# Patient Record
Sex: MALE | Race: WHITE | NOT HISPANIC OR LATINO | Employment: UNEMPLOYED | URBAN - METROPOLITAN AREA
[De-identification: names, ages, dates, MRNs, and addresses within clinical notes are randomized per-mention and may not be internally consistent; named-entity substitution may affect disease eponyms.]

---
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
## 2021-02-22 ENCOUNTER — TRANSFERRED RECORDS (OUTPATIENT)
Dept: HEALTH INFORMATION MANAGEMENT | Facility: CLINIC | Age: 21
End: 2021-02-22

## 2021-02-22 LAB
ALT SERPL-CCNC: 20 U/L (ref 4–50)
AST SERPL-CCNC: 35 U/L (ref 12–35)
CHOLEST SERPL-MCNC: 196 MG/DL (ref 90–200)
CREAT SERPL-MCNC: 0.9 MG/DL (ref 0.6–1.2)
GLUCOSE SERPL-MCNC: 84 MG/DL (ref 60–115)
HDLC SERPL-MCNC: 62 MG/DL
LDLC SERPL CALC-MCNC: 125 MG/DL
POTASSIUM SERPL-SCNC: 4.3 MMOL/L (ref 3.6–5.1)
TRIGL SERPL-MCNC: 44 MG/DL (ref 40–197)
TSH SERPL-ACNC: 1.84 ULU/ML (ref 0.27–4.2)

## 2021-03-15 ENCOUNTER — TRANSFERRED RECORDS (OUTPATIENT)
Dept: HEALTH INFORMATION MANAGEMENT | Facility: CLINIC | Age: 21
End: 2021-03-15
Payer: COMMERCIAL

## 2021-04-21 ENCOUNTER — TRANSFERRED RECORDS (OUTPATIENT)
Dept: HEALTH INFORMATION MANAGEMENT | Facility: CLINIC | Age: 21
End: 2021-04-21
Payer: COMMERCIAL

## 2021-05-07 ENCOUNTER — TRANSFERRED RECORDS (OUTPATIENT)
Dept: HEALTH INFORMATION MANAGEMENT | Facility: CLINIC | Age: 21
End: 2021-05-07

## 2021-05-07 ENCOUNTER — MEDICAL CORRESPONDENCE (OUTPATIENT)
Dept: HEALTH INFORMATION MANAGEMENT | Facility: CLINIC | Age: 21
End: 2021-05-07

## 2021-05-10 ENCOUNTER — TRANSCRIBE ORDERS (OUTPATIENT)
Dept: OTHER | Age: 21
End: 2021-05-10

## 2021-05-10 DIAGNOSIS — G24.9 DYSTONIA: Primary | ICD-10-CM

## 2021-05-14 NOTE — TELEPHONE ENCOUNTER
RECORDS RECEIVED FROM: External   Date of Appt: 5/18/21   NOTES (FOR ALL VISITS) STATUS DETAILS   OFFICE NOTE from referring provider Received Tatyana MEDEL @ [facility]:  5/7/21  3/15/21  2/22/21   OFFICE NOTE from other specialist Received Dr Lonnie Parr @ [facility]:  6/17/19 2/22/19    Dr Colby Elizabeth @ [facility]:  7/25/18 5/29/18 1/31/18   DISCHARGE SUMMARY from hospital N/A    DISCHARGE REPORT from the ER Received [facility]:  3/26/18  Removal and Replacement of pulse generator    [facility]:  12/12/13  DBS Placement    [facility]:  12/2/13  DBS Placement   OPERATIVE REPORT N/A    MEDICATION LIST Care Everywhere    IMAGING  (FOR ALL VISITS)     EMG N/A    EEG N/A    LUMBAR PUNCTURE N/A    AKILA SCAN N/A    ULTRASOUND (CAROTID BILAT) *VASCULAR* N/A    MRI (HEAD, NECK, SPINE) Received [facility]:  MRI Head 12/2/13  MRI Head 11/27/13  MRI Complete Spine 11/27/13  MRI Head 2/11/13  MRI Head 8/24/10   CT (HEAD, NECK, SPINE) Received [facility]:  CT Head 12/2/13      Action 5/14/21 MV 7.18am   Action Taken Records request faxed to [facility]     Action 5/17/21 MV 1.55pm   Action Taken Records received from [facility] via fax. Sent to scanning    Waiting for images.     Action 5/27/21 MV 10.36am   Action Taken Images received from [facility] and resolved in PACS

## 2021-05-18 ENCOUNTER — OFFICE VISIT (OUTPATIENT)
Dept: NEUROLOGY | Facility: CLINIC | Age: 21
End: 2021-05-18
Attending: PHYSICIAN ASSISTANT
Payer: COMMERCIAL

## 2021-05-18 ENCOUNTER — PRE VISIT (OUTPATIENT)
Dept: NEUROLOGY | Facility: CLINIC | Age: 21
End: 2021-05-18

## 2021-05-18 VITALS
SYSTOLIC BLOOD PRESSURE: 110 MMHG | DIASTOLIC BLOOD PRESSURE: 60 MMHG | WEIGHT: 131 LBS | RESPIRATION RATE: 16 BRPM | HEART RATE: 76 BPM | OXYGEN SATURATION: 97 %

## 2021-05-18 DIAGNOSIS — G24.3 CERVICAL DYSTONIA: ICD-10-CM

## 2021-05-18 DIAGNOSIS — G24.1 DYSTONIA, TORSION, FRAGMENTS OF: ICD-10-CM

## 2021-05-18 DIAGNOSIS — G24.1 GENERALIZED TORSION DYSTONIA: Primary | ICD-10-CM

## 2021-05-18 DIAGNOSIS — R26.9 GAIT DIFFICULTY: ICD-10-CM

## 2021-05-18 PROCEDURE — 99205 OFFICE O/P NEW HI 60 MIN: CPT | Mod: 25 | Performed by: PSYCHIATRY & NEUROLOGY

## 2021-05-18 PROCEDURE — 95970 ALYS NPGT W/O PRGRMG: CPT | Mod: GC | Performed by: PSYCHIATRY & NEUROLOGY

## 2021-05-18 RX ORDER — CARBIDOPA AND LEVODOPA 25; 100 MG/1; MG/1
TABLET ORAL 4 TIMES DAILY
COMMUNITY
Start: 2021-05-07 | End: 2021-06-18

## 2021-05-18 RX ORDER — SERTRALINE HYDROCHLORIDE 25 MG/1
50 TABLET, FILM COATED ORAL EVERY MORNING
Status: ON HOLD | COMMUNITY
Start: 2021-05-09 | End: 2022-04-11

## 2021-05-18 ASSESSMENT — PAIN SCALES - GENERAL: PAINLEVEL: NO PAIN (0)

## 2021-05-18 NOTE — LETTER
2021       RE: Bradley Garcia  [address]  [facility] [address]     Dear Colleague,    Thank you for referring your patient, Bradley Garcia, to the [facility] NEUROLOGY CLINIC [facility]. Please see a copy of my visit note below.    Department of Neurology  Movement Disorders Division   Initial Clinic Evaluation     Patient: Bradley Garcia   MRN: [id number]   : 2000   Date of Visit: 2021     Referring Provider: Tatyana Low PA-C     Dear Tatyana Low , Thank you for your referral of Bradley Garcia for dystonia.    History of Present Illness  Mr. Garcia is a 20 year old male with childhood onset of generalized dystonia with dystonic head tremor , s/p bilateral GPi DBS 2013 at [facility] who is transitioning care to [facility] .    He presents today accompanied by his father who helps with history . Bradley met his childhood milestones on time, pregnancy and delivery was uneventful .    When he start crawling he kept left fist closed , things progressed to the point that he was not using his left arm, and with progression to the right arm.When Bradley was 10-11 years of age he was noted to drag his left leg. In  at 13 years of age he underwent bilateral GPi DBS implantation at [facility]. After surgery he noted almost 80% benefit in improvement of dystonia symptoms but unfortunately lost benefit in 2-3 months after surgery. He states handwriting improved right after surgery. He denies difficulty swallowing .   In 2018 generator was changed to [employer].    He mentioned that he turned his DBS for 1 week at one point and dystonia got only slightly worse.    He received Botulinum toxin injection for dystonic head tremor wtih great benefit , he stopped a couple of years ago for unclear reason. Bradley and his father are interested in further programming options in order to  help with dystonia symptoms. The most bothersome symptom is the neck pulling and pain.    He has 3 programs, his preferred Program is A ( Active Program).    FHx: brother with Di Elkin syndrome, sister - no issues .    Review of Systems:  Other than that mentioned above and at the end of this note, the remainder of 12 systems reviewed were negative.    Medications:  Current Outpatient Medications   Medication Sig Dispense Refill     carbidopa-levodopa (SINEMET)  MG tablet 4 times daily        sertraline (ZOLOFT) 25 MG tablet Take 25 mg by mouth daily          Allergies: has No Known Allergies.    Past Medical History:   Past Medical History:   Diagnosis Date     Depression      PONV (postoperative nausea and vomiting)      S/P deep brain stimulator placement      Torsion dystonia        Past Surgical History:   Past Surgical History:   Procedure Laterality Date     ANESTHESIA OUT OF OR MRI       EYE SURGERY       NOSE SURGERY      nasal fracture repair     STEREOTACTIC INSERTION DEEP BRAIN STIMULATION         Social History:   Social History     Socioeconomic History     Marital status: Single     Spouse name: Not on file     Number of children: Not on file     Years of education: Not on file     Highest education level: Not on file   Occupational History     Not on file   Social Needs     Financial resource strain: Not on file     Food insecurity     Worry: Not on file     Inability: Not on file     Transportation needs     Medical: Not on file     Non-medical: Not on file   Tobacco Use     Smoking status: Not on file   Substance and Sexual Activity     Alcohol use: Not on file     Drug use: Not on file     Sexual activity: Not on file   Lifestyle     Physical activity     Days per week: Not on file     Minutes per session: Not on file     Stress: Not on file   Relationships     Social connections     Talks on phone: Not on file     Gets together: Not on file     Attends Jew service: Not on file      Active member of club or organization: Not on file     Attends meetings of clubs or organizations: Not on file     Relationship status: Not on file     Intimate partner violence     Fear of current or ex partner: Not on file     Emotionally abused: Not on file     Physically abused: Not on file     Forced sexual activity: Not on file   Other Topics Concern     Not on file   Social History Narrative     Not on file       Family History:  Family History   Problem Relation Age of Onset     No Known Problems Mother      No Known Problems Father      No Known Problems Sister      Other - See Comments Brother         di bunny syndrome     Myocardial Infarction Maternal Grandmother      Hyperlipidemia Maternal Grandmother      Hyperlipidemia Maternal Grandfather      Unknown/Adopted Paternal Grandmother      Unknown/Adopted Paternal Grandfather      Deep Vein Thrombosis No family hx of      Anesthesia Reaction No family hx of           Physical Exam:  The patient's  weight is 59.4 kg (131 lb). His blood pressure is 110/60 and his pulse is 76. His respiration is 16 and oxygen saturation is 97%.    Neurological Examination: He is alert and oriented, speech is dysarthric , follows commands.  He has severe retrocollis, left latero wally at 45 degrees and right torticollis  ~30 degrees. He has right shoulder elevation.  He has multidirectional, jerky head movements consistent with dystonic tremor .  Right hand posturing . Left hand - difficulty opening .  Left truncal flexion during ambulation.    Data Reviewed:   Records from 12/13/2013 [facility] : negative THAP1 and TOR 1 gene for genetic dystonia , Duplication of Chromosome 22 , negative genetic testing for myotonic dystrophy and negative for fascioscapulohumeral dystrophy .    Procedure: DBS Programming    Lead(s):    Left Right   DBS Target GPi    GPi      DBS Lead Type Medtronic 3387       Medtronic 3387    Lead Implant Date 12/12/ 2013 12/12/ 2013         IPG(s):   1 2   IPG    Activa RC       IPG Implant Date    03/26/2018       Location Right chest           Battery (V)    25%         Full Impedence/Currents Check: no issues were found in patient's leads    Initial and final Settings:  Left Brain Program A Program B Program C Program D   Contacts  C+1,2 neg  C pos 1,2 neg   Cpos 1,2 neg  Cpos 1,2 neg    Amplitude (V)  6.0   5.0  6.0  5.0   Pulse Width ( s)  120  120   120  120   Frequency (Hz)  60  100    100   60   Impedence/ Currents 602 / 9.8  /   /   /      Right Brain Program A Program B Program C Program D   Contacts  9 neg, 10 pos   Cpos 9, 10 neg   9 neg 10 pos    C pos, 9, 10 neg   Amplitude (V)  7.5  5.0  7.5   5.0   Pulse Width ( s)  120  120  120  120   Frequency (Hz)  60  100  100  60   Impedence/ Currents 1327 / 5.7  /   /   /      Initial and final program selected:    Program A .       Impression:  Bradley Garcia is a 20 year old male with Childhood onset of generalized dystonia with dystonic head tremor , s/p bilateral GPi DBS 12/2013 at [facility] who is transitioning care to [facility].    Recommendations:     1. Will require a 2 hr programming appointment with Dr. Aleman.    2. Started DBS work up for possible lead revision .      Thank you for the opportunity to share in the care of this patient.  Please feel free to contact me if you have any further questions or comments.    Sincerely,    May Polo MD   Movement Disorders Fellow       Neurology Attending Attestation:     I, Viviana Aleman MD, personally saw this patient with our Movement Disorders Fellow and agree with the fellow's findings and plan of care as documented in the movement disorder fellow's note. I personally performed salient aspects of the history and neurological examination.     I personally reviewed the vital signs, medications, and labs. I personally viewed the imaging, and agree with the interpretation documented by the fellow.    20-year-old  right-handed man with childhood onset dystonia and dystonic tremor (left hand/arm onset when crawling, gait involvement left leg dragging onset at 10-11, spread to cervical age 14, after DBS; axial involvement, need to clarify onset) status post bilateral (GPi?) DBS at age 12/13 at [facility]. 80% benefit for 2-3 months, none thereafter. Has even turned DBS off for at least 1 week duration with minimal change.    Exam: generalized dystonia (left leg, axial [prominent bending to the left with gait], cervical dystonia [marked retrocollis, left laterocollis, right torticollis], bilateral distal arm [left worse than right]) with prominent irregular, jerky dystonic tremor involving the trunk and neck.    History and exam consistent with idiopathic generalized dystonia with dystonic tremor, childhood onset. Description of only 2-3 month benefit after initial DBS surgery despite programming suggests that DBS leads are not in the GPi, or not in the somatomotor GPi. Plan: imaging to map out lead location (Stereotactic brain 1.5T MRI at [facility] with transmit-receive only head coil--to define anatomy, and stereotactic head CT-to most accurately delineate lead location after merging with MRI). We will also initiate the remainder of our DBS evaluation (except wait on neurosurgery evaluation until we have lead locations and have tried programming troubleshooting using lead locations), including videotaped rating scales, neuropsychological testing.     If lead locations are not in somatomotor GPi, as suspected, then after discussion with our DBS consensus team it is likely we will recommend lead removal. Interval 7T imaging for targeting once current DBS system is removed, followed by reimplantation of a new system with CAMILLE guidance for accurate placement in the somatomotor GPi. With GPi stimulation, it is anticipated that he will have significant improvement in his dystonia.    75 minutes spent on the date of the encounter  doing chart review, history and exam, documentation and further activities as noted above    Additional time spent for separate DBS programming: DBS interrogated without reprogramming.    Viviana Aleman MD    of Neurology

## 2021-05-18 NOTE — PATIENT INSTRUCTIONS
1. We started the DBS work up for lead revision.     2. We ordered MRI brain, CT head , you will require intravenous sedation .    3. You will be seen by one of our neurosurgeons and neurophysiologist.

## 2021-05-18 NOTE — PROGRESS NOTES
Department of Neurology  Movement Disorders Division   Initial Clinic Evaluation     Patient: Bradley Garcia   MRN: [id number]   : 2000   Date of Visit: 2021     Referring Provider: Tatyana Low PA-C     Dear Tatyana Low , Thank you for your referral of Bradley Garcia for dystonia.    History of Present Illness  Mr. Garcia is a 20 year old male with childhood onset of generalized dystonia with dystonic head tremor , s/p bilateral GPi DBS 2013 at [facility] who is transition[facility] care to [facility]  .    He presents today accompanied by his father who helps with history . Bradley met his childhood milestones on time, pregnancy and delivery was uneventful .    When he start crawling he kept left fist closed , things progressed to the point that he was not using his left arm, and with progression to the right arm.When Bradley was 10-11 years of age he was noted to drag his left leg. In  at 13 years of age he underwent bilateral GPi DBS implantation at [facility]. After surgery he noted almost 80% benefit in improvement of dystonia symptoms but unfortunately lost benefit in 2-3 months after surgery. He states handwriting improved right after surgery. He denies difficulty swallowing .   In 2018 generator was changed to [employer].    He mentioned that he turned his DBS for 1 week at one point and dystonia got only slightly worse.    He received Botulinum toxin injection for dystonic head tremor wtih great benefit , he stopped a couple of years ago for unclear reason. Bradley and his father are interested in further programming options in order to help with dystonia symptoms. The most bothersome symptom is the neck pulling and pain.    He has 3 programs, his preferred Program is A ( Active Program).    FHx: brother with Di Elkin syndrome, sister - no issues .    Review of Systems:  Other than that mentioned above and at the end of this note, the remainder of 12 systems  reviewed were negative.    Medications:  Current Outpatient Medications   Medication Sig Dispense Refill     carbidopa-levodopa (SINEMET)  MG tablet 4 times daily        sertraline (ZOLOFT) 25 MG tablet Take 25 mg by mouth daily          Allergies: has No Known Allergies.    Past Medical History:   Past Medical History:   Diagnosis Date     Depression      PONV (postoperative nausea and vomiting)      S/P deep brain stimulator placement      Torsion dystonia        Past Surgical History:   Past Surgical History:   Procedure Laterality Date     ANESTHESIA OUT OF OR MRI       EYE SURGERY       NOSE SURGERY      nasal fracture repair     STEREOTACTIC INSERTION DEEP BRAIN STIMULATION         Social History:   Social History     Socioeconomic History     Marital status: Single     Spouse name: Not on file     Number of children: Not on file     Years of education: Not on file     Highest education level: Not on file   Occupational History     Not on file   Social Needs     Financial resource strain: Not on file     Food insecurity     Worry: Not on file     Inability: Not on file     Transportation needs     Medical: Not on file     Non-medical: Not on file   Tobacco Use     Smoking status: Not on file   Substance and Sexual Activity     Alcohol use: Not on file     Drug use: Not on file     Sexual activity: Not on file   Lifestyle     Physical activity     Days per week: Not on file     Minutes per session: Not on file     Stress: Not on file   Relationships     Social connections     Talks on phone: Not on file     Gets together: Not on file     Attends Hoahaoism service: Not on file     Active member of club or organization: Not on file     Attends meetings of clubs or organizations: Not on file     Relationship status: Not on file     Intimate partner violence     Fear of current or ex partner: Not on file     Emotionally abused: Not on file     Physically abused: Not on file     Forced sexual activity: Not on  file   Other Topics Concern     Not on file   Social History Narrative     Not on file       Family History:  Family History   Problem Relation Age of Onset     No Known Problems Mother      No Known Problems Father      No Known Problems Sister      Other - See Comments Brother         di bunny syndrome     Myocardial Infarction Maternal Grandmother      Hyperlipidemia Maternal Grandmother      Hyperlipidemia Maternal Grandfather      Unknown/Adopted Paternal Grandmother      Unknown/Adopted Paternal Grandfather      Deep Vein Thrombosis No family hx of      Anesthesia Reaction No family hx of           Physical Exam:  The patient's  weight is 59.4 kg (131 lb). His blood pressure is 110/60 and his pulse is 76. His respiration is 16 and oxygen saturation is 97%.    Neurological Examination: He is alert and oriented, speech is dysarthric , follows commands.  He has severe retrocollis, left latero wally at 45 degrees and right torticollis  ~30 degrees. He has right shoulder elevation.  He has multidirectional, jerky head movements consistent with dystonic tremor .  Right hand posturing . Left hand - difficulty opening .  Left truncal flexion during ambulation.    Data Reviewed:   Records from 12/13/2013 [facility] : negative THAP1 and TOR 1 gene for genetic dystonia , Duplication of Chromosome 22 , negative genetic testing for myotonic dystrophy and negative for fascioscapulohumeral dystrophy .    Procedure: DBS Programming    Lead(s):    Left Right   DBS Target GPi    GPi      DBS Lead Type Medtronic 3387       Medtronic 3387    Lead Implant Date 12/12/ 2013 12/12/ 2013        IPG(s):   1 2   IPG    Activa RC       IPG Implant Date    03/26/2018       Location Right chest           Battery (V)    25%         Full Impedence/Currents Check: no issues were found in patient's leads    Initial and final Settings:  Left Brain Program A Program B Program C Program D   Contacts  C+1,2 neg  C pos 1,2 neg   Cpos  1,2 neg  Cpos 1,2 neg    Amplitude (V)  6.0   5.0  6.0  5.0   Pulse Width ( s)  120  120   120  120   Frequency (Hz)  60  100    100   60   Impedence/ Currents 602 / 9.8  /   /   /      Right Brain Program A Program B Program C Program D   Contacts  9 neg, 10 pos   Cpos 9, 10 neg   9 neg 10 pos    C pos, 9, 10 neg   Amplitude (V)  7.5  5.0  7.5   5.0   Pulse Width ( s)  120  120  120  120   Frequency (Hz)  60  100  100  60   Impedence/ Currents 1327 / 5.7  /   /   /      Initial and final program selected:    Program A .       Impression:  Bradley Garcia is a 20 year old male with Childhood onset of generalized dystonia with dystonic head tremor , s/p bilateral GPi DBS 12/2013 at [facility] who is transitioning care to [facility].    Recommendations:     1. Will require a 2 hr programming appointment with Dr. Aleman.    2. Started DBS work up for possible lead revision .      Thank you for the opportunity to share in the care of this patient.  Please feel free to contact me if you have any further questions or comments.    Sincerely,    May Polo MD   Movement Disorders Fellow       Neurology Attending Attestation:     I, Viviana Aleman MD, personally saw this patient with our Movement Disorders Fellow and agree with the fellow's findings and plan of care as documented in the movement disorder fellow's note. I personally performed salient aspects of the history and neurological examination.     I personally reviewed the vital signs, medications, and labs. I personally viewed the imaging, and agree with the interpretation documented by the fellow.    20-year-old right-handed man with childhood onset dystonia and dystonic tremor (left hand/arm onset when crawling, gait involvement left leg dragging onset at 10-11, spread to cervical age 14, after DBS; axial involvement, need to clarify onset) status post bilateral (GPi?) DBS at age 12/13 at [facility]. 80% benefit for 2-3 months, none  thereafter. Has even turned DBS off for at least 1 week duration with minimal change.    Exam: generalized dystonia (left leg, axial [prominent bending to the left with gait], cervical dystonia [marked retrocollis, left laterocollis, right torticollis], bilateral distal arm [left worse than right]) with prominent irregular, jerky dystonic tremor involving the trunk and neck.    History and exam consistent with idiopathic generalized dystonia with dystonic tremor, childhood onset. Description of only 2-3 month benefit after initial DBS surgery despite programming suggests that DBS leads are not in the GPi, or not in the somatomotor GPi. Plan: imaging to map out lead location (Stereotactic brain 1.5T MRI at [facility] with transmit-receive only head coil--to define anatomy, and stereotactic head CT-to most accurately delineate lead location after merging with MRI). We will also initiate the remainder of our DBS evaluation (except wait on neurosurgery evaluation until we have lead locations and have tried programming troubleshooting using lead locations), including videotaped rating scales, neuropsychological testing.     If lead locations are not in somatomotor GPi, as suspected, then after discussion with our DBS consensus team it is likely we will recommend lead removal. Interval 7T imaging for targeting once current DBS system is removed, followed by reimplantation of a new system with CAMILLE guidance for accurate placement in the somatomotor GPi. With GPi stimulation, it is anticipated that he will have significant improvement in his dystonia.    75 minutes spent on the date of the encounter doing chart review, history and exam, documentation and further activities as noted above    Additional time spent for separate DBS programming: DBS interrogated without reprogramming.    Viviana Aleman MD    of Neurology

## 2021-05-18 NOTE — NURSING NOTE
Authorization to discuss form and MyChart proxy access form completed and signed to discuss information and give MyChart access to Sonny (father) landen Levin (mother).

## 2021-05-18 NOTE — NURSING NOTE
Chief Complaint   Patient presents with     Consult     P NEW MOVEMENT DISORDER      Olvin Cosme

## 2021-05-19 ENCOUNTER — TELEPHONE (OUTPATIENT)
Dept: NEUROLOGY | Facility: CLINIC | Age: 21
End: 2021-05-19

## 2021-05-19 NOTE — TELEPHONE ENCOUNTER
Writer left a message for the patient to call back. Patient needs to schedule MRI again as the last  scheduled the patient incorrectly.

## 2021-05-20 ENCOUNTER — PRE VISIT (OUTPATIENT)
Dept: SURGERY | Facility: CLINIC | Age: 21
End: 2021-05-20

## 2021-05-20 ENCOUNTER — TELEPHONE (OUTPATIENT)
Dept: NEUROLOGY | Facility: CLINIC | Age: 21
End: 2021-05-20

## 2021-05-20 ENCOUNTER — ANESTHESIA EVENT (OUTPATIENT)
Dept: SURGERY | Facility: CLINIC | Age: 21
End: 2021-05-20

## 2021-05-20 ENCOUNTER — PRE VISIT (OUTPATIENT)
Dept: NEUROSURGERY | Facility: CLINIC | Age: 21
End: 2021-05-20

## 2021-05-20 ASSESSMENT — LIFESTYLE VARIABLES: TOBACCO_USE: 0

## 2021-05-20 ASSESSMENT — COPD QUESTIONNAIRES: COPD: 0

## 2021-05-20 NOTE — TELEPHONE ENCOUNTER
FUTURE VISIT INFORMATION      SURGERY INFORMATION:    MRI - Neurology     6/9/21.    [facility] [place]    Consult: ov 5/18    RECORDS REQUESTED FROM:       Primary Care Provider: [facility]

## 2021-05-20 NOTE — TELEPHONE ENCOUNTER
Patient was informed that his MRI needed to be canceled because it was scheduled incorrectly previously.    The patient was scheduled on 6/16/21 at noon.    Writer left a message on the patient's voicemail to call back to set up a Deep Brain Stimulation interrogation time with the Deep Brain Stimulation nurse prior to the MRI.

## 2021-05-20 NOTE — TELEPHONE ENCOUNTER
FUTURE VISIT INFORMATION      FUTURE VISIT INFORMATION:    Date: 6/1/2021    Time: 830am    [place] [facility] [place]  REFERRAL INFORMATION:    Referring provider:  Dr. Aleman    Referring providers clinic:  [facility]     Reason for visit/diagnosis  DBS     RECORDS REQUESTED FROM:       Clinic name Comments Records Status Imaging Status   Internal Dr. Aleman-5/18/2021 Epic N/A

## 2021-05-20 NOTE — TELEPHONE ENCOUNTER
What we need:  -Deep Brain Stimulation Lead model and serial number:   -Deep Brain Stimulation battery model and serial number:  -Monopolar Survey on each side  -Last couple programming visits    -Notes we have from the 7/26/2018 programming visit at [facility] visit stated programming notes were lost in clinic before documented in the EMR  -Saw Dr. Elizabeth    Called Neurology at [facility] and spoke to Tamela, Nurse. Tamela stated there system only goes back to 2018. His notes are otherwise archived and will need to come from medical records. She was not able to verify his Deep Brain Stimulation system model or serial numbers. Medical records transferred me to their release of information department. Voicemail left regarding what we need specifically for this patient for continuity of care. I asked that they call me back.    [facility] Records: [phone]  [facility]: [phone]  [facility] MR: [id number]

## 2021-05-25 DIAGNOSIS — Z11.59 ENCOUNTER FOR SCREENING FOR OTHER VIRAL DISEASES: ICD-10-CM

## 2021-05-26 NOTE — TELEPHONE ENCOUNTER
"Current generator: Activa RC - implanted March 26 th 2018 Serial number [id number], Model: 37553    BGPI: Lead model (on each side): 3387    Called [employer] Provider Support line and spoke to Mendoza. Mendoza confirmed the above system and reported he should not have a pocket adapter with his system. \"His inline extension wire model is [id number] and he was originally implanted with an Activa PC so I see no reason why he would have had a pocket adapter implanted.\"        "

## 2021-05-27 ENCOUNTER — DOCUMENTATION ONLY (OUTPATIENT)
Dept: NEUROLOGY | Facility: CLINIC | Age: 21
End: 2021-05-27

## 2021-05-27 NOTE — PROGRESS NOTES
Received records from [facility]  Records Date of service: 5/20/21  Copy has been sent to scanning and encounter routed to specialty nurse for review.

## 2021-06-01 ENCOUNTER — VIRTUAL VISIT (OUTPATIENT)
Dept: NEUROPSYCHOLOGY | Facility: CLINIC | Age: 21
End: 2021-06-01
Payer: COMMERCIAL

## 2021-06-01 ENCOUNTER — VIRTUAL VISIT (OUTPATIENT)
Dept: NEUROSURGERY | Facility: CLINIC | Age: 21
End: 2021-06-01
Payer: COMMERCIAL

## 2021-06-01 DIAGNOSIS — G24.1 DYSTONIA, TORSION, FRAGMENTS OF: Primary | ICD-10-CM

## 2021-06-01 DIAGNOSIS — F09 MENTAL DISORDER DUE TO GENERAL MEDICAL CONDITION: ICD-10-CM

## 2021-06-01 DIAGNOSIS — G24.9 DYSTONIA: Primary | ICD-10-CM

## 2021-06-01 PROCEDURE — 99207 PR NO CHARGE LOS: CPT

## 2021-06-01 PROCEDURE — 99204 OFFICE O/P NEW MOD 45 MIN: CPT | Mod: GT | Performed by: NEUROLOGICAL SURGERY

## 2021-06-01 NOTE — PROGRESS NOTES
Neurosurgery Attending DBS Consult    No chief complaint on file.      HISTORY OF PRESENT ILLNESS  Bradley Garcia is a 21 y/o right handed young man with idiopathic generalized dystonia s/p bilateral GPi DBS in  at [facility] who comes for neurosurgical consultation regarding possible lead revision. Of note, this was a video visit. His parents noted when he was 3 that he was not meeting his motor milestones, in particular, dragging his left leg and not moving his left side as well in general. 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 He underwent DBS at about age 13 and got marked improvement for about 2-3 months with subsequent return to baseline function. No programming changes have helped since then. He turned the DBS for a week at one point and noted no difference in his symptoms. His IPG  3 years ago and he was unaware as again, he noted no change in his symptoms.       ON/OFF testing:  Fahn/Gomez Scale (higher=worse):  Movement: 58/120  Disability: 10/30    MRI:  I imported his most recent MRI into the [employer] DBS planning platform to get a sense for where his electrodes are currently. He has a high quality stereotactic (1mm cuts) T1 MRI post-operatively from 2013 as well as SWI/MIP images though the artifact from the leads is quite large on the latter images. Although I do not have all the necessary sequences to plan a GPi case, it appears the electrodes are about 1 cm anterior and a few mm medial to where we would typically target in order to hit somatomotor GPi.     Neuropsych testing: Impression pending    DBS goals:  Walk and move better, not as bothered by the tremor      Past Medical History:   Diagnosis Date     Depression      PONV (postoperative nausea and vomiting)      S/P deep brain stimulator placement      Torsion dystonia        Past Surgical  History:   Procedure Laterality Date     ANESTHESIA OUT OF OR MRI       EYE SURGERY       NOSE SURGERY      nasal fracture repair     STEREOTACTIC INSERTION DEEP BRAIN STIMULATION         Family History   Problem Relation Age of Onset     No Known Problems Mother      No Known Problems Father      No Known Problems Sister      Other - See Comments Brother         di bunny syndrome     Myocardial Infarction Maternal Grandmother      Hyperlipidemia Maternal Grandmother      Hyperlipidemia Maternal Grandfather      Unknown/Adopted Paternal Grandmother      Unknown/Adopted Paternal Grandfather      Deep Vein Thrombosis No family hx of      Anesthesia Reaction No family hx of        Social History     Socioeconomic History     Marital status: Single     Spouse name: Not on file     Number of children: 0     Years of education: Not on file     Highest education level: Not on file   Occupational History     Occupation: unemployed   Social Needs     Financial resource strain: Not on file     Food insecurity     Worry: Not on file     Inability: Not on file     Transportation needs     Medical: Not on file     Non-medical: Not on file   Tobacco Use     Smoking status: Never Smoker     Smokeless tobacco: Never Used   Substance and Sexual Activity     Alcohol use: Never     Frequency: Never     Binge frequency: Never     Drug use: Never     Sexual activity: Not on file   Lifestyle     Physical activity     Days per week: Not on file     Minutes per session: Not on file     Stress: Not on file   Relationships     Social connections     Talks on phone: Not on file     Gets together: Not on file     Attends Cheondoism service: Not on file     Active member of club or organization: Not on file     Attends meetings of clubs or organizations: Not on file     Relationship status: Not on file     Intimate partner violence     Fear of current or ex partner: Not on file     Emotionally abused: Not on file     Physically abused: Not on  file     Forced sexual activity: Not on file   Other Topics Concern     Not on file   Social History Narrative     Not on file        No Known Allergies    Current Outpatient Medications   Medication     carbidopa-levodopa (SINEMET)  MG tablet     sertraline (ZOLOFT) 25 MG tablet     No current facility-administered medications for this visit.          PHYSICAL EXAM  There were no vitals taken for this visit.    Neurological  Awake, alert and oriented to date, time, place and person. Speech fluent.           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 Would recommend obtaining a CT in addition to a high quality MRI so we can merge the images to reduce the effect of artifact and reconstruct the lead position. Just grossly examining the images we do have in the planning software, it appears the leads are quite anterior and would explain his lack of benefit. If we did decide to revise these leads, we would likely have to remove the entire system, give him some time to heal and then place the new system in standard fashion. This would consist of both phase I and II of DBS surgery. We discussed that Phase I would place the DBS electrode on the right side under MAC and microelectrode recording. He would then return one week later for phase II with placement of the DBS generator/battery over the right chest wall under general anesthesia. He  would then return three weeks later for phase I with placement of the DBS electrode on the left side under MAC and microelectrode recording.    Briefly, the following topics were discussed regarding device options. Given his dystonia, we are likely limited to the Medtr[employer] device platform.    Medtronic  MRI compatible.  Non-rechargeable and rechargeable generator/battery available.  4 contact electrode.  Communication method: have the   or patient controller on the skin directly over the generator/battery.      The risks, benefits, alternative therapies were discussed with the patient, including but not limited to infection and bleeding. Surgical procedure was discussed in detail. All questions were answered, and he  expressed understanding. His  case will be discussed at the Movement Disorder Consensus Group Meeting to determine whether he  is a good candidate for DBS surgery.     PLAN:  1. We will discuss his  case at the Movement Disorder Consensus Group Meeting, and we will contact him  regarding his DBS candidacy.

## 2021-06-01 NOTE — LETTER
2021       RE: Bradley Garcia  [id number]  [facility] [id number]     Dear Colleague,    Thank you for referring your patient, Bradley Garcia, to the [facility] [place] [facility]. Please see a copy of my visit note below.    Neurosurgery Attending DBS Consult    No chief complaint on file.      HISTORY OF PRESENT ILLNESS  Bradley Garcia is a 19 y/o right handed young man with idiopathic generalized dystonia s/p bilateral GPi DBS in  at [facility] who comes for neurosurgical consultation regarding possible lead revision. Of note, this was a video visit. His parents noted when he was 3 that he was not meeting his motor milestones, in particular, dragging his left leg and not moving his left side as well in general. 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 He underwent DBS at about age 13 and got marked improvement for about 2-3 months with subsequent return to baseline function. No programming changes have helped since then. He turned the DBS for a week at one point and noted no difference in his symptoms. His IPG  3 years ago and he was unaware as again, he noted no change in his symptoms.       ON/OFF testing:  Fahn/Gomez Scale (higher=worse):  Movement: 58/120  Disability: 10/30    MRI:  I imported his most recent MRI into the [employer] DBS planning platform to get a sense for where his electrodes are currently. He has a high quality stereotactic (1mm cuts) T1 MRI post-operatively from 2013 as well as SWI/MIP images though the artifact from the leads is quite large on the latter images. Although I do not have all the necessary sequences to plan a GPi case, it appears the electrodes are about 1 cm anterior and a few mm medial to where we would typically target in order to  hit somatomotor GPi.     Neuropsych testing: Impression pending    DBS goals:  Walk and move better, not as bothered by the tremor      Past Medical History:   Diagnosis Date     Depression      PONV (postoperative nausea and vomiting)      S/P deep brain stimulator placement      Torsion dystonia        Past Surgical History:   Procedure Laterality Date     ANESTHESIA OUT OF OR MRI       EYE SURGERY       NOSE SURGERY      nasal fracture repair     STEREOTACTIC INSERTION DEEP BRAIN STIMULATION         Family History   Problem Relation Age of Onset     No Known Problems Mother      No Known Problems Father      No Known Problems Sister      Other - See Comments Brother         di bunny syndrome     Myocardial Infarction Maternal Grandmother      Hyperlipidemia Maternal Grandmother      Hyperlipidemia Maternal Grandfather      Unknown/Adopted Paternal Grandmother      Unknown/Adopted Paternal Grandfather      Deep Vein Thrombosis No family hx of      Anesthesia Reaction No family hx of        Social History     Socioeconomic History     Marital status: Single     Spouse name: Not on file     Number of children: 0     Years of education: Not on file     Highest education level: Not on file   Occupational History     Occupation: unemployed   Social Needs     Financial resource strain: Not on file     Food insecurity     Worry: Not on file     Inability: Not on file     Transportation needs     Medical: Not on file     Non-medical: Not on file   Tobacco Use     Smoking status: Never Smoker     Smokeless tobacco: Never Used   Substance and Sexual Activity     Alcohol use: Never     Frequency: Never     Binge frequency: Never     Drug use: Never     Sexual activity: Not on file   Lifestyle     Physical activity     Days per week: Not on file     Minutes per session: Not on file     Stress: Not on file   Relationships     Social connections     Talks on phone: Not on file     Gets together: Not on file     Attends  Temple service: Not on file     Active member of club or organization: Not on file     Attends meetings of clubs or organizations: Not on file     Relationship status: Not on file     Intimate partner violence     Fear of current or ex partner: Not on file     Emotionally abused: Not on file     Physically abused: Not on file     Forced sexual activity: Not on file   Other Topics Concern     Not on file   Social History Narrative     Not on file        No Known Allergies    Current Outpatient Medications   Medication     carbidopa-levodopa (SINEMET)  MG tablet     sertraline (ZOLOFT) 25 MG tablet     No current facility-administered medications for this visit.          PHYSICAL EXAM  There were no vitals taken for this visit.    Neurological  Awake, alert and oriented to date, time, place and person. Speech fluent.           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 Would recommend obtaining a CT in addition to a high quality MRI so we can merge the images to reduce the effect of artifact and reconstruct the lead position. Just grossly examining the images we do have in the planning software, it appears the leads are quite anterior and would explain his lack of benefit. If we did decide to revise these leads, we would likely have to remove the entire system, give him some time to heal and then place the new system in standard fashion. This would consist of both phase I and II of DBS surgery. We discussed that Phase I would place the DBS electrode on the right side under MAC and microelectrode recording. He would then return one week later for phase II with placement of the DBS generator/battery over the right chest wall under general anesthesia. He  would then return three weeks later for phase I with placement of the DBS electrode on the left side under MAC and  microelectrode recording.    Briefly, the following topics were discussed regarding device options. Given his dystonia, we are likely limited to the Medtr[employer] device platform.    [employer]tronic  MRI compatible.  Non-rechargeable and rechargeable generator/battery available.  4 contact electrode.  Communication method: have the  or patient controller on the skin directly over the generator/battery.      The risks, benefits, alternative therapies were discussed with the patient, including but not limited to infection and bleeding. Surgical procedure was discussed in detail. All questions were answered, and he  expressed understanding. His  case will be discussed at the Movement Disorder Consensus Group Meeting to determine whether he  is a good candidate for DBS surgery.     PLAN:  1. We will discuss his  case at the Movement Disorder Consensus Group Meeting, and we will contact him  regarding his DBS candidacy.               Again, thank you for allowing me to participate in the care of your patient.      Sincerely,    Ravinder Coleman MD

## 2021-06-02 ENCOUNTER — TELEPHONE (OUTPATIENT)
Dept: NEUROLOGY | Facility: CLINIC | Age: 21
End: 2021-06-02

## 2021-06-02 NOTE — TELEPHONE ENCOUNTER
The patient was called and informed Dr. Aleman would like for him to do a Head CT on the day of his MRI as well. Patient was informed he was scheduled for the Head CT after his MRI is done on 6/16/21.    The patient verbalized his understanding and had no further questions.

## 2021-06-08 ENCOUNTER — OFFICE VISIT (OUTPATIENT)
Dept: NEUROPSYCHOLOGY | Facility: CLINIC | Age: 21
End: 2021-06-08
Payer: COMMERCIAL

## 2021-06-08 ENCOUNTER — MYC MEDICAL ADVICE (OUTPATIENT)
Dept: NEUROLOGY | Facility: CLINIC | Age: 21
End: 2021-06-08

## 2021-06-08 ENCOUNTER — ALLIED HEALTH/NURSE VISIT (OUTPATIENT)
Dept: NEUROLOGY | Facility: CLINIC | Age: 21
End: 2021-06-08
Payer: COMMERCIAL

## 2021-06-08 VITALS
HEART RATE: 60 BPM | BODY MASS INDEX: 21.05 KG/M2 | RESPIRATION RATE: 16 BRPM | OXYGEN SATURATION: 97 % | HEIGHT: 66 IN | WEIGHT: 131 LBS

## 2021-06-08 DIAGNOSIS — F09 MENTAL DISORDER DUE TO GENERAL MEDICAL CONDITION: ICD-10-CM

## 2021-06-08 DIAGNOSIS — G24.9 GENERALIZED DYSTONIA: Primary | ICD-10-CM

## 2021-06-08 DIAGNOSIS — G24.1 DYSTONIA, TORSION, FRAGMENTS OF: Primary | ICD-10-CM

## 2021-06-08 PROCEDURE — 96139 PSYCL/NRPSYC TST TECH EA: CPT

## 2021-06-08 PROCEDURE — 96132 NRPSYC TST EVAL PHYS/QHP 1ST: CPT

## 2021-06-08 PROCEDURE — 96138 PSYCL/NRPSYC TECH 1ST: CPT

## 2021-06-08 PROCEDURE — 90791 PSYCH DIAGNOSTIC EVALUATION: CPT

## 2021-06-08 PROCEDURE — 96133 NRPSYC TST EVAL PHYS/QHP EA: CPT

## 2021-06-08 PROCEDURE — 95970 ALYS NPGT W/O PRGRMG: CPT | Performed by: PSYCHIATRY & NEUROLOGY

## 2021-06-08 ASSESSMENT — PAIN SCALES - GENERAL: PAINLEVEL: NO PAIN (0)

## 2021-06-08 ASSESSMENT — MIFFLIN-ST. JEOR: SCORE: 1551.96

## 2021-06-08 NOTE — PROGRESS NOTES
Bradley comes into clinic today at the request of Dr. Aleman, ordering provider for follow-up visit to further evaluate his Deep Brain Stimulation system prior to his MRI. This service provided today was under the supervising provider of the day Dr. Aleman, who was available if needed.     Reason for visit: DEEP BRAIN STIMULATION Interrogation   Time spent on visit: 10 minutes    Interrogated patient's DBS Activa RC battery. Patient has BGPI. All impedances were checked at 3.0 Volts and were WN. See neuromodulation sheets scanned. Patient informed he is good to go for his MRI.    LGPI Therapy impedance = 572 ohms, 10.3 mA    Contacts used = C+/1-/2-   (Tripolar)  Upper limit = 7.0  Lower limit = 0  Current Setting = 6.0    RGPI Therapy impedance = 1,317 ohms, 5.7 mA    Contacts used = 2+/1-   (Bipolar)  Upper limit = 6.0  Lower limit = 0  Current Setting = 5.0    Model 20522    Battery voltage 75%    Mana Egan RN     Movement Disorder Care Coordinator  [facility]

## 2021-06-08 NOTE — LETTER
2021      RE: Bradley Garcia  [address] [facility] [id number]       Pt was seen for neuropsychological evaluation at the request of Viviana Aleman MD for the purposes of diagnostic clarification and treatment planning. 255 minutes of test administration and scoring were provided by this writer. Please see Dr. Katarina Klein's report for a full interpretation of the findings.      Iesha Bush  Psychometrist      NEUROPSYCHOLOGICAL EVALUATION    RELEVANT HISTORY AND REASON FOR REFERRAL    Bradley Garcia is a 20 year old, right handed, [employer]  with 12 years of formal education. Information was obtained via interview with the patient and review of his medical records. Records indicate a history of generalized dystonia. His parents have reported that they first brought him to the doctor because before the age of [age], he was crawling and not walking, and was not using his left hand the way he used his right hand. He is s/p bilateral Gpi DBS implantation in . He reportedly had good benefit for three months, until it wore off. He has had multiple programmings done at [facility] and [facility], without regaining benefit. He was not aware that his 0battery  in 2018 because he was not receiving benefit. He is being considered for revision, including removing and replacing the entire system. This neuropsychological evaluation was undertaken at the request of Viviana Aleman M.D., as part of the presurgical protocol, to assess cognitive functioning, mood and personality, as they pertain to his ability to make well reasoned medical decisions and follow through with treatment recommendations.    Precautions are in place related to COVID-19. In an effort to reduce the amount of time spent in the clinic, the interview was conducted remotely via video on 2021, and the testing took place in the clinic today.    CLINICAL INTERVIEW FINDINGS    Upon interview, Mr. Garcia stated that he  had his first DBS surgery in .  He believes that he had 2 or 3 months with good movements, but then he feels like he went back to where it was before.  He was not able to turn off the system.  When the battery , he did not notice a difference.  His goals for revision include having at least some improvement, with better hand and arm control.  He has a good understanding of the surgical procedure.  He understands that there is a risk of bleeding and infection, and that perhaps his risk of infection is elevated because they will be taking out medical devices and putting in new ones.  He stated that his family is excited for him to have the surgery, since they know firsthand how bad it has gotten.  If there is a chance that he could improve, they feel that it is worth it.    Mr. Garcia has not noticed changes in cognition.  He notices difficulty with word finding and attention, which he attributes to ADHD.  He does not believe that these have changed over time.  He has no trouble remembering what others have said, remembering names of familiar people, or becoming lost in familiar places.  He denied difficulty with decision-making.    Mr. Garcia lives with his parents.  He manages his own finances and medications.  He has alarms on his phone that help with the medications.  He stated that he drives and cooks without difficulty.  He has an ILS worker who helps with weekly tasks such as shopping.  He handles his personal cares independently.    Mr. Garcia reported a history of depression, which has been treated with medications prescribed by his primary care provider.  He has never been hospitalized for psychiatric care, and stated that he has not been in psychotherapy.  He described his mood as fine and normal, and he feels that his depression is well under control.  He is not having feelings of sadness, hopelessness, or guilt.  He stated that he is not experiencing any more anxiety than normal.  He sometimes  feels anxious when he is talking to people he does not know, or in job interviews.  He stated that his dystonia gets worse when he is stressed, and is found that it helps to take a few moments and take a deep breath. He sleeps 5-1/2 to 6 hours at night and has not been napping during the day.  His appetite has been good and his weight has been steady.  He stated that he is thinking about eating 4 meals a day instead of 3 because he has extra movements with his dystonia, so he needs extra intake.  His energy level varies.  He stated that it is all over the place but generally it is medium.  His interest level seems to be tied with his medications.  If he forgets to take them for a day or two then his interest level wanes.  When asked about visual hallucinations, he stated that when he is driving he is more focused on the cars ahead of him and sometimes thinks that he sees things on the side of the road like a new post that was not there.  He denied auditory hallucinations.  He denied suicidal ideation or any history of attempts to commit suicide.  He did not report alcohol, tobacco, or illicit drug use.    In school, Mr. Garcia reportedly had some academic accommodations, including a homeroom class where he had an hour to do his homework.  He had the accommodations throughout elementary school, middle school, and the first 2 years of high school.  He stated that there were times when he was in higher classes, such as taking eighth grade math when he was in the sixth grade.  Overall, his grades were average to below average, and he stated that he did not always do his homework.  He graduated from high school in 2020.  He expects to take a college course or two in the next year.  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  He recently was hired at [employer] as a , and he thinks that he will like this work.  He works 16 hours a week.    Around the age of 9 or 10, Mr. Garcia tripped while  playing ball and broke his nose on a metal fire pit.  He had a loss of consciousness and his parents were called and brought him to the hospital.  He denied any history of seizure or stroke.  He stated that his balance is surprisingly very good, stating that people would be concerned if they saw him walk.  He denied unilateral weakness or numbness.  He experiences headaches which he attributes to his neck being tight from the dystonia.    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     CURRENT MEDICATIONS:  Include carbidopa-levodopa (Sinemet), sertraline.    FAMILY MEDICAL HISTORY:  Significant for a brother with DiGeorge syndrome, and a maternal grandmother with myocardial infarction.    BEHAVIORAL OBSERVATIONS    During the evaluation, Mr. Garcia was pleasant, cooperative, and seemed to understand the instructions. He was alert and oriented to person, place, and time. A head tremor was noted clinically, and his head tilted backwards while sitting, although he maintained eye contact.  While walking, he tended to lean to the right. His hands were clenched and he gripped his pencil hard; when writing, he put his chin on his left hand and held on to the table, apparently to compensate for the tremor. He asked the technician to danica his responses on questionnaires. Mood was neutral. Speech was fluent and subtly dysarthric, with normal volume and rate. He presented his thoughts in a clear, logical manner. Internal performance validity measures fell within normal limits. The results are believed to accurately reflect his current level of functioning.     MEASURES ADMINISTERED    The following measures were administered by a trained psychometrist, under the direct supervision of a licensed psychologist.    Wechsler Abbreviated Scale of Intelligence - 2 (WASI-2); Subtests of the Wechsler Adult  Intelligence Scale-4; Reading subtest of the Wide Range Achievement Test-4; subtests of the Wechsler Memory Scale-IV; Swanson Verbal Learning Test-Revised, Form 6; Brief Visual Memory Test - Revised, Form 4; [place] Naming Test; D-KEFS Verbal Fluency, Standard Form; Trail Making Test; Stroop; [place] Card Sorting Test - 64; [place] Judgement of Line Orientation Form H; Clock Drawing; [place] Multiphasic Personality Inventory - 3 (MMPI-3); Daivs Depression Inventory-2 (BDI-2), HAM-D, HAM-A, Apathy Scale, RBDSQ; ESS.     RESULTS AND INTERPRETATION    Overall intellectual functioning fell in the average range (WASI Full 4 IQ = 93), with no significant different between verbal and nonverbal intellectual functioning. Premorbid intellectual functioning was also estimated to fall in the average range based on single word reading abilities.    Confrontation naming was average for his age and level of education. Expressive vocabulary was average. Verbal abstract reasoning was average. Letter fluency was exceptionally low, and generative naming to category and switching fluency were below average. Fluency tasks were notable for a paucity of responses rather than errors.    Attention span was average for his age. Divided attention was low average on a timed, visuomotor task, and was average on an untimed, auditory task. Performance on a measure of distractibility was average.     Basic visual perception, including matching lines and angles, was high average. Construction of a clock fell within normal limits, and was notable for a slight tremor. Assembly of visual material was average. Nonverbal deductive reasoning was average.    Novel problem solving, including the ability to generate strategies and solutions, fell within normal limits for his age and level of education.    Immediate recall of verbal narrative material was average, with high average recall following a 30 minute delay. On a multiple trial list learning task,  immediate recall was high average, with exceptionally low (42%) retention of the learned material following a 25 minute delay. Recognition memory on this task was not significantly better than free recall. Immediate recall of visual material was average, with low average recall following a 25 minute delay. Recognition memory on this task fell within normal limits.    On the MMPI-3, a self-report measure of mood and personality, he responded to the items in a consistent manner. He had a tendency to deny some minor faults and shortcomings that most people acknowledge, although the profile is considered to be valid. He reports an above average level of stress, and he reports not enjoying social events and avoiding social situations.     On the BDI-2, he did not report significant depressive symptomatology. He did report apathy on a scale. He did not endorse excessive daytime sleepiness or dream enactment behavior on questionnaires.    IMPRESSIONS AND RECOMMENDATIONS    Bradley Garcia is a 20 year old man with a history of generalized dystonia. He is s/p bilateral GPi DBS implantation in 2013, with only temporary benefit. He is being considered for replacement of the DBS system.    Results of this evaluation indicate slowed information and psychomotor processing speed. Memory is variable; it is impaired on a list learning task, but otherwise falls well within normal limits. Language, visual processing, complex attentional processing, and executive functioning all fall within normal limits, and overall intellectual functioning falls in the average range. Assessment of mood and personality is suggestive of an above average level of stress, as well as apathy, and otherwise falls within normal limits.    This pattern of performance does not suggest focal or lateralized cerebral involvement. He appears to be capable of comprehending medical information and making well reasoned decisions for himself. He has a good  understanding of the surgical procedures and the risks involved. He is not experiencing emotional problems which might interfere with his judgement or ability to follow through with treatment recommendations. He has a good support system in his family. Overall, he appears to be a good candidate for DBS surgery from a neurocognitive perspective.    In terms of daily functioning, Mr. Garcia is not experiencing cognitive difficulties that might interfere with his ability to actively participate in treatment or to manage his instrumental activities of daily living independently.  He is likely to take longer to complete tasks, and may find it helpful to provide himself with ample time when working on a task so that he does not become overwhelmed and work less efficiently.  Given variable memory, he may benefit from the use of written reminders or checklists.    Results may serve as a baseline of Mr. Garcia's neurocognitive functioning.  Repeated neuropsychological evaluation in 1 year may help to determine whether there are changes in cognition over time.      Katarina Klein, Ph.D., ABPP  Licensed Psychologist, LP [id number]  Board Certified in Clinical Neuropsychology    Time spent: One unit of professional time, including interview on 6/1/2021 (CPT 29412). 60 minutes (1 unit) neuropsychological testing evaluation by licensed and board-certified neuropsychologist, including integration of patient data, interpretation of standardized test results and clinical data, clinical decision-making, treatment planning, report, and interactive feedback to the patient, first hour (CPT 69670). 95 minutes (2 units) of neuropsychological testing evaluation by licensed and board-certified neuropsychologist, including integration of patient data, interpretation of standardized test results and clinical data, clinical decision-making, treatment planning, report, and interactive feedback to the patient, subsequent hours (CPT 63629). 30 minutes  of neuropsychological test administration and scoring by technician, first 30 minutes (CPT 43875). 225 additional minutes (7 units) neuropsychological test administration and scoring by technician, subsequent 30 minutes (CPT 52120). ICD-10 Diagnoses: G24.1; F06.8.          Katarina Klein, PhD LP

## 2021-06-08 NOTE — NURSING NOTE
Chief Complaint   Patient presents with     DBS Interrogation     Nurse Visit     P Nurse Visit     Brandon Noyola

## 2021-06-09 NOTE — PROGRESS NOTES
Pt was seen for neuropsychological evaluation at the request of Viviana Aleman MD for the purposes of diagnostic clarification and treatment planning. 255 minutes of test administration and scoring were provided by this writer. Please see Dr. Katarina Klein's report for a full interpretation of the findings.      Iesha Bush  Psychometrist

## 2021-06-12 ENCOUNTER — HEALTH MAINTENANCE LETTER (OUTPATIENT)
Age: 21
End: 2021-06-12

## 2021-06-12 DIAGNOSIS — Z11.59 ENCOUNTER FOR SCREENING FOR OTHER VIRAL DISEASES: ICD-10-CM

## 2021-06-12 LAB
SARS-COV-2 RNA RESP QL NAA+PROBE: NORMAL
SPECIMEN SOURCE: NORMAL

## 2021-06-12 PROCEDURE — U0005 INFEC AGEN DETEC AMPLI PROBE: HCPCS | Performed by: PSYCHIATRY & NEUROLOGY

## 2021-06-12 PROCEDURE — U0003 INFECTIOUS AGENT DETECTION BY NUCLEIC ACID (DNA OR RNA); SEVERE ACUTE RESPIRATORY SYNDROME CORONAVIRUS 2 (SARS-COV-2) (CORONAVIRUS DISEASE [COVID-19]), AMPLIFIED PROBE TECHNIQUE, MAKING USE OF HIGH THROUGHPUT TECHNOLOGIES AS DESCRIBED BY CMS-2020-01-R: HCPCS | Performed by: PSYCHIATRY & NEUROLOGY

## 2021-06-13 LAB
LABORATORY COMMENT REPORT: NORMAL
SARS-COV-2 RNA RESP QL NAA+PROBE: NEGATIVE
SPECIMEN SOURCE: NORMAL

## 2021-06-14 NOTE — CONFIDENTIAL NOTE
Bradley Garcia is a 20 year old who is being evaluated via a billable video visit.      How would you like to obtain your AVS? MyChart  If the video visit is dropped, the invitation should be resent by: Send to e-mail at: [email or web address]  Will anyone else be joining your video visit? No    Video Start Time: 12:24 PM    Mr. Garcia completed the interview portion of his neuropsychological evaluation. Precautions remain in place related to COVID-19. In an effort to reduce the amount of time spent in the clinic, the interview was conducted remotely today, and he will be seen in the clinic next week to complete the testing portion of the evaluation. Full report to follow, pending completion of the evaluation.    Video-Visit Details    Type of service:  Video Visit    Video End Time:12:55 PM  Originating Location (pt. Location): Home    Distant Location (provider location):  [facility] NEUROPSYCHOLOGY [place]     Platform used for Video Visit: [email or web address]

## 2021-06-14 NOTE — PROGRESS NOTES
NEUROPSYCHOLOGICAL EVALUATION    RELEVANT HISTORY AND REASON FOR REFERRAL    Bradley Garcia is a 20 year old, right handed, [employer]  with 12 years of formal education. Information was obtained via interview with the patient and review of his medical records. Records indicate a history of generalized dystonia. His parents have reported that they first brought him to the doctor because before the age of [age], he was crawling and not walking, and was not using his left hand the way he used his right hand. He is s/p bilateral Gpi DBS implantation in . He reportedly had good benefit for three months, until it wore off. He has had multiple programmings done at [place] and [facility], without regaining benefit. He was not aware that his 0battery  in 2018 because he was not receiving benefit. He is being considered for revision, including removing and replacing the entire system. This neuropsychological evaluation was undertaken at the request of Viviana Aleman M.D., as part of the presurgical protocol, to assess cognitive functioning, mood and personality, as they pertain to his ability to make well reasoned medical decisions and follow through with treatment recommendations.    Precautions are in place related to COVID-19. In an effort to reduce the amount of time spent in the clinic, the interview was conducted remotely via video on 2021, and the testing took place in the clinic today.    CLINICAL INTERVIEW FINDINGS    Upon interview, Mr. Garcia stated that he had his first DBS surgery in .  He believes that he had 2 or 3 months with good movements, but then he feels like he went back to where it was before.  He was not able to turn off the system.  When the battery , he did not notice a difference.  His goals for revision include having at least some improvement, with better hand and arm control.  He has a good understanding of the surgical procedure.  He understands that there is a  risk of bleeding and infection, and that perhaps his risk of infection is elevated because they will be taking out medical devices and putting in new ones.  He stated that his family is excited for him to have the surgery, since they know firsthand how bad it has gotten.  If there is a chance that he could improve, they feel that it is worth it.    Mr. Garcia has not noticed changes in cognition.  He notices difficulty with word finding and attention, which he attributes to ADHD.  He does not believe that these have changed over time.  He has no trouble remembering what others have said, remembering names of familiar people, or becoming lost in familiar places.  He denied difficulty with decision-making.    Mr. Garcia lives with his parents.  He manages his own finances and medications.  He has alarms on his phone that help with the medications.  He stated that he drives and cooks without difficulty.  He has an [employer] worker who helps with weekly tasks such as shopping.  He handles his personal cares independently.    Mr. Garcia reported a history of depression, which has been treated with medications prescribed by his primary care provider.  He has never been hospitalized for psychiatric care, and stated that he has not been in psychotherapy.  He described his mood as fine and normal, and he feels that his depression is well under control.  He is not having feelings of sadness, hopelessness, or guilt.  He stated that he is not experiencing any more anxiety than normal.  He sometimes feels anxious when he is talking to people he does not know, or in job interviews.  He stated that his dystonia gets worse when he is stressed, and is found that it helps to take a few moments and take a deep breath. He sleeps 5-1/2 to 6 hours at night and has not been napping during the day.  His appetite has been good and his weight has been steady.  He stated that he is thinking about eating 4 meals a day instead of 3 because he has  extra movements with his dystonia, so he needs extra intake.  His energy level varies.  He stated that it is all over the place but generally it is medium.  His interest level seems to be tied with his medications.  If he forgets to take them for a day or two then his interest level wanes.  When asked about visual hallucinations, he stated that when he is driving he is more focused on the cars ahead of him and sometimes thinks that he sees things on the side of the road like a new post that was not there.  He denied auditory hallucinations.  He denied suicidal ideation or any history of attempts to commit suicide.  He did not report alcohol, tobacco, or illicit drug use.    In school, Mr. Garcia reportedly had some academic accommodations, including a homeroom class where he had an hour to do his homework.  He had the accommodations throughout elementary school, middle school, and the first 2 years of high school.  He stated that there were times when he was in higher classes, such as taking eighth grade math when he was in the sixth grade.  Overall, his grades were average to below average, and he stated that he did not always do his homework.  He graduated from high school in 2020.  He expects to take a college course or two in the next year.  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  He recently was hired at [employer] as a , and he thinks that he will like this work.  He works 16 hours a week.    Around the age of 9 or 10, Mr. Garcia tripped while playing ball and broke his nose on a metal fire pit.  He had a loss of consciousness and his parents were called and brought him to the hospital.  He denied any history of seizure or stroke.  He stated that his balance is surprisingly very good, stating that people would be concerned if they saw him walk.  He denied unilateral weakness or numbness.  He experiences headaches which he attributes to his neck being tight from the  dystonia.    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     CURRENT MEDICATIONS:  Include carbidopa-levodopa (Sinemet), sertraline.    FAMILY MEDICAL HISTORY:  Significant for a brother with DiGeorge syndrome, and a maternal grandmother with myocardial infarction.    BEHAVIORAL OBSERVATIONS    During the evaluation, Mr. Garcia was pleasant, cooperative, and seemed to understand the instructions. He was alert and oriented to person, place, and time. A head tremor was noted clinically, and his head tilted backwards while sitting, although he maintained eye contact.  While walking, he tended to lean to the right. His hands were clenched and he gripped his pencil hard; when writing, he put his chin on his left hand and held on to the table, apparently to compensate for the tremor. He asked the technician to danica his responses on questionnaires. Mood was neutral. Speech was fluent and subtly dysarthric, with normal volume and rate. He presented his thoughts in a clear, logical manner. Internal performance validity measures fell within normal limits. The results are believed to accurately reflect his current level of functioning.     MEASURES ADMINISTERED    The following measures were administered by a trained psychometrist, under the direct supervision of a licensed psychologist.    Wechsler Abbreviated Scale of Intelligence - 2 (WASI-2); Subtests of the Wechsler Adult Intelligence Scale-4; Reading subtest of the Wide Range Achievement Test-4; subtests of the Wechsler Memory Scale-IV; Swanson Verbal Learning Test-Revised, Form 6; Brief Visual Memory Test - Revised, Form 4; [place] Naming Test; D-KEFS Verbal Fluency, Standard Form; Trail Making Test; Stroop; [place] Card Sorting Test - 64; [place] Judgement of Line Orientation Form H; Clock Drawing; [place] Multiphasic Personality Inventory  - 3 (MMPI-3); Davis Depression Inventory-2 (BDI-2), HAM-D, HAM-A, Apathy Scale, RBDSQ; ESS.     RESULTS AND INTERPRETATION    Overall intellectual functioning fell in the average range (WASI Full 4 IQ = 93), with no significant different between verbal and nonverbal intellectual functioning. Premorbid intellectual functioning was also estimated to fall in the average range based on single word reading abilities.    Confrontation naming was average for his age and level of education. Expressive vocabulary was average. Verbal abstract reasoning was average. Letter fluency was exceptionally low, and generative naming to category and switching fluency were below average. Fluency tasks were notable for a paucity of responses rather than errors.    Attention span was average for his age. Divided attention was low average on a timed, visuomotor task, and was average on an untimed, auditory task. Performance on a measure of distractibility was average.     Basic visual perception, including matching lines and angles, was high average. Construction of a clock fell within normal limits, and was notable for a slight tremor. Assembly of visual material was average. Nonverbal deductive reasoning was average.    Novel problem solving, including the ability to generate strategies and solutions, fell within normal limits for his age and level of education.    Immediate recall of verbal narrative material was average, with high average recall following a 30 minute delay. On a multiple trial list learning task, immediate recall was high average, with exceptionally low (42%) retention of the learned material following a 25 minute delay. Recognition memory on this task was not significantly better than free recall. Immediate recall of visual material was average, with low average recall following a 25 minute delay. Recognition memory on this task fell within normal limits.    On the MMPI-3, a self-report measure of mood and personality,  he responded to the items in a consistent manner. He had a tendency to deny some minor faults and shortcomings that most people acknowledge, although the profile is considered to be valid. He reports an above average level of stress, and he reports not enjoying social events and avoiding social situations.     On the BDI-2, he did not report significant depressive symptomatology. He did report apathy on a scale. He did not endorse excessive daytime sleepiness or dream enactment behavior on questionnaires.    IMPRESSIONS AND RECOMMENDATIONS    Bradley Garcia is a 20 year old man with a history of generalized dystonia. He is s/p bilateral GPi DBS implantation in 2013, with only temporary benefit. He is being considered for replacement of the DBS system.    Results of this evaluation indicate slowed information and psychomotor processing speed. Memory is variable; it is impaired on a list learning task, but otherwise falls well within normal limits. Language, visual processing, complex attentional processing, and executive functioning all fall within normal limits, and overall intellectual functioning falls in the average range. Assessment of mood and personality is suggestive of an above average level of stress, as well as apathy, and otherwise falls within normal limits.    This pattern of performance does not suggest focal or lateralized cerebral involvement. He appears to be capable of comprehending medical information and making well reasoned decisions for himself. He has a good understanding of the surgical procedures and the risks involved. He is not experiencing emotional problems which might interfere with his judgement or ability to follow through with treatment recommendations. He has a good support system in his family. Overall, he appears to be a good candidate for DBS surgery from a neurocognitive perspective.    In terms of daily functioning, Mr. Garcia is not experiencing cognitive difficulties that  might interfere with his ability to actively participate in treatment or to manage his instrumental activities of daily living independently.  He is likely to take longer to complete tasks, and may find it helpful to provide himself with ample time when working on a task so that he does not become overwhelmed and work less efficiently.  Given variable memory, he may benefit from the use of written reminders or checklists.    Results may serve as a baseline of Mr. Garcia's neurocognitive functioning.  Repeated neuropsychological evaluation in 1 year may help to determine whether there are changes in cognition over time.      Katarina Klein, Ph.D., ABPP  Licensed Psychologist, LP [id number]  Board Certified in Clinical Neuropsychology    Time spent: One unit of professional time, including interview on 6/1/2021 (CPT 82181). 60 minutes (1 unit) neuropsychological testing evaluation by licensed and board-certified neuropsychologist, including integration of patient data, interpretation of standardized test results and clinical data, clinical decision-making, treatment planning, report, and interactive feedback to the patient, first hour (CPT 01138). 95 minutes (2 units) of neuropsychological testing evaluation by licensed and board-certified neuropsychologist, including integration of patient data, interpretation of standardized test results and clinical data, clinical decision-making, treatment planning, report, and interactive feedback to the patient, subsequent hours (CPT 37611). 30 minutes of neuropsychological test administration and scoring by technician, first 30 minutes (CPT 76176). 225 additional minutes (7 units) neuropsychological test administration and scoring by technician, subsequent 30 minutes (CPT 41914). ICD-10 Diagnoses: G24.1; F06.8.

## 2021-06-15 ENCOUNTER — TELEPHONE (OUTPATIENT)
Dept: NEUROLOGY | Facility: CLINIC | Age: 21
End: 2021-06-15

## 2021-06-15 ENCOUNTER — ANESTHESIA EVENT (OUTPATIENT)
Dept: SURGERY | Facility: CLINIC | Age: 21
End: 2021-06-15
Payer: COMMERCIAL

## 2021-06-15 DIAGNOSIS — Z11.59 ENCOUNTER FOR SCREENING FOR OTHER VIRAL DISEASES: ICD-10-CM

## 2021-06-15 ASSESSMENT — LIFESTYLE VARIABLES: TOBACCO_USE: 0

## 2021-06-15 ASSESSMENT — COPD QUESTIONNAIRES: COPD: 0

## 2021-06-15 NOTE — ANESTHESIA PREPROCEDURE EVALUATION
Anesthesia Pre-Procedure Evaluation    Patient: rBadley Garcia   MRN: [id number] : 2000        Preoperative Diagnosis: * No surgery found *   Procedure :      Past Medical History:   Diagnosis Date     Depression      PONV (postoperative nausea and vomiting)      S/P deep brain stimulator placement      Torsion dystonia       Past Surgical History:   Procedure Laterality Date     ANESTHESIA OUT OF OR MRI       EYE SURGERY       NOSE SURGERY      nasal fracture repair     STEREOTACTIC INSERTION DEEP BRAIN STIMULATION        No Known Allergies   Social History     Tobacco Use     Smoking status: Never Smoker     Smokeless tobacco: Never Used   Substance Use Topics     Alcohol use: Never     Frequency: Never     Binge frequency: Never      Wt Readings from Last 1 Encounters:   21 59.4 kg (131 lb) (13 %, Z= -1.13)*     * Growth percentiles are based on [facility] (Boys, 2-20 Years) data.        Anesthesia Evaluation   Pt has had prior anesthetic. Type: General and MAC.    History of anesthetic complications  - PONV.      ROS/MED HX  ENT/Pulmonary: Comment: Hasn't had covid vaccines.  Has never had covid    (-) tobacco use, asthma, COPD, GURVINDER risk factors and recent URI   Neurologic: Comment: Dystonia - neck pulling, neck pain, head tremors.  DBS right chest.     (-) no seizures and no CVA   Cardiovascular:     (+) -----No previous cardiac testing     METS/Exercise Tolerance: >4 METS    Hematologic:  - neg hematologic  ROS  (-) history of blood clots and history of blood transfusion   Musculoskeletal: Comment: Chronic neck pain secondary to dystonia      GI/Hepatic:  - neg GI/hepatic ROS     Renal/Genitourinary:  - neg Renal ROS     Endo:  - neg endo ROS     Psychiatric/Substance Use:     (+) psychiatric history depression     Infectious Disease: Comment: COVID NEGATIVE 21 - neg infectious disease ROS  (-) Recent Fever   Malignancy:  - neg malignancy ROS     Other:  - neg other ROS    (+) , H/O Chronic  Pain,        Physical Exam    Airway        Mallampati: II   TM distance: > 3 FB   Neck ROM: full   Mouth opening: > 3 cm    Respiratory Devices and Support         Dental  no notable dental history     (+) upper retainer      Cardiovascular   cardiovascular exam normal       Rhythm and rate: regular     Pulmonary   pulmonary exam normal                OUTSIDE LABS:  CBC: No results found for: WBC, HGB, HCT, PLT  BMP:   Lab Results   Component Value Date    POTASSIUM 4.3 02/22/2021    CR 0.9 02/22/2021    GLC 84 02/22/2021     COAGS: No results found for: PTT, INR, FIBR  POC: No results found for: BGM, HCG, HCGS  HEPATIC:   Lab Results   Component Value Date    ALT 20 02/22/2021    AST 35 02/22/2021     OTHER:   Lab Results   Component Value Date    TSH 1.840 02/22/2021       Anesthesia Plan    ASA Status:  2      Anesthesia Type: General.     - Airway: ETT   Induction: Intravenous.   Maintenance: Balanced.        Consents    Anesthesia Plan(s) and associated risks, benefits, and realistic alternatives discussed. Questions answered and patient/representative(s) expressed understanding.     - Discussed with:  Patient, Parent (Mother and/or Father)         Postoperative Care    Pain management: Multi-modal analgesia.   PONV prophylaxis: Ondansetron (or other 5HT-3), Dexamethasone or Solumedrol     Comments:                PAC Discussion and Assessment    ASA Classification: 1  Case is suitable for: [place]  Anesthetic techniques and relevant risks discussed: GA                  PAC Resident/NP Anesthesia Assessment: Bradley Garcia 20 year old male scheduled for ANESTHESIA OUT OF OR Magnetic resonance imaging Brain on 6/16/21 by general anesthesia in evaluation of dystonia and DBS.  PAC referral for risk assessment and optimization for anesthesia with comorbid conditions of: depression.     Pre-operative considerations:  1.  Cardiac:  Functional status- METS >4.  He doesn't exercise purposefully, but reports he  recently walked 10 minutes and can take the full flight of stairs in his home with no complications.  He has no known cardiac conditions.  Low risk procedure with 0.4% risk of major adverse cardiac event.   2.  Pulm:   GURVINDER risk: low.   3.  GI:  Risk of PONV score = 1.  If > 2, anti-emetic intervention recommended.  4. Neuro:  DBS in place.  Instructed to bring his remote for procedure.   Dystonia symptoms include neck pulling, neck pain, tremors and other involuntary movements.     VTE risk: 0.5%    Virtual visit - Please refer to the physical examination documented by the anesthesiologist in the anesthesia record on the day of surgery      Patient is optimized and is acceptable candidate for the proposed procedure.  No further diagnostic evaluation is needed.         Ann Mcconnell DNP, RN, APRN      Reviewed and Signed by PAC Mid-Level Provider/Resident  Mid-Level Provider/Resident: Mena Burrell PA-C  Date: 7/2/21  Time: 6559                               Rodolfo Yung MD

## 2021-06-15 NOTE — OR NURSING
[email or web address] message with preparing for surgery information sent to patient with reminder of pre-surgery instructions given in PAC.

## 2021-06-15 NOTE — TELEPHONE ENCOUNTER
From: Kaitlin Castillo RN   Sent: 6/14/2021  12:03 PM CDT   To: Elvira Nunn RN, *   Subject: PREOP ORDERS                                     PAS Notification: Your patient is scheduled for surgery in 2 days. Critical chart components are missing. If the required components are not completed in EPIC by noon the day prior to surgery your case may be rescheduled. Thank you in advance for completing the record and improving [facility]'s quality of care.     Surgeon's Name: Anesthesia   Date of Surgery: 6/16/2   Procedure: ANESTHESIA OUT OF OR Magnetic resonance imaging Brain ANESTHESIA OUT OF OR CT Scan Head withput Contrast         Missing Components:     Preop Order Set         Pre-Anesthesia Screening Department [facility]   [phone]    [phone] fax       Thank you!     LEN Madrigal   PAC Clinic

## 2021-06-15 NOTE — TELEPHONE ENCOUNTER
The patient no-showed his Pre-op appointment on 5/20/21.     Writer called and left the patient a detailed message to call back as soon as possible to get the pre-op scheduled or his MRI and CT appointments will be canceled.    Writer also left a general message on the home phone for either Poonam or Sonny (patient's parents) to either tell the patient to call or call back to the writer or his testing appointments will be canceled.

## 2021-06-15 NOTE — TELEPHONE ENCOUNTER
Left a message for the patient that the appointments tomorrow's MRI Head and CT Head have been canceled since the patient did not complete the pre-op physical. Left a message the writer will try the patient again to further explain and answer questions the patient may have.

## 2021-06-15 NOTE — TELEPHONE ENCOUNTER
The patient is aware is MRI and CT for tomorrow have been canceled. Patient was informed he was rescheduled to Thursday 7/8 at 9 AM (MRI) and 10 AM (CT) and must check in 1 hour prior.     Sent a message to Mana HARRINGTON to see if she can gain interrogate the patient's current Deep Brain Stimulation system prior to the MRI and CT, on Tuesday 6/29/21.

## 2021-06-16 ENCOUNTER — MYC MEDICAL ADVICE (OUTPATIENT)
Dept: NEUROLOGY | Facility: CLINIC | Age: 21
End: 2021-06-16

## 2021-06-16 ENCOUNTER — ANESTHESIA (OUTPATIENT)
Dept: SURGERY | Facility: CLINIC | Age: 21
End: 2021-06-16
Payer: COMMERCIAL

## 2021-06-18 ENCOUNTER — OFFICE VISIT (OUTPATIENT)
Dept: NEUROLOGY | Facility: CLINIC | Age: 21
End: 2021-06-18
Payer: COMMERCIAL

## 2021-06-18 VITALS
OXYGEN SATURATION: 98 % | HEIGHT: 66 IN | BODY MASS INDEX: 21.21 KG/M2 | RESPIRATION RATE: 16 BRPM | WEIGHT: 132 LBS | HEART RATE: 92 BPM

## 2021-06-18 DIAGNOSIS — G24.1 GENERALIZED TORSION DYSTONIA: Primary | ICD-10-CM

## 2021-06-18 DIAGNOSIS — Z96.89 S/P DEEP BRAIN STIMULATOR PLACEMENT: ICD-10-CM

## 2021-06-18 PROBLEM — Z76.89: Status: ACTIVE | Noted: 2021-06-18

## 2021-06-18 PROBLEM — M26.639 ARTICULAR DISC DISORDER OF TEMPOROMANDIBULAR JOINT: Status: ACTIVE | Noted: 2019-10-24

## 2021-06-18 PROBLEM — F32.A DEPRESSION: Status: ACTIVE | Noted: 2021-06-18

## 2021-06-18 PROBLEM — M54.2 NECK PAIN: Status: ACTIVE | Noted: 2021-06-18

## 2021-06-18 PROBLEM — R25.9 ABNORMAL INVOLUNTARY MOVEMENT: Status: ACTIVE | Noted: 2021-06-18

## 2021-06-18 PROBLEM — G25.9 MOVEMENT DISORDER: Status: ACTIVE | Noted: 2018-12-05

## 2021-06-18 PROBLEM — F90.9 ATTENTION DEFICIT HYPERACTIVITY DISORDER (ADHD): Status: ACTIVE | Noted: 2021-06-18

## 2021-06-18 PROBLEM — Q92.2: Status: ACTIVE | Noted: 2021-06-18

## 2021-06-18 PROBLEM — Z86.59 HISTORY OF MENTAL DISORDER: Status: ACTIVE | Noted: 2021-06-18

## 2021-06-18 PROBLEM — G24.3 SPASMODIC TORTICOLLIS: Status: ACTIVE | Noted: 2021-06-18

## 2021-06-18 PROBLEM — Z98.890 HISTORY OF EYE SURGERY: Status: ACTIVE | Noted: 2021-06-18

## 2021-06-18 PROBLEM — H93.19 TINNITUS: Status: ACTIVE | Noted: 2021-06-18

## 2021-06-18 PROBLEM — F41.9 ANXIETY: Status: ACTIVE | Noted: 2021-06-18

## 2021-06-18 PROCEDURE — 99417 PROLNG OP E/M EACH 15 MIN: CPT | Performed by: NURSE PRACTITIONER

## 2021-06-18 PROCEDURE — 99215 OFFICE O/P EST HI 40 MIN: CPT | Performed by: NURSE PRACTITIONER

## 2021-06-18 ASSESSMENT — PAIN SCALES - GENERAL: PAINLEVEL: NO PAIN (0)

## 2021-06-18 ASSESSMENT — MIFFLIN-ST. JEOR: SCORE: 1556.5

## 2021-06-18 NOTE — PROGRESS NOTES
"PATIENT: Bradley Garcia    : 2000    REGGIE: 2021    REASON FOR VISIT: For evaluation of generalized dystonia using a rating scale and videotaping.     HPI:  Mr. Bradley Garcia is a 20 year old right - handed young adult who came to the [facility] neurology clinic for dystonia rating evaluation as part of his Deep Brain Stimulation (DBS) surgery work-up for management of generalized dystonia.  He came with his father who is in the waiting area.   Patient reports that before the age of 3, his parents noticed that he was crawling and wasn't walking that led them to bringing him to the doctor. He also wasn't using his left hand the way he used his right.     He is s/p bilateral Gpi DBS implantation on 2013 at [facility]. Patient reports that initially he had good benefit for 3 months. During that time, he had good hand control, shakiness, and \"everything got better.\" But the benefit wore off.  Since then, regardless of multiple programming done at [facility], he didn't regain the benefit. In 2019, he was referred to [place] for programming optimization and was seen by Dr. Parr. His primary care physician assistant, Tatyana Low PA-C referred him here for another opinion.    At this point, pt reports no benefit from his DBS as when his battery  in , he wasn't aware that it did as he didn't notice change/worsening in his symptoms.     In , his PA started him on Sinemet 25/100 mg and he was taking 2 tabs at 8, 1 tab at 12 pm, 2 tabs at 4 pm & 1 tab at HS.  He reports the shakiness improved for a few weeks; but the benefit wore off.  Since it's not doing much, he has stopped taking it a few days ago.     His DBS Goal: Primarily, he would like \"to at least walk upright like a normal person.\"  Secondly, he would like his writing to improve and his ability to do various tasks like putting toothpaste on his tooth brush, tying his shoelace, and " "so on. He report the shaking/jerking is the lease bothersome symptom.     He reports feeling embarrassed when meeting new people. He is comfortable with his friends and people who know that he has dystonia. He reports on days like today where he is meeting a new person and perform tasks while being videotaped, his dystonia becomes much worse.     He was started on Sertraline about 6 months ago for depression. He was at 25 mg and since Monday June 14th, the dose was increased to 50 mg. He reports it is \"definitely helping\" his depression.     He has a sister and brother without any physical symptoms.     When asked about his knowledge on potential side effect of DBS, he stated, that he could have \"brain bleeding that might lead to death and infection.\"    Records Reviewed:   [facility] Neurology Notes by Dr. Parr from June 2019 -   August 2019: Genetic test for PRKRA was negative.      Per Dr. Elizabeth's note on 7/25/2018, patient had a negative genetic testing of the following: THAP1, TOR1A, myotonic dystrophy, and facioscapulohumeral dystrophy.   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    MEDICATIONS:     sertraline (ZOLOFT) 25 MG tablet, Take 50 mg by mouth daily.    PHYSICAL EXAM:    Vital Signs:  Pulse 92, resp. rate 16, height 1.676 m (5' 6\"), weight 59.9 kg (132 lb), SpO2 98 %., Body mass index is 21.31 kg/m .      MOVEMENT DISORDERS ASSESSMENT: (ON DBS)    The Fahn Gomez (BFM) Disability Score     Activity Severity Factor Score   A. Speech  0 - Normal  1 - Slightly involved; easily understood  2 - Some difficulty in understanding  3 - Marked difficulty in understanding  4 - Complete or almost complete anarthria    2      B. Handwriting   (tremor or dystonia) 0 - Normal  1 - Slight difficulty, legible  2 - Almost illegible  3 - Illegible  4 - Unable to grasp to maintain hold on pen    3   C. Feeding 0 - " Normal  1 - Uses  tricks , independent  2 - Can feed, but not cut  3 - Finger food only  4 - Completely dependent     1     D. Eating/swallowing 0 - Normal  1 - Occasional choking  2 - Chokes frequently; difficulty swallowing  3 - Unable to swallow firm foods  4 - Marked difficulty swallowing soft foods and liquids     0     E. Hygiene 0 - Normal  1 - Clumsy, independent  2 - Needs help with some activities  3 - Needs help with most activities  4 - Needs help with all activities    1   F. Dressing 0 - Normal  1 - Clumsy, independent  2 - Needs help with some activities  3 - Needs help with most activities  4 - Helpless     1        G. Walking 0 - Normal  1 - Slightly abnormal; hardly noticeable  2 - Moderately abnormal; obvious to naïve observer  3 - Considerably abnormal  4 - Needs assistance to walk  6 - Wheelchair-bound     2       Total (maximum: 30) = 10              The Fahn-Gomez Score        Region Provoking factor  General Severity Factor Weight Score   Eyes 0 0 0.5 0   Mouth 4 (Jaw opening) 2 0.5 4   Speech & Swallowing 2 2 1.0 4   Neck 4 4 0.5 8   Right Arm 3 3 1.0 9   Left Arm 4 3 1.0 12   Right Leg 4 2 1.0 8   Left Leg 2 2 1.0 4   Trunk 3 3 1.0 9   Total Score = 58       Writing & drawing samples sent to be scanned into Epic.      Total movement scale score = 58/120   Total dystonia disability scale score = 10/30    __ We discussed about DBS procedure, risks, & benefits. Pt was informed the possibility of 1 - 3 % serious side effect; the risk of infection, bleeding, stroke, other potential complications, & death. I also informed him the possibility of lead misplacement. We discussed about appropriate DBS candidates and briefly touched on DBS programming & the need to return to clinic for reprogramming. Pt had good knowledge about DBS. All questions were answered.     __ He was informed that we'll contact him after the DBS team meets & discusses his work-up. He understands the plan.    Today I spent  120 minutes caring for the patient. Time was spent with reviewing records, meeting with the patient, answering questions, videotaping and evaluation of dystonia using a rating scale, and documentation.    MAG Dooley, CNP   [facility]

## 2021-06-18 NOTE — NURSING NOTE
Chief Complaint   Patient presents with     RECHECK     [facility] RETURN MOTOR TESTING- RATING SCALES       Hector Sinha, EMT

## 2021-06-18 NOTE — PATIENT INSTRUCTIONS
Dear  Bradley Peter Garcia,    Thank you for coming today.  During your visit, we have discussed the following:     __  You have completed the Motor Assessment and videotaping.     __  Continue with your DBS workup appointments. It looks like you only have head CT/MRI left, which is scheduled on July 8th under anesthesia. Once you're done with your workup, we'll discuss you at the DBS Consensus meeting & will let you know the decision.    __  You may contact us with any question.     __  Here is the information on our DBS program.     [email or web address]      For questions, you may send us a [employer] message or call [phone]    Fax number: [phone]    MAG Dooley, CNP  [facility]

## 2021-06-21 NOTE — TELEPHONE ENCOUNTER
FUTURE VISIT INFORMATION      SURGERY INFORMATION:    Date: 7/8/21    Location: [facility] OR    Surgeon:  GENERIC ANESTHESIA PROVIDER    Anesthesia Type:  general    Procedure: ANESTHESIA OUT OF OR Magnetic resonance imaging Brain @1200 ANESTHESIA OUT OF OR CT Scan Head withput Contrast @1300    Consult: carie  6/18    RECORDS REQUESTED FROM:       Primary Care Provider: Tatyana Low PA-C- [facility]

## 2021-06-24 NOTE — PROGRESS NOTES
Patient Bradley Garcia  THOMAS [birth year]    MRN [id number]   Provider      [birth year]   [employer] [facility]    Sex Male   Occupation     Education 12   Language     Preferred Hand Right   Station OP    Age 20                 WASI-II           Raw T      Vocabulary  35 47      Similarities  26 41      Block Design  39 47      Matrix Reasoning  21 51               VCI 90 FSIQ 4 93      DIANA 89 FSIQ 2                WAIS-IV          Raw SS       Letter Num Seq 20 10       Digit Span 26 9                WIDE RANGE ACHIEVEMENT TEST - 4         Raw SS %ile Grade Eq.     Reading 58 96 39 11.6              WECHSLER MEMORY SCALE - IV     YA    Raw SS/%ile       Info/Orientation 14        LM Immediate 29 11       LM 30 Minute 28 12       LM 30 Minute Recog 29 >75                BOSTON NAMING TEST         Score (Correct+Stim Cue)  50 MAS 0     # Correct Stimulus Cue  0 T 43     # Correct Phonemic Cue  8                D-KEFS VERBAL FLUENCY    TEST FORM Standard     Raw SS       Letter Fluency 14 3       Category Fluency 23 4       Switching Fluency             Total Correct 8 4       Switching Accuracy 7 6                CLOCK DRAWING         Command 3 /3 Rating   Normal    Copy 3 /3 Rating  Normal     TRAIL MAKING TEST          Seconds Errors MAS z     Trails A 53 1 0 -3.1     Trails B 68 0 0 -1.2              STROOP             Raw    +    Edmond    = Edmond Tot.         T  Raw MAS   Word 83 0 83 37 83 0   Color  57 0 57 35 57 0   Color/Word 37 0 37 42 37 0            [place] CARD SORTING TEST - 1 deck        # Categories 4 %ile >16      # Persev Error 6 T 47      Concept. Lev Resp. 48 T 54      FMS 0                 GUALLPA JUDGMENT OF LINE ORIENTATION   TEST FORM H    Raw Score 27 Raw Correction 0     MAS 0 Guallpa %ile  72              HVLT-R    TEST FORM 6     Raw T       Trial 1 9        Trial 2 12        Trial 3 10        Total 1-3 31 57       Learning 3        Delay 5 <20       Percent Retained 42 <20       True Positives 11         Discrimination Index <20 1       False Positives 10                 BRIEF VISUAL MEMORY TEST - REVISED   TEST FORM 4     Raw T       Trial 1 9        Trial 2 8        Trial 3 9        Total 1-3 26 44       Learning 0 28       Delay 9 37       Percent Retained 100 >16 %ile      Recognition Hits 6        Discrimination Index 6 >16 %ile      False Alarms 0                 BDI         Score 2        Interpretation Minimal                 APATHY SCALE         Score 14                 NON-PD QUESTIONNAIRES         ESS         RDBSQ         CYNTHIA         MMPI

## 2021-06-29 NOTE — TELEPHONE ENCOUNTER
Writer informed the patient that Chelo could see him tomorrow at 10 AM. Patient stated it would not work and then asked if Friday, a time closer to his PAC appointment at 3:15, would work. The writer said she will get back to the patient on this after sending a message to Chelo again, to double check.

## 2021-06-29 NOTE — TELEPHONE ENCOUNTER
Called the patient to ask him when he wanted to reschedule his Deep Brain Stimulation interrogation appointment with the nurse. Patient stated he is okay with an appointment at 10 AM or 11 AM; on any day of this week or next week. Patient stated he wanted to have this as soon as possible.    The patient was informed that the writer would have to check with Chelo, the other movement nurse if she has these times available for this week since Mana won't be in the clinic for the rest of the week, after today. The writer informed the patient that when she hears back from Chelo, then she would let the patient know via [employer].

## 2021-07-01 NOTE — TELEPHONE ENCOUNTER
Writer left the patient a message reminding him to be at the [facility], [facility] Floor, at 2:45 PM, to meet with Chelo nurse for his Medtronic DBS interrogation.     Patient has not read his [employer] message yet with his new appointment.

## 2021-07-02 ENCOUNTER — TELEPHONE (OUTPATIENT)
Dept: NEUROLOGY | Facility: CLINIC | Age: 21
End: 2021-07-02

## 2021-07-02 ENCOUNTER — ALLIED HEALTH/NURSE VISIT (OUTPATIENT)
Dept: NEUROLOGY | Facility: CLINIC | Age: 21
End: 2021-07-02
Payer: COMMERCIAL

## 2021-07-02 ENCOUNTER — OFFICE VISIT (OUTPATIENT)
Dept: SURGERY | Facility: CLINIC | Age: 21
End: 2021-07-02
Payer: COMMERCIAL

## 2021-07-02 VITALS
BODY MASS INDEX: 20.68 KG/M2 | HEIGHT: 66 IN | TEMPERATURE: 98 F | RESPIRATION RATE: 20 BRPM | SYSTOLIC BLOOD PRESSURE: 110 MMHG | WEIGHT: 128.7 LBS | HEART RATE: 60 BPM | OXYGEN SATURATION: 97 % | DIASTOLIC BLOOD PRESSURE: 74 MMHG

## 2021-07-02 VITALS
OXYGEN SATURATION: 100 % | RESPIRATION RATE: 16 BRPM | SYSTOLIC BLOOD PRESSURE: 118 MMHG | HEART RATE: 66 BPM | DIASTOLIC BLOOD PRESSURE: 73 MMHG

## 2021-07-02 DIAGNOSIS — R25.9 ABNORMAL INVOLUNTARY MOVEMENT: Primary | ICD-10-CM

## 2021-07-02 DIAGNOSIS — Z01.818 PREOP EXAMINATION: Primary | ICD-10-CM

## 2021-07-02 PROCEDURE — 99203 OFFICE O/P NEW LOW 30 MIN: CPT | Performed by: PHYSICIAN ASSISTANT

## 2021-07-02 PROCEDURE — 95970 ALYS NPGT W/O PRGRMG: CPT

## 2021-07-02 RX ORDER — TIZANIDINE 2 MG/1
TABLET ORAL EVERY EVENING
Status: ON HOLD | COMMUNITY
Start: 2021-06-18 | End: 2021-10-08

## 2021-07-02 ASSESSMENT — ENCOUNTER SYMPTOMS: SEIZURES: 0

## 2021-07-02 ASSESSMENT — PAIN SCALES - GENERAL
PAINLEVEL: NO PAIN (0)
PAINLEVEL: NO PAIN (0)

## 2021-07-02 ASSESSMENT — MIFFLIN-ST. JEOR: SCORE: 1541.53

## 2021-07-02 NOTE — TELEPHONE ENCOUNTER
The patient called and left the writer a voicemail message that he received the writer's voicemail that he will be seeing LEN Whitney at 2:45 PM, today, for his Deep Brain Stimulation interrogation.

## 2021-07-02 NOTE — H&P (VIEW-ONLY)
Pre-Operative H & P     CC:  Preoperative exam to assess for increased cardiopulmonary risk while undergoing surgery and anesthesia.    Date of Encounter: 7/2/2021  Primary Care Physician:  Tatyana Low  Reason for Visit: Torsion dystonia       HPI     Bradley Garcia is a 19 y/o male who presents for pre-operative H&P in preparation for ANESTHESIA OUT OF OR Magnetic resonance imaging Brain & ANESTHESIA OUT OF OR CT Scan Head without Contrast on 7/8/21 at [facility] for treatment of Torsion dystonia.      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  He has a DBS in place.  He had previously been receiving his neurologic care at [facility].  He recently transferred care to [facility] and noted interest in possible DBS changes for better dystonia management due to chronic associated neck pain and neck pulling.  A MRI under anesthesia was recommended for further evaluation at this time and has been scheduled as above.      PMH is also significant for depression.     History was obtained from patient & chart review.     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      Depression      Neck pain      PONV (postoperative nausea and vomiting)      S/P deep brain stimulator placement      Torsion dystonia        Past Surgical History  Past Surgical History:   Procedure Laterality Date     ANESTHESIA OUT OF OR MRI       EYE SURGERY       NOSE SURGERY      nasal fracture repair     STEREOTACTIC INSERTION DEEP BRAIN STIMULATION         Hx of Blood transfusions/reactions: denies     Hx of abnormal bleeding or anti-platelet use: denies    Menstrual history: No LMP for male patient.:      Steroid use in the last year: denies    Personal or FH with difficulty with Anesthesia:  Patient has a history of PONV, but has no family history of anesthesia  complications.    Prior to Admission Medications  Current Outpatient Medications   Medication Sig Dispense Refill     sertraline (ZOLOFT) 25 MG tablet Take 50 mg by mouth every morning        tiZANidine (ZANAFLEX) 2 MG tablet every evening          Allergies  No Known Allergies    Social History  Social History     Socioeconomic History     Marital status: Single     Spouse name: Not on file     Number of children: 0     Years of education: Not on file     Highest education level: Not on file   Occupational History     Occupation: unemployed   Social Needs     Financial resource strain: Not on file     Food insecurity     Worry: Not on file     Inability: Not on file     Transportation needs     Medical: Not on file     Non-medical: Not on file   Tobacco Use     Smoking status: Never Smoker     Smokeless tobacco: Never Used   Substance and Sexual Activity     Alcohol use: Never     Frequency: Never     Binge frequency: Never     Drug use: Never     Sexual activity: Not on file   Lifestyle     Physical activity     Days per week: Not on file     Minutes per session: Not on file     Stress: Not on file   Relationships     Social connections     Talks on phone: Not on file     Gets together: Not on file     Attends Religion service: Not on file     Active member of club or organization: Not on file     Attends meetings of clubs or organizations: Not on file     Relationship status: Not on file     Intimate partner violence     Fear of current or ex partner: Not on file     Emotionally abused: Not on file     Physically abused: Not on file     Forced sexual activity: Not on file   Other Topics Concern     Not on file   Social History Narrative     Not on file       Family History  Family History   Problem Relation Age of Onset     No Known Problems Mother      No Known Problems Father      No Known Problems Sister      Other - See Comments Brother         di bunny syndrome     Myocardial Infarction Maternal  "Grandmother      Hyperlipidemia Maternal Grandmother      Hyperlipidemia Maternal Grandfather      Unknown/Adopted Paternal Grandmother      Unknown/Adopted Paternal Grandfather      Deep Vein Thrombosis No family hx of      Anesthesia Reaction No family hx of           ROS/MED HX  The complete review of systems is negative other than noted in the HPI or here.  Patient denies recent illness, fever and respiratory infection during past month.  Pt denies steroid use during past year.    ENT/Pulmonary: Comment: Hasn't had covid vaccines.  Has never had covid    (-) tobacco use, asthma, COPD, GURVINDER risk factors and recent URI   Neurologic: Comment: Dystonia - neck pulling, neck pain, head tremors.  DBS right chest.     (-) no seizures and no CVA   Cardiovascular:     (+) -----No previous cardiac testing     METS/Exercise Tolerance: >4 METS    Hematologic:  - neg hematologic  ROS  (-) history of blood clots and history of blood transfusion   Musculoskeletal: Comment: Chronic neck pain secondary to dystonia      GI/Hepatic:  - neg GI/hepatic ROS     Renal/Genitourinary:  - neg Renal ROS     Endo:  - neg endo ROS     Psychiatric/Substance Use:     (+) psychiatric history depression     Infectious Disease: Comment: COVID NEGATIVE 6/12/21 - neg infectious disease ROS  (-) Recent Fever   Malignancy:  - neg malignancy ROS     Other:  - neg other ROS    (+) , H/O Chronic Pain,          The complete review of systems is negative other than noted in the HPI or here.   Temp: 98  F (36.7  C) Temp src: Oral BP: 110/74 Pulse: 60   Resp: 20 SpO2: 97 %         128 lbs 11.2 oz  5' 6\"[pt reported[   Body mass index is 20.77 kg/m .       Physical Exam  Constitutional: Awake, alert, cooperative, no apparent distress, and appears stated age. Moving constantly due to dystonia.  Eyes: Pupils equal, round and reactive to light, extra ocular muscles intact, sclera clear, conjunctiva normal.  HENT: Normocephalic, oral pharynx with moist mucus " membranes, good dentition. No goiter appreciated. No removable dental hardware. Permanent upper retainer in place.  Respiratory: Clear to auscultation bilaterally, no crackles or wheezing. No SOB when supine.  Cardiovascular: Regular rate and rhythm, normal S1 and S2, and no murmur noted.  Carotids +2, no bruits. No edema. Palpable pulses to radial, DP and PT arteries.   GI: Normal bowel sounds, soft, non-distended, non-tender, no masses palpated.    Lymph/Hematologic: No cervical lymphadenopathy and no supraclavicular lymphadenopathy.  Genitourinary:  deferred  Skin: Warm and dry.  No rashes.   Musculoskeletal: full ROM of neck. There is no redness, warmth, or swelling of the joints. Gross motor strength is normal.    Neurologic: Awake, alert, oriented to name, place and time. Cranial nerves II-XII are grossly intact. Gait is normal. Ambulates from chair to exam table, seats self, lies supine and sits back up w/o assistance.  Neuropsychiatric: Calm, cooperative. Normal affect. Answers questions appropriately, follows commands w/o difficulty.        PRIOR LABS/DIAGNOSTIC STUDIES:    All labs and imaging personally reviewed      CBC: No results found for: WBC, HGB, HCT, PLT  BMP:   Lab Results   Component Value Date    POTASSIUM 4.3 02/22/2021    CR 0.9 02/22/2021    GLC 84 02/22/2021     COAGS: No results found for: PTT, INR, FIBR  POC: No results found for: BGM, HCG, HCGS  HEPATIC:   Lab Results   Component Value Date    ALT 20 02/22/2021    AST 35 02/22/2021     OTHER:   Lab Results   Component Value Date    TSH 1.840 02/22/2021       Labs today: None    COVID19 testing scheduled on 7/5/21      ASSESSMENT and PLAN  Bradley Garcia is a 20 year old male scheduled to undergo ANESTHESIA OUT OF OR Magnetic resonance imaging Brain & ANESTHESIA OUT OF OR CT Scan Head without Contrast on 7/8/21 at [facility] for treatment of Torsion dystonia.    Patient has a history of  PONV, but has no family history of anesthesia complications.    Pre-operative considerations:  1.  Cardiac:  Functional status- METS >4.  He doesn't exercise purposefully, but reports he recently walked 10 minutes and can take the full flight of stairs in his home with no complications.  He has no known cardiac conditions.  Low risk procedure with 0.4% risk of major adverse cardiac event.   2.  Pulm:   GURVINDER risk: low.   3.  GI:  Risk of PONV score = 1.  If > 2, anti-emetic intervention recommended.  4. Neuro:  DBS in place.  Instructed to bring his remote for procedure.   Dystonia symptoms include neck pulling, neck pain, tremors and other involuntary movements.      VTE risk: 0.5%      Patient is optimized and is acceptable candidate for the proposed procedure. No further diagnostic evaluation is needed.    Final plan per anesthesiologist on day of surgery.     Arrival time, NPO, shower and medication instructions provided by nursing staff today.  Preparing For Your Surgery handout given.    37 minutes spent on the date of the encounter doing chart review, history and exam, documentation and further activities as noted below:    Prep time: 10 minutes  Visit time: 17 minutes  Documentation time: 10 minutes      Mena Burrell PA-C  Preoperative Assessment Center  [facility]  Phone: [phone]  Fax: [phone]

## 2021-07-02 NOTE — NURSING NOTE
Bradley Garcia comes into clinic today at the request of Sofia Van Ordering Provider for    DEEP BRAIN STIMULATION interrogation prior to MRI.    This service provided today was under the supervising provider of the day Sofia Van, who was available if needed.    Chelo Moss RN  ------------------------  Interrogated patient's DBS Activa RC model [id number] battery. Patient has BGpi. All impedances were checked at 0.7 Volts and were WNL. See neuromodulation sheets scanned. Patient informed.    LGPi Therapy impedance = 607 ohms, 9.7 mA    Model 60490    Battery voltage 0 - he needs to charge his battery - he was told this    RGPi Therapy impedance = 1390 ohms, 5.4 mA    --------------------------  Filled out Medtronic MRI eligibility form and faxed to [facility] tech room [phone].

## 2021-07-02 NOTE — TELEPHONE ENCOUNTER
The writer left the patient a message asking for the patient to call her back to confirm the patient is aware he is going to see LEN Whitney at 2:45 PM, today, before his preop physical at 3:30.

## 2021-07-02 NOTE — H&P
Pre-Operative H & P     CC:  Preoperative exam to assess for increased cardiopulmonary risk while undergoing surgery and anesthesia.    Date of Encounter: 7/2/2021  Primary Care Physician:  Tatyana Low  Reason for Visit: Torsion dystonia       HPI     Bradley Garcia is a 21 y/o male who presents for pre-operative H&P in preparation for ANESTHESIA OUT OF OR Magnetic resonance imaging Brain & ANESTHESIA OUT OF OR CT Scan Head without Contrast on 7/8/21 at [facility] for treatment of Torsion dystonia.      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  He has a DBS in place.  He had previously been receiving his neurologic care at [facility].  He recently transferred care to [facility] and noted interest in possible DBS changes for better dystonia management due to chronic associated neck pain and neck pulling.  A MRI under anesthesia was recommended for further evaluation at this time and has been scheduled as above.      PMH is also significant for depression.     History was obtained from patient & chart review.     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      Depression      Neck pain      PONV (postoperative nausea and vomiting)      S/P deep brain stimulator placement      Torsion dystonia        Past Surgical History  Past Surgical History:   Procedure Laterality Date     ANESTHESIA OUT OF OR MRI       EYE SURGERY       NOSE SURGERY      nasal fracture repair     STEREOTACTIC INSERTION DEEP BRAIN STIMULATION         Hx of Blood transfusions/reactions: denies     Hx of abnormal bleeding or anti-platelet use: denies    Menstrual history: No LMP for male patient.:      Steroid use in the last year: denies    Personal or FH with difficulty with Anesthesia:  Patient has a history of PONV, but has no family history of anesthesia  complications.    Prior to Admission Medications  Current Outpatient Medications   Medication Sig Dispense Refill     sertraline (ZOLOFT) 25 MG tablet Take 50 mg by mouth every morning        tiZANidine (ZANAFLEX) 2 MG tablet every evening          Allergies  No Known Allergies    Social History  Social History     Socioeconomic History     Marital status: Single     Spouse name: Not on file     Number of children: 0     Years of education: Not on file     Highest education level: Not on file   Occupational History     Occupation: unemployed   Social Needs     Financial resource strain: Not on file     Food insecurity     Worry: Not on file     Inability: Not on file     Transportation needs     Medical: Not on file     Non-medical: Not on file   Tobacco Use     Smoking status: Never Smoker     Smokeless tobacco: Never Used   Substance and Sexual Activity     Alcohol use: Never     Frequency: Never     Binge frequency: Never     Drug use: Never     Sexual activity: Not on file   Lifestyle     Physical activity     Days per week: Not on file     Minutes per session: Not on file     Stress: Not on file   Relationships     Social connections     Talks on phone: Not on file     Gets together: Not on file     Attends Sikh service: Not on file     Active member of club or organization: Not on file     Attends meetings of clubs or organizations: Not on file     Relationship status: Not on file     Intimate partner violence     Fear of current or ex partner: Not on file     Emotionally abused: Not on file     Physically abused: Not on file     Forced sexual activity: Not on file   Other Topics Concern     Not on file   Social History Narrative     Not on file       Family History  Family History   Problem Relation Age of Onset     No Known Problems Mother      No Known Problems Father      No Known Problems Sister      Other - See Comments Brother         di bunny syndrome     Myocardial Infarction Maternal  "Grandmother      Hyperlipidemia Maternal Grandmother      Hyperlipidemia Maternal Grandfather      Unknown/Adopted Paternal Grandmother      Unknown/Adopted Paternal Grandfather      Deep Vein Thrombosis No family hx of      Anesthesia Reaction No family hx of           ROS/MED HX  The complete review of systems is negative other than noted in the HPI or here.  Patient denies recent illness, fever and respiratory infection during past month.  Pt denies steroid use during past year.    ENT/Pulmonary: Comment: Hasn't had covid vaccines.  Has never had covid    (-) tobacco use, asthma, COPD, GURVINDER risk factors and recent URI   Neurologic: Comment: Dystonia - neck pulling, neck pain, head tremors.  DBS right chest.     (-) no seizures and no CVA   Cardiovascular:     (+) -----No previous cardiac testing     METS/Exercise Tolerance: >4 METS    Hematologic:  - neg hematologic  ROS  (-) history of blood clots and history of blood transfusion   Musculoskeletal: Comment: Chronic neck pain secondary to dystonia      GI/Hepatic:  - neg GI/hepatic ROS     Renal/Genitourinary:  - neg Renal ROS     Endo:  - neg endo ROS     Psychiatric/Substance Use:     (+) psychiatric history depression     Infectious Disease: Comment: COVID NEGATIVE 6/12/21 - neg infectious disease ROS  (-) Recent Fever   Malignancy:  - neg malignancy ROS     Other:  - neg other ROS    (+) , H/O Chronic Pain,          The complete review of systems is negative other than noted in the HPI or here.   Temp: 98  F (36.7  C) Temp src: Oral BP: 110/74 Pulse: 60   Resp: 20 SpO2: 97 %         128 lbs 11.2 oz  5' 6\"[pt reported[   Body mass index is 20.77 kg/m .       Physical Exam  Constitutional: Awake, alert, cooperative, no apparent distress, and appears stated age. Moving constantly due to dystonia.  Eyes: Pupils equal, round and reactive to light, extra ocular muscles intact, sclera clear, conjunctiva normal.  HENT: Normocephalic, oral pharynx with moist mucus " membranes, good dentition. No goiter appreciated. No removable dental hardware. Permanent upper retainer in place.  Respiratory: Clear to auscultation bilaterally, no crackles or wheezing. No SOB when supine.  Cardiovascular: Regular rate and rhythm, normal S1 and S2, and no murmur noted.  Carotids +2, no bruits. No edema. Palpable pulses to radial, DP and PT arteries.   GI: Normal bowel sounds, soft, non-distended, non-tender, no masses palpated.    Lymph/Hematologic: No cervical lymphadenopathy and no supraclavicular lymphadenopathy.  Genitourinary:  deferred  Skin: Warm and dry.  No rashes.   Musculoskeletal: full ROM of neck. There is no redness, warmth, or swelling of the joints. Gross motor strength is normal.    Neurologic: Awake, alert, oriented to name, place and time. Cranial nerves II-XII are grossly intact. Gait is normal. Ambulates from chair to exam table, seats self, lies supine and sits back up w/o assistance.  Neuropsychiatric: Calm, cooperative. Normal affect. Answers questions appropriately, follows commands w/o difficulty.        PRIOR LABS/DIAGNOSTIC STUDIES:    All labs and imaging personally reviewed      CBC: No results found for: WBC, HGB, HCT, PLT  BMP:   Lab Results   Component Value Date    POTASSIUM 4.3 02/22/2021    CR 0.9 02/22/2021    GLC 84 02/22/2021     COAGS: No results found for: PTT, INR, FIBR  POC: No results found for: BGM, HCG, HCGS  HEPATIC:   Lab Results   Component Value Date    ALT 20 02/22/2021    AST 35 02/22/2021     OTHER:   Lab Results   Component Value Date    TSH 1.840 02/22/2021       Labs today: None    COVID19 testing scheduled on 7/5/21      ASSESSMENT and PLAN  Bradley Garcia is a 20 year old male scheduled to undergo ANESTHESIA OUT OF OR Magnetic resonance imaging Brain & ANESTHESIA OUT OF OR CT Scan Head without Contrast on 7/8/21 at [facility] for treatment of Torsion dystonia.    Patient has a history of  PONV, but has no family history of anesthesia complications.    Pre-operative considerations:  1.  Cardiac:  Functional status- METS >4.  He doesn't exercise purposefully, but reports he recently walked 10 minutes and can take the full flight of stairs in his home with no complications.  He has no known cardiac conditions.  Low risk procedure with 0.4% risk of major adverse cardiac event.   2.  Pulm:   GURVINDER risk: low.   3.  GI:  Risk of PONV score = 1.  If > 2, anti-emetic intervention recommended.  4. Neuro:  DBS in place.  Instructed to bring his remote for procedure.   Dystonia symptoms include neck pulling, neck pain, tremors and other involuntary movements.      VTE risk: 0.5%      Patient is optimized and is acceptable candidate for the proposed procedure. No further diagnostic evaluation is needed.    Final plan per anesthesiologist on day of surgery.     Arrival time, NPO, shower and medication instructions provided by nursing staff today.  Preparing For Your Surgery handout given.    37 minutes spent on the date of the encounter doing chart review, history and exam, documentation and further activities as noted below:    Prep time: 10 minutes  Visit time: 17 minutes  Documentation time: 10 minutes      Mena Burrell PA-C  Preoperative Assessment Center  [facility]  Phone: [phone]  Fax: [phone]

## 2021-07-02 NOTE — TELEPHONE ENCOUNTER
The patient left the writer a message on her voicemail asking if he is able to eat or does he have to fast for his labs later at 4:30 PM.     The writer called and left the patient a detailed message that he would need to check with PAC as the writer it not clear of what they require. The patient was informed that for a pre-op physical, it is typically not required for patient to fast but for the patient to check with PAC. The patient was informed to contact the writer with any further questions.

## 2021-07-02 NOTE — PATIENT INSTRUCTIONS
Preparing for Your Surgery      Name:  Bradley Garcia   MRN:  [id number]   :  2000   Today's Date:  2021       Arriving for surgery:  Surgery date:  2021  Arrival time:  7:00 am    Restrictions due to COVID 19:       One visitor is allowed in the Pre Op area. When you go into surgery, one visitor is allowed to wait in the Surgery Waiting Room       (provided there is enough space to social distance).   After surgery- Two visitors are allowed at a time if you have a private room and one visitor is allowed for those in a semi-private room.   Every 4 days the visitor(s) can rotate. During the 4 day period, the visitor(s) must be consistent. No visitors under the age of 18 years old.   Visiting Hours: 8 am - 8:30 pm   No ill visitors.   All visitors must wear face mask.    [employer] parking is available for anyone with mobility limitations or disabilities.  ([place]  24 hours/ 7 days a week; [facility]  7 am- 3:30 pm, Mon- Fri)    Please come to:     [facility] [address]  [address] [place] [address]       -    Please proceed to [address] on the 3rd floor. [phone]?     - ?If you are in need of directions, wheelchair or escort please stop at the Information Desk in the lobby.      What can I eat or drink?  -  You may eat and drink normally for up to 8 hours before your surgery. (Until Midnight)  -  You may have clear liquids until 2 hours before surgery. (Until 7 am arrival time)    Examples of clear liquids:  Water  Clear broth  Juices (apple, white grape, white cranberry  and cider) without pulp  Noncarbonated, powder based beverages  (lemonade and Anders-Aid)  Sodas (Sprite, 7-Up, ginger ale and seltzer)  Coffee or tea (without milk or cream)  Gatorade    -  No Alcohol for at least 24 hours before surgery     Which medicines can I take?    Hold Aspirin for 7 days before surgery.   Hold Multivitamins for 7 days before  surgery.  Hold Supplements for 7 days before surgery.  Hold Ibuprofen (Advil, Motrin) for 1 day before surgery--unless otherwise directed by surgeon.  Hold Naproxen (Aleve) for 4 days before surgery.      -  PLEASE TAKE these medications the day of surgery:  Sertraline       How do I prepare myself?  - Please take 2 showers before surgery using Scrubcare or Hibiclens soap.    Use this soap only from the neck to your toes.     Leave the soap on your skin for one minute--then rinse thoroughly.      You may use your own shampoo and conditioner; no other hair products.   - Please remove all jewelry and body piercings.  - No lotions, deodorants or fragrance.  - No makeup or fingernail polish.   - Bring your ID and insurance card.    - All patients are required to have a Covid-19 test within 4 days of surgery/procedure.      -Patients will be contacted by the [facility] scheduling team within 1 week of surgery to make an appointment.      - Patients may call the Scheduling team at [phone] if they have not been scheduled within 4 days of  surgery.      ALL PATIENTS GOING HOME THE SAME DAY OF SURGERY ARE REQUIRED TO HAVE A RESPONSIBLE ADULT TO DRIVE AND BE IN ATTENDANCE WITH THEM FOR 24 HOURS FOLLOWING SURGERY.      Questions or Concerns:    - For any questions regarding the day of surgery or your hospital stay, please contact the Pre Admission Nursing Office at [phone].       - If you have health changes between today and your surgery please call your surgeon.       For questions after surgery please call your surgeons office.

## 2021-07-05 ENCOUNTER — HOSPITAL ENCOUNTER (OUTPATIENT)
Dept: LAB | Facility: CLINIC | Age: 21
Discharge: HOME | End: 2021-07-05
Attending: STUDENT IN AN ORGANIZED HEALTH CARE EDUCATION/TRAINING PROGRAM | Admitting: STUDENT IN AN ORGANIZED HEALTH CARE EDUCATION/TRAINING PROGRAM
Payer: COMMERCIAL

## 2021-07-05 DIAGNOSIS — Z11.59 ENCOUNTER FOR SCREENING FOR OTHER VIRAL DISEASES: ICD-10-CM

## 2021-07-05 LAB
SARS-COV-2 RNA RESP QL NAA+PROBE: NORMAL
SPECIMEN SOURCE: NORMAL

## 2021-07-05 PROCEDURE — U0005 INFEC AGEN DETEC AMPLI PROBE: HCPCS | Performed by: STUDENT IN AN ORGANIZED HEALTH CARE EDUCATION/TRAINING PROGRAM

## 2021-07-05 PROCEDURE — U0003 INFECTIOUS AGENT DETECTION BY NUCLEIC ACID (DNA OR RNA); SEVERE ACUTE RESPIRATORY SYNDROME CORONAVIRUS 2 (SARS-COV-2) (CORONAVIRUS DISEASE [COVID-19]), AMPLIFIED PROBE TECHNIQUE, MAKING USE OF HIGH THROUGHPUT TECHNOLOGIES AS DESCRIBED BY CMS-2020-01-R: HCPCS | Performed by: STUDENT IN AN ORGANIZED HEALTH CARE EDUCATION/TRAINING PROGRAM

## 2021-07-06 ENCOUNTER — TELEPHONE (OUTPATIENT)
Dept: NEUROLOGY | Facility: CLINIC | Age: 21
End: 2021-07-06

## 2021-07-06 NOTE — TELEPHONE ENCOUNTER
"                                                      Deep Brain Stimulation Surgery Surgical Candidacy Form    Referring Provider: Tatyana Low PA-C    Patient Information: Lives in [place]  Name: Bradley Garcia  YOB: 2000  Age: 20 year old  Height: 5'6\"  Weight: 128 lbs  BMI: 20.77  Blood Pressure: 110/74  Diagnosis: Dystonia  Age of Onset of Symptoms: <2. Year of Onset of Symptoms: prior to 2003.  Handedness: Right.  Side of Onset: Left upper extremity.   Disease Features: Dystonia and Tremor     Surgery Goals: Improve walking.  Improve writing. Be able to put toothpaste on a toothbrush and tie his shoelaces     Medications: As of 7/6/21  Current Outpatient Medications   Medication Sig Dispense Refill     sertraline (ZOLOFT) 25 MG tablet Take 50 mg by mouth every morning        tiZANidine (ZANAFLEX) 2 MG tablet every evening          Motor Assessment:  Total movement scale score = 58/120   Total dystonia disability scale score = 10/30     Neuropsychological Evaluation:    Cognitive Issues: Results indicate slowed information and psychomotor processing speed. Memory is variable, but largely falls within normal limits. Overall intellectual functioning falls in the average range.    Psychiatric Issues: He has a history of depression, which has been treated with medications. He has no history of psychiatric hospitalizations and has not been in psychotherapy. Assessment of mood and personality suggests an above average level of stress, as well as apathy, and otherwise falls within normal limits. He does not report alcohol, tobacco, or illicit drug use. He has a good support system in his family.     Depression: Mild depressive symptoms.    Cognitive Function: No signs or symptoms of cognitive dysfunction.(slowed processing speed)    Psychosis: No hallucinations.     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     Family Hx of Movement Disorders: No    Consult Clara Aleman/[place]   Patient discussed at conference on: 7/15/21     DBS Meeting Notes/Further Work-up Needed: -    Authorization to discuss Protected Health Information:  Poonam (mother) and Santo (Father)  Healthcare Directive:  None on file  Mychart use:  Uses reliably    DBS decision on 7/15/21:    1. Patient is a candidate for revision   2. 1st surgery will be removal of all components (including battery)   3. Wait 4-6 weeks after removal for brain healing and then have 3T MRI, with and without contrast, under sedation (previous 3T did use airway support, so probably general anesthesia) **Request F-GATIR sequence for MRI**   4. Staged bilateral Gpi surgery, left Gpi first for right body   5. Device: After team discussion: 1st choice would be Medtronic directional/Sensight lead with Activa RC so that patient can avoid numerous replacement surgeries/infection risk. 2nd choice would be Medtronic directional/Sensight lead with newer Percept battery (but is not rechargeable, possibility that Medtronic will have rechargeable version at a future replacement surgery). 3rd choice is Promodity Genus rechargeable with dystonic tremor indication. Ask patient/family on which device they prefer.   6. Dr. Aleman to program patient   7. Ask patient about Research: pt not eligible for 7T due to sedation needed for MRIs. Patient could participate in intraop recordings for removal surgery  (asleep) as well as both lead implants surgery. Will also need HDE if we use Medtronic equipment.   8. Authorization to discuss PHI Poonam-mother and Santo-father, Healthcare directive-none on file, [employer]-uses

## 2021-07-08 ENCOUNTER — HOSPITAL ENCOUNTER (OUTPATIENT)
Dept: MRI IMAGING | Facility: CLINIC | Age: 21
End: 2021-07-08
Attending: PSYCHIATRY & NEUROLOGY | Admitting: PSYCHIATRY & NEUROLOGY
Payer: COMMERCIAL

## 2021-07-08 ENCOUNTER — HOSPITAL ENCOUNTER (OUTPATIENT)
Facility: CLINIC | Age: 21
Discharge: HOME | End: 2021-07-08
Attending: PSYCHIATRY & NEUROLOGY | Admitting: PSYCHIATRY & NEUROLOGY
Payer: COMMERCIAL

## 2021-07-08 VITALS
TEMPERATURE: 98.4 F | HEART RATE: 87 BPM | SYSTOLIC BLOOD PRESSURE: 137 MMHG | OXYGEN SATURATION: 98 % | RESPIRATION RATE: 18 BRPM | DIASTOLIC BLOOD PRESSURE: 123 MMHG

## 2021-07-08 DIAGNOSIS — G24.1 DYSTONIA, TORSION, FRAGMENTS OF: ICD-10-CM

## 2021-07-08 LAB — GLUCOSE BLDC GLUCOMTR-MCNC: 103 MG/DL (ref 70–99)

## 2021-07-08 PROCEDURE — 370N000017 HC ANESTHESIA TECHNICAL FEE, PER MIN

## 2021-07-08 PROCEDURE — 70553 MRI BRAIN STEM W/O & W/DYE: CPT | Mod: 26 | Performed by: STUDENT IN AN ORGANIZED HEALTH CARE EDUCATION/TRAINING PROGRAM

## 2021-07-08 PROCEDURE — 250N000009 HC RX 250: Performed by: NURSE ANESTHETIST, CERTIFIED REGISTERED

## 2021-07-08 PROCEDURE — 250N000011 HC RX IP 250 OP 636: Performed by: NURSE ANESTHETIST, CERTIFIED REGISTERED

## 2021-07-08 PROCEDURE — 258N000003 HC RX IP 258 OP 636: Performed by: NURSE ANESTHETIST, CERTIFIED REGISTERED

## 2021-07-08 PROCEDURE — A9585 GADOBUTROL INJECTION: HCPCS | Performed by: PSYCHIATRY & NEUROLOGY

## 2021-07-08 PROCEDURE — 250N000011 HC RX IP 250 OP 636: Performed by: ANESTHESIOLOGY

## 2021-07-08 PROCEDURE — 82962 GLUCOSE BLOOD TEST: CPT

## 2021-07-08 PROCEDURE — 999N000141 HC STATISTIC PRE-PROCEDURE NURSING ASSESSMENT

## 2021-07-08 PROCEDURE — 710N000010 HC RECOVERY PHASE 1, LEVEL 2, PER MIN

## 2021-07-08 PROCEDURE — 710N000012 HC RECOVERY PHASE 2, PER MINUTE

## 2021-07-08 PROCEDURE — 255N000002 HC RX 255 OP 636: Performed by: PSYCHIATRY & NEUROLOGY

## 2021-07-08 PROCEDURE — 70553 MRI BRAIN STEM W/O & W/DYE: CPT

## 2021-07-08 PROCEDURE — 250N000024 HC ISOFLURANE, PER MIN

## 2021-07-08 RX ORDER — PROPOFOL 10 MG/ML
INJECTION, EMULSION INTRAVENOUS PRN
Status: DISCONTINUED | OUTPATIENT
Start: 2021-07-08 | End: 2021-07-08

## 2021-07-08 RX ORDER — DEXAMETHASONE SODIUM PHOSPHATE 4 MG/ML
INJECTION, SOLUTION INTRA-ARTICULAR; INTRALESIONAL; INTRAMUSCULAR; INTRAVENOUS; SOFT TISSUE PRN
Status: DISCONTINUED | OUTPATIENT
Start: 2021-07-08 | End: 2021-07-08

## 2021-07-08 RX ORDER — ONDANSETRON 2 MG/ML
INJECTION INTRAMUSCULAR; INTRAVENOUS PRN
Status: DISCONTINUED | OUTPATIENT
Start: 2021-07-08 | End: 2021-07-08

## 2021-07-08 RX ORDER — NALOXONE HYDROCHLORIDE 0.4 MG/ML
0.2 INJECTION, SOLUTION INTRAMUSCULAR; INTRAVENOUS; SUBCUTANEOUS
Status: DISCONTINUED | OUTPATIENT
Start: 2021-07-08 | End: 2021-07-08 | Stop reason: HOSPADM

## 2021-07-08 RX ORDER — SODIUM CHLORIDE, SODIUM LACTATE, POTASSIUM CHLORIDE, CALCIUM CHLORIDE 600; 310; 30; 20 MG/100ML; MG/100ML; MG/100ML; MG/100ML
INJECTION, SOLUTION INTRAVENOUS CONTINUOUS PRN
Status: DISCONTINUED | OUTPATIENT
Start: 2021-07-08 | End: 2021-07-08

## 2021-07-08 RX ORDER — FENTANYL CITRATE 50 UG/ML
INJECTION, SOLUTION INTRAMUSCULAR; INTRAVENOUS PRN
Status: DISCONTINUED | OUTPATIENT
Start: 2021-07-08 | End: 2021-07-08

## 2021-07-08 RX ORDER — ONDANSETRON 4 MG/1
4 TABLET, ORALLY DISINTEGRATING ORAL EVERY 30 MIN PRN
Status: DISCONTINUED | OUTPATIENT
Start: 2021-07-08 | End: 2021-07-08 | Stop reason: HOSPADM

## 2021-07-08 RX ORDER — LIDOCAINE HYDROCHLORIDE 20 MG/ML
INJECTION, SOLUTION INFILTRATION; PERINEURAL PRN
Status: DISCONTINUED | OUTPATIENT
Start: 2021-07-08 | End: 2021-07-08

## 2021-07-08 RX ORDER — NALOXONE HYDROCHLORIDE 0.4 MG/ML
0.4 INJECTION, SOLUTION INTRAMUSCULAR; INTRAVENOUS; SUBCUTANEOUS
Status: DISCONTINUED | OUTPATIENT
Start: 2021-07-08 | End: 2021-07-08 | Stop reason: HOSPADM

## 2021-07-08 RX ORDER — SODIUM CHLORIDE, SODIUM LACTATE, POTASSIUM CHLORIDE, CALCIUM CHLORIDE 600; 310; 30; 20 MG/100ML; MG/100ML; MG/100ML; MG/100ML
INJECTION, SOLUTION INTRAVENOUS CONTINUOUS
Status: DISCONTINUED | OUTPATIENT
Start: 2021-07-08 | End: 2021-07-08 | Stop reason: HOSPADM

## 2021-07-08 RX ORDER — DEXAMETHASONE SODIUM PHOSPHATE 4 MG/ML
4 INJECTION, SOLUTION INTRA-ARTICULAR; INTRALESIONAL; INTRAMUSCULAR; INTRAVENOUS; SOFT TISSUE ONCE
Status: DISCONTINUED | OUTPATIENT
Start: 2021-07-08 | End: 2021-07-08 | Stop reason: HOSPADM

## 2021-07-08 RX ORDER — ONDANSETRON 2 MG/ML
4 INJECTION INTRAMUSCULAR; INTRAVENOUS EVERY 30 MIN PRN
Status: DISCONTINUED | OUTPATIENT
Start: 2021-07-08 | End: 2021-07-08 | Stop reason: HOSPADM

## 2021-07-08 RX ORDER — GADOBUTROL 604.72 MG/ML
0.1 INJECTION INTRAVENOUS ONCE
Status: COMPLETED | OUTPATIENT
Start: 2021-07-08 | End: 2021-07-08

## 2021-07-08 RX ORDER — PROPOFOL 10 MG/ML
INJECTION, EMULSION INTRAVENOUS CONTINUOUS PRN
Status: DISCONTINUED | OUTPATIENT
Start: 2021-07-08 | End: 2021-07-08

## 2021-07-08 RX ADMIN — ROCURONIUM BROMIDE 35 MG: 10 INJECTION INTRAVENOUS at 09:09

## 2021-07-08 RX ADMIN — ONDANSETRON 4 MG: 2 INJECTION INTRAMUSCULAR; INTRAVENOUS at 10:16

## 2021-07-08 RX ADMIN — FENTANYL CITRATE 50 MCG: 50 INJECTION, SOLUTION INTRAMUSCULAR; INTRAVENOUS at 09:09

## 2021-07-08 RX ADMIN — ONDANSETRON 4 MG: 2 INJECTION INTRAMUSCULAR; INTRAVENOUS at 10:49

## 2021-07-08 RX ADMIN — PROPOFOL 120 MG: 10 INJECTION, EMULSION INTRAVENOUS at 09:09

## 2021-07-08 RX ADMIN — SODIUM CHLORIDE, POTASSIUM CHLORIDE, SODIUM LACTATE AND CALCIUM CHLORIDE: 600; 310; 30; 20 INJECTION, SOLUTION INTRAVENOUS at 08:59

## 2021-07-08 RX ADMIN — SUGAMMADEX 200 MG: 100 INJECTION, SOLUTION INTRAVENOUS at 10:16

## 2021-07-08 RX ADMIN — LIDOCAINE HYDROCHLORIDE 100 MG: 20 INJECTION, SOLUTION INFILTRATION; PERINEURAL at 09:09

## 2021-07-08 RX ADMIN — DEXAMETHASONE SODIUM PHOSPHATE 4 MG: 4 INJECTION, SOLUTION INTRA-ARTICULAR; INTRALESIONAL; INTRAMUSCULAR; INTRAVENOUS; SOFT TISSUE at 09:09

## 2021-07-08 RX ADMIN — FENTANYL CITRATE 50 MCG: 50 INJECTION, SOLUTION INTRAMUSCULAR; INTRAVENOUS at 09:04

## 2021-07-08 RX ADMIN — PROPOFOL 75 MCG/KG/MIN: 10 INJECTION, EMULSION INTRAVENOUS at 09:09

## 2021-07-08 RX ADMIN — GADOBUTROL 6 ML: 604.72 INJECTION INTRAVENOUS at 10:00

## 2021-07-08 RX ADMIN — FENTANYL CITRATE 50 MCG: 50 INJECTION, SOLUTION INTRAMUSCULAR; INTRAVENOUS at 08:59

## 2021-07-08 NOTE — ANESTHESIA POSTPROCEDURE EVALUATION
Patient: Bradley Garcia    Procedure(s):  ANESTHESIA OUT OF OR Magnetic resonance imaging Brain @1200  ANESTHESIA OUT OF OR CT Scan Head withput Contrast @1300    Diagnosis:Torsion dystonia [G24.1]  Diagnosis Additional Information: No value filed.    Anesthesia Type:  General    Note:  Disposition: Outpatient   Postop Pain Control: Uneventful            Sign Out: Well controlled pain   PONV: No   Neuro/Psych: Uneventful            Sign Out: Acceptable/Baseline neuro status   Airway/Respiratory: Uneventful            Sign Out: Acceptable/Baseline resp. status   CV/Hemodynamics: Uneventful            Sign Out: Acceptable CV status; No obvious hypovolemia; No obvious fluid overload   Other NRE:    DID A NON-ROUTINE EVENT OCCUR?            Last vitals:  Vitals:    07/08/21 1140 07/08/21 1149 07/08/21 1150   BP: 108/78 111/71    Pulse: 78 92    Resp: 24 18    Temp:  36.9  C (98.4  F)    SpO2: 96% 97% 96%       Last vitals prior to Anesthesia Care Transfer:  CRNA VITALS  7/8/2021 0951 - 7/8/2021 1051      7/8/2021             Ht Rate:  70    SpO2:  96 %          Electronically Signed By: Gabe Maurer MD  July 8, 2021  11:53 AM

## 2021-07-08 NOTE — ANESTHESIA CARE TRANSFER NOTE
Patient: Bradley Garcia    Procedure(s):  ANESTHESIA OUT OF OR Magnetic resonance imaging Brain @1200  ANESTHESIA OUT OF OR CT Scan Head withput Contrast @1300    Diagnosis: Torsion dystonia [G24.1]  Diagnosis Additional Information: No value filed.    Anesthesia Type:   General     Note:    Oropharynx: oropharynx clear of all foreign objects  Level of Consciousness: awake  Oxygen Supplementation: face mask  Level of Supplemental Oxygen (L/min / FiO2): 6  Independent Airway: airway patency satisfactory and stable  Dentition: dentition unchanged  Vital Signs Stable: post-procedure vital signs reviewed and stable  Report to RN Given: handoff report given  Patient transferred to: PACU    Handoff Report: Identifed the Patient, Identified the Reponsible Provider, Reviewed the pertinent medical history, Discussed the surgical course, Reviewed Intra-OP anesthesia mangement and issues during anesthesia, Set expectations for post-procedure period and Allowed opportunity for questions and acknowledgement of understanding      Vitals: (Last set prior to Anesthesia Care Transfer)  CRNA VITALS  7/8/2021 0951 - 7/8/2021 1035      7/8/2021             Ht Rate:  70    SpO2:  96 %        Electronically Signed By: MAG Lr CRNA  July 8, 2021  10:35 AM

## 2021-07-08 NOTE — ANESTHESIA PROCEDURE NOTES
Airway       Patient location during procedure: OR  Staff -        Performed By: CRNAIndications and Patient Condition       Indications for airway management: jose cruz-procedural       Induction type:intravenous       Mask difficulty assessment: 1 - vent by mask    Final Airway Details       Final airway type: endotracheal airway       Successful airway: ETT - single  Endotracheal Airway Details        ETT size (mm): 7.0       Cuffed: yes       Successful intubation technique: direct laryngoscopy       DL Blade Type: MAC 3       Grade View of Cords: 1       Adjucts: stylet       Position: Right       Secured at (cm): 22    Post intubation assessment        Placement verified by: capnometry, equal breath sounds and chest rise        Number of attempts at approach: 1       Secured with: pink tape       Ease of procedure: easy       Dentition: Intact    Medication(s) Administered   Medication Administration Time: 7/8/2021 9:11 AM

## 2021-07-08 NOTE — DISCHARGE INSTRUCTIONS
[facility]  Same-Day Surgery   Adult Discharge Orders & Instructions     For 24 hours after surgery    1. Get plenty of rest.  A responsible adult must stay with you for at least 24 hours after you leave the hospital.   2. Do not drive or use heavy equipment.  If you have weakness or tingling, don't drive or use heavy equipment until this feeling goes away.  3. Do not drink alcohol.  4. Avoid strenuous or risky activities.  Ask for help when climbing stairs.   5. You may feel lightheaded.  IF so, sit for a few minutes before standing.  Have someone help you get up.   6. If you have nausea (feel sick to your stomach): Drink only clear liquids such as apple juice, ginger ale, broth or 7-Up.  Rest may also help.  Be sure to drink enough fluids.  Move to a regular diet as you feel able.  7. You may have a slight fever. Call the doctor if your fever is over 100 F (37.7 C) (taken under the tongue) or lasts longer than 24 hours.  8. You may have a dry mouth, a sore throat, muscle aches or trouble sleeping.  These should go away after 24 hours.  9. Do not make important or legal decisions.   Call your doctor for any of the followin.  Signs of infection (fever, growing tenderness at the surgery site, a large amount of drainage or bleeding, severe pain, foul-smelling drainage, redness, swelling).    2. It has been over 8 to 10 hours since surgery and you are still not able to urinate (pass water).    3.  Headache for over 24 hours.    To contact an Anesthesiologist call:        [phone] and ask for the resident on call for Anesthesiologia (answered 24 hours a day)      Emergency Department: [facility]: [phone]    (TTY for hearing impaired: [phone])

## 2021-07-12 ENCOUNTER — NURSE TRIAGE (OUTPATIENT)
Dept: NURSING | Facility: CLINIC | Age: 21
End: 2021-07-12

## 2021-07-12 ENCOUNTER — PRE VISIT (OUTPATIENT)
Dept: SURGERY | Facility: CLINIC | Age: 21
End: 2021-07-12

## 2021-07-12 ENCOUNTER — TELEPHONE (OUTPATIENT)
Dept: NEUROLOGY | Facility: CLINIC | Age: 21
End: 2021-07-12

## 2021-07-12 DIAGNOSIS — Z11.59 ENCOUNTER FOR SCREENING FOR OTHER VIRAL DISEASES: Primary | ICD-10-CM

## 2021-07-12 DIAGNOSIS — Z11.59 ENCOUNTER FOR SCREENING FOR OTHER VIRAL DISEASES: ICD-10-CM

## 2021-07-12 LAB — SARS-COV-2 RNA RESP QL NAA+PROBE: NEGATIVE

## 2021-07-12 PROCEDURE — U0005 INFEC AGEN DETEC AMPLI PROBE: HCPCS

## 2021-07-12 PROCEDURE — U0003 INFECTIOUS AGENT DETECTION BY NUCLEIC ACID (DNA OR RNA); SEVERE ACUTE RESPIRATORY SYNDROME CORONAVIRUS 2 (SARS-COV-2) (CORONAVIRUS DISEASE [COVID-19]), AMPLIFIED PROBE TECHNIQUE, MAKING USE OF HIGH THROUGHPUT TECHNOLOGIES AS DESCRIBED BY CMS-2020-01-R: HCPCS

## 2021-07-12 NOTE — TELEPHONE ENCOUNTER
Writer called and spoke to Ashley at the [facility] department to find out why the patient's CT head under IV sedation was not completed after his 1.5T MRI Head was done. Ashley stated the CT tech and the radiology manager, Pj does not know why the patient's CT was not done. Ashley stated that typically if a patient is scheduled with back to back images like the patient was, then it would be coordinated. Ashley stated she was told they do not know what happened but that the patient didn't show up for his CT Head.    The patient was rescheduled for CT head under IV sedation for 7/13/21 at 12 PM.     The writer called and asked the patient if he knew the reason why his CT head was not done. The patient stated he thought it was done. The patient stated he even told the radiology staff if they knew he was getting an MRI and CT done back to back. The patient stated he was reassured the staff knew. The writer apologized to the patient for the incontinence as well as informing him he was rescheduled for his CT head tomorrow at noon. The patient stated he was okay with being rescheduled for the CT head tomorrow. The writer thanked the patient for his time and patience.

## 2021-07-12 NOTE — TELEPHONE ENCOUNTER
The writer left a message for the Pre-Admissions Nursing Office to check if the patient still needed a Covid test for his CT tomorrow.     Katarina left the writer a message that yes, the patient does need a Covid test as his last one has .    The patient was called and informed that the writer will try to get him in today for a Covid test. The writer again apologized to the patient again.    Spoke to LEN Pinto with the [facility] Covid Team and she was informed of the situation of needing to get the patient in as soon as possible. Carolina stated she will reach out to the patient to get him scheduled and will let the writer know with an update.

## 2021-07-12 NOTE — TELEPHONE ENCOUNTER
Caller: Gael - care coordinator, [facility]  Phone #: [phone]    Caller informs that pt was originally scheduled last week for back to back MRI CT which requires IV sedation. There were some scheduling issues, and caused procedure to be moved tomorrow 7/13.    Needs COVID testing prior to procedure.    FNA called pt and transferred him to [facility]. He is all set for a 2 PM pre-procedure COVID test at  Lab.    FNA reached out to supervisor, Phyllis HARRINGTON for orders. Pre-procedure COVID test orders now in Epic.    FNA called Gael back at [facility] and left detailed message.    Britta Wheeler RN/[facility] Nurse Advisor          Additional Information    Information only question and nurse able to answer    Protocols used: INFORMATION ONLY CALL - NO TRIAGE-A-OH

## 2021-07-12 NOTE — TELEPHONE ENCOUNTER
LEN Pinto left the writer a message that the patient was scheduled for his Covid test and that she requested it to be expedited.

## 2021-07-13 ENCOUNTER — HOSPITAL ENCOUNTER (OUTPATIENT)
Facility: CLINIC | Age: 21
Discharge: HOME | End: 2021-07-13
Attending: PSYCHIATRY & NEUROLOGY | Admitting: PSYCHIATRY & NEUROLOGY
Payer: COMMERCIAL

## 2021-07-13 ENCOUNTER — ANESTHESIA (OUTPATIENT)
Dept: SURGERY | Facility: CLINIC | Age: 21
End: 2021-07-13
Payer: COMMERCIAL

## 2021-07-13 ENCOUNTER — HOSPITAL ENCOUNTER (OUTPATIENT)
Dept: CT IMAGING | Facility: CLINIC | Age: 21
End: 2021-07-13
Attending: PSYCHIATRY & NEUROLOGY | Admitting: PSYCHIATRY & NEUROLOGY
Payer: COMMERCIAL

## 2021-07-13 ENCOUNTER — ANESTHESIA EVENT (OUTPATIENT)
Dept: SURGERY | Facility: CLINIC | Age: 21
End: 2021-07-13
Payer: COMMERCIAL

## 2021-07-13 VITALS
OXYGEN SATURATION: 97 % | RESPIRATION RATE: 14 BRPM | DIASTOLIC BLOOD PRESSURE: 92 MMHG | TEMPERATURE: 97.7 F | WEIGHT: 132.06 LBS | HEART RATE: 64 BPM | HEIGHT: 66 IN | BODY MASS INDEX: 21.22 KG/M2 | SYSTOLIC BLOOD PRESSURE: 137 MMHG

## 2021-07-13 DIAGNOSIS — G24.1 DYSTONIA, TORSION, FRAGMENTS OF: ICD-10-CM

## 2021-07-13 PROCEDURE — 258N000003 HC RX IP 258 OP 636: Performed by: ANESTHESIOLOGY

## 2021-07-13 PROCEDURE — 999N000141 HC STATISTIC PRE-PROCEDURE NURSING ASSESSMENT

## 2021-07-13 PROCEDURE — 370N000017 HC ANESTHESIA TECHNICAL FEE, PER MIN

## 2021-07-13 PROCEDURE — 70450 CT HEAD/BRAIN W/O DYE: CPT

## 2021-07-13 PROCEDURE — 70450 CT HEAD/BRAIN W/O DYE: CPT | Mod: 26 | Performed by: RADIOLOGY

## 2021-07-13 PROCEDURE — 710N000012 HC RECOVERY PHASE 2, PER MINUTE

## 2021-07-13 PROCEDURE — 250N000011 HC RX IP 250 OP 636: Performed by: NURSE ANESTHETIST, CERTIFIED REGISTERED

## 2021-07-13 RX ORDER — SODIUM CHLORIDE, SODIUM LACTATE, POTASSIUM CHLORIDE, CALCIUM CHLORIDE 600; 310; 30; 20 MG/100ML; MG/100ML; MG/100ML; MG/100ML
INJECTION, SOLUTION INTRAVENOUS CONTINUOUS
Status: CANCELLED | OUTPATIENT
Start: 2021-07-13

## 2021-07-13 RX ORDER — LIDOCAINE 40 MG/G
CREAM TOPICAL
Status: DISCONTINUED | OUTPATIENT
Start: 2021-07-13 | End: 2021-07-13 | Stop reason: HOSPADM

## 2021-07-13 RX ORDER — SODIUM CHLORIDE, SODIUM LACTATE, POTASSIUM CHLORIDE, CALCIUM CHLORIDE 600; 310; 30; 20 MG/100ML; MG/100ML; MG/100ML; MG/100ML
INJECTION, SOLUTION INTRAVENOUS CONTINUOUS
Status: DISCONTINUED | OUTPATIENT
Start: 2021-07-13 | End: 2021-07-13 | Stop reason: HOSPADM

## 2021-07-13 RX ORDER — MEPERIDINE HYDROCHLORIDE 25 MG/ML
12.5 INJECTION INTRAMUSCULAR; INTRAVENOUS; SUBCUTANEOUS
Status: CANCELLED | OUTPATIENT
Start: 2021-07-13

## 2021-07-13 RX ORDER — ONDANSETRON 2 MG/ML
4 INJECTION INTRAMUSCULAR; INTRAVENOUS EVERY 30 MIN PRN
Status: CANCELLED | OUTPATIENT
Start: 2021-07-13

## 2021-07-13 RX ORDER — FENTANYL CITRATE 50 UG/ML
INJECTION, SOLUTION INTRAMUSCULAR; INTRAVENOUS PRN
Status: DISCONTINUED | OUTPATIENT
Start: 2021-07-13 | End: 2021-07-13

## 2021-07-13 RX ORDER — ONDANSETRON 4 MG/1
4 TABLET, ORALLY DISINTEGRATING ORAL EVERY 30 MIN PRN
Status: CANCELLED | OUTPATIENT
Start: 2021-07-13

## 2021-07-13 RX ORDER — PROPOFOL 10 MG/ML
INJECTION, EMULSION INTRAVENOUS PRN
Status: DISCONTINUED | OUTPATIENT
Start: 2021-07-13 | End: 2021-07-13

## 2021-07-13 RX ADMIN — PROPOFOL 30 MG: 10 INJECTION, EMULSION INTRAVENOUS at 12:03

## 2021-07-13 RX ADMIN — SODIUM CHLORIDE, POTASSIUM CHLORIDE, SODIUM LACTATE AND CALCIUM CHLORIDE: 600; 310; 30; 20 INJECTION, SOLUTION INTRAVENOUS at 11:50

## 2021-07-13 RX ADMIN — FENTANYL CITRATE 100 MCG: 50 INJECTION, SOLUTION INTRAMUSCULAR; INTRAVENOUS at 12:00

## 2021-07-13 RX ADMIN — MIDAZOLAM 2 MG: 1 INJECTION INTRAMUSCULAR; INTRAVENOUS at 12:00

## 2021-07-13 ASSESSMENT — LIFESTYLE VARIABLES: TOBACCO_USE: 0

## 2021-07-13 ASSESSMENT — MIFFLIN-ST. JEOR: SCORE: 1556.75

## 2021-07-13 ASSESSMENT — COPD QUESTIONNAIRES: COPD: 0

## 2021-07-13 ASSESSMENT — ENCOUNTER SYMPTOMS: SEIZURES: 0

## 2021-07-13 NOTE — ANESTHESIA PREPROCEDURE EVALUATION
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      Depression      Neck pain      PONV (postoperative nausea and vomiting)      S/P deep brain stimulator placement      Torsion dystonia       Past Surgical History:   Procedure Laterality Date     ANESTHESIA OUT OF OR MRI       ANESTHESIA OUT OF OR MRI N/A 2021    Procedure: ANESTHESIA OUT OF OR Magnetic resonance imaging Brain @1200;  Surgeon: GENERIC ANESTHESIA PROVIDER;  Location: [facility] OR     EYE SURGERY       NOSE SURGERY      nasal fracture repair     STEREOTACTIC INSERTION DEEP BRAIN STIMULATION        No Known Allergies   Social History     Tobacco Use     Smoking status: Never Smoker     Smokeless tobacco: Never Used   Substance Use Topics     Alcohol use: Never      Wt Readings from Last 1 Encounters:   21 59.9 kg (132 lb 0.9 oz) (14 %, Z= -1.08)*     * Growth percentiles are based on [facility] (Boys, 2-20 Years) data.        Anesthesia Evaluation   Pt has had prior anesthetic. Type: General and MAC.    History of anesthetic complications  - PONV.      ROS/MED HX  ENT/Pulmonary: Comment: Hasn't had covid vaccines.  Has never had covid    (-) tobacco use, asthma, COPD, GURVINDER risk factors and recent URI   Neurologic: Comment: Dystonia - neck pulling, neck pain, head tremors.  DBS right chest.     (-) no seizures and no CVA   Cardiovascular:     (+) -----No previous cardiac testing     METS/Exercise Tolerance: >4 METS    Hematologic:  - neg hematologic  ROS  (-) history of blood clots and history of blood transfusion   Musculoskeletal: Comment: Chronic neck pain secondary to dystonia      GI/Hepatic:  - neg GI/hepatic ROS      Renal/Genitourinary:  - neg Renal ROS     Endo:  - neg endo ROS     Psychiatric/Substance Use:     (+) psychiatric history depression     Infectious Disease: Comment: COVID NEGATIVE 6/12/21 - neg infectious disease ROS  (-) Recent Fever   Malignancy:  - neg malignancy ROS     Other:  - neg other ROS    (+) , H/O Chronic Pain,        Physical Exam    Airway        Mallampati: I   TM distance: > 3 FB   Neck ROM: full   Mouth opening: > 3 cm    Respiratory Devices and Support         Dental  no notable dental history         Cardiovascular          Rhythm and rate: regular and normal     Pulmonary   pulmonary exam normal                OUTSIDE LABS:  CBC: No results found for: WBC, HGB, HCT, PLT  BMP:   Lab Results   Component Value Date    POTASSIUM 4.3 02/22/2021    CR 0.9 02/22/2021    GLC 93 07/13/2021    GLC 84 02/22/2021     COAGS: No results found for: PTT, INR, FIBR  POC:   Lab Results   Component Value Date     (H) 07/08/2021     HEPATIC:   Lab Results   Component Value Date    ALT 20 02/22/2021    AST 35 02/22/2021     OTHER:   Lab Results   Component Value Date    TSH 1.840 02/22/2021       Anesthesia Plan    ASA Status:  2      Anesthesia Type: MAC.              Consents    Anesthesia Plan(s) and associated risks, benefits, and realistic alternatives discussed. Questions answered and patient/representative(s) expressed understanding.     - Discussed with:  Patient      - Extended Intubation/Ventilatory Support Discussed: No.      - Patient is DNR/DNI Status: No    Use of blood products discussed: No .     Postoperative Care       PONV prophylaxis: Ondansetron (or other 5HT-3), Dexamethasone or Solumedrol     Comments:                Dominguez Childers MD

## 2021-07-13 NOTE — ANESTHESIA CARE TRANSFER NOTE
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    Anesthesia Type:   MAC     Note:      Level of Consciousness: awake  Oxygen Supplementation: room air    Independent Airway: airway patency satisfactory and stable  Dentition: dentition unchanged  Vital Signs Stable: post-procedure vital signs reviewed and stable  Report to RN Given: handoff report given  Patient transferred to: PACU    Handoff Report: Identifed the Patient, Identified the Reponsible Provider, Reviewed the pertinent medical history, Discussed the surgical course, Reviewed Intra-OP anesthesia mangement and issues during anesthesia, Set expectations for post-procedure period and Allowed opportunity for questions and acknowledgement of understanding      Vitals: (Last set prior to Anesthesia Care Transfer)  CRNA VITALS  7/13/2021 1150 - 7/13/2021 1225      7/13/2021             NIBP:  (!) 152/96    Ht Rate:  78        Electronically Signed By: MAG Nova CRNA  July 13, 2021  12:25 PM

## 2021-07-13 NOTE — ANESTHESIA POSTPROCEDURE EVALUATION
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    Anesthesia Type:  MAC    Note:  Disposition: Outpatient   Postop Pain Control: Uneventful            Sign Out: Well controlled pain   PONV: No   Neuro/Psych: Uneventful            Sign Out: Acceptable/Baseline neuro status   Airway/Respiratory: Uneventful            Sign Out: Acceptable/Baseline resp. status   CV/Hemodynamics: Uneventful            Sign Out: Acceptable CV status; No obvious hypovolemia; No obvious fluid overload   Other NRE: NONE   DID A NON-ROUTINE EVENT OCCUR? No           Last vitals:  Vitals:    07/13/21 1004 07/13/21 1231   BP: 129/88 (!) 137/92   Pulse: 74 64   Resp: 16 14   Temp: 36.6  C (97.8  F) 36.5  C (97.7  F)   SpO2: 99% 97%       Last vitals prior to Anesthesia Care Transfer:  CRNA VITALS  7/13/2021 1150 - 7/13/2021 1250      7/13/2021             Ht Rate:  72          Electronically Signed By: Dominguez Childers MD  July 13, 2021  1:11 PM

## 2021-07-13 NOTE — DISCHARGE INSTRUCTIONS
[facility]  Same-Day Surgery   Adult Discharge Orders & Instructions     For 24 hours after surgery    1. Get plenty of rest.  A responsible adult must stay with you for at least 24 hours after you leave the hospital.   2. Do not drive or use heavy equipment.  If you have weakness or tingling, don't drive or use heavy equipment until this feeling goes away.  3. Do not drink alcohol.  4. Avoid strenuous or risky activities.  Ask for help when climbing stairs.   5. You may feel lightheaded.  IF so, sit for a few minutes before standing.  Have someone help you get up.   6. If you have nausea (feel sick to your stomach): Drink only clear liquids such as apple juice, ginger ale, broth or 7-Up.  Rest may also help.  Be sure to drink enough fluids.  Move to a regular diet as you feel able.  7. You may have a slight fever. Call the doctor if your fever is over 100 F (37.7 C) (taken under the tongue) or lasts longer than 24 hours.  8. You may have a dry mouth, a sore throat, muscle aches or trouble sleeping.  These should go away after 24 hours.  9. Do not make important or legal decisions.   Call your doctor for any of the followin.  Signs of infection (fever, growing tenderness at the surgery site, a large amount of drainage or bleeding, severe pain, foul-smelling drainage, redness, swelling).    2. It has been over 8 to 10 hours since surgery and you are still not able to urinate (pass water).    3.  Headache for over 24 hours.    4.  Numbness, tingling or weakness the day after surgery (if you had spinal anesthesia).      [phone] and ask for the resident on call for   anesthesia  (answered 24 hours a day)      Emergency Department:    [facility]: [phone]       (TTY for hearing impaired: [phone])    [facility]: [phone]       (TTY for hearing impaired: [phone])

## 2021-07-27 ENCOUNTER — TELEPHONE (OUTPATIENT)
Dept: NEUROLOGY | Facility: CLINIC | Age: 21
End: 2021-07-27

## 2021-07-27 NOTE — TELEPHONE ENCOUNTER
From 7/15/21 Consensus:    1. Patient is a candidate for revision  2. 1st surgery will be removal of all components (including battery)  3. Wait 4-6 weeks after removal for brain healing and then have 3T MRI, with and without contrast, under sedation (previous 3T did use airway support, so probably general anesthesia) **Request F-GATIR sequence for MRI**  4. Staged bilateral Gpi surgery, left Gpi first for right body  5. Device: After team discussion: 1st choice would be Medtronic directional/Sensight lead with Activa RC so that patient can avoid numerous replacement surgeries/infection risk.  2nd choice would be Medtronic directional/Sensight lead with newer Percept battery (but is not rechargeable, possibility that Medtronic will have rechargeable version at a future replacement surgery).  3rd choice is sones Genus rechargeable with dystonic tremor indication. Ask patient/family on which device they prefer.   6. Dr. Aleman to program patient  7. Ask patient about Research: pt not eligible for 7T due to sedation needed for MRIs. Patient could participate in intraop recordings for removal surgery (asleep) as well as both lead implants surgery. Will also need HDE if we use Medtronic equipment.   8. Authorization to discuss PHI Poonam-mother and Santo-father, Healthcare directive-none on file, [employer]-uses  --------------------------------------  Sent detailed MyChart with above information.    Patient want to have the Medtronic directional/Sensight lead with Activa RC and start with right brain implantation.  He is interested in research.

## 2021-07-27 NOTE — LETTER
July 27, 2021      Re: Bradley Garcia [birth year]  [id number]  [facility] [place] [address]    Dear Ms Low,    Thank you for referring Bradley Garcia to the [facility] for Deep Brain Stimulation evaluation. After thorough testing and discussion at our DBS consensus meeting, we feel that Bradley is an appropriate candidate for DBS to treat his dystonia. We are planning a complete revision. The first surgery will be to remove all the previous Deep Brain Stimulation components, including the battery. We will wait 4-6 weeks for the brain to heal and then implant his left Gpi first, the right Gpi to follow four weeks later. We are hoping to use the Medtronic directional/Sensight lead with the Activa rechargeable battery, but we will wait for the family's decision.    There are many research opportunities for our DBS candidates and Bradley will be able to decide if he would like to participate. If you have any further questions or concerns, please feel free to contact me. Thank you very much for allowing us to partner with you in caring for your patient.    Sincerely,  Chelo Moss, CHRISTINAN, RN, PHN, BA, CNRN, HNB-BC  DBS   Clinical Nurse Care Coordinator Movement Disorders   Phone: [phone]  Fax: [phone]    Cc: Tatyana Low PA-C  [facility], [address] [place] [id number]

## 2021-07-29 DIAGNOSIS — G24.9 DYSTONIA: Primary | ICD-10-CM

## 2021-08-02 ENCOUNTER — TELEPHONE (OUTPATIENT)
Dept: NEUROSURGERY | Facility: CLINIC | Age: 21
End: 2021-08-02

## 2021-08-02 DIAGNOSIS — Z11.59 ENCOUNTER FOR SCREENING FOR OTHER VIRAL DISEASES: ICD-10-CM

## 2021-08-02 NOTE — TELEPHONE ENCOUNTER
FUTURE VISIT INFORMATION      SURGERY INFORMATION:    Date: 8/23/21    Location: [facility] OR    Surgeon:  Ravinder Coleman MD    Anesthesia Type:  Combined MAC with Local    Procedure: Removal of entire deep brain stimulation system hardware including intracranial electrodes and battery/generator in chest wall    RECORDS REQUESTED FROM:       Primary Care Provider: Tatyana Low PA-C- [facility] and [facility]

## 2021-08-02 NOTE — TELEPHONE ENCOUNTER
DOS:   08/23/21  Provider:   Dr. Coleman  Location:    [facility] OR  PAC:    08/16/21  COVID test:    08/21/21  Post Op:      09/07/21  Patient Called: Called and confirmed all upcoming appointments with patient.    Surgery packet sent    Sent by [employer]     Nuvasive:    N/A     Extra Imaging:    N/A    Additional comments: The patient is aware they need a PRE-OP/PAC appt at least a couple of weeks before surgery date. We went over the patient needing a  and someone to stay with them for 24 hours after the surgery. The patient was given my direct number for any questions [phone]

## 2021-08-16 ENCOUNTER — OFFICE VISIT (OUTPATIENT)
Dept: SURGERY | Facility: CLINIC | Age: 21
End: 2021-08-16
Payer: COMMERCIAL

## 2021-08-16 ENCOUNTER — LAB (OUTPATIENT)
Dept: LAB | Facility: CLINIC | Age: 21
End: 2021-08-16
Payer: COMMERCIAL

## 2021-08-16 ENCOUNTER — PRE VISIT (OUTPATIENT)
Dept: SURGERY | Facility: CLINIC | Age: 21
End: 2021-08-16

## 2021-08-16 ENCOUNTER — ANESTHESIA EVENT (OUTPATIENT)
Dept: SURGERY | Facility: CLINIC | Age: 21
End: 2021-08-16
Payer: COMMERCIAL

## 2021-08-16 VITALS
OXYGEN SATURATION: 100 % | WEIGHT: 130 LBS | BODY MASS INDEX: 21.66 KG/M2 | HEART RATE: 69 BPM | DIASTOLIC BLOOD PRESSURE: 58 MMHG | HEIGHT: 65 IN | SYSTOLIC BLOOD PRESSURE: 98 MMHG | TEMPERATURE: 98 F

## 2021-08-16 DIAGNOSIS — Z01.818 PREOP EXAMINATION: Primary | ICD-10-CM

## 2021-08-16 DIAGNOSIS — G24.9 DYSTONIA: ICD-10-CM

## 2021-08-16 DIAGNOSIS — Z01.818 PREOP EXAMINATION: ICD-10-CM

## 2021-08-16 LAB
ABO/RH(D): NORMAL
ANION GAP SERPL CALCULATED.3IONS-SCNC: 7 MMOL/L (ref 3–14)
ANTIBODY SCREEN: NEGATIVE
BASOPHILS # BLD AUTO: 0.1 10E3/UL (ref 0–0.2)
BASOPHILS NFR BLD AUTO: 1 %
BUN SERPL-MCNC: 16 MG/DL (ref 7–30)
CALCIUM SERPL-MCNC: 9.6 MG/DL (ref 8.5–10.1)
CHLORIDE BLD-SCNC: 105 MMOL/L (ref 98–110)
CO2 SERPL-SCNC: 29 MMOL/L (ref 20–32)
CREAT SERPL-MCNC: 0.99 MG/DL (ref 0.5–1)
EOSINOPHIL # BLD AUTO: 0.2 10E3/UL (ref 0–0.7)
EOSINOPHIL NFR BLD AUTO: 2 %
ERYTHROCYTE [DISTWIDTH] IN BLOOD BY AUTOMATED COUNT: 11.9 % (ref 10–15)
GFR SERPL CREATININE-BSD FRML MDRD: >90 ML/MIN/1.73M2
GLUCOSE BLD-MCNC: 94 MG/DL (ref 70–99)
HCT VFR BLD AUTO: 48.2 % (ref 40–53)
HGB BLD-MCNC: 16.9 G/DL (ref 13.3–17.7)
IMM GRANULOCYTES # BLD: 0 10E3/UL
IMM GRANULOCYTES NFR BLD: 0 %
LYMPHOCYTES # BLD AUTO: 4.2 10E3/UL (ref 0.8–5.3)
LYMPHOCYTES NFR BLD AUTO: 42 %
MCH RBC QN AUTO: 28.9 PG (ref 26.5–33)
MCHC RBC AUTO-ENTMCNC: 35.1 G/DL (ref 31.5–36.5)
MCV RBC AUTO: 82 FL (ref 78–100)
MONOCYTES # BLD AUTO: 0.6 10E3/UL (ref 0–1.3)
MONOCYTES NFR BLD AUTO: 6 %
NEUTROPHILS # BLD AUTO: 4.9 10E3/UL (ref 1.6–8.3)
NEUTROPHILS NFR BLD AUTO: 49 %
NRBC # BLD AUTO: 0 10E3/UL
NRBC BLD AUTO-RTO: 0 /100
PLATELET # BLD AUTO: 294 10E3/UL (ref 150–450)
POTASSIUM BLD-SCNC: 4 MMOL/L (ref 3.4–5.3)
RBC # BLD AUTO: 5.85 10E6/UL (ref 4.4–5.9)
SODIUM SERPL-SCNC: 141 MMOL/L (ref 133–144)
SPECIMEN EXPIRATION DATE: NORMAL
WBC # BLD AUTO: 9.9 10E3/UL (ref 4–11)

## 2021-08-16 PROCEDURE — 99215 OFFICE O/P EST HI 40 MIN: CPT | Performed by: PHYSICIAN ASSISTANT

## 2021-08-16 PROCEDURE — 86901 BLOOD TYPING SEROLOGIC RH(D): CPT | Mod: 90 | Performed by: PATHOLOGY

## 2021-08-16 PROCEDURE — 86850 RBC ANTIBODY SCREEN: CPT | Mod: 90 | Performed by: PATHOLOGY

## 2021-08-16 PROCEDURE — 85025 COMPLETE CBC W/AUTO DIFF WBC: CPT | Performed by: PATHOLOGY

## 2021-08-16 PROCEDURE — 36415 COLL VENOUS BLD VENIPUNCTURE: CPT | Performed by: PATHOLOGY

## 2021-08-16 PROCEDURE — 86900 BLOOD TYPING SEROLOGIC ABO: CPT | Mod: 90 | Performed by: PATHOLOGY

## 2021-08-16 PROCEDURE — 80048 BASIC METABOLIC PNL TOTAL CA: CPT | Performed by: PATHOLOGY

## 2021-08-16 ASSESSMENT — LIFESTYLE VARIABLES: TOBACCO_USE: 0

## 2021-08-16 ASSESSMENT — ENCOUNTER SYMPTOMS: SEIZURES: 0

## 2021-08-16 ASSESSMENT — PAIN SCALES - GENERAL: PAINLEVEL: NO PAIN (0)

## 2021-08-16 ASSESSMENT — COPD QUESTIONNAIRES: COPD: 0

## 2021-08-16 ASSESSMENT — MIFFLIN-ST. JEOR: SCORE: 1531.56

## 2021-08-16 NOTE — H&P
Pre-Operative H & P     CC:  Preoperative exam to assess for increased cardiopulmonary risk while undergoing surgery and anesthesia.    Date of Encounter: 2021  Primary Care Physician:  Tatyana Low     Reason for visit:   Encounter Diagnoses   Name Primary?     Preop examination      Dystonia Yes       HPI  Bradley Garcia is a 19 y/o male who presents for pre-operative H&P in preparation for Removal of entire deep brain stimulation system hardware including intracranial electrodes and battery/generator in chest wall with Ravinder Coleman MD on 21 at [facility] for treatment of Dystonia. 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    Mr. Garcia has a history of idiopathic progressive dystonia since early childhood for which he underwent implantation of a DBS system in  at [facility]. He experienced marked improvement for about 2-3 months with subsequent return to baseline function. No programming changes have helped since then. He turned the DBS off for a week at one point and noted no difference in his symptoms. His IPG  3 years ago and he was unaware as again, he noted no change in his symptoms. He now presents for the above procedure.    History was obtained from patient & chart review.     Hx of abnormal bleeding or anti-platelet use: denies    Menstrual history: No LMP for male patient.:      Prior to Admission Medications  Current Outpatient Medications   Medication Sig Dispense Refill     sertraline (ZOLOFT) 25 MG tablet Take 50 mg by mouth every morning        tiZANidine (ZANAFLEX) 2 MG tablet every evening          Family History  Family History   Problem Relation Age of Onset     No Known Problems Mother      No Known Problems Father      No Known Problems Sister      Other - See Comments Brother         di bunny syndrome      Myocardial Infarction Maternal Grandmother      Hyperlipidemia Maternal Grandmother      Hyperlipidemia Maternal Grandfather      Unknown/Adopted Paternal Grandmother      Unknown/Adopted Paternal Grandfather      Deep Vein Thrombosis No family hx of      Anesthesia Reaction No family hx of        The complete review of systems is negative other than noted in the HPI or here.         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      Depression      Neck pain      PONV (postoperative nausea and vomiting)      S/P deep brain stimulator placement      Torsion dystonia       Past Surgical History:   Procedure Laterality Date     ANESTHESIA OUT OF OR MRI       ANESTHESIA OUT OF OR MRI N/A 2021    Procedure: ANESTHESIA OUT OF OR Magnetic resonance imaging Brain @1200;  Surgeon: GENERIC ANESTHESIA PROVIDER;  Location: [facility] OR     EYE SURGERY       NOSE SURGERY      nasal fracture repair     STEREOTACTIC INSERTION DEEP BRAIN STIMULATION        No Known Allergies   Social History     Tobacco Use     Smoking status: Never Smoker     Smokeless tobacco: Never Used   Substance Use Topics     Alcohol use: Never      Wt Readings from Last 1 Encounters:   21 59 kg (130 lb) (11 %, Z= -1.21)*     * Growth percentiles are based on CDC (Boys, 2-20 Years) data.             ROS/MED HX  The complete review of systems is negative other than noted in the HPI or here.  Patient denies recent illness, fever and respiratory infection during past month.  Pt denies steroid use during past year.    ENT/Pulmonary:  - neg pulmonary ROS  (-)  "tobacco use, asthma, COPD, GURVINDER risk factors and recent URI   Neurologic: Comment: Dystonia - neck pulling, neck pain, head tremors.  DBS right chest.      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   Hematologic:  - neg hematologic  ROS  (-) history of blood clots and history of blood transfusion   Musculoskeletal: Comment: Chronic neck pain secondary to dystonia      GI/Hepatic:  - neg GI/hepatic ROS     Renal/Genitourinary:  - neg Renal ROS     Endo:  - neg endo ROS     Psychiatric/Substance Use: Comment: ADHD      (+) psychiatric history depression and anxiety     Infectious Disease:  - neg infectious disease ROS  (-) Recent Fever   Malignancy:  - neg malignancy ROS     Other: Comment: 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 BP 98/58 (BP Location: Right arm, Patient Position: Chair, Cuff Size: Adult Regular)   Pulse 69   Temp 98  F (36.7  C) (Oral)   Ht 1.651 m (5' 5\")   Wt 59 kg (130 lb)   SpO2 100%   BMI 21.63 kg/m           Physical Exam  Constitutional: Awake, alert, cooperative, no apparent distress, and appears stated age. Continuous movements due to dystonia.  Eyes: Pupils equal, round and reactive to light, extra ocular muscles intact, sclera clear, conjunctiva normal.  HENT: Normocephalic, oral pharynx with moist mucus membranes, good dentition. No goiter appreciated. No removable dental hardware. Permanent upper retainer in " place.  Respiratory: Clear to auscultation bilaterally, no crackles or wheezing. No SOB when supine.  Cardiovascular: Regular rate and rhythm, normal S1 and S2, and no murmur noted.  Carotids +2, no bruits. No edema. Palpable pulses to radial, DP and PT arteries.   GI: Normal bowel sounds, soft, non-distended, non-tender, no masses palpated.    Lymph/Hematologic: No cervical lymphadenopathy and no supraclavicular lymphadenopathy.  Genitourinary:  deferred  Skin: Warm and dry.  No rashes.   Musculoskeletal: full ROM of neck. There is no redness, warmth, or swelling of the joints. Gross motor strength is normal.    Neurologic: Awake, alert, oriented to name, place and time. Cranial nerves II-XII are grossly intact. Gait is normal. Ambulates from chair to exam table, seats self, lies supine and sits back up w/o assistance.  Neuropsychiatric: Calm, cooperative. Normal affect. Answers questions appropriately, follows commands w/o difficulty.      PRIOR LABS/DIAGNOSTIC STUDIES:    All labs and imaging personally reviewed      CT HEAD W/O CONTRAST  7/13/2021  Impression: Bilateral frontal approach deep brain stimulator with  electrode tips within the expected regions of subthalamic nuclei.  Limited imaging performed primarily for the purposes of stereotactic  localization.      BMP:   Lab Results   Component Value Date    POTASSIUM 4.3 02/22/2021    CR 0.9 02/22/2021    GLC 93 07/13/2021    GLC 84 02/22/2021     COAGS: No results found for: PTT, INR, FIBR  POC:   Lab Results   Component Value Date     (H) 07/08/2021     HEPATIC:   Lab Results   Component Value Date    ALT 20 02/22/2021    AST 35 02/22/2021     OTHER:   Lab Results   Component Value Date    TSH 1.840 02/22/2021           The patient's records and results personally reviewed by this provider.     Outside records reviewed from: Care Everywhere (Provider notes at [place])      Labs today:  BMP, CBC, T&S    WBC Count 4.0 - 11.0 10e3/uL 9.9      RBC Count  4.40 - 5.90 10e6/uL 5.85     Hemoglobin 13.3 - 17.7 g/dL 16.9     Hematocrit 40.0 - 53.0 % 48.2     MCV 78 - 100 fL 82     MCH 26.5 - 33.0 pg 28.9     MCHC 31.5 - 36.5 g/dL 35.1     RDW 10.0 - 15.0 % 11.9     Platelet Count 150 - 450 10e3/uL 294     % Neutrophils % 49     % Lymphocytes % 42     % Monocytes % 6     % Eosinophils % 2     % Basophils % 1     % Immature Granulocytes % 0     NRBCs per 100 WBC <1 /100 0     Absolute Neutrophils 1.6 - 8.3 10e3/uL 4.9     Absolute Lymphocytes 0.8 - 5.3 10e3/uL 4.2     Absolute Monocytes 0.0 - 1.3 10e3/uL 0.6     Absolute Eosinophils 0.0 - 0.7 10e3/uL 0.2     Absolute Basophils 0.0 - 0.2 10e3/uL 0.1     Absolute Immature Granulocytes <=0.0 10e3/uL 0.0     Absolute NRBCs 10e3/uL 0.0        Sodium 133 - 144 mmol/L 141      Potassium 3.4 - 5.3 mmol/L 4.0     Chloride 98 - 110 mmol/L 105     Carbon Dioxide (CO2) 20 - 32 mmol/L 29     Anion Gap 3 - 14 mmol/L 7     Urea Nitrogen 7 - 30 mg/dL 16     Creatinine 0.50 - 1.00 mg/dL 0.99     Calcium 8.5 - 10.1 mg/dL 9.6     Glucose 70 - 99 mg/dL 94     GFR Estimate >60 mL/min/1.73m2 >90        COVID19 testing scheduled on 8/21/21    ASSESSMENT and PLAN  Bradley Garcia is a 19 y/o male who presents for pre-operative H&P in preparation for Removal of entire deep brain stimulation system hardware including intracranial electrodes and battery/generator in chest wall with Ravinder Coleman MD on 8/23/21 at [facility] for treatment of Dystonia.    Pt has had prior anesthetic. Type: General and MAC.    History of anesthetic complications: + PONV.    He has the following specific operative considerations:   # GURVINDER 1/8 = low risk  # VTE risk: 0.5%  # Risk of PONV score = 1.  If > 2, anti-emetic intervention recommended.  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 Can take the full flight of  stairs in his home with no complications.     # RCRI : No serious cardiac risks.  0.4% risk of major adverse cardiac event.       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 S/P DBS implant 2013.      Patient is optimized and is acceptable candidate for the proposed procedure. No further diagnostic evaluation is needed.    Final plan per anesthesiologist on day of surgery.     Arrival time, NPO, shower and medication instructions provided by nursing staff today.  Preparing For Your Surgery handout given.       On the day of service:     Prep time: 14 minutes  Visit time: 22 minutes  Documentation time: 10 minutes  ------------------------------------------  Total time: 46 minutes      Mena Burrell PA-C  Preoperative Assessment Center  [facility]  Clinic and Surgery Center  Phone: [phone]  Fax: [phone]

## 2021-08-16 NOTE — ANESTHESIA PREPROCEDURE EVALUATION
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      Depression      Neck pain      PONV (postoperative nausea and vomiting)      S/P deep brain stimulator placement      Torsion dystonia       Past Surgical History:   Procedure Laterality Date     ANESTHESIA OUT OF OR MRI       ANESTHESIA OUT OF OR MRI N/A 2021    Procedure: ANESTHESIA OUT OF OR Magnetic resonance imaging Brain @1200;  Surgeon: GENERIC ANESTHESIA PROVIDER;  Location: [facility] OR     EYE SURGERY       NOSE SURGERY      nasal fracture repair     STEREOTACTIC INSERTION DEEP BRAIN STIMULATION        No Known Allergies   Social History     Tobacco Use     Smoking status: Never Smoker     Smokeless tobacco: Never Used   Substance Use Topics     Alcohol use: Never      Wt Readings from Last 1 Encounters:   21 59 kg (130 lb) (11 %, Z= -1.21)*     * Growth percentiles are based on CDC (Boys, 2-20 Years) data.        Anesthesia Evaluation   Pt has had prior anesthetic. Type: General and MAC.    History of anesthetic complications  - PONV.      ROS/MED HX  ENT/Pulmonary:  - neg pulmonary ROS  (-) tobacco use, asthma, COPD, GURVINDER risk factors and recent URI   Neurologic: Comment: Dystonia - neck pulling, neck pain, head tremors.  DBS right chest.      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   Hematologic:  - neg hematologic  ROS  (-) history of blood clots and history of blood transfusion    Musculoskeletal: Comment: Chronic neck pain secondary to dystonia      GI/Hepatic:  - neg GI/hepatic ROS     Renal/Genitourinary:  - neg Renal ROS     Endo:  - neg endo ROS     Psychiatric/Substance Use: Comment: ADHD      (+) psychiatric history depression and anxiety     Infectious Disease:  - neg infectious disease ROS  (-) Recent Fever   Malignancy:  - neg malignancy ROS     Other: Comment: 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       (+) , H/O Chronic Pain,        Physical Exam    Airway        Mallampati: II   TM distance: > 3 FB   Neck ROM: full   Mouth opening: > 3 cm    Respiratory Devices and Support         Dental  no notable dental history         Cardiovascular   cardiovascular exam normal          Pulmonary   pulmonary exam normal                OUTSIDE LABS:  CBC: No results found for: WBC, HGB, HCT, PLT  BMP:   Lab Results   Component Value Date    POTASSIUM 4.3 02/22/2021    CR 0.9 02/22/2021    GLC 93 07/13/2021    GLC 84 02/22/2021     COAGS: No results found for: PTT, INR, FIBR  POC:   Lab Results   Component Value Date     (H) 07/08/2021     HEPATIC:   Lab Results   Component Value Date    ALT 20 02/22/2021    AST 35 02/22/2021     OTHER:   Lab Results   Component Value Date    TSH 1.840 02/22/2021       Anesthesia Plan    ASA Status:  3   NPO Status:  NPO Appropriate    Anesthesia Type: General.     - Airway: ETT   Induction: Intravenous, Propofol.   Maintenance: Balanced.        Consents    Anesthesia Plan(s) and associated risks, benefits, and realistic alternatives discussed. Questions answered and patient/representative(s) expressed understanding.     - Discussed with:  Patient, Parent (Mother and/or Father)      - Extended Intubation/Ventilatory Support Discussed: No.      - Patient is DNR/DNI Status: No    Use of blood products discussed: No .     Postoperative Care    Pain management: IV analgesics.   PONV prophylaxis: Ondansetron (or other 5HT-3), Dexamethasone or Solumedrol      Comments:              PAC Discussion and Assessment    ASA Classification: 2  Case is suitable for: [place]  Anesthetic techniques and relevant risks discussed: MAC with GA as backup and Regional  Invasive monitoring and risk discussed: No    Possibility and Risk of blood transfusion discussed: No            PAC Resident/NP Anesthesia Assessment: Bradley Garcia is a 21 y/o male who presents for pre-operative H&P in preparation for Removal of entire deep brain stimulation system hardware including intracranial electrodes and battery/generator in chest wall with Ravinder Coleman MD on 8/23/21 at [facility] for treatment of Dystonia.    Pt has had prior anesthetic. Type: General and MAC.    History of anesthetic complications: + PONV.    He has the following specific operative considerations:   # GURVINDER 1/8 = low risk  # VTE risk: 0.5%  # Risk of PONV score = 1.  If > 2, anti-emetic intervention recommended.  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 Can take the full flight of stairs in his home with no complications.     # RCRI : No serious cardiac risks.  0.4% risk of major adverse cardiac event.       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 S/P DBS implant 2013.      Patient is optimized and is acceptable candidate for the proposed procedure. No further diagnostic evaluation is needed.    Final plan per anesthesiologist on day of surgery.     Reviewed and Signed by PAC Mid-Level Provider/Resident  Mid-Level Provider/Resident: Mena Burrell PA-C  Date: 8/16/21  Time: 1812    Attending Anesthesiologist Anesthesia Assessment:   Airway: GETA for MRI 7/21-  easy,  DL MAC 3, 7 ett Grade 1 view  Reviewed and Signed by PAC Anesthesiologist  Anesthesiologist: demario  Date: 8/16/21                     Nathaniel Parson MD

## 2021-08-16 NOTE — PATIENT INSTRUCTIONS
Preparing for Your Surgery      Name:  Bradley Garcia   MRN:  [id number]   :  2000   Today's Date:  2021       Arriving for surgery:  Surgery date:  21  Arrival time:  11:40AM    Restrictions due to COVID 19:       One visitor is allowed in the Pre Op area. When you go into surgery, one visitor is allowed to wait in the Surgery Waiting Room       (provided there is enough space to social distance).   After surgery- Two visitors are allowed at a time if you have a private room and one visitor is allowed for those in a semi-private room.   Every 4 days the visitor(s) can rotate. During the 4 day period, the visitor(s) must be consistent. No visitors under the age of 18 years old.   Visiting Hours: 8 am - 8:30 pm   No ill visitors.   All visitors must wear face mask.    Xamplified parking is available for anyone with mobility limitations or disabilities.  (Man  24 hours/ 7 days a week; [facility]  7 am- 3:30 pm, Mon- Fri)    Please come to:     [facility] [address]  [address] [place] [address]    When entering the hospital you will be asked COVID screening questions, you will then be directed to Registration.  Registration will direct you to the 3rd floor Surgery waiting room. Please ask if you need an escort or a wheelchair to the Surgery Waiting Room.  [phone]     What can I eat or drink?  -  You may eat and drink normally for up to 8 hours before your surgery. (Until 21, 6AM)  -  You may have clear liquids until 2 1/2 hours before surgery. (Until 21, 11:40AM)    Examples of clear liquids:  Water  Clear broth  Juices (apple, white grape, white cranberry  and cider) without pulp  Noncarbonated, powder based beverages  (lemonade and Anders-Aid)  Sodas (Sprite, 7-Up, ginger ale and seltzer)  Coffee or tea (without milk or cream)  Gatorade    -  No Alcohol for at least 24 hours before surgery     Which  medicines can I take?    Hold Aspirin for 7 days before surgery.   Hold Multivitamins for 7 days before surgery.  Hold Supplements for 7 days before surgery.  Hold Ibuprofen (Advil, Motrin) for 1 day before surgery--unless otherwise directed by surgeon.  Hold Naproxen (Aleve) for 4 days before surgery.      -  PLEASE TAKE these medications the day of surgery:    Sertraline(Zoloft)    How do I prepare myself?  - Please take 2 showers before surgery using Scrubcare or Hibiclens soap.    Use this soap only from the neck to your toes.     Leave the soap on your skin for one minute--then rinse thoroughly.      You may use your own shampoo and conditioner; no other hair products.   - Please remove all jewelry and body piercings.  - No lotions, deodorants or fragrance.  - Bring your ID and insurance card.    -If you have a Deep Brain Stimulator, Spinal Cord Stimulator or any neuro stimulator device---you must bring the remote control to the hospital     - All patients are required to have a Covid-19 test within 4 days of surgery/procedure.      -Patients will be contacted by the [facility] scheduling team within 1 week of surgery to make an appointment.      - Patients may call the Scheduling team at [phone] if they have not been scheduled within 4 days of  surgery.      ALL PATIENTS GOING HOME THE SAME DAY OF SURGERY ARE REQUIRED TO HAVE A RESPONSIBLE ADULT TO DRIVE AND BE IN ATTENDANCE WITH THEM FOR 24 HOURS FOLLOWING SURGERY.      Questions or Concerns:    - For any questions regarding the day of surgery or your hospital stay, please contact the Pre Admission Nursing Office at [phone].       - If you have health changes between today and your surgery please call your surgeon.       For questions after surgery please call your surgeons office.

## 2021-08-23 ENCOUNTER — APPOINTMENT (OUTPATIENT)
Dept: CT IMAGING | Facility: CLINIC | Age: 21
End: 2021-08-23
Attending: STUDENT IN AN ORGANIZED HEALTH CARE EDUCATION/TRAINING PROGRAM
Payer: COMMERCIAL

## 2021-08-23 ENCOUNTER — ANESTHESIA (OUTPATIENT)
Dept: SURGERY | Facility: CLINIC | Age: 21
End: 2021-08-23
Payer: COMMERCIAL

## 2021-08-23 ENCOUNTER — APPOINTMENT (OUTPATIENT)
Dept: GENERAL RADIOLOGY | Facility: CLINIC | Age: 21
End: 2021-08-23
Attending: NEUROLOGICAL SURGERY
Payer: COMMERCIAL

## 2021-08-23 ENCOUNTER — HOSPITAL ENCOUNTER (OUTPATIENT)
Facility: CLINIC | Age: 21
Discharge: HOME | End: 2021-08-24
Attending: NEUROLOGICAL SURGERY | Admitting: NEUROLOGICAL SURGERY
Payer: COMMERCIAL

## 2021-08-23 DIAGNOSIS — G24.9 DYSTONIA: ICD-10-CM

## 2021-08-23 DIAGNOSIS — Z98.890 S/P HARDWARE REMOVAL: Primary | ICD-10-CM

## 2021-08-23 LAB
APTT PPP: 28 SECONDS (ref 22–38)
GLUCOSE BLDC GLUCOMTR-MCNC: 95 MG/DL (ref 70–99)
INR PPP: 1.06 (ref 0.85–1.15)
SARS-COV-2 RNA RESP QL NAA+PROBE: NEGATIVE

## 2021-08-23 PROCEDURE — U0003 INFECTIOUS AGENT DETECTION BY NUCLEIC ACID (DNA OR RNA); SEVERE ACUTE RESPIRATORY SYNDROME CORONAVIRUS 2 (SARS-COV-2) (CORONAVIRUS DISEASE [COVID-19]), AMPLIFIED PROBE TECHNIQUE, MAKING USE OF HIGH THROUGHPUT TECHNOLOGIES AS DESCRIBED BY CMS-2020-01-R: HCPCS | Performed by: ANESTHESIOLOGY

## 2021-08-23 PROCEDURE — 250N000009 HC RX 250: Performed by: NURSE ANESTHETIST, CERTIFIED REGISTERED

## 2021-08-23 PROCEDURE — 360N000078 HC SURGERY LEVEL 5, PER MIN: Performed by: NEUROLOGICAL SURGERY

## 2021-08-23 PROCEDURE — 250N000009 HC RX 250: Performed by: NEUROLOGICAL SURGERY

## 2021-08-23 PROCEDURE — 258N000003 HC RX IP 258 OP 636: Performed by: NURSE ANESTHETIST, CERTIFIED REGISTERED

## 2021-08-23 PROCEDURE — 250N000011 HC RX IP 250 OP 636: Performed by: NEUROLOGICAL SURGERY

## 2021-08-23 PROCEDURE — 250N000011 HC RX IP 250 OP 636: Performed by: NURSE ANESTHETIST, CERTIFIED REGISTERED

## 2021-08-23 PROCEDURE — 70450 CT HEAD/BRAIN W/O DYE: CPT | Mod: 26 | Performed by: RADIOLOGY

## 2021-08-23 PROCEDURE — 710N000010 HC RECOVERY PHASE 1, LEVEL 2, PER MIN: Performed by: NEUROLOGICAL SURGERY

## 2021-08-23 PROCEDURE — 85610 PROTHROMBIN TIME: CPT | Performed by: STUDENT IN AN ORGANIZED HEALTH CARE EDUCATION/TRAINING PROGRAM

## 2021-08-23 PROCEDURE — 258N000003 HC RX IP 258 OP 636: Performed by: ANESTHESIOLOGY

## 2021-08-23 PROCEDURE — 999N000065 XR SKULL PORT 1/3 VIEWS: Mod: TC

## 2021-08-23 PROCEDURE — 370N000017 HC ANESTHESIA TECHNICAL FEE, PER MIN: Performed by: NEUROLOGICAL SURGERY

## 2021-08-23 PROCEDURE — 70450 CT HEAD/BRAIN W/O DYE: CPT

## 2021-08-23 PROCEDURE — 272N000001 HC OR GENERAL SUPPLY STERILE: Performed by: NEUROLOGICAL SURGERY

## 2021-08-23 PROCEDURE — 120N000002 HC R&B MED SURG/OB UMMC

## 2021-08-23 PROCEDURE — 36415 COLL VENOUS BLD VENIPUNCTURE: CPT | Performed by: STUDENT IN AN ORGANIZED HEALTH CARE EDUCATION/TRAINING PROGRAM

## 2021-08-23 PROCEDURE — 250N000011 HC RX IP 250 OP 636: Performed by: STUDENT IN AN ORGANIZED HEALTH CARE EDUCATION/TRAINING PROGRAM

## 2021-08-23 PROCEDURE — 999N000141 HC STATISTIC PRE-PROCEDURE NURSING ASSESSMENT: Performed by: NEUROLOGICAL SURGERY

## 2021-08-23 PROCEDURE — 85730 THROMBOPLASTIN TIME PARTIAL: CPT | Performed by: STUDENT IN AN ORGANIZED HEALTH CARE EDUCATION/TRAINING PROGRAM

## 2021-08-23 PROCEDURE — 250N000024 HC ISOFLURANE, PER MIN: Performed by: NEUROLOGICAL SURGERY

## 2021-08-23 RX ORDER — PROCHLORPERAZINE MALEATE 10 MG
10 TABLET ORAL EVERY 6 HOURS PRN
Status: DISCONTINUED | OUTPATIENT
Start: 2021-08-23 | End: 2021-08-24 | Stop reason: HOSPADM

## 2021-08-23 RX ORDER — TIZANIDINE 2 MG/1
2 TABLET ORAL EVERY EVENING
Status: DISCONTINUED | OUTPATIENT
Start: 2021-08-23 | End: 2021-08-24 | Stop reason: HOSPADM

## 2021-08-23 RX ORDER — ONDANSETRON 4 MG/1
4 TABLET, ORALLY DISINTEGRATING ORAL EVERY 30 MIN PRN
Status: DISCONTINUED | OUTPATIENT
Start: 2021-08-23 | End: 2021-08-23

## 2021-08-23 RX ORDER — SODIUM CHLORIDE, SODIUM LACTATE, POTASSIUM CHLORIDE, CALCIUM CHLORIDE 600; 310; 30; 20 MG/100ML; MG/100ML; MG/100ML; MG/100ML
INJECTION, SOLUTION INTRAVENOUS CONTINUOUS PRN
Status: DISCONTINUED | OUTPATIENT
Start: 2021-08-23 | End: 2021-08-23

## 2021-08-23 RX ORDER — ACETAMINOPHEN 325 MG/1
975 TABLET ORAL EVERY 8 HOURS
Status: DISCONTINUED | OUTPATIENT
Start: 2021-08-23 | End: 2021-08-24 | Stop reason: HOSPADM

## 2021-08-23 RX ORDER — FENTANYL CITRATE 50 UG/ML
25-50 INJECTION, SOLUTION INTRAMUSCULAR; INTRAVENOUS EVERY 5 MIN PRN
Status: DISCONTINUED | OUTPATIENT
Start: 2021-08-23 | End: 2021-08-23

## 2021-08-23 RX ORDER — LIDOCAINE HYDROCHLORIDE 20 MG/ML
INJECTION, SOLUTION INFILTRATION; PERINEURAL PRN
Status: DISCONTINUED | OUTPATIENT
Start: 2021-08-23 | End: 2021-08-23

## 2021-08-23 RX ORDER — SODIUM CHLORIDE, SODIUM LACTATE, POTASSIUM CHLORIDE, CALCIUM CHLORIDE 600; 310; 30; 20 MG/100ML; MG/100ML; MG/100ML; MG/100ML
INJECTION, SOLUTION INTRAVENOUS CONTINUOUS
Status: DISCONTINUED | OUTPATIENT
Start: 2021-08-23 | End: 2021-08-24 | Stop reason: HOSPADM

## 2021-08-23 RX ORDER — OXYCODONE HYDROCHLORIDE 10 MG/1
10 TABLET ORAL EVERY 4 HOURS PRN
Status: DISCONTINUED | OUTPATIENT
Start: 2021-08-23 | End: 2021-08-23

## 2021-08-23 RX ORDER — SODIUM CHLORIDE, SODIUM LACTATE, POTASSIUM CHLORIDE, CALCIUM CHLORIDE 600; 310; 30; 20 MG/100ML; MG/100ML; MG/100ML; MG/100ML
INJECTION, SOLUTION INTRAVENOUS CONTINUOUS
Status: DISCONTINUED | OUTPATIENT
Start: 2021-08-23 | End: 2021-08-23

## 2021-08-23 RX ORDER — FENTANYL CITRATE 50 UG/ML
INJECTION, SOLUTION INTRAMUSCULAR; INTRAVENOUS PRN
Status: DISCONTINUED | OUTPATIENT
Start: 2021-08-23 | End: 2021-08-23

## 2021-08-23 RX ORDER — AMOXICILLIN 250 MG
1 CAPSULE ORAL 2 TIMES DAILY
Status: DISCONTINUED | OUTPATIENT
Start: 2021-08-23 | End: 2021-08-24 | Stop reason: HOSPADM

## 2021-08-23 RX ORDER — ONDANSETRON 2 MG/ML
4 INJECTION INTRAMUSCULAR; INTRAVENOUS EVERY 6 HOURS PRN
Status: DISCONTINUED | OUTPATIENT
Start: 2021-08-23 | End: 2021-08-24 | Stop reason: HOSPADM

## 2021-08-23 RX ORDER — ACETAMINOPHEN 325 MG/1
650 TABLET ORAL EVERY 4 HOURS PRN
Status: DISCONTINUED | OUTPATIENT
Start: 2021-08-26 | End: 2021-08-24 | Stop reason: HOSPADM

## 2021-08-23 RX ORDER — LABETALOL HYDROCHLORIDE 5 MG/ML
10-40 INJECTION, SOLUTION INTRAVENOUS EVERY 10 MIN PRN
Status: DISCONTINUED | OUTPATIENT
Start: 2021-08-23 | End: 2021-08-24 | Stop reason: HOSPADM

## 2021-08-23 RX ORDER — HYDROMORPHONE HYDROCHLORIDE 1 MG/ML
.2-.4 INJECTION, SOLUTION INTRAMUSCULAR; INTRAVENOUS; SUBCUTANEOUS EVERY 5 MIN PRN
Status: DISCONTINUED | OUTPATIENT
Start: 2021-08-23 | End: 2021-08-23

## 2021-08-23 RX ORDER — CEFAZOLIN SODIUM 1 G/3ML
1 INJECTION, POWDER, FOR SOLUTION INTRAMUSCULAR; INTRAVENOUS EVERY 8 HOURS
Status: DISCONTINUED | OUTPATIENT
Start: 2021-08-24 | End: 2021-08-24 | Stop reason: HOSPADM

## 2021-08-23 RX ORDER — ONDANSETRON 2 MG/ML
4 INJECTION INTRAMUSCULAR; INTRAVENOUS EVERY 30 MIN PRN
Status: DISCONTINUED | OUTPATIENT
Start: 2021-08-23 | End: 2021-08-23

## 2021-08-23 RX ORDER — VANCOMYCIN HYDROCHLORIDE 1 G/20ML
INJECTION, POWDER, LYOPHILIZED, FOR SOLUTION INTRAVENOUS PRN
Status: DISCONTINUED | OUTPATIENT
Start: 2021-08-23 | End: 2021-08-23 | Stop reason: HOSPADM

## 2021-08-23 RX ORDER — ONDANSETRON 4 MG/1
4 TABLET, ORALLY DISINTEGRATING ORAL EVERY 6 HOURS PRN
Status: DISCONTINUED | OUTPATIENT
Start: 2021-08-23 | End: 2021-08-24 | Stop reason: HOSPADM

## 2021-08-23 RX ORDER — OXYCODONE HYDROCHLORIDE 5 MG/1
5 TABLET ORAL EVERY 4 HOURS PRN
Status: DISCONTINUED | OUTPATIENT
Start: 2021-08-23 | End: 2021-08-23

## 2021-08-23 RX ORDER — BISACODYL 10 MG
10 SUPPOSITORY, RECTAL RECTAL DAILY PRN
Status: DISCONTINUED | OUTPATIENT
Start: 2021-08-23 | End: 2021-08-24 | Stop reason: HOSPADM

## 2021-08-23 RX ORDER — OXYCODONE HYDROCHLORIDE 5 MG/1
5 TABLET ORAL EVERY 4 HOURS PRN
Status: DISCONTINUED | OUTPATIENT
Start: 2021-08-23 | End: 2021-08-24 | Stop reason: HOSPADM

## 2021-08-23 RX ORDER — CEFAZOLIN SODIUM 2 G/100ML
2 INJECTION, SOLUTION INTRAVENOUS
Status: COMPLETED | OUTPATIENT
Start: 2021-08-23 | End: 2021-08-23

## 2021-08-23 RX ORDER — PROPOFOL 10 MG/ML
INJECTION, EMULSION INTRAVENOUS PRN
Status: DISCONTINUED | OUTPATIENT
Start: 2021-08-23 | End: 2021-08-23

## 2021-08-23 RX ORDER — EPHEDRINE SULFATE 50 MG/ML
INJECTION, SOLUTION INTRAMUSCULAR; INTRAVENOUS; SUBCUTANEOUS PRN
Status: DISCONTINUED | OUTPATIENT
Start: 2021-08-23 | End: 2021-08-23

## 2021-08-23 RX ORDER — CEFAZOLIN SODIUM 2 G/100ML
2 INJECTION, SOLUTION INTRAVENOUS SEE ADMIN INSTRUCTIONS
Status: DISCONTINUED | OUTPATIENT
Start: 2021-08-23 | End: 2021-08-23 | Stop reason: HOSPADM

## 2021-08-23 RX ORDER — LIDOCAINE 40 MG/G
CREAM TOPICAL
Status: DISCONTINUED | OUTPATIENT
Start: 2021-08-23 | End: 2021-08-24 | Stop reason: HOSPADM

## 2021-08-23 RX ORDER — HYDROMORPHONE HYDROCHLORIDE 1 MG/ML
0.4 INJECTION, SOLUTION INTRAMUSCULAR; INTRAVENOUS; SUBCUTANEOUS
Status: DISCONTINUED | OUTPATIENT
Start: 2021-08-23 | End: 2021-08-24

## 2021-08-23 RX ORDER — ONDANSETRON 2 MG/ML
INJECTION INTRAMUSCULAR; INTRAVENOUS PRN
Status: DISCONTINUED | OUTPATIENT
Start: 2021-08-23 | End: 2021-08-23

## 2021-08-23 RX ORDER — MEPERIDINE HYDROCHLORIDE 25 MG/ML
12.5 INJECTION INTRAMUSCULAR; INTRAVENOUS; SUBCUTANEOUS
Status: DISCONTINUED | OUTPATIENT
Start: 2021-08-23 | End: 2021-08-23

## 2021-08-23 RX ORDER — HYDRALAZINE HYDROCHLORIDE 20 MG/ML
10-20 INJECTION INTRAMUSCULAR; INTRAVENOUS EVERY 30 MIN PRN
Status: DISCONTINUED | OUTPATIENT
Start: 2021-08-23 | End: 2021-08-24 | Stop reason: HOSPADM

## 2021-08-23 RX ORDER — POLYETHYLENE GLYCOL 3350 17 G/17G
17 POWDER, FOR SOLUTION ORAL DAILY
Status: DISCONTINUED | OUTPATIENT
Start: 2021-08-24 | End: 2021-08-24 | Stop reason: HOSPADM

## 2021-08-23 RX ORDER — HYDROMORPHONE HYDROCHLORIDE 1 MG/ML
0.2 INJECTION, SOLUTION INTRAMUSCULAR; INTRAVENOUS; SUBCUTANEOUS
Status: DISCONTINUED | OUTPATIENT
Start: 2021-08-23 | End: 2021-08-24

## 2021-08-23 RX ORDER — DEXAMETHASONE SODIUM PHOSPHATE 4 MG/ML
INJECTION, SOLUTION INTRA-ARTICULAR; INTRALESIONAL; INTRAMUSCULAR; INTRAVENOUS; SOFT TISSUE PRN
Status: DISCONTINUED | OUTPATIENT
Start: 2021-08-23 | End: 2021-08-23

## 2021-08-23 RX ADMIN — MIDAZOLAM 2 MG: 1 INJECTION INTRAMUSCULAR; INTRAVENOUS at 15:56

## 2021-08-23 RX ADMIN — PROPOFOL 50 MG: 10 INJECTION, EMULSION INTRAVENOUS at 20:29

## 2021-08-23 RX ADMIN — ROCURONIUM BROMIDE 10 MG: 10 INJECTION INTRAVENOUS at 18:24

## 2021-08-23 RX ADMIN — ROCURONIUM BROMIDE 10 MG: 10 INJECTION INTRAVENOUS at 19:29

## 2021-08-23 RX ADMIN — Medication 2 G: at 20:40

## 2021-08-23 RX ADMIN — Medication 10 MG: at 18:02

## 2021-08-23 RX ADMIN — SODIUM CHLORIDE, POTASSIUM CHLORIDE, SODIUM LACTATE AND CALCIUM CHLORIDE: 600; 310; 30; 20 INJECTION, SOLUTION INTRAVENOUS at 19:22

## 2021-08-23 RX ADMIN — PROPOFOL 60 MG: 10 INJECTION, EMULSION INTRAVENOUS at 20:27

## 2021-08-23 RX ADMIN — ROCURONIUM BROMIDE 50 MG: 10 INJECTION INTRAVENOUS at 16:31

## 2021-08-23 RX ADMIN — LIDOCAINE HYDROCHLORIDE 50 MG: 20 INJECTION, SOLUTION INFILTRATION; PERINEURAL at 16:31

## 2021-08-23 RX ADMIN — ROCURONIUM BROMIDE 10 MG: 10 INJECTION INTRAVENOUS at 19:00

## 2021-08-23 RX ADMIN — DEXAMETHASONE SODIUM PHOSPHATE 6 MG: 4 INJECTION, SOLUTION INTRA-ARTICULAR; INTRALESIONAL; INTRAMUSCULAR; INTRAVENOUS; SOFT TISSUE at 17:14

## 2021-08-23 RX ADMIN — Medication 2 G: at 16:40

## 2021-08-23 RX ADMIN — FENTANYL CITRATE 25 MCG: 50 INJECTION, SOLUTION INTRAMUSCULAR; INTRAVENOUS at 19:07

## 2021-08-23 RX ADMIN — FENTANYL CITRATE 50 MCG: 50 INJECTION, SOLUTION INTRAMUSCULAR; INTRAVENOUS at 17:35

## 2021-08-23 RX ADMIN — ROCURONIUM BROMIDE 10 MG: 10 INJECTION INTRAVENOUS at 17:50

## 2021-08-23 RX ADMIN — HYDROMORPHONE HYDROCHLORIDE 0.5 MG: 1 INJECTION, SOLUTION INTRAMUSCULAR; INTRAVENOUS; SUBCUTANEOUS at 20:31

## 2021-08-23 RX ADMIN — SODIUM CHLORIDE, POTASSIUM CHLORIDE, SODIUM LACTATE AND CALCIUM CHLORIDE: 600; 310; 30; 20 INJECTION, SOLUTION INTRAVENOUS at 15:45

## 2021-08-23 RX ADMIN — HYDROMORPHONE HYDROCHLORIDE 0.2 MG: 1 INJECTION, SOLUTION INTRAMUSCULAR; INTRAVENOUS; SUBCUTANEOUS at 23:31

## 2021-08-23 RX ADMIN — ROCURONIUM BROMIDE 10 MG: 10 INJECTION INTRAVENOUS at 17:14

## 2021-08-23 RX ADMIN — HYDROMORPHONE HYDROCHLORIDE 0.3 MG: 1 INJECTION, SOLUTION INTRAMUSCULAR; INTRAVENOUS; SUBCUTANEOUS at 22:39

## 2021-08-23 RX ADMIN — Medication 10 MG: at 20:39

## 2021-08-23 RX ADMIN — FENTANYL CITRATE 25 MCG: 50 INJECTION, SOLUTION INTRAMUSCULAR; INTRAVENOUS at 19:21

## 2021-08-23 RX ADMIN — FENTANYL CITRATE 100 MCG: 50 INJECTION, SOLUTION INTRAMUSCULAR; INTRAVENOUS at 16:31

## 2021-08-23 RX ADMIN — PROPOFOL 140 MG: 10 INJECTION, EMULSION INTRAVENOUS at 16:31

## 2021-08-23 RX ADMIN — ONDANSETRON 4 MG: 2 INJECTION INTRAMUSCULAR; INTRAVENOUS at 15:56

## 2021-08-23 RX ADMIN — PHENYLEPHRINE HYDROCHLORIDE 100 MCG: 10 INJECTION INTRAVENOUS at 20:39

## 2021-08-23 RX ADMIN — Medication 10 MG: at 20:35

## 2021-08-23 RX ADMIN — SODIUM CHLORIDE, POTASSIUM CHLORIDE, SODIUM LACTATE AND CALCIUM CHLORIDE: 600; 310; 30; 20 INJECTION, SOLUTION INTRAVENOUS at 21:30

## 2021-08-23 RX ADMIN — SUGAMMADEX 200 MG: 100 INJECTION, SOLUTION INTRAVENOUS at 21:16

## 2021-08-23 ASSESSMENT — MIFFLIN-ST. JEOR: SCORE: 1546.75

## 2021-08-23 NOTE — ANESTHESIA PROCEDURE NOTES
Airway       Patient location during procedure: OR       Procedure Start/Stop Times: 8/23/2021 4:33 PM  Staff -        CRNA: Dixon Ann APRN CRNA       Performed By: CRNA  Consent for Airway        Urgency: elective  Indications and Patient Condition       Indications for airway management: jose cruz-procedural       Induction type:intravenous       Mask difficulty assessment: 1 - vent by mask    Final Airway Details       Final airway type: endotracheal airway       Successful airway: ETT - single  Endotracheal Airway Details        ETT size (mm): 7.5       Cuffed: yes       Successful intubation technique: direct laryngoscopy       DL Blade Type: Garcia 2       Grade View of Cords: 1       Position: Right       Measured from: gums/teeth       Secured at (cm): 23       Bite block used: Oral Airway    Post intubation assessment        Placement verified by: capnometry, equal breath sounds and chest rise        Number of attempts at approach: 1       Secured with: cloth tape       Ease of procedure: easy       Dentition: Intact

## 2021-08-23 NOTE — OR NURSING
Paged Eugenio Dao to clarify orders for labs. Pt had labs done 8/16, and I am questioning whether he wants them all repeated.

## 2021-08-23 NOTE — PROGRESS NOTES
"Pt brought back to OR and only MD signature was appearing on E-consent. Pt confirmed he had signed the consents with the pre-op RN Rica. Rica corroborated this story. E-consent was signed with a generic \"X\" and witnessed by myself. CRNA and surgical team aware.   "

## 2021-08-24 VITALS
OXYGEN SATURATION: 98 % | WEIGHT: 129.85 LBS | SYSTOLIC BLOOD PRESSURE: 115 MMHG | BODY MASS INDEX: 20.87 KG/M2 | HEART RATE: 103 BPM | DIASTOLIC BLOOD PRESSURE: 63 MMHG | RESPIRATION RATE: 16 BRPM | HEIGHT: 66 IN | TEMPERATURE: 98 F

## 2021-08-24 PROBLEM — Z98.890 S/P HARDWARE REMOVAL: Status: ACTIVE | Noted: 2021-08-24

## 2021-08-24 PROCEDURE — 250N000011 HC RX IP 250 OP 636: Performed by: STUDENT IN AN ORGANIZED HEALTH CARE EDUCATION/TRAINING PROGRAM

## 2021-08-24 PROCEDURE — 61888 REVISE/REMOVE NEURORECEIVER: CPT | Mod: 51 | Performed by: NEUROLOGICAL SURGERY

## 2021-08-24 PROCEDURE — 250N000013 HC RX MED GY IP 250 OP 250 PS 637: Performed by: STUDENT IN AN ORGANIZED HEALTH CARE EDUCATION/TRAINING PROGRAM

## 2021-08-24 PROCEDURE — 258N000003 HC RX IP 258 OP 636: Performed by: STUDENT IN AN ORGANIZED HEALTH CARE EDUCATION/TRAINING PROGRAM

## 2021-08-24 PROCEDURE — 61880 REVISE/REMOVE NEUROELECTRODE: CPT | Mod: GC | Performed by: NEUROLOGICAL SURGERY

## 2021-08-24 RX ORDER — NALOXONE HYDROCHLORIDE 0.4 MG/ML
0.4 INJECTION, SOLUTION INTRAMUSCULAR; INTRAVENOUS; SUBCUTANEOUS
Status: DISCONTINUED | OUTPATIENT
Start: 2021-08-24 | End: 2021-08-24 | Stop reason: HOSPADM

## 2021-08-24 RX ORDER — HYDROMORPHONE HYDROCHLORIDE 1 MG/ML
0.4 INJECTION, SOLUTION INTRAMUSCULAR; INTRAVENOUS; SUBCUTANEOUS
Status: DISCONTINUED | OUTPATIENT
Start: 2021-08-24 | End: 2021-08-24 | Stop reason: HOSPADM

## 2021-08-24 RX ORDER — SODIUM CHLORIDE 9 MG/ML
INJECTION, SOLUTION INTRAVENOUS CONTINUOUS
Status: DISCONTINUED | OUTPATIENT
Start: 2021-08-24 | End: 2021-08-24 | Stop reason: HOSPADM

## 2021-08-24 RX ORDER — HYDROMORPHONE HYDROCHLORIDE 1 MG/ML
0.2 INJECTION, SOLUTION INTRAMUSCULAR; INTRAVENOUS; SUBCUTANEOUS
Status: DISCONTINUED | OUTPATIENT
Start: 2021-08-24 | End: 2021-08-24 | Stop reason: HOSPADM

## 2021-08-24 RX ORDER — POLYETHYLENE GLYCOL 3350 17 G/17G
17 POWDER, FOR SOLUTION ORAL DAILY
Qty: 14 PACKET | Refills: 0 | Status: SHIPPED | OUTPATIENT
Start: 2021-08-24 | End: 2021-09-07

## 2021-08-24 RX ORDER — NALOXONE HYDROCHLORIDE 0.4 MG/ML
0.2 INJECTION, SOLUTION INTRAMUSCULAR; INTRAVENOUS; SUBCUTANEOUS
Status: DISCONTINUED | OUTPATIENT
Start: 2021-08-24 | End: 2021-08-24 | Stop reason: HOSPADM

## 2021-08-24 RX ORDER — AMOXICILLIN 250 MG
1 CAPSULE ORAL 2 TIMES DAILY
Qty: 28 TABLET | Refills: 0 | Status: SHIPPED | OUTPATIENT
Start: 2021-08-24 | End: 2021-09-07

## 2021-08-24 RX ORDER — OXYCODONE HYDROCHLORIDE 5 MG/1
5 TABLET ORAL EVERY 4 HOURS PRN
Qty: 20 TABLET | Refills: 0 | Status: ON HOLD | OUTPATIENT
Start: 2021-08-24 | End: 2021-10-08

## 2021-08-24 RX ADMIN — HYDROMORPHONE HYDROCHLORIDE 0.2 MG: 1 INJECTION, SOLUTION INTRAMUSCULAR; INTRAVENOUS; SUBCUTANEOUS at 01:13

## 2021-08-24 RX ADMIN — SODIUM CHLORIDE: 9 INJECTION, SOLUTION INTRAVENOUS at 01:12

## 2021-08-24 RX ADMIN — CEFAZOLIN 1 G: 1 INJECTION, POWDER, FOR SOLUTION INTRAMUSCULAR; INTRAVENOUS at 11:09

## 2021-08-24 RX ADMIN — PROCHLORPERAZINE EDISYLATE 10 MG: 5 INJECTION INTRAMUSCULAR; INTRAVENOUS at 01:13

## 2021-08-24 RX ADMIN — ACETAMINOPHEN 975 MG: 325 TABLET, FILM COATED ORAL at 04:24

## 2021-08-24 RX ADMIN — CEFAZOLIN 1 G: 1 INJECTION, POWDER, FOR SOLUTION INTRAMUSCULAR; INTRAVENOUS at 04:24

## 2021-08-24 RX ADMIN — SERTRALINE HYDROCHLORIDE 50 MG: 50 TABLET ORAL at 10:15

## 2021-08-24 ASSESSMENT — ACTIVITIES OF DAILY LIVING (ADL)
ADLS_ACUITY_SCORE: 17

## 2021-08-24 NOTE — ANESTHESIA CARE TRANSFER NOTE
Patient: Bradley Garcia    Procedure(s):  Removal of entire deep brain stimulation system hardware including intracranial electrodes and battery/generator in chest wall    Diagnosis: Dystonia [G24.9]  Diagnosis Additional Information: No value filed.    Anesthesia Type:   General     Note:    Oropharynx: oropharynx clear of all foreign objects and spontaneously breathing  Level of Consciousness: drowsy  Oxygen Supplementation: face mask  Level of Supplemental Oxygen (L/min / FiO2): 10  Independent Airway: airway patency satisfactory and stable  Dentition: dentition unchanged  Vital Signs Stable: post-procedure vital signs reviewed and stable  Report to RN Given: handoff report given  Patient transferred to: PACU    Handoff Report: Identifed the Patient, Identified the Reponsible Provider, Reviewed the pertinent medical history, Discussed the surgical course, Reviewed Intra-OP anesthesia mangement and issues during anesthesia, Set expectations for post-procedure period and Allowed opportunity for questions and acknowledgement of understanding      Vitals:  Vitals Value Taken Time   BP     Temp     Pulse 105 08/23/21 2140   Resp     SpO2 96 % 08/23/21 2140   Vitals shown include unvalidated device data.    Electronically Signed By: Sapphire Mcconnell CRNA, MAG MCHUGH  August 23, 2021  9:41 PM

## 2021-08-24 NOTE — BRIEF OP NOTE
[facility]    Brief Operative Note    Pre-operative diagnosis: Dystonia [G24.9]  Post-operative diagnosis Same as pre-operative diagnosis    Procedure: Procedure(s):  Removal of entire deep brain stimulation system hardware including intracranial electrodes and battery/generator in chest wall  Surgeon: Surgeon(s) and Role:     * Ravinder Coleman MD - Primary     * Eugenio Dao MD - Resident - Assisting  Anesthesia: Combined MAC with Local   Estimated blood loss: 25 mL  Drains: None  Specimens: * No specimens in log *  Findings:   Scarred in bilateral DBS system. Fully extacted, small residual lead overlying right clavicle removed following intraoperative xray. Vanc powder placed in wounds. .  Complications: None.  Implants:   Implant Name Type Inv. Item Serial No.  Lot No. LRB No. Used Action   [employer] DBS NEUROSTIMULATOR Neurology device   [employer]   1 Explanted         Plan:  - 6A  - Pain control  - CT head prior to transfer out of PACU  - 3x scalp incisions closed with nylon  - Right chest wall incision closed with monocryl  - Strait catheterization of bladder at end of procedure  - Ancef x3 post-op

## 2021-08-24 NOTE — PLAN OF CARE
Pt discharge back to home. PIV removed. Dressed pt w/ his personal clothings. Personal belongings, clothes & phone, w/ pt. Mom at bedside providing ride home.  AVS printed and handed to patient and mom. Educated and went through AVS handout, suture cares, and medications w/ pt and mom. Pt and mom had no questions or concerns.     Mom left the unit first to pull car around to the front. Pt left the unit on wheelchair with nursing assistant @ 0515.

## 2021-08-24 NOTE — UTILIZATION REVIEW
"  Admission Status; Secondary Review Determination     Admission Date: 8/23/2021 11:35 AM      Under the authority of the Utilization Management Committee, the utilization review process indicated a secondary review on the above patient.  The review outcome is based on review of the medical records, discussions with staff, and applying clinical experience noted on the date of the review.        ()      Inpatient Status Appropriate - This patient's medical care is consistent with medical management for inpatient care and reasonable inpatient medical practice.      () Observation Status Appropriate - This patient does not meet hospital inpatient criteria and is placed in observation status. If this patient's primary payer is Medicare and was admitted as an inpatient, Condition Code 44 should be used and patient status changed to \"observation\".   (x) Outpatient status appropriate          RATIONALE FOR DETERMINATION   Bradley Garcia is a 21 y/o male with a history of idiopathic progressive dystonia since early childhood for which he underwent implantation of a DBS system in 2013 at [facility].  He underwent Explantation of bilateral deep brain stimulator electrode leads, extension wires, and right anterior chest wall implantable pulse generator on 8/23.  He was kept in the hospital overnight for routine postoperative management.  This procedure is usually done in the outpatient setting.  For his routine postoperative care, he had initially had an inpatient order placed.  However, outpatient status at hospital for routine postoperative care is appropriate hospital status at this time.      The severity of illness, intensity of service provided, expected LOS and risk for adverse outcome make the care appropriate for outpatient level care at the hospital.        The information on this document is developed by the utilization review team in order for the business office to ensure compliance.  This only denotes the " appropriateness of proper admission status and does not reflect the quality of care rendered.         The definitions of Inpatient Status and Observation Status used in making the determination above are those provided in the CMS Coverage Manual, Chapter 1 and Chapter 6, section 70.4.      Sincerely,     Parish Rose D.O.  Utilization Review/ Case Management  [facility].

## 2021-08-24 NOTE — OR NURSING
Writer received report from Francesca Kat Pacu RN. Neuro assessment done with Francesca. Pt's parents at bedside seeing Pt before they go home. Pt is stable and neuro status is stable. Vitals WNL's. Pt met Pacu transfer criteria and brought to CT scan before taken to 6A. Neuro assessment done with Hellen BOWENS RN.

## 2021-08-24 NOTE — DISCHARGE SUMMARY
[facility] Discharge Summary and Instructions    Bradley Garcia MRN# [id number]   Age: 20 year old YOB: 2000     Date of Admission:  8/23/2021  Date of Discharge::  8/24/2021  Admitting Physician:  Ravinder Coleman MD  Discharge Physician:  Ravinder Coleman MD          Admission Diagnoses:   Dystonia [G24.9]          Discharge Diagnosis:     Dystonia [G24.9]     In addition to the assessment of diagnoses detailed above, this 20 year old male  patient admitted from home has the following conditions contributing to the complexity of their medical care:    ADHD  Depression         Procedures:   8/23/21  Removal of entire deep brain stimulation system hardware including intracranial electrodes and battery/generator in chest wall           Brief History of Illness:   Bradley is a 20-year-old right-handed male with a history of idiopathic generalized dystonia, status post bilateral GPi DBS leads implanted in 2013 who presented to Neurosurgery Clinic for evaluation regarding possible lead revision.  Bradley was first noted to have dystonic symptoms when he was approximately 3 years old when he was not meeting motor milestones in particular straighten his left leg and not moving his left side as well in general.  He was diagnosed with dystonia around this time and subsequently developed a dystonic tremor, cervical dystonia, which is currently quite prominent and was noted during the clinic visit. He underwent DBS placement when he was approximately 13 years old and received marked benefit for approximately 2-3 months with subsequent return to baseline functioning.  No programming of the device has changed or helped his symptoms since that time.  His IPG reached end of life approximately 3 years ago and he is unaware as he did not, after redeveloping dystonic symptoms several years prior, he did not receive any apparent benefit from the DBS system.  Based on this, we had recommended removal of the  "entire system with plan to replace the system in the future.  The risks, benefits, alternatives were discussed with Bradley and his parents elected to proceed.              Hospital Course:   Following surgery the patient was monitored overnight where he remained medically stable.  Postoperative head CT did not reveal any acute hemorrhage or intracranial pathology. Following surgery the patient complained of mild incisional pain however was tolerating diet, voiding without a James, and ambulating safely.   Home medications were restarted prior to discharge.  Patient was given 3 doses of IV antibiotic for wound prophylaxis and completed doses prior to discharge.  Patient was able to discharge home with family on postoperative day #1.  Patient will follow-up in 2 weeks for wound evaluation.  Discharge instructions were discussed with the patient who stated understanding.         Discharge Medications:     Current Discharge Medication List      START taking these medications    Details   oxyCODONE (ROXICODONE) 5 MG tablet Take 1 tablet (5 mg) by mouth every 4 hours as needed for moderate to severe pain  Qty: 20 tablet, Refills: 0    Associated Diagnoses: S/P hardware removal      polyethylene glycol (MIRALAX) 17 g packet Take 17 g by mouth daily for 14 days  Qty: 14 packet, Refills: 0    Associated Diagnoses: S/P hardware removal      senna-docusate (SENOKOT-S/PERICOLACE) 8.6-50 MG tablet Take 1 tablet by mouth 2 times daily for 14 days  Qty: 28 tablet, Refills: 0    Associated Diagnoses: S/P hardware removal         CONTINUE these medications which have NOT CHANGED    Details   sertraline (ZOLOFT) 25 MG tablet Take 50 mg by mouth every morning       tiZANidine (ZANAFLEX) 2 MG tablet every evening             Exam:   Physical Exam  /53 (BP Location: Right arm)   Pulse 60   Temp 98.2  F (36.8  C) (Oral)   Resp 12   Ht 1.676 m (5' 6\")   Wt 58.9 kg (129 lb 13.6 oz)   SpO2 94%   BMI 20.96 kg/m    General: " Appears comfortable, NAD  Wound: Incision, clean, dry, intact without strikethrough  Neurologic Exam:  - AOx3.  - Follows commands.  - Speech fluent, spontaneous. No aphasia or dysarthria.  - No gaze preference. No apparent hemineglect.  - PERRL, EOMI.  - Face symmetric with sensation intact to light touch.  - Palate elevates symmetrically, uvula midline, tongue protrudes midline.  - Trapezii and sternocleidomastoid muscles 5/5 bilaterally.  - No pronator drift.  Motor: Normal bulk/tone; no tremor, rigidity, or bradykinesia.   Right:  Deltoid 5/5, tricep 5/5, bicep 5/5, wrist flexor 5/5, wrist extensor 5/5, finger intrinsic 5/5  Left:  Deltoid 5/5, tricep 5/5, bicep 5/5, wrist flexor 5/5, wrist extensor 5/5, finger intrinsic 5/5  Right: Iliopsoas 5/5, quadricep 5/5, hamstring 5/5, tibialis anterior 5/5, gastrocnemius 5/5, EHL 5/5  Left:  Iliopsoas 5/5, quadricep 5/5, hamstring 5/5, tibialis anterior 5/5, gastrocnemius 5/5, EHL 5/5    Sensation intact in bilateral L4-S1 dermatones              Discharge Instructions and Follow-Up:     Discharge diet: Regular   Discharge activity: You may advance activity as tolerated. No strenuous exercise or heay lifting greater than 10 lbs for 4 weeks or until seen and cleared in clinic.   Discharge follow-up:     Follow-up Dr. Ravinder Coleman MD in 2 weeks, all additional follow-up visits to be determined by Dr. Ravinder Coleman MD       Wound care: Ok to shower,however no scrubbing of the wound and no soaking of the wound, meaning no bathtubs or swimming pools. Pat dry only. Leave wound open to air.  Patient to have wound checked 2 weeks following surgery.    Post-op care at follow-up: Keep dry and clean     Please call if you have:  1. increased pain, redness, drainage, swelling at your incision  2. fevers > 101.5 F degrees  3. with any questions or concerns.  You may reach the Neurosurgery clinic at [phone] during regular work hours. ER at [phone].    and  ask for the Neurosurgery Resident on call at [phone], during off hours or weekends.         Discharge Disposition:     Discharged to home        MAG Spence, CNP  Department of Neurosurgery  Pager: [phone]

## 2021-08-24 NOTE — OR NURSING
Xray taken at end of procedure to confirm all Hardware removed. Confirmed by Dr. Coleman and Radiologist Dr. Dixon Ingram RN on 8/23/2021 at 8:46 PM

## 2021-08-24 NOTE — OP NOTE
Procedure Date: 08/23/2021    PREOPERATIVE DIAGNOSES:  1.  Bilateral dystonia.  2.  Status post bilateral DBS placement with targeted GPi connected to a right anterior chest wall generator.  3.  Persistent dystonia with limited to no benefit from prior DBS therapy due to suspected inaccurate targeting the DBS leads.    POSTOPERATIVE DIAGNOSES:    1.  Bilateral dystonia.  2.  Status post bilateral DBS placement with targeted GPi connected to a right anterior chest wall generator.  3.  Persistent dystonia with limited to no benefit from prior DBS therapy due to suspected inaccurate targeting the DBS leads.    PROCEDURES PERFORMED:   Explantation of bilateral deep brain stimulator electrode leads, extension wires, and right anterior chest wall implantable pulse generator.  Intraoperative X-ray.    ATTENDING SURGEON:  Ravinder Coleman MD    RESIDENT SURGEON:  Eugenio Dao MD, PhD.    ANESTHESIA:  General endotracheal anesthesia.    ESTIMATED BLOOD LOSS:  25 mL    FINDINGS:  Significantly scarred in bilateral DBS system with a complete explantation of the system.  No retention of intracranial or extracranial wires from the previously implanted system.    IMPLANTS:  None.    EXPLANTS:    - [employer] DBS system (bilateral leads with extension wires and implantable pulse generator).     INDICATIONS FOR PROCEDURE:   Bradley is a 20-year-old right-handed male with a history of idiopathic generalized dystonia, status post bilateral GPi DBS leads implanted in 2013 who presented to Neurosurgery Clinic for evaluation regarding possible lead revision.  Bradley was first noted to have dystonic symptoms when he was approximately 3 years old when he was not meeting motor milestones in particular straighten his left leg and not moving his left side as well in general.  He was diagnosed with dystonia around this time and subsequently developed a dystonic tremor, cervical dystonia, which is currently quite prominent and was noted  during the clinic visit. He underwent DBS placement when he was approximately 13 years old and received marked benefit for approximately 2-3 months with subsequent return to baseline functioning.  No programming of the device has changed or helped his symptoms since that time.  His IPG reached end of life approximately 3 years ago and he is unaware as he did not, after redeveloping dystonic symptoms several years prior, he did not receive any apparent benefit from the DBS system.  Based on this, we had recommended removal of the entire system with plan to replace the system in the future.  The risks, benefits, alternatives were discussed with Bradley and his parents elected to proceed.    DESCRIPTION OF PROCEDURE:  After informed consent was obtained, the patient was brought to the operating room where he was transferred supine onto the operating room table.  He was positioned supine with his head on a donut.  His arms were appropriately set aside.  All pressure points were appropriately padded to avoid neurovascular injury.  His head was turned to the left.  The hair around his scalp incisions and right anterior chest wall incision was clipped using surgical clippers.  Bilateral frontal scalp as well as right scalp, neck and anterior chest wall were prepped and draped in the standard fashion.    Timeout was performed, verifying site, side of surgery as well as that the available imaging in the room corresponded to the correct patient's. Using a #15 blade, the left frontal curvilinear scalp incision was opened.  Monopolar electrocautery used to dissect down to the subgaleal space.  The periosteal elevator was used to mobilize the scalp and the 2-0 Vicryl suture placed in the scalp and the scalp retracted posteriorly.  The leads system was immediately obvious underneath the scalp and was secured in position using bone cement.  There were several tension relief coils embedded in the bone cement.  The leads appeared to  be tunneled to the right down towards a right sided scalp incision.  Therefore, at this point, the right scalp incision was opened using #15 blade.  Monopolar electrocautery was used to dissect down to expose the subgaleal space and the underlying DBS system.  A 2-0 Vicryl suture was placed in the scalp and the scalp was retracted posteriorly.  Again, the lead system was noted to be secured with bone cement.    After exposing the DBS leads, the right anterior chest wall incision was opened using #15 blade.  Monopolar electrocautery was used to dissect down and expose the underlying DBS lead and implantable pulse generator.  Further monopolar electrocautery was used to expose the IPG and open the pocket.  The IPG was then removed and the leads cut to facilitate removal of the IPG.  Hemostasis was achieved using bipolar electrocautery.    We then turned our attention back to explanting the deep brain stimulator leads.  A 3 mm cutting bur was first used to remove a portion of the bone cement and expose the underlying DBS lead.  We then switched to a B1 without a footplate and further meticulous drilling was performed to fully release the DBS lead from the intracranial entry point within the bone cement.  Once the lead was entirely free on the left side the lead was carefully removed from the brain substance. The CSF egress was observed with no evidence of hemorrhage.  The lead was then further drilled out to free it from the bone cement.  We then turned our attention to the right side.  The right lead was again released from the bone cement using a B1 without footplate.  Once the lead was fully released from the bone cement it was carefully removed from the brain parenchyma again with CSF egress and no evidence of blood within the egressing CSF.  The right lead was then freed from the bone cement fully.  The leads were then followed using blunt dissection with Christen clamps to free them from subgaleal scar tissue.  Once  the leads were free from the subgaleal scar tissue they appeared to be tethered in the right posterior auricular region by their larger segment of the lead at their suspected connection to the extension wires.  Therefore, a 3 cm incision was made just posterior to the right ear.  Monopolar electrocautery was used to dissect down and expose the connection of the DBS leads to the extension wires.  The lead connectors were then carefully mobilized from the scar tissue, thus freeing lead and extension wire in a continuous unit.  The left lead was first removed in its entirety.  Further dissection was performed to identify the right lead, which was then removed in its entirety.     Plain x-ray was then brought on the field.  X-rays were obtained to verify complete removal of the DBS electrode leads.  A small section of wiring was identified over the right clavicle, which we were able to identify in the wound and fully removed.  Therefore, at the end of the procedure, both leads and extension wires as well as the implantable pulse generator material were removed in their entirety from the patient.    We then turned our attention to closing.  All wounds were irrigated with copious amounts of antibiotic containing irrigation including the bilateral scalp incisions in the right infraclavicular incision. We then applied vancomycin powder to all the incisions.  The scalp was reapproximated using 3-0 Vicryl sutures in simple inverted interrupted fashion and the skin closed using a 3-0 nylon simple running fashion.  The right posterior auricular incision was closed using 3-0 nylon simple running fashion.  The right infraclavicular anterior chest wall incision was closed first by collapsing the pocket with 3-0 Vicryl sutures, then closing the pocket, superior aspect of the pocket using 3-0 Vicryl sutures in simple interrupted fashion.  The subdermal layer was reapproximated using 3-0 Vicryl sutures in simple inverted interrupted  fashion.  The skin was closed using 3-0 Monocryl in a running subcuticular fashion.  All the wounds were cleaned with wet and dry sponges followed by ChloraPrep and Primapore dressings were applied to the scalp incisions.  The right anterior chest wall incision was covered with Steri-Strips followed by a Telfa and a Tegaderm.  At the end of the procedure, all counts including needle, instrument, sponge were correct x 2. The patient was then extubated and transferred to the PACU for further recovery.     Dr. Coleman, Neurosurgery staff, was present during the key and critical portions of the case and immediately available during the entire procedure.    Ravinder Coleman M.D.    As Dictated by GABRIELLE ROBISON MD, PhD        D: 2021   T: 2021   MT: [id number]    Name:     KYMBERLY CULP  MRN:      [id number]        Account:        [id number]   :      2000           Procedure Date: 2021     Document: [id number]    Physician Attestation   I was present for the key portions of the procedure and I was immediately available for the entire procedure between opening and closing.    Ravinder Coleman  Date of Service (when I saw the patient): 21

## 2021-08-24 NOTE — PLAN OF CARE
"Assumed cares [id number]     /53 (BP Location: Right arm)   Pulse 60   Temp 98.2  F (36.8  C) (Oral)   Resp 12   Ht 1.676 m (5' 6\")   Wt 58.9 kg (129 lb 13.6 oz)   SpO2 94%   BMI 20.96 kg/m       Pain: Patient had mild pain this morning, denied pain this afternoon.   Neuro:  A&Ox4. H/o Dystonia. LUE tremors, LLU, RUE, RLE tremors with activity. Uncontrollable muscle twitches and jerking.  Respiratory: Lungs clear and equal bilaterally. On RA.   Cardiac/Neurovascular: HR and pulse WDL. No numbness or tingling reported. No edema noted.   GI/: pt unable to urinate since surgery. Bladder scanned for 633 around 1115AM, straight cath'd an hour later for 625ml. Pt wanted time to try and urinate on his own, was not successful. No BM.   Nutrition: Advanced to regular diet, adequate intake.   Activity: up with assist of 1 w/ GB.   Skin: 4 small pin sites to forehead, incision on head covered with primapore. No new drainage noted. Incision on chest covered with primapore, no new drainage noted.    Lines: Right PIV infusing NS @75ml/hr.   Events this shift: Discharge orders were placed for patient, however cannot leave until able to urinate on own. Will continue to encourage intake throughout day. Last straight cath'd around 1215pm.      Plan: Continue with plan of care. Possible discharge later if able to urinate on own.               "

## 2021-08-24 NOTE — PLAN OF CARE
"Time: 0025-0730.  Reason for admission: DVS removal.   VS: /68 (BP Location: Right arm)   Pulse 82   Temp 97.8  F (36.6  C) (Axillary)   Resp 16   Ht 1.676 m (5' 6\")   Wt 58.9 kg (129 lb 13.6 oz)   SpO2 95%   BMI 20.96 kg/m      Activity: A1 with gait belt.   Neuros: A&Ox4. H/o Dystonia. LUE tremors, LLU, RUE, RLE tremors with activity. Uncontrollable muscle twitches and jerking. Ambulates with 1 assist with gait belt.   Cardiac: Apical heart sounds regular. Denies chest pain. No edema noted.   Respiratory: LS clear. Sating mid 90's on RA. Denies cough or SOB.   GI/: Pt was straight cathed in OR at 2130. Pt was unable to void on own, attempted 3x. Straight cathed at 0630. Abd soft non tender. BS active. N/V x 2. Compazine 10 mg administered at 0115.   Diet: Clears advance as tolerated. Pt remained clears r/t N/V.   Skin: 4 small pin sites to forehead. No drainage noted. 3 incisions on head covered by primipore dressing. Light shadowing noted post vomiting. Writer outlined area, dated and timed it. R chest wall incision with primpore dressing. Light shadowing noted. Writer outlined, time and dated it.No other skin issues identified. Georgiana SALGADO, charge and writer performed full skin check when pt arrived form PACU. Writer also performed a full skin check with PACU LEN SPRAGUE RN.    Lines: R PIV NS 75 ml/hr and intermit ABX.   Labs: Reviewed. No lytes replaced.   New changes this shift/Plan: Bladder scanned pt at 0400- 280, 0500-407, 0600-419. Straight cathed pt at 0630 for 300. Compazine and Dilaudid administered at 0115.   Will continue to monitor & follow POC.     "

## 2021-08-24 NOTE — ANESTHESIA POSTPROCEDURE EVALUATION
Patient: Bradley Garcia    Procedure(s):  Removal of entire deep brain stimulation system hardware including intracranial electrodes and battery/generator in chest wall    Diagnosis:Dystonia [G24.9]  Diagnosis Additional Information: No value filed.    Anesthesia Type:  General    Note:  Disposition: Inpatient   Postop Pain Control: Uneventful            Sign Out: Well controlled pain   PONV: No   Neuro/Psych: Uneventful            Sign Out: Acceptable/Baseline neuro status   Airway/Respiratory: Uneventful            Sign Out: Acceptable/Baseline resp. status   CV/Hemodynamics: Uneventful            Sign Out: Acceptable CV status; No obvious hypovolemia; No obvious fluid overload   Other NRE: NONE   DID A NON-ROUTINE EVENT OCCUR? No           Last vitals:  Vitals Value Taken Time   /67 08/23/21 2210   Temp     Pulse 113 08/23/21 2214   Resp 16 08/23/21 2150   SpO2 94 % 08/23/21 2214   Vitals shown include unvalidated device data.    Electronically Signed By: Rodolfo Balbuena MD  August 23, 2021  10:15 PM

## 2021-08-26 ENCOUNTER — PATIENT OUTREACH (OUTPATIENT)
Dept: NEUROSURGERY | Facility: CLINIC | Age: 21
End: 2021-08-26

## 2021-08-26 NOTE — PROGRESS NOTES
Pt s/p explantation of bilateral DBS electrode leads, extension wires and right chest pulse generator. Left VM checking on his postop status and requested that he call me on my direct line at his earliest convenience.

## 2021-08-27 NOTE — PROGRESS NOTES
[facility]: Post-Discharge Note  SITUATION                                                      Admission:    Admission Date: 08/23/21   Reason for Admission: Explantation of bilateral deep brain stimulator electrode leads, extension wires, and right anterior chest wall implantable pulse generator  Discharge:   Discharge Date: 08/23/21  Discharge Diagnosis: Dystonia    BACKGROUND                                                      Neurosurgery Discharge Coordination Note     Attending physician: Dr. Coleman  Discharge to: Home     Current status: Patient states he is doing well since surgery. He had some postop pain initially but says he did not need to take any pain medication. Denies redness, swelling, increased tenderness, drainage, incisions opening or elevated temp. Reports Incisions CDI without signs of infection.  Denies current bowel or bladder issues.    Discharge instructions and medications reviewed with patient.  Follow up appointments/imaging/tests needed: 2 week post op with Dr. Coleman on 9/7 at 2:30.     RN triage/on call number given: [phone]/ [phone]        ASSESSMENT           Discharge Assessment  How are your symptoms? (Red Flag symptoms escalate to triage hotline per guidelines): Improved  Do you feel your condition is stable enough to be safe at home until your provider visit?: Yes  Does the patient have their discharge instructions? : Yes  Does the patient have questions regarding their discharge instructions? : No  Does the patient have all of their medications?: Yes  Do you have questions regarding any of your medications? : No  Discharge follow-up appointment scheduled within 14 calendar days? : Yes  Discharge Follow Up Appointment Date: 09/07/21  Discharge Follow Up Appointment Scheduled with?: Specialty Care Provider    Post-op ([facility] Only)  If the patient had a surgery or procedure, do they have any questions for a nurse?: No    Post-op (Clinicians Only)  Did the  patient have surgery or a procedure: Yes  Incision: closed;healing  Drainage: No  Bleeding: none  Fever: No  Chills: No  Redness: No  Warmth: No  Swelling: No  Incision site pain: No  Closure: suture  Eating & Drinking: eating and drinking without complaints/concerns  PO Intake: regular diet  Bowel Function: normal  Urinary Status: voiding without complaint/concerns        PLAN                                                      Outpatient Plan:  Routine postop follow up    Future Appointments   Date Time Provider Department Center   9/7/2021  2:30 PM Ravinder Coleman MD [facility]         For any urgent concerns, please contact our 24 hour nurse triage line: [phone] ([phone])         CRISTIANO LIM RN

## 2021-09-07 ENCOUNTER — OFFICE VISIT (OUTPATIENT)
Dept: NEUROSURGERY | Facility: CLINIC | Age: 21
End: 2021-09-07
Payer: COMMERCIAL

## 2021-09-07 VITALS
OXYGEN SATURATION: 99 % | BODY MASS INDEX: 20.66 KG/M2 | RESPIRATION RATE: 16 BRPM | SYSTOLIC BLOOD PRESSURE: 117 MMHG | HEART RATE: 74 BPM | DIASTOLIC BLOOD PRESSURE: 82 MMHG | WEIGHT: 128 LBS

## 2021-09-07 DIAGNOSIS — G24.9 DYSTONIA: Primary | ICD-10-CM

## 2021-09-07 PROCEDURE — 99024 POSTOP FOLLOW-UP VISIT: CPT | Performed by: NEUROLOGICAL SURGERY

## 2021-09-07 RX ORDER — METHYLPHENIDATE HYDROCHLORIDE 36 MG/1
36 TABLET, EXTENDED RELEASE ORAL EVERY MORNING
COMMUNITY
Start: 2021-08-30 | End: 2024-05-14

## 2021-09-07 ASSESSMENT — PAIN SCALES - GENERAL: PAINLEVEL: NO PAIN (0)

## 2021-09-07 NOTE — NURSING NOTE
Chief Complaint   Patient presents with     RECHECK     [facility] RETURN -  2 wk post op     Jagjit Salomon

## 2021-09-07 NOTE — LETTER
9/7/2021       RE: Bradley Garcia  [id number]  [place] [address]     Dear Colleague,    Thank you for referring your patient, Bradley Garcia, to the [facility] NEUROSURGERY [facility] [place] at [facility]. Please see a copy of my visit note below.    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 He underwent lead and IPG removal without complication a couple of weeks ago and has been doing well. He hasn't noticed much difference in his dystonia since surgery.     Exam:  Awake, alert, oriented x 3  Attends, follows, dysarthric, fluent  PERRL  EOMI  FS  TML  Left laterocollis, right torticollis, dystonic head tremor, bilateral hand posturing  Wounds healing well, no erythema, swelling or pain. Nylons removed in the clinic.      Programming schedule: n/a    UPDRS/TETRAS/FM outcome: TBD    A/P: 22 y/o with idiopathic generalized dystonia s/p DBS lead and IPG removal two weeks ago, doing well. His surgery was much more complicated than anticipated as the prior surgeon had used bone cement to secure the lead without any hardware in place. We therefore had to drill quite a bit of bone away in order to free up the intracranial leads. Since we want to use the same incisions for his redo surgery, this will slightly complicate the method of securing the leads for his replacement surgery. He needs to obtain an MRI first so we can schedule his case.    - MRI brain DBS protocol  - Schedule staged bilateral GPi DBS with Dr. Aleman as electrophysiologist after I look at and plan with the new MRI      Again, thank you for allowing me to participate in the care of your patient.      Sincerely,    Ravinder Coleman MD

## 2021-09-13 DIAGNOSIS — G24.9 DYSTONIA: Primary | ICD-10-CM

## 2021-09-14 DIAGNOSIS — G24.9 DYSTONIA: Primary | ICD-10-CM

## 2021-09-23 NOTE — TELEPHONE ENCOUNTER
FUTURE VISIT INFORMATION      SURGERY INFORMATION:    Date: 10/8/21    Location: [facility] OR    Surgeon:  Ravinder Coleman MD    Anesthesia Type:  General    Procedure: ANESTHESIA OUT OF OR MAGNETIC RESONANCE IMAGING of BRAIN WITH AND WITHOUT CONTRAST@1400    Consult: Ov 9/7    RECORDS REQUESTED FROM:       Primary Care Provider: Tatyana Low PA-C- [facility]

## 2021-09-29 ENCOUNTER — ANESTHESIA EVENT (OUTPATIENT)
Dept: SURGERY | Facility: CLINIC | Age: 21
End: 2021-09-29
Payer: COMMERCIAL

## 2021-09-29 ENCOUNTER — DOCUMENTATION ONLY (OUTPATIENT)
Dept: OTHER | Facility: CLINIC | Age: 21
End: 2021-09-29

## 2021-09-29 ENCOUNTER — OFFICE VISIT (OUTPATIENT)
Dept: SURGERY | Facility: CLINIC | Age: 21
End: 2021-09-29
Payer: COMMERCIAL

## 2021-09-29 ENCOUNTER — PRE VISIT (OUTPATIENT)
Dept: SURGERY | Facility: CLINIC | Age: 21
End: 2021-09-29

## 2021-09-29 VITALS
OXYGEN SATURATION: 97 % | WEIGHT: 129.4 LBS | RESPIRATION RATE: 16 BRPM | DIASTOLIC BLOOD PRESSURE: 66 MMHG | TEMPERATURE: 98.5 F | HEIGHT: 66 IN | SYSTOLIC BLOOD PRESSURE: 126 MMHG | HEART RATE: 90 BPM | BODY MASS INDEX: 20.79 KG/M2

## 2021-09-29 DIAGNOSIS — G24.9 DYSTONIA: ICD-10-CM

## 2021-09-29 DIAGNOSIS — Z01.818 PRE-OP EXAMINATION: Primary | ICD-10-CM

## 2021-09-29 PROCEDURE — 99214 OFFICE O/P EST MOD 30 MIN: CPT | Mod: 24 | Performed by: PHYSICIAN ASSISTANT

## 2021-09-29 ASSESSMENT — PAIN SCALES - GENERAL: PAINLEVEL: NO PAIN (0)

## 2021-09-29 ASSESSMENT — COPD QUESTIONNAIRES: COPD: 0

## 2021-09-29 ASSESSMENT — LIFESTYLE VARIABLES: TOBACCO_USE: 0

## 2021-09-29 ASSESSMENT — MIFFLIN-ST. JEOR: SCORE: 1539.7

## 2021-09-29 ASSESSMENT — ENCOUNTER SYMPTOMS: SEIZURES: 0

## 2021-09-29 NOTE — ANESTHESIA PREPROCEDURE EVALUATION
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      Depression      Neck pain      PONV (postoperative nausea and vomiting)      S/P deep brain stimulator placement      Torsion dystonia       Past Surgical History:   Procedure Laterality Date     ANESTHESIA OUT OF OR MRI       ANESTHESIA OUT OF OR MRI N/A 2021    Procedure: ANESTHESIA OUT OF OR Magnetic resonance imaging Brain @1200;  Surgeon: GENERIC ANESTHESIA PROVIDER;  Location: [facility] OR     EYE SURGERY       NOSE SURGERY      nasal fracture repair     REMOVE DEPTH ELECTRODES N/A 2021    Procedure: Removal of entire deep brain stimulation system hardware including intracranial electrodes and battery/generator in chest wall;  Surgeon: Ravinder Coleman MD;  Location: [facility] OR     STEREOTACTIC INSERTION DEEP BRAIN STIMULATION        No Known Allergies   Social History     Tobacco Use     Smoking status: Never Smoker     Smokeless tobacco: Never Used   Substance Use Topics     Alcohol use: Never      Wt Readings from Last 1 Encounters:   21 58.1 kg (128 lb) (9 %, Z= -1.33)*     * Growth percentiles are based on CDC (Boys, 2-20 Years) data.        Anesthesia Evaluation   Pt has had prior anesthetic. Type: General and MAC.    History of anesthetic complications  - PONV.  The patient reports x1 vomiting after needing urgent nose surgery and not being NPO. After last surgery he ate crackers and then have vomiting without nausea which was throught to possibily be due to headache from DBS removal .    ROS/MED HX  ENT/Pulmonary:  - neg pulmonary ROS  (-) tobacco use, asthma, COPD, GURVINDER risk factors  and recent URI   Neurologic: Comment: Idiopathy progressive dystonia - neck pulling, neck and back pain, head tremors.      Tinnitus     (-) no seizures, no CVA and migraines   Cardiovascular:     (+) -----No previous cardiac testing  (-) murmur   METS/Exercise Tolerance: >4 METS Comment: He was playing adaptive sports in high school but since graduating he has not been playing sports but continues to walk everywhere and can go up a flight of stairs without symptoms.    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        (+) , H/O Chronic Pain,        Physical Exam    Airway        Mallampati: IV   TM distance: > 3 FB   Neck ROM: full   Mouth opening: > 3 cm    Respiratory Devices and Support         Dental  no notable dental history         Cardiovascular          Rhythm and rate: regular and normal (-) no murmur    Pulmonary   pulmonary exam normal                OUTSIDE LABS:  CBC:   Lab Results   Component Value Date    WBC 9.9 08/16/2021    HGB 16.9 08/16/2021    HCT 48.2 08/16/2021     08/16/2021     BMP:   Lab Results   Component Value Date     08/16/2021    POTASSIUM 4.0 08/16/2021    POTASSIUM 4.3 02/22/2021    CHLORIDE 105 08/16/2021    CO2 29 08/16/2021    BUN 16 08/16/2021    CR 0.99 08/16/2021    CR 0.9 02/22/2021    GLC 95 08/23/2021    GLC 94 08/16/2021     COAGS:    Lab Results   Component Value Date    PTT 28 08/23/2021    INR 1.06 08/23/2021     POC:   Lab Results   Component Value Date     (H) 07/08/2021     HEPATIC:   Lab Results   Component Value Date    ALT 20 02/22/2021    AST 35 02/22/2021     OTHER:   Lab Results   Component Value Date    OSORIO 9.6 08/16/2021    TSH 1.840 02/22/2021       Anesthesia Plan    ASA Status:  2   NPO Status:  NPO Appropriate    Anesthesia Type: General.     - Airway: ETT   Induction: Intravenous.   Maintenance: Balanced.        Consents            Postoperative Care    Pain management: IV analgesics, Oral pain medications.   PONV prophylaxis: Ondansetron (or other 5HT-3), Dexamethasone or Solumedrol     Comments:              PAC Discussion and Assessment    ASA Classification: 2  Case is suitable for: [place]  Anesthetic techniques and relevant risks discussed: GA                  PAC Resident/NP Anesthesia Assessment: Bradley is a 21 year old man who is scheduled for ANESTHESIA OUT OF OR MAGNETIC RESONANCE IMAGING of BRAIN WITH AND WITHOUT CONTRAST@1400 on 10/8/21 by Dr. Coleman in treatment of Dystonia. 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  Cardiac:  Functional status- METS >4.  He doesn't exercise purposefully after graduating high school and stopping adaptive sports, but reports he walks everywhere and can go up a flight of stairs without issues.  He has no known cardiac conditions and denies symptoms.  Low risk procedure with 0.4% risk of major adverse cardiac event. No further testing indicated.   2.  Pulm:   GURVINDER risk: low (male)  3.  GI:  Risk of PONV score = 2.  If > 2, anti-emetic intervention recommended. The patient had one episode of post op vomiting after having an urgent nose surgery and not being NPO. After last surgery he ate crackers and then had  vomiting without nausea. He reports they thought it was due to an headache after having had the DBS removed.   4. Neuro:  Idiopathic progressive dystonia symptoms include neck pulling, neck and back pain, tremors and other involuntary movements.    ~ Chronic neck and back pain - he reports these are situational and usually rated at a 1 so can just rest or stretch and the pain resolves  5. Psych: ADHD, depression, anxiety - Hold concerta DOS, continue zoloft. He is working with his primary care provider to improve his depression control.   6. 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      VTE risk: 0.5%    Patient is optimized and is acceptable candidate for the proposed procedure.  No further diagnostic evaluation is needed.    For further details of assessment, testing, and physical exam please see H and P completed on same date   Adele Licona PA-C       Mid-Level Provider/Resident: Adele Licona PA-C  Date: 9/29/21                                 Adele Licona PA-C

## 2021-09-29 NOTE — H&P
Pre-Operative H & P     CC:  Preoperative exam to assess for increased cardiopulmonary risk while undergoing surgery and anesthesia.    Date of Encounter: 9/29/2021  Primary Care Physician:  Tatyana Low     Reason for visit:   Encounter Diagnoses   Name Primary?     Pre-op examination Yes     Dystonia        HPI  Bradley Garcia is a 21 year old male who presents for pre-operative H & P in preparation for ANESTHESIA OUT OF OR MAGNETIC RESONANCE IMAGING of BRAIN WITH AND WITHOUT CONTRAST@1400 with Dr. Coleman on 10/8/21 at [facility].     Bradley is a 21 year old man who has a past medical history significant for depression, anxiety, ADHD, and  PONV. He has history of idiopathic progressive dystonia since early childhood for which he underwent implantation of a DBS system in 2013 at [facility]. He experienced marked improvement for about 2-3 months with subsequent return to baseline function. No programming changes have helped since then. He turned the DBS off for a week at one point and noted no difference in his symptoms. He underwent removal of entire deep brain stimulation system on 8/23/21 with Dr. Coleman. He followed up on 9/7/21 and has been scheduled for the follow up imaging as above.     History is obtained from the patient and chart review      Hx of abnormal bleeding or anti-platelet use: none    Menstrual history: No LMP for male patient.:     Prior to Admission Medications  Current Outpatient Medications   Medication Sig Dispense Refill     CONCERTA 18 MG CR tablet Take 21 mg by mouth every morning        sertraline (ZOLOFT) 50 MG tablet Take 50 mg by mouth daily       oxyCODONE (ROXICODONE) 5 MG tablet Take 1 tablet (5 mg) by mouth every 4 hours as needed for moderate to severe pain (Patient not taking: Reported on 9/7/2021) 20 tablet 0     sertraline (ZOLOFT) 25 MG tablet Take 50 mg by mouth every morning  (Patient not taking: Reported  on 2021)       tiZANidine (ZANAFLEX) 2 MG tablet every evening  (Patient not taking: Reported on 2021)         Family History  Family History   Problem Relation Age of Onset     No Known Problems Mother      No Known Problems Father      No Known Problems Sister      Other - See Comments Brother         di bunny syndrome     Myocardial Infarction Maternal Grandmother      Hyperlipidemia Maternal Grandmother      Hyperlipidemia Maternal Grandfather      Unknown/Adopted Paternal Grandmother      Unknown/Adopted Paternal Grandfather      Deep Vein Thrombosis No family hx of      Anesthesia Reaction No family hx of        The complete review of systems is negative other than noted in the HPI or here.     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      Depression      Neck pain      PONV (postoperative nausea and vomiting)      S/P deep brain stimulator placement      Torsion dystonia       Past Surgical History:   Procedure Laterality Date     ANESTHESIA OUT OF OR MRI       ANESTHESIA OUT OF OR MRI N/A 2021    Procedure: ANESTHESIA OUT OF OR Magnetic resonance imaging Brain @1200;  Surgeon: GENERIC ANESTHESIA PROVIDER;  Location: [facility] OR     EYE SURGERY       NOSE SURGERY      nasal fracture repair     REMOVE DEPTH ELECTRODES N/A 2021    Procedure: Removal of entire deep brain stimulation system hardware including intracranial electrodes and battery/generator in chest  maricruz;  Surgeon: Ravinder Coleman MD;  Location: [facility] OR     STEREOTACTIC INSERTION DEEP BRAIN STIMULATION        No Known Allergies   Social History     Tobacco Use     Smoking status: Never Smoker     Smokeless tobacco: Never Used   Substance Use Topics     Alcohol use: Never      Wt Readings from Last 1 Encounters:   09/07/21 58.1 kg (128 lb) (9 %, Z= -1.33)*     * Growth percentiles are based on [facility] (Boys, 2-20 Years) data.        Anesthesia Evaluation   Pt has had prior anesthetic. Type: General and MAC.    History of anesthetic complications  - PONV.  The patient reports x1 vomiting after needing urgent nose surgery and not being NPO. After last surgery he ate crackers and then have vomiting without nausea which was throught to possibily be due to headache from DBS removal .    ROS/MED HX  ENT/Pulmonary:  - neg pulmonary ROS  (-) tobacco use, asthma, COPD, GURVINDER risk factors and recent URI   Neurologic: Comment: Idiopathy progressive dystonia - neck pulling, neck and back pain, head tremors.      Tinnitus     (-) no seizures, no CVA and migraines   Cardiovascular:     (+) -----No previous cardiac testing     METS/Exercise Tolerance: >4 METS Comment: He was playing adaptive sports in high school but since graduating he has not been playing sports but continues to walk everywhere and can go up a flight of stairs without symptoms.    Hematologic:  - neg hematologic  ROS  (-) history of blood clots and history of blood transfusion   Musculoskeletal: Comment: Chronic neck pain secondary to dystonia      GI/Hepatic:  - neg GI/hepatic ROS  (-) GERD   Renal/Genitourinary:  - neg Renal ROS     Endo:  - neg endo ROS     Psychiatric/Substance Use:     (+) psychiatric history depression, anxiety and other (comment) (ADHD)     Infectious Disease:  - neg infectious disease ROS  (-) Recent Fever   Malignancy:  - neg malignancy ROS     Other: Comment: Patient's brother has di bunny syndrome and Chromosome 22q11.2 duplication  "syndrome is listed in the patient's chart but he is unsure if he does have this diagnosis.        (+) , H/O Chronic Pain,      OUTSIDE LABS:  CBC:   Lab Results   Component Value Date    WBC 9.9 08/16/2021    HGB 16.9 08/16/2021    HCT 48.2 08/16/2021     08/16/2021     BMP:   Lab Results   Component Value Date     08/16/2021    POTASSIUM 4.0 08/16/2021    POTASSIUM 4.3 02/22/2021    CHLORIDE 105 08/16/2021    CO2 29 08/16/2021    BUN 16 08/16/2021    CR 0.99 08/16/2021    CR 0.9 02/22/2021    GLC 95 08/23/2021    GLC 94 08/16/2021     COAGS:   Lab Results   Component Value Date    PTT 28 08/23/2021    INR 1.06 08/23/2021     POC:   Lab Results   Component Value Date     (H) 07/08/2021     HEPATIC:   Lab Results   Component Value Date    ALT 20 02/22/2021    AST 35 02/22/2021     OTHER:   Lab Results   Component Value Date    OSORIO 9.6 08/16/2021    TSH 1.840 02/22/2021     /66 (BP Location: Right arm, Patient Position: Sitting, Cuff Size: Adult Regular)   Pulse 90   Temp 98.5  F (36.9  C) (Oral)   Resp 16   Ht 1.676 m (5' 6\")   Wt 58.7 kg (129 lb 6.4 oz)   SpO2 97%   BMI 20.89 kg/m           Physical Exam  Constitutional: Awake, alert, cooperative, no apparent distress, and appears stated age.  Eyes: Pupils equal, round and reactive to light, extra ocular muscles intact, sclera clear, conjunctiva normal.  HENT: Normocephalic, oral pharynx with moist mucus membranes, good dentition. No goiter appreciated.   Respiratory: Clear to auscultation bilaterally, no crackles or wheezing.  Cardiovascular: Tachycardia, regular rhythm, normal S1 and S2, and no murmur noted.  Carotids +2, no bruits. No edema. Palpable pulses to radial  DP and PT arteries.   GI: Normal bowel sounds, soft, non-distended, non-tender, no masses palpated, no hepatosplenomegaly.    Lymph/Hematologic: No cervical lymphadenopathy and no supraclavicular lymphadenopathy.  Genitourinary:  defer  Skin: Warm and dry.  No rashes " at anticipated surgical site.   Musculoskeletal: Full ROM of neck. There is no redness, warmth, or swelling of the joints.     Neurologic: Awake, alert, oriented to name, place and time. Cranial nerves II-XII are grossly intact. Ongoing tremor in upper arms and head  Neuropsychiatric: Calm, cooperative. Normal affect.     PRIOR LABS/DIAGNOSTIC STUDIES:   All labs and imaging personally reviewed     EKG - not indicated    The patient's records and results personally reviewed by this provider.     Outside records reviewed from: care everywhere      ASSESSMENT and PLAN  Bradley is a 21 year old man who is scheduled for ANESTHESIA OUT OF OR MAGNETIC RESONANCE IMAGING of BRAIN WITH AND WITHOUT CONTRAST@1400 on 10/8/21 by Dr. Coleman in treatment of Dystonia. 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  Cardiac:  Functional status- METS >4.  He doesn't exercise purposefully after graduating high school and stopping adaptive sports, but reports he walks everywhere and can go up a flight of stairs without issues.  He has no known cardiac conditions and denies symptoms.  Low risk procedure with 0.4% risk of major adverse cardiac event. No further testing indicated.   2.  Pulm:   GURVINDER risk: low (male)  3.  GI:  Risk of PONV score = 2.  If > 2, anti-emetic intervention recommended. The patient had one episode of post op vomiting after having an urgent nose surgery and not being NPO. After last surgery he ate crackers and then had vomiting without nausea. He reports they thought it was due to an headache after having had the DBS removed.   4. Neuro:  Idiopathic progressive dystonia symptoms include neck pulling, neck and back pain, tremors and other involuntary movements.    ~ Chronic neck and back pain - he reports these are situational and usually rated at a 1 so  can just rest or stretch and the pain resolves  5. Psych: ADHD, depression, anxiety - Hold concerta DOS, continue zoloft. He is working with his primary care provider to improve his depression control.   6. 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      VTE risk: 0.5%    The patient is optimized and acceptable candidate for proposed procedure. Arrival time, NPO, shower and medication instructions provided by nursing staff today.      On the day of service:     Prep time: 3 minutes  Visit time: 18 minutes  Documentation time: 12 minutes  ------------------------------------------  Total time: 33 minutes      Adele Licona PA-C  Preoperative Assessment Center  [facility]  Clinic and Surgery Center  Phone: [phone]  Fax: [phone]

## 2021-10-02 ENCOUNTER — HEALTH MAINTENANCE LETTER (OUTPATIENT)
Age: 21
End: 2021-10-02

## 2021-10-06 ENCOUNTER — LAB (OUTPATIENT)
Dept: LAB | Facility: CLINIC | Age: 21
End: 2021-10-06
Payer: COMMERCIAL

## 2021-10-06 DIAGNOSIS — Z11.59 ENCOUNTER FOR SCREENING FOR OTHER VIRAL DISEASES: ICD-10-CM

## 2021-10-06 PROCEDURE — U0005 INFEC AGEN DETEC AMPLI PROBE: HCPCS

## 2021-10-06 PROCEDURE — U0003 INFECTIOUS AGENT DETECTION BY NUCLEIC ACID (DNA OR RNA); SEVERE ACUTE RESPIRATORY SYNDROME CORONAVIRUS 2 (SARS-COV-2) (CORONAVIRUS DISEASE [COVID-19]), AMPLIFIED PROBE TECHNIQUE, MAKING USE OF HIGH THROUGHPUT TECHNOLOGIES AS DESCRIBED BY CMS-2020-01-R: HCPCS

## 2021-10-07 LAB — SARS-COV-2 RNA RESP QL NAA+PROBE: NEGATIVE

## 2021-10-08 ENCOUNTER — ANESTHESIA (OUTPATIENT)
Dept: SURGERY | Facility: CLINIC | Age: 21
End: 2021-10-08
Payer: COMMERCIAL

## 2021-10-08 ENCOUNTER — HOSPITAL ENCOUNTER (OUTPATIENT)
Dept: MRI IMAGING | Facility: CLINIC | Age: 21
End: 2021-10-08
Attending: NEUROLOGICAL SURGERY | Admitting: NEUROLOGICAL SURGERY
Payer: COMMERCIAL

## 2021-10-08 ENCOUNTER — HOSPITAL ENCOUNTER (OUTPATIENT)
Facility: CLINIC | Age: 21
Discharge: HOME | End: 2021-10-08
Attending: NEUROLOGICAL SURGERY | Admitting: NEUROLOGICAL SURGERY
Payer: COMMERCIAL

## 2021-10-08 VITALS
RESPIRATION RATE: 16 BRPM | HEIGHT: 66 IN | HEART RATE: 70 BPM | SYSTOLIC BLOOD PRESSURE: 106 MMHG | BODY MASS INDEX: 20.44 KG/M2 | DIASTOLIC BLOOD PRESSURE: 99 MMHG | OXYGEN SATURATION: 96 % | TEMPERATURE: 98.2 F | WEIGHT: 127.21 LBS

## 2021-10-08 DIAGNOSIS — G24.9 DYSTONIA: ICD-10-CM

## 2021-10-08 LAB — GLUCOSE BLDC GLUCOMTR-MCNC: 95 MG/DL (ref 70–99)

## 2021-10-08 PROCEDURE — 250N000009 HC RX 250: Performed by: NURSE ANESTHETIST, CERTIFIED REGISTERED

## 2021-10-08 PROCEDURE — 82962 GLUCOSE BLOOD TEST: CPT

## 2021-10-08 PROCEDURE — 250N000011 HC RX IP 250 OP 636: Performed by: NURSE ANESTHETIST, CERTIFIED REGISTERED

## 2021-10-08 PROCEDURE — 70553 MRI BRAIN STEM W/O & W/DYE: CPT | Mod: 26 | Performed by: RADIOLOGY

## 2021-10-08 PROCEDURE — 255N000002 HC RX 255 OP 636: Performed by: NEUROLOGICAL SURGERY

## 2021-10-08 PROCEDURE — 250N000025 HC SEVOFLURANE, PER MIN

## 2021-10-08 PROCEDURE — 258N000003 HC RX IP 258 OP 636: Performed by: ANESTHESIOLOGY

## 2021-10-08 PROCEDURE — 710N000009 HC RECOVERY PHASE 1, LEVEL 1, PER MIN

## 2021-10-08 PROCEDURE — 250N000011 HC RX IP 250 OP 636: Performed by: ANESTHESIOLOGY

## 2021-10-08 PROCEDURE — 70553 MRI BRAIN STEM W/O & W/DYE: CPT

## 2021-10-08 PROCEDURE — 999N000141 HC STATISTIC PRE-PROCEDURE NURSING ASSESSMENT

## 2021-10-08 PROCEDURE — A9585 GADOBUTROL INJECTION: HCPCS | Performed by: NEUROLOGICAL SURGERY

## 2021-10-08 PROCEDURE — 710N000012 HC RECOVERY PHASE 2, PER MINUTE

## 2021-10-08 PROCEDURE — 370N000017 HC ANESTHESIA TECHNICAL FEE, PER MIN

## 2021-10-08 RX ORDER — MEPERIDINE HYDROCHLORIDE 25 MG/ML
12.5 INJECTION INTRAMUSCULAR; INTRAVENOUS; SUBCUTANEOUS
Status: DISCONTINUED | OUTPATIENT
Start: 2021-10-08 | End: 2021-10-08 | Stop reason: HOSPADM

## 2021-10-08 RX ORDER — LIDOCAINE HYDROCHLORIDE 20 MG/ML
INJECTION, SOLUTION INFILTRATION; PERINEURAL PRN
Status: DISCONTINUED | OUTPATIENT
Start: 2021-10-08 | End: 2021-10-08

## 2021-10-08 RX ORDER — GADOBUTROL 604.72 MG/ML
7.5 INJECTION INTRAVENOUS ONCE
Status: COMPLETED | OUTPATIENT
Start: 2021-10-08 | End: 2021-10-08

## 2021-10-08 RX ORDER — DEXAMETHASONE SODIUM PHOSPHATE 4 MG/ML
INJECTION, SOLUTION INTRA-ARTICULAR; INTRALESIONAL; INTRAMUSCULAR; INTRAVENOUS; SOFT TISSUE PRN
Status: DISCONTINUED | OUTPATIENT
Start: 2021-10-08 | End: 2021-10-08

## 2021-10-08 RX ORDER — DIAZEPAM 5 MG
5 TABLET ORAL ONCE
Status: DISCONTINUED | OUTPATIENT
Start: 2021-10-08 | End: 2021-10-08 | Stop reason: CLARIF

## 2021-10-08 RX ORDER — SODIUM CHLORIDE, SODIUM LACTATE, POTASSIUM CHLORIDE, CALCIUM CHLORIDE 600; 310; 30; 20 MG/100ML; MG/100ML; MG/100ML; MG/100ML
INJECTION, SOLUTION INTRAVENOUS CONTINUOUS
Status: DISCONTINUED | OUTPATIENT
Start: 2021-10-08 | End: 2021-10-08 | Stop reason: HOSPADM

## 2021-10-08 RX ORDER — LIDOCAINE 40 MG/G
CREAM TOPICAL
Status: DISCONTINUED | OUTPATIENT
Start: 2021-10-08 | End: 2021-10-08 | Stop reason: HOSPADM

## 2021-10-08 RX ORDER — ONDANSETRON 2 MG/ML
INJECTION INTRAMUSCULAR; INTRAVENOUS PRN
Status: DISCONTINUED | OUTPATIENT
Start: 2021-10-08 | End: 2021-10-08

## 2021-10-08 RX ORDER — FENTANYL CITRATE 50 UG/ML
INJECTION, SOLUTION INTRAMUSCULAR; INTRAVENOUS PRN
Status: DISCONTINUED | OUTPATIENT
Start: 2021-10-08 | End: 2021-10-08

## 2021-10-08 RX ORDER — PROPOFOL 10 MG/ML
INJECTION, EMULSION INTRAVENOUS CONTINUOUS PRN
Status: DISCONTINUED | OUTPATIENT
Start: 2021-10-08 | End: 2021-10-08

## 2021-10-08 RX ORDER — FENTANYL CITRATE 50 UG/ML
25 INJECTION, SOLUTION INTRAMUSCULAR; INTRAVENOUS
Status: DISCONTINUED | OUTPATIENT
Start: 2021-10-08 | End: 2021-10-08 | Stop reason: HOSPADM

## 2021-10-08 RX ORDER — ONDANSETRON 4 MG/1
4 TABLET, ORALLY DISINTEGRATING ORAL EVERY 30 MIN PRN
Status: DISCONTINUED | OUTPATIENT
Start: 2021-10-08 | End: 2021-10-08 | Stop reason: HOSPADM

## 2021-10-08 RX ORDER — ONDANSETRON 2 MG/ML
4 INJECTION INTRAMUSCULAR; INTRAVENOUS EVERY 30 MIN PRN
Status: DISCONTINUED | OUTPATIENT
Start: 2021-10-08 | End: 2021-10-08 | Stop reason: HOSPADM

## 2021-10-08 RX ORDER — PROPOFOL 10 MG/ML
INJECTION, EMULSION INTRAVENOUS PRN
Status: DISCONTINUED | OUTPATIENT
Start: 2021-10-08 | End: 2021-10-08

## 2021-10-08 RX ADMIN — ONDANSETRON 4 MG: 2 INJECTION INTRAMUSCULAR; INTRAVENOUS at 16:17

## 2021-10-08 RX ADMIN — LIDOCAINE HYDROCHLORIDE 100 MG: 20 INJECTION, SOLUTION INFILTRATION; PERINEURAL at 14:17

## 2021-10-08 RX ADMIN — MIDAZOLAM 2 MG: 1 INJECTION INTRAMUSCULAR; INTRAVENOUS at 14:17

## 2021-10-08 RX ADMIN — PROPOFOL 75 MCG/KG/MIN: 10 INJECTION, EMULSION INTRAVENOUS at 14:28

## 2021-10-08 RX ADMIN — FENTANYL CITRATE 25 MCG: 50 INJECTION, SOLUTION INTRAMUSCULAR; INTRAVENOUS at 14:19

## 2021-10-08 RX ADMIN — PROPOFOL 200 MG: 10 INJECTION, EMULSION INTRAVENOUS at 14:17

## 2021-10-08 RX ADMIN — ONDANSETRON 4 MG: 2 INJECTION INTRAMUSCULAR; INTRAVENOUS at 15:51

## 2021-10-08 RX ADMIN — GADOBUTROL 6 ML: 604.72 INJECTION INTRAVENOUS at 15:18

## 2021-10-08 RX ADMIN — ROCURONIUM BROMIDE 20 MG: 10 INJECTION INTRAVENOUS at 14:41

## 2021-10-08 RX ADMIN — FENTANYL CITRATE 75 MCG: 50 INJECTION, SOLUTION INTRAMUSCULAR; INTRAVENOUS at 14:17

## 2021-10-08 RX ADMIN — ROCURONIUM BROMIDE 50 MG: 10 INJECTION INTRAVENOUS at 14:17

## 2021-10-08 RX ADMIN — SUGAMMADEX 200 MG: 100 INJECTION, SOLUTION INTRAVENOUS at 15:51

## 2021-10-08 RX ADMIN — PROPOFOL 30 MG: 10 INJECTION, EMULSION INTRAVENOUS at 14:19

## 2021-10-08 RX ADMIN — SODIUM CHLORIDE, POTASSIUM CHLORIDE, SODIUM LACTATE AND CALCIUM CHLORIDE: 600; 310; 30; 20 INJECTION, SOLUTION INTRAVENOUS at 14:17

## 2021-10-08 RX ADMIN — DEXAMETHASONE SODIUM PHOSPHATE 4 MG: 4 INJECTION, SOLUTION INTRA-ARTICULAR; INTRALESIONAL; INTRAMUSCULAR; INTRAVENOUS; SOFT TISSUE at 14:17

## 2021-10-08 ASSESSMENT — MIFFLIN-ST. JEOR: SCORE: 1529.75

## 2021-10-08 NOTE — DISCHARGE INSTRUCTIONS
[facility]  Same-Day Surgery   Adult Discharge Orders & Instructions     For 24 hours after surgery    1. Get plenty of rest.  A responsible adult must stay with you for at least 24 hours after you leave the hospital.   2. Do not drive or use heavy equipment.  If you have weakness or tingling, don't drive or use heavy equipment until this feeling goes away.  3. Do not drink alcohol.  4. Avoid strenuous or risky activities.  Ask for help when climbing stairs.   5. You may feel lightheaded.  IF so, sit for a few minutes before standing.  Have someone help you get up.   6. If you have nausea (feel sick to your stomach): Drink only clear liquids such as apple juice, ginger ale, broth or 7-Up.  Rest may also help.  Be sure to drink enough fluids.  Move to a regular diet as you feel able.  7. You may have a slight fever. Call the doctor if your fever is over 100 F (37.7 C) (taken under the tongue) or lasts longer than 24 hours.  8. You may have a dry mouth, a sore throat, muscle aches or trouble sleeping.  These should go away after 24 hours.  9. Do not make important or legal decisions.   Call your doctor for any of the followin.  Signs of infection (fever, growing tenderness at the surgery site, a large amount of drainage or bleeding, severe pain, foul-smelling drainage, redness, swelling).    2. It has been over 8 to 10 hours since surgery and you are still not able to urinate (pass water).    3.  Headache for over 24 hours.    To contact a doctor, call Radiology at [phone] during business hours or:       [phone] and ask for the resident on call for Radiology or Anesthesia (answered 24 hours a day)      Emergency Department:    [facility]: [phone]       (TTY for hearing impaired: [phone])

## 2021-10-08 NOTE — ANESTHESIA POSTPROCEDURE EVALUATION
Patient: Bradley Garcia    Procedure: Procedure(s):  ANESTHESIA OUT OF OR MAGNETIC RESONANCE IMAGING of BRAIN WITH AND WITHOUT CONTRAST@1400       Diagnosis:Dystonia [G24.9]  Diagnosis Additional Information: No value filed.    Anesthesia Type:  General    Note:  Disposition: Outpatient   Postop Pain Control: Uneventful            Sign Out: Well controlled pain   PONV: No   Neuro/Psych: Uneventful            Sign Out: Acceptable/Baseline neuro status   Airway/Respiratory: Uneventful            Sign Out: Acceptable/Baseline resp. status   CV/Hemodynamics: Uneventful            Sign Out: Acceptable CV status   Other NRE: NONE   DID A NON-ROUTINE EVENT OCCUR? No           Last vitals:  Vitals Value Taken Time   /66 10/08/21 1615   Temp 36.1  C (96.9  F) 10/08/21 1550   Pulse 78 10/08/21 1618   Resp 16 10/08/21 1600   SpO2 98 % 10/08/21 1619   Vitals shown include unvalidated device data.    Electronically Signed By: Viviana Price MD  October 8, 2021  4:26 PM

## 2021-10-08 NOTE — ANESTHESIA CARE TRANSFER NOTE
Patient: Bradley Garcia    Procedure: Procedure(s):  ANESTHESIA OUT OF OR MAGNETIC RESONANCE IMAGING of BRAIN WITH AND WITHOUT CONTRAST@1400       Diagnosis: Dystonia [G24.9]  Diagnosis Additional Information: No value filed.    Anesthesia Type:   General     Note:    Oropharynx: oropharynx clear of all foreign objects  Level of Consciousness: awake      Independent Airway: airway patency satisfactory and stable  Dentition: dentition unchanged  Vital Signs Stable: post-procedure vital signs reviewed and stable  Report to RN Given: handoff report given  Patient transferred to: PACU  Comments: Ambu 6l O2. On monitors. To PACU. Suctioned and extubated upon arrival.   Handoff Report: Identifed the Patient, Identified the Reponsible Provider, Reviewed the pertinent medical history, Discussed the surgical course, Reviewed Intra-OP anesthesia mangement and issues during anesthesia, Set expectations for post-procedure period and Allowed opportunity for questions and acknowledgement of understanding      Vitals:  Vitals Value Taken Time   /77 10/08/21 1600   Temp 36.1  C (96.9  F) 10/08/21 1550   Pulse 75 10/08/21 1601   Resp 50 10/08/21 1600   SpO2 100 % 10/08/21 1601   Vitals shown include unvalidated device data.    Electronically Signed By: MAG Hairston CRNA  October 8, 2021  4:03 PM

## 2021-10-08 NOTE — ANESTHESIA PROCEDURE NOTES
Airway       Patient location during procedure: OR       Procedure Start/Stop Times: 10/8/2021 2:26 PM  Staff -        CRNA: Tessa Alcaraz APRN CRNA       Performed By: CRNAIndications and Patient Condition       Indications for airway management: jose cruz-procedural         Mask difficulty assessment: 1 - vent by mask    Final Airway Details       Final airway type: endotracheal airway       Successful airway: ETT - single  Endotracheal Airway Details        ETT size (mm): 7.0       Cuffed: yes       Successful intubation technique: direct laryngoscopy       DL Blade Type: Garcia 2       Grade View of Cords: 1       Adjucts: stylet       Bite block used: None    Post intubation assessment        Placement verified by: capnometry, equal breath sounds and chest rise        Number of attempts at approach: 1       Number of other approaches attempted: 0       Secured with: pink tape       Ease of procedure: easy       Dentition: Intact and Unchanged

## 2021-10-08 NOTE — OR NURSING
Discharge instructions completed with patient and father at bedside, all questions answered. IV out, belongings returned to patient. Discharge to home with dad.

## 2021-10-19 ENCOUNTER — TELEPHONE (OUTPATIENT)
Dept: NEUROLOGY | Facility: CLINIC | Age: 21
End: 2021-10-19

## 2021-10-19 NOTE — PROGRESS NOTES
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 He underwent lead and IPG removal without complication a couple of weeks ago and has been doing well. He hasn't noticed much difference in his dystonia since surgery.     Exam:  Awake, alert, oriented x 3  Attends, follows, dysarthric, fluent  PERRL  EOMI  FS  TML  Left laterocollis, right torticollis, dystonic head tremor, bilateral hand posturing  Wounds healing well, no erythema, swelling or pain. Nylons removed in the clinic.      Programming schedule: n/a    UPDRS/TETRAS/FM outcome: TBD    A/P: 20 y/o with idiopathic generalized dystonia s/p DBS lead and IPG removal two weeks ago, doing well. His surgery was much more complicated than anticipated as the prior surgeon had used bone cement to secure the lead without any hardware in place. We therefore had to drill quite a bit of bone away in order to free up the intracranial leads. Since we want to use the same incisions for his redo surgery, this will slightly complicate the method of securing the leads for his replacement surgery. He needs to obtain an MRI first so we can schedule his case.    - MRI brain DBS protocol  - Schedule staged bilateral GPi DBS with Dr. Aleman as electrophysiologist after I look at and plan with the new MRI

## 2021-10-19 NOTE — TELEPHONE ENCOUNTER
Writer called pt and discussed Humanitarian Device Exemption form for [employer] DBS for Dystonia as well as optional Neural OR recordings research study. Patient completed e-consent via [employer] for both studies. Patient did consent to optional ECoG strips.     Patient is waiting hear about next step for surgery and I told him that I would send a message to our team to notify him about the timeline.    Christen Dickens RN, MPH  Research Nurse, Movement Disorders

## 2021-10-22 NOTE — TELEPHONE ENCOUNTER
After reviewing eligibility criteria for study, it was found that patient does not meet age inclusion criteria for Neural Recordings in the OR study (lower limit is age 22).  Patient withdrawn from study on 10/21/21 and study team was updated. Called patient to notify them, and left general message to have patient call back on diret line to explain.    Christen Dickens, RN, MPH  Research Nurse, Movement Disorders

## 2021-10-28 NOTE — TELEPHONE ENCOUNTER
Spoke to pt and let him know that due to the age criteria for the Neural OR recordings study, I have withdrawn him from the study. I explained that he will still move forward with the surgery, just will not have the additional research component, and I have let neurosurgery and the research team know. Patient appreciated my call and had no further questions.    Christen Dickens RN, MPH  Research Nurse, Movement Disorders

## 2021-11-09 DIAGNOSIS — G24.9 DYSTONIA: Primary | ICD-10-CM

## 2022-01-18 ENCOUNTER — TELEPHONE (OUTPATIENT)
Dept: NEUROSURGERY | Facility: CLINIC | Age: 22
End: 2022-01-18
Payer: COMMERCIAL

## 2022-01-18 DIAGNOSIS — G24.9 DYSTONIA: Primary | ICD-10-CM

## 2022-01-18 NOTE — TELEPHONE ENCOUNTER
Called patient to schedule surgery with Dr. Elizabeth     Date of Surgery: 4/11,4/18,5/2    Location of surgery: [place] OR    Pre-Op H&P: 4/7 PAC    Imaging Scheduled:  Yes    Scheduled COVID-19 Testing: Yes 4/8,4/15,4/29    Post-Op Appt Date: 5/17    Surgery Packet Mailed:     Additional comments: patient to call with additional questions.        Anna C. Schoenecker on 1/18/2022 at 9:30 AM

## 2022-01-19 NOTE — TELEPHONE ENCOUNTER
FUTURE VISIT INFORMATION      SURGERY INFORMATION:    Date: 4/11/22    Location: uu or    Surgeon:  Ravinder Coleman MD    Anesthesia Type:  Combined MAC with Local    Procedure: stealth assisted Right side deep brain stimulator placement, phase I,  target right globus pallidus internus, with microelectrode recording    RECORDS REQUESTED FROM:       Primary Care Provider: Tatyana Low PA-C- [facility] and [facility]

## 2022-01-24 NOTE — TELEPHONE ENCOUNTER
FUTURE VISIT INFORMATION        SURGERY INFORMATION:    Date: 4/11/22    Location: uu or    Surgeon:  Ravinder Coleman MD    Anesthesia Type:  Combined MAC with Local    Procedure: stealth assisted Right side deep brain stimulator placement, phase I,  target right globus pallidus internus, with microelectrode recording     RECORDS REQUESTED FROM:         Primary Care Provider: Tatyana Low PA-C- [facility] and [facility]

## 2022-02-01 ENCOUNTER — TELEPHONE (OUTPATIENT)
Dept: NEUROLOGY | Facility: CLINIC | Age: 22
End: 2022-02-01
Payer: COMMERCIAL

## 2022-02-01 DIAGNOSIS — G24.3 SPASMODIC TORTICOLLIS: Primary | ICD-10-CM

## 2022-02-01 DIAGNOSIS — G24.1 DYSTONIA, TORSION, FRAGMENTS OF: ICD-10-CM

## 2022-02-01 NOTE — TELEPHONE ENCOUNTER
CT head ordered and scheduled x  Neuropsych order placed for 1 year follow up x  Letter composed x  Datebase updated x  Appointment held x    The patient was informed of their initial programming appointments on 5/11/22 and 6/1/22 and was reminded to bring their patient . There was a question on whether or not the patient needed sedation for his post-op CT. The patient stated he did not know if he needed sedation for the CT. The writer informed the patient she would double check with Dr. Aleman.    The patient was informed of Get Well [employer] but stated he did not have time to ask questions about Get Well [employer]. The writer informed the patient she could send him a message and then he can decide if he wants to sign up for it or not. Patient verbalized understanding and had no further questions.

## 2022-02-02 NOTE — TELEPHONE ENCOUNTER
FUTURE VISIT INFORMATION      SURGERY INFORMATION:    Date: 4/18/22    Location: uu or    Surgeon:  Ravinder Coleman MD    Anesthesia Type:  General    Procedure: Deep brain stimulator placement, phase II, placement of deep brain stimulator generator/battery over the right chest wall    RECORDS REQUESTED FROM:       Primary Care Provider: Tatyana Low PA-C- [facility]

## 2022-02-22 DIAGNOSIS — Z11.59 ENCOUNTER FOR SCREENING FOR OTHER VIRAL DISEASES: Primary | ICD-10-CM

## 2022-03-07 DIAGNOSIS — Z11.59 ENCOUNTER FOR SCREENING FOR OTHER VIRAL DISEASES: Primary | ICD-10-CM

## 2022-03-15 DIAGNOSIS — Z11.59 ENCOUNTER FOR SCREENING FOR OTHER VIRAL DISEASES: Primary | ICD-10-CM

## 2022-03-24 ENCOUNTER — PRE VISIT (OUTPATIENT)
Dept: SURGERY | Facility: CLINIC | Age: 22
End: 2022-03-24
Payer: COMMERCIAL

## 2022-03-24 NOTE — TELEPHONE ENCOUNTER
FUTURE VISIT INFORMATION        SURGERY INFORMATION:    Date: 4/11/22    Location: uu or    Surgeon:  Ravinder Coleman MD    Anesthesia Type:  Combined MAC with Local    Procedure: stealth assisted Right side deep brain stimulator placement, phase I,  target right globus pallidus internus, with microelectrode recording     RECORDS REQUESTED FROM:         Primary Care Provider: Tatyana Low PA-C- [facility] and [facility]

## 2022-04-01 RX ORDER — CEFAZOLIN SODIUM/WATER 2 G/20 ML
2 SYRINGE (ML) INTRAVENOUS SEE ADMIN INSTRUCTIONS
Status: CANCELLED | OUTPATIENT
Start: 2022-04-01

## 2022-04-01 RX ORDER — CEFAZOLIN SODIUM/WATER 2 G/20 ML
2 SYRINGE (ML) INTRAVENOUS
Status: CANCELLED | OUTPATIENT
Start: 2022-04-01

## 2022-04-05 NOTE — PATIENT INSTRUCTIONS
Preparing for Your Surgery      Name:  Bradley Garcia   MRN:  [id number]   :  2000   Today's Date:  2022       Arriving for surgery:  Surgery date:  22  Arrival time:  05:30 am    Restrictions due to COVID 19       Effective 22 [facility] is implementing the following visitor policy:     1 person may accompany the patient through the Pre-Op process.      That same person may wait in the Surgery Waiting room, provided there is enough room to social distance         Inpatients are allowed 2 visitors per day for the duration of their stay.        Visitors must wear a mask.      Visitors must not be ill.      Visiting hours are 8 am to 8 pm.    [employer] parking is available for anyone with mobility limitations or disabilities.  ([facility]  24 hours/ 7 days a week; [facility]  7 am- 3:30 pm, Mon- Fri)    Please come to:   [facility] [address]  [address] [place] [address]  -    Please proceed to [address] on the 3rd floor. [phone]?     - ?If you are in need of directions, wheelchair or escort please stop at the Information Desk in the lobby.  Inform the information person that you are here for surgery; a wheelchair and escort to [address] will be provided.?     What can I eat or drink?  -  You may eat and drink normally for up to 8 hours before your surgery.  -  You may have clear liquids until 2 hours before surgery.     Examples of clear liquids:  Water  Clear broth  Juices (apple, white grape, white cranberry  and cider) without pulp  Noncarbonated, powder based beverages  (lemonade and Anders-Aid)  Sodas (Sprite, 7-Up, ginger ale and seltzer)  Coffee or tea (without milk or cream)  Gatorade    -  No Alcohol for at least 24 hours before surgery     Which medicines can I take?  Hold Aspirin for 7 days before surgery.   Hold Multivitamins for 7 days before surgery.  Hold Supplements for 7 days before surgery.  Hold Ibuprofen  (Advil, Motrin) for 1 day before surgery--unless otherwise directed by surgeon.  Hold Naproxen (Aleve) for 4 days before surgery.  -  DO NOT take these medications the day of surgery:  concerta.  -  PLEASE TAKE these medications the day of surgery:  Tylenol if needed; take other morning medication.    How do I prepare myself?  - Please take 2 showers before surgery using Scrubcare or Hibiclens soap.    Use this soap only from the neck to your toes.     Leave the soap on your skin for one minute--then rinse thoroughly.      You may use your own shampoo and conditioner; no other hair products.   - Please remove all jewelry and body piercings.  - No lotions, deodorants or fragrance.  - No makeup or fingernail polish.   - Bring your ID and insurance card.    -If you have a Deep Brain Stimulator, Spinal Cord Stimulator or any neuro stimulator device---you must bring the remote control to the hospital     - All patients are required to have a Covid-19 test within 4 days of surgery/procedure.      -Patients will be contacted by the [facility] scheduling team within 1 week of surgery to make an appointment.      - Patients may call the Scheduling team at [phone] if they have not been scheduled within 4 days of  surgery.        Questions or Concerns:    - For any questions regarding the day of surgery or your hospital stay, please contact the Pre Admission Nursing Office at [phone].       - If you have health changes between today and your surgery please call your surgeon.       For questions after surgery please call your surgeons office.

## 2022-04-06 ENCOUNTER — PRE VISIT (OUTPATIENT)
Dept: SURGERY | Facility: CLINIC | Age: 22
End: 2022-04-06
Payer: COMMERCIAL

## 2022-04-06 ENCOUNTER — ANESTHESIA EVENT (OUTPATIENT)
Dept: SURGERY | Facility: CLINIC | Age: 22
DRG: 027 | End: 2022-04-06
Payer: COMMERCIAL

## 2022-04-06 ENCOUNTER — VIRTUAL VISIT (OUTPATIENT)
Dept: SURGERY | Facility: CLINIC | Age: 22
End: 2022-04-06
Payer: COMMERCIAL

## 2022-04-06 DIAGNOSIS — Z01.818 PRE-OP EXAMINATION: Primary | ICD-10-CM

## 2022-04-06 DIAGNOSIS — G24.9 DYSTONIA: ICD-10-CM

## 2022-04-06 PROCEDURE — 99213 OFFICE O/P EST LOW 20 MIN: CPT | Mod: GT | Performed by: PHYSICIAN ASSISTANT

## 2022-04-06 RX ORDER — ACETAMINOPHEN 325 MG/1
325-650 TABLET ORAL EVERY 6 HOURS PRN
COMMUNITY

## 2022-04-06 ASSESSMENT — PAIN SCALES - GENERAL: PAINLEVEL: NO PAIN (0)

## 2022-04-06 ASSESSMENT — ENCOUNTER SYMPTOMS: SEIZURES: 0

## 2022-04-06 NOTE — H&P (VIEW-ONLY)
Pre-Operative H & P     CC:  Preoperative exam to assess for increased cardiopulmonary risk while undergoing surgery and anesthesia.    Date of Encounter: 4/6/2022  Primary Care Physician:  Tatyana Low     Reason for visit:   Encounter Diagnoses   Name Primary?     Pre-op examination Yes     Dystonia        HPI  Bradley Garcia is a 21 year old male who presents for pre-operative H & P in preparation for  Procedure Information     Case: [id number] Date/Time: 04/11/22 0730    Procedure: stealth assisted Right side deep brain stimulator placement, phase I,  target right globus pallidus internus, with microelectrode recording (Right Head)    Anesthesia type: Combined MAC with Local    Diagnosis: Dystonia [G24.9]    Pre-op diagnosis: Dystonia [G24.9]    Location:  OR [address] [place] [address] OR    Providers: Ravinder Coleman MD          Bradley is a 21 year old man who has a past medical history significant for depression, anxiety, ADHD, and  PONV. He has history of idiopathic progressive dystonia since early childhood for which he underwent implantation of a DBS system in 2013 at [facility]. He experienced marked improvement for about 2-3 months with subsequent return to baseline function. No programming changes have helped since then. He turned the DBS off for a week at one point and noted no difference in his symptoms. He underwent removal of entire deep brain stimulation system on 8/23/21 with Dr. Coleman. He then had follow up MRI on 10/8/21. The patient was then scheduled for the procedure as above.      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      Depression      Neck pain      PONV (postoperative nausea and vomiting)      S/P deep brain stimulator placement       Torsion dystonia        Past Surgical History  Past Surgical History:   Procedure Laterality Date     ANESTHESIA OUT OF OR MRI       ANESTHESIA OUT OF OR MRI N/A 7/8/2021    Procedure: ANESTHESIA OUT OF OR Magnetic resonance imaging Brain @1200;  Surgeon: GENERIC ANESTHESIA PROVIDER;  Location: [facility] OR     ANESTHESIA OUT OF OR MRI N/A 10/8/2021    Procedure: ANESTHESIA OUT OF OR MAGNETIC RESONANCE IMAGING of BRAIN WITH AND WITHOUT CONTRAST@1400;  Surgeon: GENERIC ANESTHESIA PROVIDER;  Location: [facility] OR     EYE SURGERY       NOSE SURGERY      nasal fracture repair     REMOVE DEPTH ELECTRODES N/A 8/23/2021    Procedure: Removal of entire deep brain stimulation system hardware including intracranial electrodes and battery/generator in chest wall;  Surgeon: Ravinder Coleman MD;  Location: [facility] OR     STEREOTACTIC INSERTION DEEP BRAIN STIMULATION         Prior to Admission Medications  Current Outpatient Medications   Medication Sig Dispense Refill     acetaminophen (TYLENOL) 325 MG tablet Take 325-650 mg by mouth every 6 hours as needed for mild pain       CONCERTA 36 MG CR tablet Take 36 mg by mouth every morning        sertraline (ZOLOFT) 50 MG tablet Take 50 mg by mouth every morning        sertraline (ZOLOFT) 25 MG tablet Take 50 mg by mouth every morning          Allergies  No Known Allergies    Social History  Social History     Socioeconomic History     Marital status: Single     Spouse name: Not on file     Number of children: 0     Years of education: Not on file     Highest education level: Not on file   Occupational History     Occupation: unemployed   Tobacco Use     Smoking status: Never Smoker     Smokeless tobacco: Never Used   Substance and Sexual Activity     Alcohol use: Never     Drug use: Never     Sexual activity: Not on file   Other Topics Concern     Not on file   Social History Narrative     Not on file     Social Determinants of Health     Financial Resource Strain: Not on file   Food Insecurity:  Not on file   Transportation Needs: Not on file   Physical Activity: Not on file   Stress: Not on file   Social Connections: Not on file   Intimate Partner Violence: Not on file   Housing Stability: Not on file       Family History  Family History   Problem Relation Age of Onset     No Known Problems Mother      No Known Problems Father      No Known Problems Sister      Other - See Comments Brother         di bunny syndrome     Myocardial Infarction Maternal Grandmother      Hyperlipidemia Maternal Grandmother      Hyperlipidemia Maternal Grandfather      Unknown/Adopted Paternal Grandmother      Unknown/Adopted Paternal Grandfather      Deep Vein Thrombosis No family hx of      Anesthesia Reaction No family hx of        Review of Systems  The complete review of systems is negative other than noted in the HPI or here.   Anesthesia Evaluation   Pt has had prior anesthetic. Type: General and MAC.    History of anesthetic complications  - PONV.  The patient reports x1 vomiting after needing urgent nose surgery and not being NPO. After last surgery he ate crackers and then have vomiting without nausea which was throught to possibily be due to headache from DBS removal - no recent issues with MRI.    ROS/MED HX  ENT/Pulmonary:  - neg pulmonary ROS     Neurologic: Comment: Idiopathy progressive dystonia     (-) no seizures, no CVA, no TIA and migraines   Cardiovascular:     (+) -----No previous cardiac testing     METS/Exercise Tolerance: 4 - Raking leaves, gardening    Hematologic:  - neg hematologic  ROS     Musculoskeletal:  - neg musculoskeletal ROS     GI/Hepatic:  - neg GI/hepatic ROS     Renal/Genitourinary:  - neg Renal ROS     Endo:  - neg endo ROS     Psychiatric/Substance Use:     (+) psychiatric history anxiety and depression (ADHD)     Infectious Disease:  - neg infectious disease ROS     Malignancy:  - neg malignancy ROS     Other:  - neg other ROS          Virtual visit -  No vitals were  obtained    Physical Exam  Constitutional: Awake, alert, cooperative, no apparent distress, and appears stated age.  Eyes: Glasses  HENT: Normocephalic  Respiratory: non labored breathing   Neurologic: Awake, alert, oriented to name, place and time. Involuntary movements of head.   Neuropsychiatric: Calm, cooperative. Normal affect.      Prior Labs/Diagnostic Studies   All labs and imaging personally reviewed   Results for KYMBERLY CULP (MRN [id number]) as of 4/6/2022 08:37   Ref. Range 8/16/2021 16:42   Sodium Latest Ref Range: 133 - 144 mmol/L 141   Potassium Latest Ref Range: 3.4 - 5.3 mmol/L 4.0   Chloride Latest Ref Range: 98 - 110 mmol/L 105   Carbon Dioxide Latest Ref Range: 20 - 32 mmol/L 29   Urea Nitrogen Latest Ref Range: 7 - 30 mg/dL 16   Creatinine Latest Ref Range: 0.50 - 1.00 mg/dL 0.99   GFR Estimate Latest Ref Range: >60 mL/min/1.73m2 >90   Calcium Latest Ref Range: 8.5 - 10.1 mg/dL 9.6   Anion Gap Latest Ref Range: 3 - 14 mmol/L 7   Glucose Latest Ref Range: 70 - 99 mg/dL 94   WBC Latest Ref Range: 4.0 - 11.0 10e3/uL 9.9   Hemoglobin Latest Ref Range: 13.3 - 17.7 g/dL 16.9   Hematocrit Latest Ref Range: 40.0 - 53.0 % 48.2   Platelet Count Latest Ref Range: 150 - 450 10e3/uL 294   RBC Count Latest Ref Range: 4.40 - 5.90 10e6/uL 5.85   MCV Latest Ref Range: 78 - 100 fL 82   MCH Latest Ref Range: 26.5 - 33.0 pg 28.9   MCHC Latest Ref Range: 31.5 - 36.5 g/dL 35.1   RDW Latest Ref Range: 10.0 - 15.0 % 11.9   % Neutrophils Latest Units: % 49   % Lymphocytes Latest Units: % 42   % Monocytes Latest Units: % 6   % Eosinophils Latest Units: % 2   % Basophils Latest Units: % 1   Absolute Basophils Latest Ref Range: 0.0 - 0.2 10e3/uL 0.1   Absolute Eosinophils Latest Ref Range: 0.0 - 0.7 10e3/uL 0.2   Absolute Immature Granulocytes Latest Ref Range: <=0.0 10e3/uL 0.0   Absolute Lymphocytes Latest Ref Range: 0.8 - 5.3 10e3/uL 4.2   Absolute Monocytes Latest Ref Range: 0.0 - 1.3 10e3/uL 0.6   % Immature  "Granulocytes Latest Units: % 0   Absolute Neutrophils Latest Ref Range: 1.6 - 8.3 10e3/uL 4.9   Absolute NRBCs Latest Units: 10e3/uL 0.0   NRBCs per 100 WBC Latest Ref Range: <1 /100 0       EKG/ stress test - if available please see in ROS above   No results found.  No flowsheet data found.      The patient's records and results personally reviewed by this provider.     Outside records reviewed from: Care Everywhere      Assessment      Bradley Garcia is a 21 year old male seen as a PAC referral for risk assessment and optimization for anesthesia.    Plan/Recommendations  Pt will be optimized for the proposed procedure.  See below for details on the assessment, risk, and preoperative recommendations    NEUROLOGY  - Idiopathic progressive dystonia - procedure as above. The patient reports his symptoms have been worse lately. He continues to have chronic neck and back pain. He normally just rests and streches and it resolves but it has been taking longer to resolve lately. The patient otherwise continues to have neck pulling. Tylenol PRN. The patient has labs \"pending standing\" ordered by Dr. Coleman. Have message surgical team if those are needed prior to surgery.   -Post Op delirium risk factors:  No risk identified    ENT  - No current airway concerns.  Will need to be reassessed day of surgery.  Mallampati: Unable to assess  TM: Unable to assess    CARDIAC  - No history of CAD, Hypertension and Afib  - METS (Metabolic Equivalents)  Patient performs 4 or more METS exercise without symptoms            Total Score: 0      RCRI-Very low risk: Class 1 0.4% complication rate            Total Score: 0    ~ The patient reports he hasn't been as active as he hasn't been working as much. When he does work he continues to walk for long distances and is able to go up a flight of stairs without issues.     PULMONARY  - Obstructive Sleep Apnea  No current risk of obstructive sleep apnea   GURVINDER Low Risk            Total Score: " "0      - Denies asthma or inhaler use  - Tobacco History      History   Smoking Status     Never Smoker   Smokeless Tobacco     Never Used       GI  PONV Medium Risk  Total Score: 2           1 AN PONV: Patient is not a current smoker    1 AN PONV: Patient has history of PONV    ~ Patient has had issues with PONV in the past with eating too soon. No issues with recent MRI under [facility].     /RENAL  - Baseline Creatinine  0.99    ENDOCRINE    - BMI: Estimated body mass index is 20.53 kg/m  as calculated from the following:    Height as of 10/8/21: 1.676 m (5' 6\").    Weight as of 10/8/21: 57.7 kg (127 lb 3.3 oz).  Healthy Weight (BMI 18.5-24.9)  - No history of Diabetes Mellitus    HEME  VTE Low Risk 0.26%            Total Score: 0      - No history of abnormal bleeding or antiplatelet use.    PSYCH  - depression/ anxiety - well controlled on Zoloft.   ~ ADHD - hold concerta DOS    The patient is optimized for their procedure. AVS with information on surgery time/arrival time, meds and NPO status given by nursing staff. No further diagnostic testing indicated.    Please refer to the physical examination documented by the anesthesiologist in the anesthesia record on the day of surgery.    Video-Visit Details    Type of service:  Video Visit    Patient verbally consented to video service today: YES    Video Start Time: 8:08  Video End Time (time video stopped): 8:17    Originating Location (pt. Location): Home    Distant Location (provider location):  [facility] PREOPERATIVE ASSESSMENT CENTER     Mode of Communication:  Video Conference via [employer]  On the day of service:     Prep time: 3 minutes  Visit time: 9 minutes  Documentation time: 6 minutes  ------------------------------------------  Total time: 18 minutes      Adele Licona PA-C  Preoperative Assessment Center  [facility]  Clinic and Surgery Center  Phone: [phone]  Fax: [phone]  "

## 2022-04-06 NOTE — H&P
Pre-Operative H & P     CC:  Preoperative exam to assess for increased cardiopulmonary risk while undergoing surgery and anesthesia.    Date of Encounter: 4/6/2022  Primary Care Physician:  Tatyana Low     Reason for visit:   Encounter Diagnoses   Name Primary?     Pre-op examination Yes     Dystonia        HPI  Bradley Garcia is a 21 year old male who presents for pre-operative H & P in preparation for  Procedure Information     Case: [id number] Date/Time: 04/11/22 0730    Procedure: stealth assisted Right side deep brain stimulator placement, phase I,  target right globus pallidus internus, with microelectrode recording (Right Head)    Anesthesia type: Combined MAC with Local    Diagnosis: Dystonia [G24.9]    Pre-op diagnosis: Dystonia [G24.9]    Location:  OR [address] [place] [address] OR    Providers: Ravinder Coleman MD          Bradley is a 21 year old man who has a past medical history significant for depression, anxiety, ADHD, and  PONV. He has history of idiopathic progressive dystonia since early childhood for which he underwent implantation of a DBS system in 2013 at [facility]. He experienced marked improvement for about 2-3 months with subsequent return to baseline function. No programming changes have helped since then. He turned the DBS off for a week at one point and noted no difference in his symptoms. He underwent removal of entire deep brain stimulation system on 8/23/21 with Dr. Coleman. He then had follow up MRI on 10/8/21. The patient was then scheduled for the procedure as above.      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      Depression      Neck pain      PONV (postoperative nausea and vomiting)      S/P deep brain stimulator placement       Torsion dystonia        Past Surgical History  Past Surgical History:   Procedure Laterality Date     ANESTHESIA OUT OF OR MRI       ANESTHESIA OUT OF OR MRI N/A 7/8/2021    Procedure: ANESTHESIA OUT OF OR Magnetic resonance imaging Brain @1200;  Surgeon: GENERIC ANESTHESIA PROVIDER;  Location: [facility] OR     ANESTHESIA OUT OF OR MRI N/A 10/8/2021    Procedure: ANESTHESIA OUT OF OR MAGNETIC RESONANCE IMAGING of BRAIN WITH AND WITHOUT CONTRAST@1400;  Surgeon: GENERIC ANESTHESIA PROVIDER;  Location: [facility] OR     EYE SURGERY       NOSE SURGERY      nasal fracture repair     REMOVE DEPTH ELECTRODES N/A 8/23/2021    Procedure: Removal of entire deep brain stimulation system hardware including intracranial electrodes and battery/generator in chest wall;  Surgeon: Ravinder Coleman MD;  Location: [facility] OR     STEREOTACTIC INSERTION DEEP BRAIN STIMULATION         Prior to Admission Medications  Current Outpatient Medications   Medication Sig Dispense Refill     acetaminophen (TYLENOL) 325 MG tablet Take 325-650 mg by mouth every 6 hours as needed for mild pain       CONCERTA 36 MG CR tablet Take 36 mg by mouth every morning        sertraline (ZOLOFT) 50 MG tablet Take 50 mg by mouth every morning        sertraline (ZOLOFT) 25 MG tablet Take 50 mg by mouth every morning          Allergies  No Known Allergies    Social History  Social History     Socioeconomic History     Marital status: Single     Spouse name: Not on file     Number of children: 0     Years of education: Not on file     Highest education level: Not on file   Occupational History     Occupation: unemployed   Tobacco Use     Smoking status: Never Smoker     Smokeless tobacco: Never Used   Substance and Sexual Activity     Alcohol use: Never     Drug use: Never     Sexual activity: Not on file   Other Topics Concern     Not on file   Social History Narrative     Not on file     Social Determinants of Health     Financial Resource Strain: Not on file   Food Insecurity:  Not on file   Transportation Needs: Not on file   Physical Activity: Not on file   Stress: Not on file   Social Connections: Not on file   Intimate Partner Violence: Not on file   Housing Stability: Not on file       Family History  Family History   Problem Relation Age of Onset     No Known Problems Mother      No Known Problems Father      No Known Problems Sister      Other - See Comments Brother         di bunny syndrome     Myocardial Infarction Maternal Grandmother      Hyperlipidemia Maternal Grandmother      Hyperlipidemia Maternal Grandfather      Unknown/Adopted Paternal Grandmother      Unknown/Adopted Paternal Grandfather      Deep Vein Thrombosis No family hx of      Anesthesia Reaction No family hx of        Review of Systems  The complete review of systems is negative other than noted in the HPI or here.   Anesthesia Evaluation   Pt has had prior anesthetic. Type: General and MAC.    History of anesthetic complications  - PONV.  The patient reports x1 vomiting after needing urgent nose surgery and not being NPO. After last surgery he ate crackers and then have vomiting without nausea which was throught to possibily be due to headache from DBS removal - no recent issues with MRI.    ROS/MED HX  ENT/Pulmonary:  - neg pulmonary ROS     Neurologic: Comment: Idiopathy progressive dystonia     (-) no seizures, no CVA, no TIA and migraines   Cardiovascular:     (+) -----No previous cardiac testing     METS/Exercise Tolerance: 4 - Raking leaves, gardening    Hematologic:  - neg hematologic  ROS     Musculoskeletal:  - neg musculoskeletal ROS     GI/Hepatic:  - neg GI/hepatic ROS     Renal/Genitourinary:  - neg Renal ROS     Endo:  - neg endo ROS     Psychiatric/Substance Use:     (+) psychiatric history anxiety and depression (ADHD)     Infectious Disease:  - neg infectious disease ROS     Malignancy:  - neg malignancy ROS     Other:  - neg other ROS          Virtual visit -  No vitals were  obtained    Physical Exam  Constitutional: Awake, alert, cooperative, no apparent distress, and appears stated age.  Eyes: Glasses  HENT: Normocephalic  Respiratory: non labored breathing   Neurologic: Awake, alert, oriented to name, place and time. Involuntary movements of head.   Neuropsychiatric: Calm, cooperative. Normal affect.      Prior Labs/Diagnostic Studies   All labs and imaging personally reviewed   Results for KYMBERLY CULP (MRN [id number]) as of 4/6/2022 08:37   Ref. Range 8/16/2021 16:42   Sodium Latest Ref Range: 133 - 144 mmol/L 141   Potassium Latest Ref Range: 3.4 - 5.3 mmol/L 4.0   Chloride Latest Ref Range: 98 - 110 mmol/L 105   Carbon Dioxide Latest Ref Range: 20 - 32 mmol/L 29   Urea Nitrogen Latest Ref Range: 7 - 30 mg/dL 16   Creatinine Latest Ref Range: 0.50 - 1.00 mg/dL 0.99   GFR Estimate Latest Ref Range: >60 mL/min/1.73m2 >90   Calcium Latest Ref Range: 8.5 - 10.1 mg/dL 9.6   Anion Gap Latest Ref Range: 3 - 14 mmol/L 7   Glucose Latest Ref Range: 70 - 99 mg/dL 94   WBC Latest Ref Range: 4.0 - 11.0 10e3/uL 9.9   Hemoglobin Latest Ref Range: 13.3 - 17.7 g/dL 16.9   Hematocrit Latest Ref Range: 40.0 - 53.0 % 48.2   Platelet Count Latest Ref Range: 150 - 450 10e3/uL 294   RBC Count Latest Ref Range: 4.40 - 5.90 10e6/uL 5.85   MCV Latest Ref Range: 78 - 100 fL 82   MCH Latest Ref Range: 26.5 - 33.0 pg 28.9   MCHC Latest Ref Range: 31.5 - 36.5 g/dL 35.1   RDW Latest Ref Range: 10.0 - 15.0 % 11.9   % Neutrophils Latest Units: % 49   % Lymphocytes Latest Units: % 42   % Monocytes Latest Units: % 6   % Eosinophils Latest Units: % 2   % Basophils Latest Units: % 1   Absolute Basophils Latest Ref Range: 0.0 - 0.2 10e3/uL 0.1   Absolute Eosinophils Latest Ref Range: 0.0 - 0.7 10e3/uL 0.2   Absolute Immature Granulocytes Latest Ref Range: <=0.0 10e3/uL 0.0   Absolute Lymphocytes Latest Ref Range: 0.8 - 5.3 10e3/uL 4.2   Absolute Monocytes Latest Ref Range: 0.0 - 1.3 10e3/uL 0.6   % Immature  "Granulocytes Latest Units: % 0   Absolute Neutrophils Latest Ref Range: 1.6 - 8.3 10e3/uL 4.9   Absolute NRBCs Latest Units: 10e3/uL 0.0   NRBCs per 100 WBC Latest Ref Range: <1 /100 0       EKG/ stress test - if available please see in ROS above   No results found.  No flowsheet data found.      The patient's records and results personally reviewed by this provider.     Outside records reviewed from: Care Everywhere      Assessment      Bradley Garcia is a 21 year old male seen as a PAC referral for risk assessment and optimization for anesthesia.    Plan/Recommendations  Pt will be optimized for the proposed procedure.  See below for details on the assessment, risk, and preoperative recommendations    NEUROLOGY  - Idiopathic progressive dystonia - procedure as above. The patient reports his symptoms have been worse lately. He continues to have chronic neck and back pain. He normally just rests and streches and it resolves but it has been taking longer to resolve lately. The patient otherwise continues to have neck pulling. Tylenol PRN. The patient has labs \"pending standing\" ordered by Dr. Coleman. Have message surgical team if those are needed prior to surgery.   -Post Op delirium risk factors:  No risk identified    ENT  - No current airway concerns.  Will need to be reassessed day of surgery.  Mallampati: Unable to assess  TM: Unable to assess    CARDIAC  - No history of CAD, Hypertension and Afib  - METS (Metabolic Equivalents)  Patient performs 4 or more METS exercise without symptoms            Total Score: 0      RCRI-Very low risk: Class 1 0.4% complication rate            Total Score: 0    ~ The patient reports he hasn't been as active as he hasn't been working as much. When he does work he continues to walk for long distances and is able to go up a flight of stairs without issues.     PULMONARY  - Obstructive Sleep Apnea  No current risk of obstructive sleep apnea   GURVINDER Low Risk            Total Score: " "0      - Denies asthma or inhaler use  - Tobacco History      History   Smoking Status     Never Smoker   Smokeless Tobacco     Never Used       GI  PONV Medium Risk  Total Score: 2           1 AN PONV: Patient is not a current smoker    1 AN PONV: Patient has history of PONV    ~ Patient has had issues with PONV in the past with eating too soon. No issues with recent MRI under [facility].     /RENAL  - Baseline Creatinine  0.99    ENDOCRINE    - BMI: Estimated body mass index is 20.53 kg/m  as calculated from the following:    Height as of 10/8/21: 1.676 m (5' 6\").    Weight as of 10/8/21: 57.7 kg (127 lb 3.3 oz).  Healthy Weight (BMI 18.5-24.9)  - No history of Diabetes Mellitus    HEME  VTE Low Risk 0.26%            Total Score: 0      - No history of abnormal bleeding or antiplatelet use.    PSYCH  - depression/ anxiety - well controlled on Zoloft.   ~ ADHD - hold concerta DOS    The patient is optimized for their procedure. AVS with information on surgery time/arrival time, meds and NPO status given by nursing staff. No further diagnostic testing indicated.    Please refer to the physical examination documented by the anesthesiologist in the anesthesia record on the day of surgery.    Video-Visit Details    Type of service:  Video Visit    Patient verbally consented to video service today: YES    Video Start Time: 8:08  Video End Time (time video stopped): 8:17    Originating Location (pt. Location): Home    Distant Location (provider location):  [facility] PREOPERATIVE ASSESSMENT CENTER     Mode of Communication:  Video Conference via [employer]  On the day of service:     Prep time: 3 minutes  Visit time: 9 minutes  Documentation time: 6 minutes  ------------------------------------------  Total time: 18 minutes      Adele Licona PA-C  Preoperative Assessment Center  [facility]  Clinic and Surgery Center  Phone: [phone]  Fax: [phone]  "

## 2022-04-06 NOTE — PROGRESS NOTES
Bradley is a 21 year old who is being evaluated via a billable video visit.      How would you like to obtain your AVS? MyChart  PI         Review of Systems         Objective    Vitals - Patient Reported  Pain Score: No Pain (0)        Physical Exam   OTTONIEL Peres LPN

## 2022-04-07 ENCOUNTER — PRE VISIT (OUTPATIENT)
Dept: SURGERY | Facility: CLINIC | Age: 22
End: 2022-04-07
Payer: COMMERCIAL

## 2022-04-08 ENCOUNTER — LAB (OUTPATIENT)
Dept: LAB | Facility: CLINIC | Age: 22
End: 2022-04-08
Payer: COMMERCIAL

## 2022-04-08 DIAGNOSIS — Z11.59 ENCOUNTER FOR SCREENING FOR OTHER VIRAL DISEASES: ICD-10-CM

## 2022-04-08 PROCEDURE — U0005 INFEC AGEN DETEC AMPLI PROBE: HCPCS

## 2022-04-08 PROCEDURE — U0003 INFECTIOUS AGENT DETECTION BY NUCLEIC ACID (DNA OR RNA); SEVERE ACUTE RESPIRATORY SYNDROME CORONAVIRUS 2 (SARS-COV-2) (CORONAVIRUS DISEASE [COVID-19]), AMPLIFIED PROBE TECHNIQUE, MAKING USE OF HIGH THROUGHPUT TECHNOLOGIES AS DESCRIBED BY CMS-2020-01-R: HCPCS

## 2022-04-09 LAB — SARS-COV-2 RNA RESP QL NAA+PROBE: NEGATIVE

## 2022-04-10 RX ORDER — OXYCODONE HYDROCHLORIDE 5 MG/1
5 TABLET ORAL EVERY 4 HOURS PRN
Status: CANCELLED | OUTPATIENT
Start: 2022-04-10

## 2022-04-10 RX ORDER — MEPERIDINE HYDROCHLORIDE 25 MG/ML
12.5 INJECTION INTRAMUSCULAR; INTRAVENOUS; SUBCUTANEOUS
Status: CANCELLED | OUTPATIENT
Start: 2022-04-10

## 2022-04-10 RX ORDER — ONDANSETRON 4 MG/1
4 TABLET, ORALLY DISINTEGRATING ORAL EVERY 30 MIN PRN
Status: CANCELLED | OUTPATIENT
Start: 2022-04-10

## 2022-04-10 RX ORDER — SODIUM CHLORIDE, SODIUM LACTATE, POTASSIUM CHLORIDE, CALCIUM CHLORIDE 600; 310; 30; 20 MG/100ML; MG/100ML; MG/100ML; MG/100ML
INJECTION, SOLUTION INTRAVENOUS CONTINUOUS
Status: CANCELLED | OUTPATIENT
Start: 2022-04-10

## 2022-04-10 RX ORDER — ONDANSETRON 2 MG/ML
4 INJECTION INTRAMUSCULAR; INTRAVENOUS EVERY 30 MIN PRN
Status: CANCELLED | OUTPATIENT
Start: 2022-04-10

## 2022-04-10 RX ORDER — FENTANYL CITRATE 50 UG/ML
25-50 INJECTION, SOLUTION INTRAMUSCULAR; INTRAVENOUS
Status: CANCELLED | OUTPATIENT
Start: 2022-04-10

## 2022-04-11 ENCOUNTER — APPOINTMENT (OUTPATIENT)
Dept: GENERAL RADIOLOGY | Facility: CLINIC | Age: 22
DRG: 027 | End: 2022-04-11
Attending: NEUROLOGICAL SURGERY
Payer: COMMERCIAL

## 2022-04-11 ENCOUNTER — HOSPITAL ENCOUNTER (INPATIENT)
Facility: CLINIC | Age: 22
LOS: 1 days | Discharge: HOME | DRG: 027 | End: 2022-04-12
Attending: NEUROLOGICAL SURGERY | Admitting: NEUROLOGICAL SURGERY
Payer: COMMERCIAL

## 2022-04-11 ENCOUNTER — ANESTHESIA EVENT (OUTPATIENT)
Dept: SURGERY | Facility: CLINIC | Age: 22
End: 2022-04-11
Payer: COMMERCIAL

## 2022-04-11 ENCOUNTER — ANESTHESIA (OUTPATIENT)
Dept: SURGERY | Facility: CLINIC | Age: 22
DRG: 027 | End: 2022-04-11
Payer: COMMERCIAL

## 2022-04-11 ENCOUNTER — APPOINTMENT (OUTPATIENT)
Dept: GENERAL RADIOLOGY | Facility: CLINIC | Age: 22
DRG: 027 | End: 2022-04-11
Attending: STUDENT IN AN ORGANIZED HEALTH CARE EDUCATION/TRAINING PROGRAM
Payer: COMMERCIAL

## 2022-04-11 ENCOUNTER — HOSPITAL ENCOUNTER (OUTPATIENT)
Dept: CT IMAGING | Facility: CLINIC | Age: 22
Discharge: HOME | DRG: 027 | End: 2022-04-11
Attending: NEUROLOGICAL SURGERY | Admitting: NEUROLOGICAL SURGERY
Payer: COMMERCIAL

## 2022-04-11 ENCOUNTER — ANESTHESIA EVENT (OUTPATIENT)
Dept: SURGERY | Facility: CLINIC | Age: 22
DRG: 027 | End: 2022-04-11
Payer: COMMERCIAL

## 2022-04-11 ENCOUNTER — APPOINTMENT (OUTPATIENT)
Dept: CT IMAGING | Facility: CLINIC | Age: 22
DRG: 027 | End: 2022-04-11
Attending: STUDENT IN AN ORGANIZED HEALTH CARE EDUCATION/TRAINING PROGRAM
Payer: COMMERCIAL

## 2022-04-11 DIAGNOSIS — G24.9 DYSTONIA: ICD-10-CM

## 2022-04-11 DIAGNOSIS — Z96.89 S/P DEEP BRAIN STIMULATOR PLACEMENT: Primary | ICD-10-CM

## 2022-04-11 LAB
GLUCOSE BLDC GLUCOMTR-MCNC: 102 MG/DL (ref 70–99)
MAGNESIUM SERPL-MCNC: 2.2 MG/DL (ref 1.6–2.3)
PHOSPHATE SERPL-MCNC: 4 MG/DL (ref 2.5–4.5)
POTASSIUM BLD-SCNC: 4 MMOL/L (ref 3.4–5.3)

## 2022-04-11 PROCEDURE — 70250 X-RAY EXAM OF SKULL: CPT | Mod: 26 | Performed by: RADIOLOGY

## 2022-04-11 PROCEDURE — 272N000001 HC OR GENERAL SUPPLY STERILE: Performed by: NEUROLOGICAL SURGERY

## 2022-04-11 PROCEDURE — 8E09XBH COMPUTER ASSISTED PROCEDURE OF HEAD AND NECK REGION, WITH MAGNETIC RESONANCE IMAGING: ICD-10-PCS | Performed by: NEUROLOGICAL SURGERY

## 2022-04-11 PROCEDURE — 360N000086 HC SURGERY LEVEL 6 W/ FLUORO, PER MIN: Performed by: NEUROLOGICAL SURGERY

## 2022-04-11 PROCEDURE — 258N000003 HC RX IP 258 OP 636

## 2022-04-11 PROCEDURE — 8E09XBG COMPUTER ASSISTED PROCEDURE OF HEAD AND NECK REGION, WITH COMPUTERIZED TOMOGRAPHY: ICD-10-PCS | Performed by: NEUROLOGICAL SURGERY

## 2022-04-11 PROCEDURE — 70450 CT HEAD/BRAIN W/O DYE: CPT | Mod: 26 | Performed by: RADIOLOGY

## 2022-04-11 PROCEDURE — 95961 ELECTRODE STIMULATION BRAIN: CPT | Mod: 26 | Performed by: PSYCHIATRY & NEUROLOGY

## 2022-04-11 PROCEDURE — 272N000002 HC OR SUPPLY OTHER OPNP: Performed by: NEUROLOGICAL SURGERY

## 2022-04-11 PROCEDURE — 272N000004 HC RX 272: Performed by: NEUROLOGICAL SURGERY

## 2022-04-11 PROCEDURE — 95962 ELECTRODE STIM BRAIN ADD-ON: CPT | Mod: 26 | Performed by: PSYCHIATRY & NEUROLOGY

## 2022-04-11 PROCEDURE — 370N000017 HC ANESTHESIA TECHNICAL FEE, PER MIN: Performed by: NEUROLOGICAL SURGERY

## 2022-04-11 PROCEDURE — 250N000011 HC RX IP 250 OP 636: Performed by: NEUROLOGICAL SURGERY

## 2022-04-11 PROCEDURE — 70450 CT HEAD/BRAIN W/O DYE: CPT

## 2022-04-11 PROCEDURE — 84132 ASSAY OF SERUM POTASSIUM: CPT | Performed by: STUDENT IN AN ORGANIZED HEALTH CARE EDUCATION/TRAINING PROGRAM

## 2022-04-11 PROCEDURE — 36415 COLL VENOUS BLD VENIPUNCTURE: CPT | Performed by: STUDENT IN AN ORGANIZED HEALTH CARE EDUCATION/TRAINING PROGRAM

## 2022-04-11 PROCEDURE — 250N000009 HC RX 250: Performed by: NEUROLOGICAL SURGERY

## 2022-04-11 PROCEDURE — 999N000141 HC STATISTIC PRE-PROCEDURE NURSING ASSESSMENT: Performed by: NEUROLOGICAL SURGERY

## 2022-04-11 PROCEDURE — 83735 ASSAY OF MAGNESIUM: CPT | Performed by: STUDENT IN AN ORGANIZED HEALTH CARE EDUCATION/TRAINING PROGRAM

## 2022-04-11 PROCEDURE — 250N000011 HC RX IP 250 OP 636

## 2022-04-11 PROCEDURE — 999N000065 XR SKULL 1/3 VIEWS

## 2022-04-11 PROCEDURE — 250N000013 HC RX MED GY IP 250 OP 250 PS 637: Performed by: STUDENT IN AN ORGANIZED HEALTH CARE EDUCATION/TRAINING PROGRAM

## 2022-04-11 PROCEDURE — 70450 CT HEAD/BRAIN W/O DYE: CPT | Mod: 77

## 2022-04-11 PROCEDURE — 250N000013 HC RX MED GY IP 250 OP 250 PS 637: Performed by: ANESTHESIOLOGY

## 2022-04-11 PROCEDURE — 61867 IMPLANT NEUROELECTRODE: CPT | Mod: RT | Performed by: NEUROLOGICAL SURGERY

## 2022-04-11 PROCEDURE — C1713 ANCHOR/SCREW BN/BN,TIS/BN: HCPCS | Performed by: NEUROLOGICAL SURGERY

## 2022-04-11 PROCEDURE — 250N000011 HC RX IP 250 OP 636: Performed by: ANESTHESIOLOGY

## 2022-04-11 PROCEDURE — 250N000009 HC RX 250

## 2022-04-11 PROCEDURE — 278N000051 HC OR IMPLANT GENERAL: Performed by: NEUROLOGICAL SURGERY

## 2022-04-11 PROCEDURE — C1778 LEAD, NEUROSTIMULATOR: HCPCS | Performed by: NEUROLOGICAL SURGERY

## 2022-04-11 PROCEDURE — 00H03MZ INSERTION OF NEUROSTIMULATOR LEAD INTO BRAIN, PERCUTANEOUS APPROACH: ICD-10-PCS | Performed by: NEUROLOGICAL SURGERY

## 2022-04-11 PROCEDURE — 120N000002 HC R&B MED SURG/OB UMMC

## 2022-04-11 PROCEDURE — 999N000179 XR SURGERY CARM FLUORO LESS THAN 5 MIN W STILLS: Mod: TC

## 2022-04-11 PROCEDURE — 84100 ASSAY OF PHOSPHORUS: CPT | Performed by: STUDENT IN AN ORGANIZED HEALTH CARE EDUCATION/TRAINING PROGRAM

## 2022-04-11 PROCEDURE — 250N000011 HC RX IP 250 OP 636: Performed by: STUDENT IN AN ORGANIZED HEALTH CARE EDUCATION/TRAINING PROGRAM

## 2022-04-11 PROCEDURE — 710N000010 HC RECOVERY PHASE 1, LEVEL 2, PER MIN: Performed by: NEUROLOGICAL SURGERY

## 2022-04-11 DEVICE — IMP SCR SYN MATRIX LOW PRO 1.5X04MM SELF DRILL 04.503.104.01: Type: IMPLANTABLE DEVICE | Site: CRANIAL | Status: FUNCTIONAL

## 2022-04-11 RX ORDER — ONDANSETRON 2 MG/ML
INJECTION INTRAMUSCULAR; INTRAVENOUS PRN
Status: DISCONTINUED | OUTPATIENT
Start: 2022-04-11 | End: 2022-04-11

## 2022-04-11 RX ORDER — SODIUM CHLORIDE, SODIUM LACTATE, POTASSIUM CHLORIDE, CALCIUM CHLORIDE 600; 310; 30; 20 MG/100ML; MG/100ML; MG/100ML; MG/100ML
INJECTION, SOLUTION INTRAVENOUS CONTINUOUS PRN
Status: DISCONTINUED | OUTPATIENT
Start: 2022-04-11 | End: 2022-04-11

## 2022-04-11 RX ORDER — OXYCODONE HYDROCHLORIDE 5 MG/1
5 TABLET ORAL EVERY 4 HOURS PRN
Status: DISCONTINUED | OUTPATIENT
Start: 2022-04-11 | End: 2022-04-12 | Stop reason: HOSPADM

## 2022-04-11 RX ORDER — ACETAMINOPHEN 325 MG/1
975 TABLET ORAL EVERY 8 HOURS
Status: DISCONTINUED | OUTPATIENT
Start: 2022-04-11 | End: 2022-04-12 | Stop reason: HOSPADM

## 2022-04-11 RX ORDER — ACETAMINOPHEN 325 MG/1
650 TABLET ORAL EVERY 4 HOURS PRN
Status: DISCONTINUED | OUTPATIENT
Start: 2022-04-14 | End: 2022-04-12 | Stop reason: HOSPADM

## 2022-04-11 RX ORDER — PROCHLORPERAZINE MALEATE 10 MG
10 TABLET ORAL EVERY 6 HOURS PRN
Status: DISCONTINUED | OUTPATIENT
Start: 2022-04-11 | End: 2022-04-12 | Stop reason: HOSPADM

## 2022-04-11 RX ORDER — NALOXONE HYDROCHLORIDE 0.4 MG/ML
0.4 INJECTION, SOLUTION INTRAMUSCULAR; INTRAVENOUS; SUBCUTANEOUS
Status: DISCONTINUED | OUTPATIENT
Start: 2022-04-11 | End: 2022-04-12 | Stop reason: HOSPADM

## 2022-04-11 RX ORDER — LIDOCAINE 40 MG/G
CREAM TOPICAL
Status: DISCONTINUED | OUTPATIENT
Start: 2022-04-11 | End: 2022-04-11 | Stop reason: HOSPADM

## 2022-04-11 RX ORDER — NALOXONE HYDROCHLORIDE 0.4 MG/ML
0.2 INJECTION, SOLUTION INTRAMUSCULAR; INTRAVENOUS; SUBCUTANEOUS
Status: DISCONTINUED | OUTPATIENT
Start: 2022-04-11 | End: 2022-04-12 | Stop reason: HOSPADM

## 2022-04-11 RX ORDER — SODIUM CHLORIDE, SODIUM LACTATE, POTASSIUM CHLORIDE, CALCIUM CHLORIDE 600; 310; 30; 20 MG/100ML; MG/100ML; MG/100ML; MG/100ML
INJECTION, SOLUTION INTRAVENOUS CONTINUOUS
Status: DISCONTINUED | OUTPATIENT
Start: 2022-04-11 | End: 2022-04-11 | Stop reason: HOSPADM

## 2022-04-11 RX ORDER — FENTANYL CITRATE 50 UG/ML
25-50 INJECTION, SOLUTION INTRAMUSCULAR; INTRAVENOUS EVERY 5 MIN PRN
Status: DISCONTINUED | OUTPATIENT
Start: 2022-04-11 | End: 2022-04-11 | Stop reason: HOSPADM

## 2022-04-11 RX ORDER — ONDANSETRON 2 MG/ML
4 INJECTION INTRAMUSCULAR; INTRAVENOUS EVERY 6 HOURS PRN
Status: DISCONTINUED | OUTPATIENT
Start: 2022-04-11 | End: 2022-04-12 | Stop reason: HOSPADM

## 2022-04-11 RX ORDER — HYDROMORPHONE HCL IN WATER/PF 6 MG/30 ML
.2-.4 PATIENT CONTROLLED ANALGESIA SYRINGE INTRAVENOUS EVERY 10 MIN PRN
Status: DISCONTINUED | OUTPATIENT
Start: 2022-04-11 | End: 2022-04-11 | Stop reason: HOSPADM

## 2022-04-11 RX ORDER — AMOXICILLIN 250 MG
1 CAPSULE ORAL 2 TIMES DAILY
Status: DISCONTINUED | OUTPATIENT
Start: 2022-04-11 | End: 2022-04-12 | Stop reason: HOSPADM

## 2022-04-11 RX ORDER — EPHEDRINE SULFATE 50 MG/ML
INJECTION, SOLUTION INTRAMUSCULAR; INTRAVENOUS; SUBCUTANEOUS PRN
Status: DISCONTINUED | OUTPATIENT
Start: 2022-04-11 | End: 2022-04-11

## 2022-04-11 RX ORDER — ACETAMINOPHEN 325 MG/1
650 TABLET ORAL ONCE
Status: COMPLETED | OUTPATIENT
Start: 2022-04-11 | End: 2022-04-11

## 2022-04-11 RX ORDER — DEXAMETHASONE SODIUM PHOSPHATE 4 MG/ML
INJECTION, SOLUTION INTRA-ARTICULAR; INTRALESIONAL; INTRAMUSCULAR; INTRAVENOUS; SOFT TISSUE PRN
Status: DISCONTINUED | OUTPATIENT
Start: 2022-04-11 | End: 2022-04-11

## 2022-04-11 RX ORDER — BISACODYL 10 MG
10 SUPPOSITORY, RECTAL RECTAL DAILY PRN
Status: DISCONTINUED | OUTPATIENT
Start: 2022-04-11 | End: 2022-04-12 | Stop reason: HOSPADM

## 2022-04-11 RX ORDER — HYDRALAZINE HYDROCHLORIDE 20 MG/ML
2.5-5 INJECTION INTRAMUSCULAR; INTRAVENOUS EVERY 10 MIN PRN
Status: DISCONTINUED | OUTPATIENT
Start: 2022-04-11 | End: 2022-04-11 | Stop reason: HOSPADM

## 2022-04-11 RX ORDER — CEFAZOLIN SODIUM 1 G/3ML
1 INJECTION, POWDER, FOR SOLUTION INTRAMUSCULAR; INTRAVENOUS EVERY 8 HOURS
Status: COMPLETED | OUTPATIENT
Start: 2022-04-11 | End: 2022-04-12

## 2022-04-11 RX ORDER — LABETALOL HYDROCHLORIDE 5 MG/ML
5-10 INJECTION, SOLUTION INTRAVENOUS
Status: DISCONTINUED | OUTPATIENT
Start: 2022-04-11 | End: 2022-04-11 | Stop reason: HOSPADM

## 2022-04-11 RX ORDER — FENTANYL CITRATE 50 UG/ML
INJECTION, SOLUTION INTRAMUSCULAR; INTRAVENOUS PRN
Status: DISCONTINUED | OUTPATIENT
Start: 2022-04-11 | End: 2022-04-11

## 2022-04-11 RX ORDER — POLYETHYLENE GLYCOL 3350 17 G/17G
17 POWDER, FOR SOLUTION ORAL DAILY
Status: DISCONTINUED | OUTPATIENT
Start: 2022-04-12 | End: 2022-04-11

## 2022-04-11 RX ORDER — LIDOCAINE 40 MG/G
CREAM TOPICAL
Status: DISCONTINUED | OUTPATIENT
Start: 2022-04-11 | End: 2022-04-12 | Stop reason: HOSPADM

## 2022-04-11 RX ORDER — CEFAZOLIN SODIUM 1 G/3ML
INJECTION, POWDER, FOR SOLUTION INTRAMUSCULAR; INTRAVENOUS PRN
Status: DISCONTINUED | OUTPATIENT
Start: 2022-04-11 | End: 2022-04-11

## 2022-04-11 RX ORDER — OXYCODONE HYDROCHLORIDE 10 MG/1
10 TABLET ORAL EVERY 4 HOURS PRN
Status: DISCONTINUED | OUTPATIENT
Start: 2022-04-11 | End: 2022-04-12 | Stop reason: HOSPADM

## 2022-04-11 RX ORDER — CEFAZOLIN SODIUM 1 G/3ML
1 INJECTION, POWDER, FOR SOLUTION INTRAMUSCULAR; INTRAVENOUS EVERY 8 HOURS
Status: DISCONTINUED | OUTPATIENT
Start: 2022-04-11 | End: 2022-04-11

## 2022-04-11 RX ORDER — POLYETHYLENE GLYCOL 3350 17 G/17G
17 POWDER, FOR SOLUTION ORAL 2 TIMES DAILY
Status: DISCONTINUED | OUTPATIENT
Start: 2022-04-11 | End: 2022-04-12 | Stop reason: HOSPADM

## 2022-04-11 RX ORDER — LIDOCAINE HYDROCHLORIDE AND EPINEPHRINE 10; 10 MG/ML; UG/ML
INJECTION, SOLUTION INFILTRATION; PERINEURAL PRN
Status: DISCONTINUED | OUTPATIENT
Start: 2022-04-11 | End: 2022-04-11 | Stop reason: HOSPADM

## 2022-04-11 RX ORDER — PROPOFOL 10 MG/ML
INJECTION, EMULSION INTRAVENOUS CONTINUOUS PRN
Status: DISCONTINUED | OUTPATIENT
Start: 2022-04-11 | End: 2022-04-11

## 2022-04-11 RX ORDER — ONDANSETRON 4 MG/1
4 TABLET, ORALLY DISINTEGRATING ORAL EVERY 6 HOURS PRN
Status: DISCONTINUED | OUTPATIENT
Start: 2022-04-11 | End: 2022-04-12 | Stop reason: HOSPADM

## 2022-04-11 RX ORDER — DEXMEDETOMIDINE HYDROCHLORIDE 4 UG/ML
.2-.7 INJECTION, SOLUTION INTRAVENOUS CONTINUOUS
Status: DISCONTINUED | OUTPATIENT
Start: 2022-04-11 | End: 2022-04-11 | Stop reason: HOSPADM

## 2022-04-11 RX ADMIN — PROPOFOL 30 MG: 10 INJECTION, EMULSION INTRAVENOUS at 12:48

## 2022-04-11 RX ADMIN — PROPOFOL 30 MG: 10 INJECTION, EMULSION INTRAVENOUS at 13:12

## 2022-04-11 RX ADMIN — Medication 5 MG: at 08:52

## 2022-04-11 RX ADMIN — OXYCODONE HYDROCHLORIDE 5 MG: 5 TABLET ORAL at 17:40

## 2022-04-11 RX ADMIN — FENTANYL CITRATE 25 MCG: 50 INJECTION, SOLUTION INTRAMUSCULAR; INTRAVENOUS at 13:45

## 2022-04-11 RX ADMIN — Medication 8 MCG: at 08:13

## 2022-04-11 RX ADMIN — Medication 4 MCG: at 08:15

## 2022-04-11 RX ADMIN — Medication 4 MCG: at 12:50

## 2022-04-11 RX ADMIN — CEFAZOLIN 1 G: 1 INJECTION, POWDER, FOR SOLUTION INTRAMUSCULAR; INTRAVENOUS at 16:44

## 2022-04-11 RX ADMIN — Medication 5 MG: at 10:11

## 2022-04-11 RX ADMIN — DEXMEDETOMIDINE HYDROCHLORIDE 0.5 MCG/KG/HR: 400 INJECTION INTRAVENOUS at 08:10

## 2022-04-11 RX ADMIN — PROPOFOL 50 MCG/KG/MIN: 10 INJECTION, EMULSION INTRAVENOUS at 08:49

## 2022-04-11 RX ADMIN — FENTANYL CITRATE 25 MCG: 50 INJECTION, SOLUTION INTRAMUSCULAR; INTRAVENOUS at 13:24

## 2022-04-11 RX ADMIN — Medication 8 MCG: at 08:18

## 2022-04-11 RX ADMIN — ACETAMINOPHEN 650 MG: 325 TABLET ORAL at 06:14

## 2022-04-11 RX ADMIN — CEFAZOLIN 2 G: 1 INJECTION, POWDER, FOR SOLUTION INTRAMUSCULAR; INTRAVENOUS at 12:03

## 2022-04-11 RX ADMIN — SODIUM CHLORIDE, POTASSIUM CHLORIDE, SODIUM LACTATE AND CALCIUM CHLORIDE: 600; 310; 30; 20 INJECTION, SOLUTION INTRAVENOUS at 08:06

## 2022-04-11 RX ADMIN — ACETAMINOPHEN 975 MG: 325 TABLET ORAL at 15:51

## 2022-04-11 RX ADMIN — DEXAMETHASONE SODIUM PHOSPHATE 6 MG: 4 INJECTION, SOLUTION INTRA-ARTICULAR; INTRALESIONAL; INTRAMUSCULAR; INTRAVENOUS; SOFT TISSUE at 09:23

## 2022-04-11 RX ADMIN — ONDANSETRON 4 MG: 2 INJECTION INTRAMUSCULAR; INTRAVENOUS at 13:46

## 2022-04-11 RX ADMIN — Medication 8 MCG: at 08:10

## 2022-04-11 RX ADMIN — FENTANYL CITRATE 25 MCG: 50 INJECTION, SOLUTION INTRAMUSCULAR; INTRAVENOUS at 14:34

## 2022-04-11 RX ADMIN — CEFAZOLIN 2 G: 1 INJECTION, POWDER, FOR SOLUTION INTRAMUSCULAR; INTRAVENOUS at 08:08

## 2022-04-11 ASSESSMENT — ACTIVITIES OF DAILY LIVING (ADL)
ADLS_ACUITY_SCORE: 4
ADLS_ACUITY_SCORE: 9
ADLS_ACUITY_SCORE: 4
ADLS_ACUITY_SCORE: 9
ADLS_ACUITY_SCORE: 4
ADLS_ACUITY_SCORE: 9
ADLS_ACUITY_SCORE: 4
ADLS_ACUITY_SCORE: 4
ADLS_ACUITY_SCORE: 9
ADLS_ACUITY_SCORE: 9
ADLS_ACUITY_SCORE: 4
ADLS_ACUITY_SCORE: 9
ADLS_ACUITY_SCORE: 4
ADLS_ACUITY_SCORE: 4
ADLS_ACUITY_SCORE: 9

## 2022-04-11 ASSESSMENT — VISUAL ACUITY
OU: BLURRED VISION
OU: BLURRED VISION

## 2022-04-11 NOTE — ANESTHESIA CARE TRANSFER NOTE
Patient: Bradley Garcia    Procedure: Procedure(s):  stealth assisted Right side deep brain stimulator placement, phase I,  target right globus pallidus internus, with microelectrode recording       Diagnosis: Dystonia [G24.9]  Diagnosis Additional Information: No value filed.    Anesthesia Type:   MAC     Note:    Oropharynx: oropharynx clear of all foreign objects and spontaneously breathing  Level of Consciousness: awake  Oxygen Supplementation: nasal cannula  Level of Supplemental Oxygen (L/min / FiO2): 2  Independent Airway: airway patency satisfactory and stable  Dentition: dentition unchanged      Patient transferred to: PACU    Handoff Report: Identifed the Patient, Identified the Reponsible Provider, Reviewed the pertinent medical history, Discussed the surgical course, Reviewed Intra-OP anesthesia mangement and issues during anesthesia, Set expectations for post-procedure period and Allowed opportunity for questions and acknowledgement of understanding      Vitals:  Vitals Value Taken Time   /48 04/11/22 1415   Temp 36.3  C (97.3  F) 04/11/22 1415   Pulse 60 04/11/22 1416   Resp 11 04/11/22 1416   SpO2 100 % 04/11/22 1416   Vitals shown include unvalidated device data.    Electronically Signed By: MAG Reyes CRNA  April 11, 2022  2:18 PM

## 2022-04-11 NOTE — OR NURSING
Late entry:  CT and XRays done post procedure.  Pt remained stable as pre op level.  Pt report to 6A and transfer to  Floor.

## 2022-04-11 NOTE — BRIEF OP NOTE
[facility]    Brief Operative Note    Pre-operative diagnosis: Dystonia [G24.9]  Post-operative diagnosis Same as pre-operative diagnosis    Procedure: Procedure(s):  stealth assisted Right side deep brain stimulator placement, phase I,  target right globus pallidus internus, with microelectrode recording  Surgeon: Surgeon(s) and Role:     * Ravinder Coleman MD - Primary     * Danielle Jimenez MD - Resident - Assisting  Anesthesia: Combined MAC with Local   Estimated Blood Loss: 5 mL from 4/11/2022  8:02 AM to 4/11/2022  2:10 PM      Drains: None  Specimens: * No specimens in log *  Findings:   None.  Complications: None.  Implants:   Implant Name Type Inv. Item Serial No.  Lot No. LRB No. Used Action   SENSIGHT GRISEL HOLE DEVICE KIT    [employer] [id number] Right 1 Wasted   IMP SCR SYN MATRIX LOW PRO [id number] SELF DRILL [id number] - [id number] [employer]/[place] IMP SCR SYN MATRIX LOW PRO 1.5X04MM SELF DRILL [id number]  SYNTHES-STRATE NA Right 2 Implanted   SENSIGHT GRISEL HOLE DEVICE    [employer] [id number] Right 1 Implanted   Directional Lead Kit   [id number] MEDTRON[employer]  Right 1 Implanted

## 2022-04-11 NOTE — LETTER
[facility] [address]  [address] [address]  [phone]          April 12, 2022    RE:  Bradley MASON Jose                                                                                                                                                       [id number]  [facility] [address]            To whom it may concern:    Bradley MARLON Garcia is under my professional care for    S/P deep brain stimulator placement  Dystonia He  may return to work pending follow up appointment at 6 weeks. A return to work clearance will be provided at this follow up appointment.     When the patient returns to work, the following restrictions apply until 6 weeks after:  A) Bend: Occasionally (1-3 hours)  B) Squat: Occasionally (1-3 hours)  C) Walk/Stand: Occasionally (1-3 hours)  D) Reach Above Shoulders: Occasionally (1-3 hours)  E) Lift, carry, push, and pull no more than:  0-10 lbs.Light duty-unable to lift more than 10 pounds.      Sincerely,    Gela Ayala MD

## 2022-04-11 NOTE — DISCHARGE SUMMARY
[facility] Discharge Summary and Instructions    Bradley Garcia MRN# [id number]   Age: 21 year old YOB: 2000     Date of Admission:  4/11/2022  Date of Discharge::  4/12/2022  Admitting Physician:  Ravinder Coleman MD  Discharge Physician:  Ravinder Coleman MD          Admission Diagnoses:   Dystonia [G24.9]          Discharge Diagnosis:     Dystonia [G24.9]          Procedures:   Procedure(s):  stealth assisted Right side deep brain stimulator placement, phase I,  target right globus pallidus internus, with microelectrode recording           Brief History of Illness:   Bradley Garcia is a 21 year old year old male with a past medical history of Dystonia [G24.9]  . Patient was seen in clinic for consideration of DBS with Ravinder Coleman MD. At that visit, risks and benefits were discussed with the patient/authorized representative and he agreed to proceed.            Hospital Course:   Patient underwent the above procedure on 4/11/2022. The operation was uncomplicated, and while in the PACU patient received a skull XR which was satisfactory. The patient also received a postoperative stealth CT scan and was admitted to the 6th floor neurological unit afterwards. The patient also received three doses of ancef. On post operative day 1, they were doing well, including ambulating, voiding without a shay, eating a regular diet, having pain under control--therefore they were discharged.    Exam:  Pupils equal, round and reactive to light.  Extraocular movements full. Visual fields full.   Face moves symmetrically. Tongue midline.  Motor strength 5/5. Strength exam limited by dystonia, worse on the left hemibody. Toes are down-going. Full sensation.   Incision clean/dry/intact              Discharge Medications:     Current Discharge Medication List      START taking these medications    Details   oxyCODONE (ROXICODONE) 5 MG tablet Take 1 tablet (5 mg) by mouth every 4 hours as needed for  moderate pain  Qty: 12 tablet, Refills: 0    Associated Diagnoses: Dystonia      polyethylene glycol (MIRALAX) 17 GM/Dose powder Take 17 g by mouth in the morning for 7 days.  Qty: 510 g, Refills: 0    Comments: Take while you're taking oxycodone  Associated Diagnoses: S/P deep brain stimulator placement      senna-docusate (SENOKOT-S/PERICOLACE) 8.6-50 MG tablet Take 1 tablet by mouth in the morning and 1 tablet in the evening. Do all this for 7 days.  Qty: 14 tablet, Refills: 0    Associated Diagnoses: S/P deep brain stimulator placement         CONTINUE these medications which have NOT CHANGED    Details   acetaminophen (TYLENOL) 325 MG tablet Take 325-650 mg by mouth every 6 hours as needed for mild pain      CONCERTA 36 MG CR tablet Take 36 mg by mouth every morning       sertraline (ZOLOFT) 50 MG tablet Take 50 mg by mouth every morning                            Discharge Instructions and Follow-Up:     Discharge diet: Regular   Discharge activity: You may advance activity as tolerated. No strenuous exercise or heay lifting greater than 10 lbs for 4 weeks or until seen and cleared in clinic. Do not return to work until seen in clinic (Return to work letter sent)   Discharge follow-up: Follow-up with Dr. Ravinder Coleman MD in 2 weeks for wound check.    Wound care: Your incision was closed with non-absorbable sutures.     Wound cares after surgery  - You are ok to shower, but do not soak your incisions. Pat them dry if they get damp.   - Avoid coloring your hair or permanent styling until cleared by your surgeon  - No baths, hot tubs or pools for 4-6 weeks after surgery.   - No strenuous activity.  - No lifting more than 10 pounds until you are seen in clinic (a gallon of milk weighs approximately 8 pounds)       Please call if you have:  1. increased pain, redness, drainage, swelling at your incision  2. fevers > 101.5 F degrees  3. with any questions or concerns.  You may reach the Neurosurgery clinic at  [phone] during regular work hours. ER at [phone].    and ask for the Neurosurgery Resident on call at [phone], during off hours or weekends.         Discharge Disposition:     Discharged to home          Danielle Jimenez MD  Neurosurgery Resident, PGY-2        Gela Ayala MD  Neurosurgery PGY2

## 2022-04-11 NOTE — ANESTHESIA POSTPROCEDURE EVALUATION
Patient: Bradley Garcia    Procedure: Procedure(s):  stealth assisted Right side deep brain stimulator placement, phase I,  target right globus pallidus internus, with microelectrode recording       Anesthesia Type:  MAC    Note:  Disposition: Admission; ICU            ICU Sign Out: Anesthesiologist/ICU physician sign out WAS performed   Postop Pain Control: Uneventful            Sign Out: Well controlled pain   PONV: No   Neuro/Psych: Uneventful            Sign Out: Acceptable/Baseline neuro status   Airway/Respiratory: Uneventful            Sign Out: Acceptable/Baseline resp. status   CV/Hemodynamics: Uneventful            Sign Out: Acceptable CV status; No obvious hypovolemia; No obvious fluid overload   Other NRE: NONE   DID A NON-ROUTINE EVENT OCCUR? No           Last vitals:  Vitals Value Taken Time   /57 04/11/22 1530   Temp 35.9  C (96.6  F) 04/11/22 1530   Pulse 88 04/11/22 1530   Resp 16 04/11/22 1530   SpO2 97 % 04/11/22 1530       Electronically Signed By: Dixon Yadav MD  April 11, 2022  4:58 PM

## 2022-04-11 NOTE — ANESTHESIA PREPROCEDURE EVALUATION
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      Depression      Neck pain      PONV (postoperative nausea and vomiting)      S/P deep brain stimulator placement      Torsion dystonia       Past Surgical History:   Procedure Laterality Date     ANESTHESIA OUT OF OR MRI       ANESTHESIA OUT OF OR MRI N/A 2021    Procedure: ANESTHESIA OUT OF OR Magnetic resonance imaging Brain @1200;  Surgeon: GENERIC ANESTHESIA PROVIDER;  Location: [facility] OR     ANESTHESIA OUT OF OR MRI N/A 10/8/2021    Procedure: ANESTHESIA OUT OF OR MAGNETIC RESONANCE IMAGING of BRAIN WITH AND WITHOUT CONTRAST@1400;  Surgeon: GENERIC ANESTHESIA PROVIDER;  Location: [facility] OR     EYE SURGERY       NOSE SURGERY      nasal fracture repair     REMOVE DEPTH ELECTRODES N/A 2021    Procedure: Removal of entire deep brain stimulation system hardware including intracranial electrodes and battery/generator in chest wall;  Surgeon: Ravinder Coleman MD;  Location: [facility] OR     STEREOTACTIC INSERTION DEEP BRAIN STIMULATION        No Known Allergies   Social History     Tobacco Use     Smoking status: Never Smoker     Smokeless tobacco: Never Used   Substance Use Topics     Alcohol use: Never      Wt Readings from Last 1 Encounters:   22 61.5 kg (135 lb 9.3 oz)        Anesthesia Evaluation   Pt has had prior anesthetic.     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           Physical Exam    Airway        Mallampati: I   TM distance: > 3 FB    Mouth opening: > 3 cm    Respiratory Devices and Support         Dental  no notable dental history         Cardiovascular          Rhythm and rate: regular and normal     Pulmonary           breath sounds clear to auscultation           OUTSIDE LABS:  CBC:   Lab Results   Component Value Date    WBC 9.9 08/16/2021    HGB 16.9 08/16/2021    HCT 48.2 08/16/2021     08/16/2021     BMP:   Lab Results   Component Value Date     08/16/2021    POTASSIUM 4.0 08/16/2021    POTASSIUM 4.3 02/22/2021    CHLORIDE 105 08/16/2021    CO2 29 08/16/2021    BUN 16 08/16/2021    CR 0.99 08/16/2021    CR 0.9 02/22/2021     (H) 04/11/2022    GLC 95 10/08/2021     COAGS:   Lab Results   Component Value Date    PTT 28 08/23/2021    INR 1.06 08/23/2021     POC:   Lab Results   Component Value Date     (H) 07/08/2021     HEPATIC:   Lab Results   Component Value Date    ALT 20 02/22/2021    AST 35 02/22/2021     OTHER:   Lab Results   Component Value Date    OSORIO 9.6 08/16/2021    TSH 1.840 02/22/2021       Anesthesia Plan    ASA Status:  2   NPO Status:  NPO Appropriate    Anesthesia Type: MAC.              Consents    Anesthesia Plan(s) and associated risks, benefits, and realistic alternatives discussed. Questions answered and patient/representative(s) expressed understanding.    - Discussed:     - Discussed with:  Patient      - Extended Intubation/Ventilatory Support Discussed: No.      - Patient is DNR/DNI Status: No    Use of blood products discussed: No .     Postoperative Care            Comments:                Dixon Yadav MD

## 2022-04-12 VITALS
RESPIRATION RATE: 16 BRPM | SYSTOLIC BLOOD PRESSURE: 118 MMHG | TEMPERATURE: 97.6 F | HEART RATE: 86 BPM | HEIGHT: 66 IN | DIASTOLIC BLOOD PRESSURE: 50 MMHG | BODY MASS INDEX: 21.79 KG/M2 | OXYGEN SATURATION: 96 % | WEIGHT: 135.58 LBS

## 2022-04-12 LAB
ANION GAP SERPL CALCULATED.3IONS-SCNC: 7 MMOL/L (ref 3–14)
BASOPHILS # BLD AUTO: 0 10E3/UL (ref 0–0.2)
BASOPHILS NFR BLD AUTO: 0 %
BUN SERPL-MCNC: 16 MG/DL (ref 7–30)
CALCIUM SERPL-MCNC: 9.6 MG/DL (ref 8.5–10.1)
CHLORIDE BLD-SCNC: 105 MMOL/L (ref 94–109)
CO2 SERPL-SCNC: 28 MMOL/L (ref 20–32)
CREAT SERPL-MCNC: 0.91 MG/DL (ref 0.66–1.25)
EOSINOPHIL # BLD AUTO: 0 10E3/UL (ref 0–0.7)
EOSINOPHIL NFR BLD AUTO: 0 %
ERYTHROCYTE [DISTWIDTH] IN BLOOD BY AUTOMATED COUNT: 12.3 % (ref 10–15)
GFR SERPL CREATININE-BSD FRML MDRD: >90 ML/MIN/1.73M2
GLUCOSE BLD-MCNC: 102 MG/DL (ref 70–99)
HCT VFR BLD AUTO: 43.8 % (ref 40–53)
HGB BLD-MCNC: 14.9 G/DL (ref 13.3–17.7)
IMM GRANULOCYTES # BLD: 0.1 10E3/UL
IMM GRANULOCYTES NFR BLD: 0 %
LYMPHOCYTES # BLD AUTO: 3 10E3/UL (ref 0.8–5.3)
LYMPHOCYTES NFR BLD AUTO: 22 %
MCH RBC QN AUTO: 28.8 PG (ref 26.5–33)
MCHC RBC AUTO-ENTMCNC: 34 G/DL (ref 31.5–36.5)
MCV RBC AUTO: 85 FL (ref 78–100)
MONOCYTES # BLD AUTO: 1.6 10E3/UL (ref 0–1.3)
MONOCYTES NFR BLD AUTO: 12 %
NEUTROPHILS # BLD AUTO: 8.7 10E3/UL (ref 1.6–8.3)
NEUTROPHILS NFR BLD AUTO: 66 %
NRBC # BLD AUTO: 0 10E3/UL
NRBC BLD AUTO-RTO: 0 /100
PLATELET # BLD AUTO: 247 10E3/UL (ref 150–450)
POTASSIUM BLD-SCNC: 4.2 MMOL/L (ref 3.4–5.3)
RBC # BLD AUTO: 5.17 10E6/UL (ref 4.4–5.9)
SODIUM SERPL-SCNC: 140 MMOL/L (ref 133–144)
WBC # BLD AUTO: 13.4 10E3/UL (ref 4–11)

## 2022-04-12 PROCEDURE — 250N000011 HC RX IP 250 OP 636: Performed by: STUDENT IN AN ORGANIZED HEALTH CARE EDUCATION/TRAINING PROGRAM

## 2022-04-12 PROCEDURE — 250N000013 HC RX MED GY IP 250 OP 250 PS 637: Performed by: STUDENT IN AN ORGANIZED HEALTH CARE EDUCATION/TRAINING PROGRAM

## 2022-04-12 PROCEDURE — 36415 COLL VENOUS BLD VENIPUNCTURE: CPT | Performed by: STUDENT IN AN ORGANIZED HEALTH CARE EDUCATION/TRAINING PROGRAM

## 2022-04-12 PROCEDURE — 80048 BASIC METABOLIC PNL TOTAL CA: CPT | Performed by: STUDENT IN AN ORGANIZED HEALTH CARE EDUCATION/TRAINING PROGRAM

## 2022-04-12 PROCEDURE — 85014 HEMATOCRIT: CPT | Performed by: STUDENT IN AN ORGANIZED HEALTH CARE EDUCATION/TRAINING PROGRAM

## 2022-04-12 RX ORDER — POLYETHYLENE GLYCOL 3350 17 G/17G
17 POWDER, FOR SOLUTION ORAL DAILY
Qty: 510 G | Refills: 0 | Status: SHIPPED | OUTPATIENT
Start: 2022-04-12 | End: 2022-04-19

## 2022-04-12 RX ORDER — AMOXICILLIN 250 MG
1 CAPSULE ORAL 2 TIMES DAILY
Qty: 14 TABLET | Refills: 0 | Status: SHIPPED | OUTPATIENT
Start: 2022-04-12 | End: 2022-04-19

## 2022-04-12 RX ORDER — OXYCODONE HYDROCHLORIDE 5 MG/1
5 TABLET ORAL EVERY 4 HOURS PRN
Qty: 12 TABLET | Refills: 0 | Status: SHIPPED | OUTPATIENT
Start: 2022-04-12 | End: 2022-07-11

## 2022-04-12 RX ADMIN — ACETAMINOPHEN 975 MG: 325 TABLET ORAL at 01:58

## 2022-04-12 RX ADMIN — CEFAZOLIN 1 G: 1 INJECTION, POWDER, FOR SOLUTION INTRAMUSCULAR; INTRAVENOUS at 07:53

## 2022-04-12 RX ADMIN — CEFAZOLIN 1 G: 1 INJECTION, POWDER, FOR SOLUTION INTRAMUSCULAR; INTRAVENOUS at 01:10

## 2022-04-12 ASSESSMENT — ACTIVITIES OF DAILY LIVING (ADL)
ADLS_ACUITY_SCORE: 9

## 2022-04-12 NOTE — PLAN OF CARE
Vitals: VSS  Neuros: Whole body tremors - denies visual changes   IV: Removed   Resp/trach: RA  Diet: Regular diet - good po   Bowel status: LBM 4/10  : Voiding spontaneously   Skin: Head incision covered with primapore - CDI   Pain: Denies pain   Activity: SBA  Plan: Pt is discharging at this time. Discharge medications and instructions gone over, no questions at that time. Pt declined needing medications filled. Volunteer brought pt to front door.

## 2022-04-12 NOTE — PLAN OF CARE
Status: POD # 0 s/p stealth assisted R sided DBS placement, phase I. Hx progressive dystonia  Vitals: VSS on RA, intermittently tachy (100s)  Neuros: A&Ox4, tremors throughout, L sided extremities worse than R. Numbness to forehead from pin sites, blurry vision at baseline, wears glasses   IV: PIV SL between abx   Diet: Regular, ate 100% of dinner. Emesis x1 this evening, declined intervention  Bowel status: LBM 4/10  : Voiding  Skin: Head incision covered with primapore, 2 pin sites on forehead FUAD - bacitracin applied   Pain: Reporting 2-3/10 headache pain - taking scheduled tylenol, PRN oxy x1  Activity: Up with assist of 1, GB. Shuffled/dystonic gait  Social: Pts mom Poonam here this afternoon, helpful/supportive  Plan: Likely discharge tomorrow AM, phase II next Mon 4/18    Arrived from: PACU   Belongings/meds: Remain with pt   2 RN Skin Assessment Completed by: Darlene Palomares  Non-intact findings documented (yes/no/NA): Yes

## 2022-04-12 NOTE — PLAN OF CARE
Status: POD#1 for R side DBS Stage I placement (stage II planned for 4/18). History of progressive dystonia   Vitals: VSS ex soft BPs (pt sleeping at the time, normal per pt)  Neuros: A/Ox4, baseline tremors throughout (fine at rest, more aggressive when ambulating or doing purposeful movements). Blurry vision baseline (corrective lenses)  IV: R PIV SL w/ intermittent abx  Resp/trach: RA  Diet: Regular diet, takes pills one at a time   Bowel status: LBM 4/10  : Voiding adequately   Skin: Head incision covered with primapore,   Pain: endorsing mild headache, taking scheduled tylenol overnight   Activity: SBA w/ GB  Plan: Plan for discharge today after last IV abx dose

## 2022-04-13 ENCOUNTER — PATIENT OUTREACH (OUTPATIENT)
Dept: NEUROSURGERY | Facility: CLINIC | Age: 22
End: 2022-04-13
Payer: COMMERCIAL

## 2022-04-13 NOTE — PROGRESS NOTES
[facility]: Post-Discharge Note  SITUATION                                                      Admission:    Admission Date: 04/11/22   Reason for Admission: Right side deep brain stimulator placement, phase I,  target right globus pallidus internus, with microelectrode recording  Discharge:   Discharge Date: 04/12/22  Discharge Diagnosis: Dystonia    BACKGROUND                                                      Per hospital discharge summary and inpatient provider notes:  Neurosurgery Discharge Coordination Note     Attending physician: Dr. Coleman  Discharge to: Home     Current status: Patient states he is doing well. Yesterday he slept most of the day, but feels much better today. Only minor pain and incision site; taking Tylenol with adequate relief. Dressing was still on at the time of my call, but pt had plans to remove later today with his father's help. He knows to call with the following symptoms:  redness, swelling, increased tenderness, drainage, incision opening or elevated temp. Denies current bowel or bladder issues.    Discharge instructions and medications reviewed with patient.  Follow up appointments/imaging/tests needed: DBS battery placement on 4/18/22 at 1:45; arrive on 3C at 11:45    RN triage/on call number given: [phone]/ [phone]        ASSESSMENT           Discharge Assessment  How are your symptoms? (Red Flag symptoms escalate to triage hotline per guidelines): Improved  Do you feel your condition is stable enough to be safe at home until your provider visit?: Yes  Does the patient have their discharge instructions? : Yes  Does the patient have questions regarding their discharge instructions? : No  Were you started on any new medications or were there changes to any of your previous medications? : Yes  Does the patient have all of their medications?: Yes  Do you have questions regarding any of your medications? : No  Discharge follow-up appointment scheduled within 14  calendar days? : Yes  Discharge Follow Up Appointment Date:  (next surgery scheduled for 4/18/22)         Post-op (Clinicians Only)  Did the patient have surgery or a procedure: Yes  Incision: closed;healing  Drainage: No  Bleeding: none  Fever: No  Chills: No  Redness:  (dressing not removed yet)  Warmth:  (dressing not removed yet)  Swelling:  (dressing not removed yet)  Incision site pain: Yes  Closure: suture;dissolving  Eating & Drinking: eating and drinking without complaints/concerns  PO Intake: regular diet  Bowel Function: normal  Urinary Status: voiding without complaint/concerns        PLAN                                                      Outpatient Plan:  Routine postop follow-up      Future Appointments   Date Time Provider Department Center   4/15/2022 11:15 AM [facility]   4/29/2022 11:15 AM [facility]   [facility]2022  6[facility]   5/11/2022  1:00 PM  MOVEMENT DISORDER FELLOW [facility]   5/17/2022  9:00 AM Ravinder Coleman MD [facility]   6/1/2022 12:20 PM [id number]   6/1/2022  1:00 PM  MOVEMENT DISORDER FELLOW [facility]         For any urgent concerns, please contact our 24 hour nurse triage line: [phone] ([phone])         CRISTIANO LIM RN

## 2022-04-14 ENCOUNTER — PRE VISIT (OUTPATIENT)
Dept: SURGERY | Facility: CLINIC | Age: 22
End: 2022-04-14
Payer: COMMERCIAL

## 2022-04-15 ENCOUNTER — LAB (OUTPATIENT)
Dept: LAB | Facility: CLINIC | Age: 22
End: 2022-04-15
Payer: COMMERCIAL

## 2022-04-15 DIAGNOSIS — Z11.59 ENCOUNTER FOR SCREENING FOR OTHER VIRAL DISEASES: ICD-10-CM

## 2022-04-15 PROCEDURE — U0005 INFEC AGEN DETEC AMPLI PROBE: HCPCS

## 2022-04-15 PROCEDURE — U0003 INFECTIOUS AGENT DETECTION BY NUCLEIC ACID (DNA OR RNA); SEVERE ACUTE RESPIRATORY SYNDROME CORONAVIRUS 2 (SARS-COV-2) (CORONAVIRUS DISEASE [COVID-19]), AMPLIFIED PROBE TECHNIQUE, MAKING USE OF HIGH THROUGHPUT TECHNOLOGIES AS DESCRIBED BY CMS-2020-01-R: HCPCS

## 2022-04-16 LAB — SARS-COV-2 RNA RESP QL NAA+PROBE: NEGATIVE

## 2022-04-18 ENCOUNTER — HOSPITAL ENCOUNTER (OUTPATIENT)
Facility: CLINIC | Age: 22
Discharge: HOME | End: 2022-04-18
Attending: NEUROLOGICAL SURGERY | Admitting: NEUROLOGICAL SURGERY
Payer: COMMERCIAL

## 2022-04-18 ENCOUNTER — ANESTHESIA (OUTPATIENT)
Dept: SURGERY | Facility: CLINIC | Age: 22
End: 2022-04-18
Payer: COMMERCIAL

## 2022-04-18 VITALS
OXYGEN SATURATION: 97 % | WEIGHT: 133.6 LBS | BODY MASS INDEX: 21.47 KG/M2 | RESPIRATION RATE: 16 BRPM | SYSTOLIC BLOOD PRESSURE: 112 MMHG | TEMPERATURE: 97.8 F | DIASTOLIC BLOOD PRESSURE: 72 MMHG | HEART RATE: 80 BPM | HEIGHT: 66 IN

## 2022-04-18 DIAGNOSIS — G24.9 DYSTONIA: Primary | ICD-10-CM

## 2022-04-18 LAB — GLUCOSE BLDC GLUCOMTR-MCNC: 83 MG/DL (ref 70–99)

## 2022-04-18 PROCEDURE — 258N000003 HC RX IP 258 OP 636: Performed by: ANESTHESIOLOGY

## 2022-04-18 PROCEDURE — 250N000011 HC RX IP 250 OP 636: Performed by: NEUROLOGICAL SURGERY

## 2022-04-18 PROCEDURE — 360N000076 HC SURGERY LEVEL 3, PER MIN: Performed by: NEUROLOGICAL SURGERY

## 2022-04-18 PROCEDURE — 250N000011 HC RX IP 250 OP 636

## 2022-04-18 PROCEDURE — 61886 IMPLANT NEUROSTIM ARRAYS: CPT | Mod: 58 | Performed by: NEUROLOGICAL SURGERY

## 2022-04-18 PROCEDURE — 250N000013 HC RX MED GY IP 250 OP 250 PS 637: Performed by: NURSE PRACTITIONER

## 2022-04-18 PROCEDURE — 710N000010 HC RECOVERY PHASE 1, LEVEL 2, PER MIN: Performed by: NEUROLOGICAL SURGERY

## 2022-04-18 PROCEDURE — 272N000002 HC OR SUPPLY OTHER OPNP: Performed by: NEUROLOGICAL SURGERY

## 2022-04-18 PROCEDURE — 250N000013 HC RX MED GY IP 250 OP 250 PS 637: Performed by: ANESTHESIOLOGY

## 2022-04-18 PROCEDURE — 250N000025 HC SEVOFLURANE, PER MIN: Performed by: NEUROLOGICAL SURGERY

## 2022-04-18 PROCEDURE — 272N000001 HC OR GENERAL SUPPLY STERILE: Performed by: NEUROLOGICAL SURGERY

## 2022-04-18 PROCEDURE — 999N000141 HC STATISTIC PRE-PROCEDURE NURSING ASSESSMENT: Performed by: NEUROLOGICAL SURGERY

## 2022-04-18 PROCEDURE — C1778 LEAD, NEUROSTIMULATOR: HCPCS | Performed by: NEUROLOGICAL SURGERY

## 2022-04-18 PROCEDURE — 250N000009 HC RX 250

## 2022-04-18 PROCEDURE — C1820 GENERATOR NEURO RECHG BAT SY: HCPCS | Performed by: NEUROLOGICAL SURGERY

## 2022-04-18 PROCEDURE — 370N000017 HC ANESTHESIA TECHNICAL FEE, PER MIN: Performed by: NEUROLOGICAL SURGERY

## 2022-04-18 PROCEDURE — 82962 GLUCOSE BLOOD TEST: CPT

## 2022-04-18 PROCEDURE — 250N000011 HC RX IP 250 OP 636: Performed by: ANESTHESIOLOGY

## 2022-04-18 PROCEDURE — 250N000011 HC RX IP 250 OP 636: Performed by: NURSE PRACTITIONER

## 2022-04-18 PROCEDURE — 250N000009 HC RX 250: Performed by: NEUROLOGICAL SURGERY

## 2022-04-18 PROCEDURE — 710N000012 HC RECOVERY PHASE 2, PER MINUTE: Performed by: NEUROLOGICAL SURGERY

## 2022-04-18 PROCEDURE — 250N000009 HC RX 250: Performed by: ANESTHESIOLOGY

## 2022-04-18 DEVICE — NEUROSTIMULATOR MEDT ACTIVA RC CHARGEABLE 37612: Type: IMPLANTABLE DEVICE | Site: CHEST | Status: FUNCTIONAL

## 2022-04-18 RX ORDER — LIDOCAINE HYDROCHLORIDE 20 MG/ML
INJECTION, SOLUTION INFILTRATION; PERINEURAL PRN
Status: DISCONTINUED | OUTPATIENT
Start: 2022-04-18 | End: 2022-04-18

## 2022-04-18 RX ORDER — ONDANSETRON 2 MG/ML
INJECTION INTRAMUSCULAR; INTRAVENOUS PRN
Status: DISCONTINUED | OUTPATIENT
Start: 2022-04-18 | End: 2022-04-18

## 2022-04-18 RX ORDER — DEXAMETHASONE SODIUM PHOSPHATE 4 MG/ML
INJECTION, SOLUTION INTRA-ARTICULAR; INTRALESIONAL; INTRAMUSCULAR; INTRAVENOUS; SOFT TISSUE PRN
Status: DISCONTINUED | OUTPATIENT
Start: 2022-04-18 | End: 2022-04-18

## 2022-04-18 RX ORDER — ONDANSETRON 2 MG/ML
4 INJECTION INTRAMUSCULAR; INTRAVENOUS EVERY 30 MIN PRN
Status: DISCONTINUED | OUTPATIENT
Start: 2022-04-18 | End: 2022-04-18 | Stop reason: HOSPADM

## 2022-04-18 RX ORDER — ONDANSETRON 4 MG/1
4 TABLET, ORALLY DISINTEGRATING ORAL EVERY 30 MIN PRN
Status: DISCONTINUED | OUTPATIENT
Start: 2022-04-18 | End: 2022-04-18 | Stop reason: HOSPADM

## 2022-04-18 RX ORDER — HYDROMORPHONE HCL IN WATER/PF 6 MG/30 ML
0.2 PATIENT CONTROLLED ANALGESIA SYRINGE INTRAVENOUS EVERY 5 MIN PRN
Status: DISCONTINUED | OUTPATIENT
Start: 2022-04-18 | End: 2022-04-18 | Stop reason: HOSPADM

## 2022-04-18 RX ORDER — DIPHENHYDRAMINE HCL 25 MG
25 CAPSULE ORAL EVERY 6 HOURS PRN
Status: DISCONTINUED | OUTPATIENT
Start: 2022-04-18 | End: 2022-04-18 | Stop reason: HOSPADM

## 2022-04-18 RX ORDER — LABETALOL HYDROCHLORIDE 5 MG/ML
10 INJECTION, SOLUTION INTRAVENOUS
Status: DISCONTINUED | OUTPATIENT
Start: 2022-04-18 | End: 2022-04-18 | Stop reason: HOSPADM

## 2022-04-18 RX ORDER — SODIUM CHLORIDE, SODIUM LACTATE, POTASSIUM CHLORIDE, CALCIUM CHLORIDE 600; 310; 30; 20 MG/100ML; MG/100ML; MG/100ML; MG/100ML
INJECTION, SOLUTION INTRAVENOUS CONTINUOUS
Status: DISCONTINUED | OUTPATIENT
Start: 2022-04-18 | End: 2022-04-18 | Stop reason: HOSPADM

## 2022-04-18 RX ORDER — ACETAMINOPHEN 325 MG/1
975 TABLET ORAL ONCE
Status: COMPLETED | OUTPATIENT
Start: 2022-04-18 | End: 2022-04-18

## 2022-04-18 RX ORDER — SODIUM CHLORIDE, SODIUM LACTATE, POTASSIUM CHLORIDE, CALCIUM CHLORIDE 600; 310; 30; 20 MG/100ML; MG/100ML; MG/100ML; MG/100ML
INJECTION, SOLUTION INTRAVENOUS CONTINUOUS PRN
Status: DISCONTINUED | OUTPATIENT
Start: 2022-04-18 | End: 2022-04-18

## 2022-04-18 RX ORDER — FENTANYL CITRATE 50 UG/ML
25 INJECTION, SOLUTION INTRAMUSCULAR; INTRAVENOUS EVERY 5 MIN PRN
Status: DISCONTINUED | OUTPATIENT
Start: 2022-04-18 | End: 2022-04-18 | Stop reason: HOSPADM

## 2022-04-18 RX ORDER — CEFAZOLIN SODIUM/WATER 2 G/20 ML
2 SYRINGE (ML) INTRAVENOUS
Status: COMPLETED | OUTPATIENT
Start: 2022-04-18 | End: 2022-04-18

## 2022-04-18 RX ORDER — LIDOCAINE 40 MG/G
CREAM TOPICAL
Status: DISCONTINUED | OUTPATIENT
Start: 2022-04-18 | End: 2022-04-18 | Stop reason: HOSPADM

## 2022-04-18 RX ORDER — DIAZEPAM 10 MG/2ML
2.5 INJECTION, SOLUTION INTRAMUSCULAR; INTRAVENOUS
Status: DISCONTINUED | OUTPATIENT
Start: 2022-04-18 | End: 2022-04-18 | Stop reason: HOSPADM

## 2022-04-18 RX ORDER — SENNA AND DOCUSATE SODIUM 50; 8.6 MG/1; MG/1
1 TABLET, FILM COATED ORAL AT BEDTIME
Qty: 2 TABLET | Refills: 0 | Status: SHIPPED | OUTPATIENT
Start: 2022-04-18 | End: 2022-04-20

## 2022-04-18 RX ORDER — PROPOFOL 10 MG/ML
INJECTION, EMULSION INTRAVENOUS PRN
Status: DISCONTINUED | OUTPATIENT
Start: 2022-04-18 | End: 2022-04-18

## 2022-04-18 RX ORDER — ONDANSETRON 4 MG/1
4 TABLET, FILM COATED ORAL EVERY 8 HOURS PRN
Qty: 6 TABLET | Refills: 0 | Status: SHIPPED | OUTPATIENT
Start: 2022-04-18 | End: 2022-07-11

## 2022-04-18 RX ORDER — CEPHALEXIN 500 MG/1
500 CAPSULE ORAL 2 TIMES DAILY
Qty: 14 CAPSULE | Refills: 0 | Status: SHIPPED | OUTPATIENT
Start: 2022-04-18 | End: 2022-04-25

## 2022-04-18 RX ORDER — HALOPERIDOL 5 MG/ML
1 INJECTION INTRAMUSCULAR
Status: DISCONTINUED | OUTPATIENT
Start: 2022-04-18 | End: 2022-04-18 | Stop reason: HOSPADM

## 2022-04-18 RX ORDER — OXYCODONE HYDROCHLORIDE 5 MG/1
5 TABLET ORAL EVERY 4 HOURS PRN
Status: DISCONTINUED | OUTPATIENT
Start: 2022-04-18 | End: 2022-04-18 | Stop reason: HOSPADM

## 2022-04-18 RX ORDER — MEPERIDINE HYDROCHLORIDE 25 MG/ML
12.5 INJECTION INTRAMUSCULAR; INTRAVENOUS; SUBCUTANEOUS EVERY 5 MIN PRN
Status: DISCONTINUED | OUTPATIENT
Start: 2022-04-18 | End: 2022-04-18 | Stop reason: HOSPADM

## 2022-04-18 RX ORDER — OXYCODONE HYDROCHLORIDE 5 MG/1
5 TABLET ORAL EVERY 6 HOURS PRN
Qty: 12 TABLET | Refills: 0 | Status: SHIPPED | OUTPATIENT
Start: 2022-04-18 | End: 2022-04-21

## 2022-04-18 RX ORDER — CEFAZOLIN SODIUM/WATER 2 G/20 ML
2 SYRINGE (ML) INTRAVENOUS SEE ADMIN INSTRUCTIONS
Status: DISCONTINUED | OUTPATIENT
Start: 2022-04-18 | End: 2022-04-18 | Stop reason: HOSPADM

## 2022-04-18 RX ORDER — DIPHENHYDRAMINE HYDROCHLORIDE 50 MG/ML
25 INJECTION INTRAMUSCULAR; INTRAVENOUS EVERY 6 HOURS PRN
Status: DISCONTINUED | OUTPATIENT
Start: 2022-04-18 | End: 2022-04-18 | Stop reason: HOSPADM

## 2022-04-18 RX ORDER — HYDRALAZINE HYDROCHLORIDE 20 MG/ML
2.5-5 INJECTION INTRAMUSCULAR; INTRAVENOUS EVERY 10 MIN PRN
Status: DISCONTINUED | OUTPATIENT
Start: 2022-04-18 | End: 2022-04-18 | Stop reason: HOSPADM

## 2022-04-18 RX ORDER — FENTANYL CITRATE 50 UG/ML
INJECTION, SOLUTION INTRAMUSCULAR; INTRAVENOUS PRN
Status: DISCONTINUED | OUTPATIENT
Start: 2022-04-18 | End: 2022-04-18

## 2022-04-18 RX ADMIN — Medication 2 G: at 16:11

## 2022-04-18 RX ADMIN — LIDOCAINE HYDROCHLORIDE 80 MG: 20 INJECTION, SOLUTION INFILTRATION; PERINEURAL at 16:21

## 2022-04-18 RX ADMIN — PHENYLEPHRINE HYDROCHLORIDE 50 MCG: 10 INJECTION INTRAVENOUS at 16:39

## 2022-04-18 RX ADMIN — HYDROMORPHONE HYDROCHLORIDE 0.5 MG: 1 INJECTION, SOLUTION INTRAMUSCULAR; INTRAVENOUS; SUBCUTANEOUS at 17:26

## 2022-04-18 RX ADMIN — ROCURONIUM BROMIDE 20 MG: 50 INJECTION, SOLUTION INTRAVENOUS at 16:44

## 2022-04-18 RX ADMIN — SODIUM CHLORIDE, POTASSIUM CHLORIDE, SODIUM LACTATE AND CALCIUM CHLORIDE: 600; 310; 30; 20 INJECTION, SOLUTION INTRAVENOUS at 16:10

## 2022-04-18 RX ADMIN — FENTANYL CITRATE 50 MCG: 50 INJECTION, SOLUTION INTRAMUSCULAR; INTRAVENOUS at 17:22

## 2022-04-18 RX ADMIN — HYDROMORPHONE HYDROCHLORIDE 0.2 MG: 1 INJECTION, SOLUTION INTRAMUSCULAR; INTRAVENOUS; SUBCUTANEOUS at 18:47

## 2022-04-18 RX ADMIN — FENTANYL CITRATE 75 MCG: 50 INJECTION, SOLUTION INTRAMUSCULAR; INTRAVENOUS at 16:21

## 2022-04-18 RX ADMIN — PHENYLEPHRINE HYDROCHLORIDE 100 MCG: 10 INJECTION INTRAVENOUS at 17:40

## 2022-04-18 RX ADMIN — PROPOFOL 160 MG: 10 INJECTION, EMULSION INTRAVENOUS at 16:21

## 2022-04-18 RX ADMIN — PROCHLORPERAZINE EDISYLATE 5 MG: 5 INJECTION INTRAMUSCULAR; INTRAVENOUS at 20:14

## 2022-04-18 RX ADMIN — PHENYLEPHRINE HYDROCHLORIDE 50 MCG: 10 INJECTION INTRAVENOUS at 16:59

## 2022-04-18 RX ADMIN — ONDANSETRON 4 MG: 2 INJECTION INTRAMUSCULAR; INTRAVENOUS at 18:40

## 2022-04-18 RX ADMIN — FENTANYL CITRATE 25 MCG: 50 INJECTION, SOLUTION INTRAMUSCULAR; INTRAVENOUS at 16:50

## 2022-04-18 RX ADMIN — ROCURONIUM BROMIDE 10 MG: 50 INJECTION, SOLUTION INTRAVENOUS at 17:34

## 2022-04-18 RX ADMIN — ACETAMINOPHEN 975 MG: 325 TABLET ORAL at 20:05

## 2022-04-18 RX ADMIN — HYDROMORPHONE HYDROCHLORIDE 0.3 MG: 1 INJECTION, SOLUTION INTRAMUSCULAR; INTRAVENOUS; SUBCUTANEOUS at 19:02

## 2022-04-18 RX ADMIN — ROCURONIUM BROMIDE 40 MG: 50 INJECTION, SOLUTION INTRAVENOUS at 16:21

## 2022-04-18 RX ADMIN — MIDAZOLAM 1 MG: 1 INJECTION INTRAMUSCULAR; INTRAVENOUS at 16:11

## 2022-04-18 RX ADMIN — DEXAMETHASONE SODIUM PHOSPHATE 4 MG: 4 INJECTION, SOLUTION INTRA-ARTICULAR; INTRALESIONAL; INTRAMUSCULAR; INTRAVENOUS; SOFT TISSUE at 16:21

## 2022-04-18 RX ADMIN — ACETAMINOPHEN 975 MG: 325 TABLET ORAL at 14:39

## 2022-04-18 RX ADMIN — SUGAMMADEX 150 MG: 100 INJECTION, SOLUTION INTRAVENOUS at 18:40

## 2022-04-18 RX ADMIN — ROCURONIUM BROMIDE 10 MG: 50 INJECTION, SOLUTION INTRAVENOUS at 17:22

## 2022-04-18 RX ADMIN — PROPOFOL 10 MG: 10 INJECTION, EMULSION INTRAVENOUS at 19:05

## 2022-04-18 RX ADMIN — PHENYLEPHRINE HYDROCHLORIDE 100 MCG: 10 INJECTION INTRAVENOUS at 18:02

## 2022-04-18 RX ADMIN — ONDANSETRON 4 MG: 2 INJECTION INTRAMUSCULAR; INTRAVENOUS at 19:40

## 2022-04-18 NOTE — ANESTHESIA PROCEDURE NOTES
Airway       Patient location during procedure: OR       Procedure Start/Stop Times: 4/18/2022 4:24 PM  Staff -        CRNA: Jhonny Hurtado APRN CRNA       Performed By: CRNA  Consent for Airway        Urgency: elective  Indications and Patient Condition       Indications for airway management: jose cruz-procedural       Induction type:intravenous       Mask difficulty assessment: 1 - vent by mask    Final Airway Details       Final airway type: endotracheal airway       Successful airway: ETT - single  Endotracheal Airway Details        ETT size (mm): 7.5       Cuffed: yes       Successful intubation technique: direct laryngoscopy       DL Blade Type: Garcia 2       Grade View of Cords: 1 (cords open and clear)       Adjucts: stylet       Position: Left       Measured from: lips       Secured at (cm): 23       Bite block used: None    Post intubation assessment        Placement verified by: capnometry, equal breath sounds and chest rise        Number of attempts at approach: 1       Number of other approaches attempted: 0       Secured with: silk tape       Ease of procedure: easy       Dentition: Intact and Unchanged    Medication(s) Administered   Medication Administration Time: 4/18/2022 4:24 PM

## 2022-04-18 NOTE — OP NOTE
Procedure Date: 04/11/2022    PREOPERATIVE DIAGNOSIS:  Dystonia.    POSTOPERATIVE DIAGNOSIS:  Dystonia.    PROCEDURE PERFORMED:   1.  Placement of CRW stereotactic frame.  2.  Stereotactic neuronavigation planning and CT of the head.  3.  Stereotactic neuronavigation Stealth system for surgical planning, targeting and approach. Target is the right globus pallidus internus.  4.  Right-sided deep brain stimulator placement, phase 1.  5.  Placement of right side deep brain stimulator electrode. Target is the right globus pallidus internus.  Use of intraoperative microelectrode recording and use of intraoperative fluoroscopy.    ATTENDING SURGEON:  Ravinder Coleman M.D.    RESIDENT SURGEON:  Danielle Jimenez MD    ANESTHESIA:  MAC.    ESTIMATED BLOOD LOSS:  5 mL    IMPLANTS:    SENSIGHT GRISEL HOLE DEVICE KIT       [employer] [id number] Right 1 Wasted   IMP SCR SYN MATRIX LOW PRO 1.5X04MM SELF DRILL [phone] - [id number] Metallic Hardware/[place] IMP SCR SYN MATRIX LOW PRO 1.5X04MM SELF DRILL [phone]   SYNTHES-STRATEC NA Right 2 Implanted   SENSIGHT GRISEL HOLE DEVICE       [employer] [id number] Right 1 Implanted   Directional Lead Kit     [id number] [employer]   Right 1 Implanted          EXPLANTS:  None.    DRAINS:  None.    FINDINGS -0.5 mm below target final electrode in the anterior Jagjit-Gun position and within normal limits.    COMPLICATIONS:  None.    INDICATIONS FOR PROCEDURE:  Bradley Garcia is a 21-year-old right-handed male with history of idiopathic generalized dystonia originally with bilateral GPi deep electrodes implanted in 2013 at the age of 13 at an outside hospita.  At that time, he received roughly 2-3 months of benefits.  He underwent DBS explantation on 08/24/2021 as he was not receiving benefit and it was determined that the leads could be replaced with the potential for good benefit.  He presents today for a planned right sided phase 1 to globus pallidus internus.  Risks and benefits were discussed  with the patient and his parents and they elected to proceed.    DESCRIPTION OF PROCEDURE:      CRW head frame placement and stereotactic neuronavigation:    The patient was identified in the preoperative holding area.  Four pin sites were used, 2 in the front and 2 in the back of the head.  The areas were first cleaned with ChloraPrep and injected with local anesthetic 1% lidocaine with epinephrine.  The CRW stereotactic frame was placed on the head with 4 points of fixation to finger tight tightness.  Care was also taken just to ensure that the box would fit over the head of the frame.  The fiducial box was easily placed without interference of the forehead.  The patient tolerated the procedure well despite his dystonic tremors.  After frame placement, he was taken directly to CT scanner and a CT stereotactic head CT was obtained.  Subsequently, the patient was taken directly back to the operating room.  At that time, a James catheter was placed and he was then placed in supine position with the CRW frame affixed to the [place].  The patient was placed in the beach chair reclined position to his comfort.  Assuring all pressure points were well padded, care was taken to make sure the back was in a neutral position.  The head caputo had room for manipulation in case more flexion and extension was needed throughout the case.    At this time, attention was given to the neuronavigation imaging system.  The stereotactic CT was pushed into the neuronavigation Stealth device.  At that time, an MRI stereotactic scan of the head was also uploaded to the Stealth scan.  CT of the head and MRI were merged that showed good registration. Using the merged image, the neuronavigation was used to stereotactically target the right GPi using the AC-PC line to target the GPi.  Using stereotactic coordinates of X, Y and Z as well as the ring and arc angles, coordinates were obtained.  Entry into the skull as well as trajectory were  tracked using the probe's eye view to avoid any offending vessels or entry into any sulci.    Using hair clippers, his hair over the right frontal area was clipped using a surgical clipper.  Sclerae was prepped and draped in routine sterile fashion.  The patient was then prepped and draped in routine sterile fashion.  Timeout was performed confirming the patient's identity, procedure to be performed, side of surgery, and site of surgery, he was identified.  Imaging corresponding with the patient registration and administration of perioperative prophylactic antibiotics occurred.  Right-sided GPi placed and microelectrode recording.    Targeting right GPi:    Using the stereotactic targeting apparatus, target location were then transferred to the frame as well as the phantom base to confirm accuracy.  This was confirmed and found to be in good position.  The CRW was then attached top of the sterile drapes, ensuring where we ensured Ioban had not entered the holes to accept the CRW frame.  These were then locked into position using the 3 points of fixation.  It was confirmed to be adequately positioned.  Entry point on the scalp was subsequently marked on the right side.  Given his prior incision was quite small, we attempted to slightly extend his prior incision by using the outer edge with a skin knife after injecting local anesthetic.  1% lidocaine with epinephrine was used.  The incision was subsequently carried down through the galea but above the pericranium.  Hemostasis was obtained using bipolar cautery.  Skin flap was subsequently slowly peeled back and a Vicryl suture was used to pin the skin flap to reflect it posteriorly.  Using blunt dissection, a small pocket was created posteriorly for later use.    At this time, the stereotactic apparatus was then used to position to show us our entry through the pericranium and for planned site of the bur hole. Monopolar cautery was used to burn the area of the  pericranium to accept an entry.  A small hole was made, a size larger than our intended bur hole.  The pericranial tissue was then removed. Using a #5 cutter, bur hole was created over the entry point to expose the dura.  The area was vigorously irrigated.  Small bone bleeders were seen.  Bone wax was used using a small [place] to achieve adequate hemostasis.  The dura was subsequently coagulated with bipolar cautery for hemostasis and again no active bleeding was noted.    At this time, the electrode fixation cover was placed to the skull using 2 screws.  Care was taken to overlap the pericranium over the edge of the cover to provide a smooth tissue transition.  The microelectrode drive was attached to the targeting apparatus.  The entry again was checked into the dura. A #11 blade was used to make a small cruciate incision through the dura.  Bipolar cautery was used to shrink the dural leaflets back.  Once seen, a small window for entry was visible.  Bipolar cautery was then used to coagulate the evelyn and our intended corticectomy.  An #11 blade was again used to open up in a small cruciate manner to accept our cannulas.  Bipolar cautery was used to expand the corticectomy.  No active bleeding was noted. At this time, the electrode guide tubes were inserted slowly anterior to the implant positions, which were the anterior and lateral tracts.  These were slowly advanced until they were inserted at which point tips were at the level above the target.  No abnormal resistance was present.  DuraSeal was used on entry to provide a seal to minimize CSF leak and air entry.  Microelectrodes were brought and placed on the guide tube for  intraoperative recording.  The patient was subsequently awakened. Neurology department participated in the recording and neurological testing via microelectrode recording and testing. Detailed notes were taken with electrophysiologic data, neurologic exam as well as a stimulation effects.   Using a MicroDrive, the electrodes were slowly advanced towards the target collecting microelectrode recording data for mapping throughout the tract.  Based on CAMILLE data, the site of the anterior tract with a 30 degree clockwise rotation would likely be the best place for the final electrode position.  DBS electrode was brought into the field.  The electrode was placed 0.5 mm below the target.  The electrode placement was adequate with good response and minimal side effects.  X-ray was then called to the room.  The guide tubes were removed after satisfactory placement of the final DBS electrode.  DuraSeal was used to preserve and provided a seal to minimize CSF leak and air entry. The electrode demonstrated good impedance values.      After satisfactory testing of electrode position and stimulation parameters, the electrode was secured at this location.  The area was generously irrigated.  Hemostasis was obtained by bipolar cautery.  Intermittent fluoroscopy was obtained to ensure any manipulation of the field did not move the electrode.  Electrode tip seemed to be slightly below target as expected.  The cap cover was finally placed and secured in place.  Fluoroscopy confirmed that the tip of the electrode did not change in position.  The electrode was covered with a protective covering/cap and was inserted into the subgaleal space to create a pocket.     Galea layer was reapproximated using 3-0 Vicryl sutures.  Skin was reapproximated using 3-0 nylon in a running fashion.  The wound was cleaned, dried, prepped with ChloraPrep.  Sterile Primapore dressing was applied.    First, the stereotactic targeting apparatus was removed.  Sterile drapes were removed.  The patient was taken out of the CRW head frame and four pin sites were clean and dry.  No active bleeding noted.  Bacitracin ointment was applied to all pin sites.  Small Primapore dressing was applied to the pin sites.    The patient was further awakened and  taken to the recovery room in stable condition.  At the end of the case, all counts including needle, sponge, and instrument counts were correct.  There were no complications.  Dr. Coleman was present for the entirety of the procedure.    Ravinder Coleman M.D.    As Dictated by JUANJO CONNELL MD    Physician Attestation   I was present for the entire procedure between opening and closing.    Ravinder Coleman  Date of Service (when I saw the patient): 22        D: 2022   T: 2022   MT: [email or web address]    Name:     KYMBERLY CULPJosué  MRN:      [id number]        Account:        [id number]   :      2000           Procedure Date: 2022     Document: [id number]

## 2022-04-19 NOTE — ANESTHESIA CARE TRANSFER NOTE
Patient: Bradley Garcia    Procedure: Procedure(s):  Deep brain stimulator placement, phase II, placement of deep brain stimulator generator/battery over the right chest wall       Diagnosis: Dystonia [G24.9]  Diagnosis Additional Information: No value filed.    Anesthesia Type:   General     Note:    Oropharynx: oropharynx clear of all foreign objects and spontaneously breathing  Level of Consciousness: awake  Oxygen Supplementation: face mask  Level of Supplemental Oxygen (L/min / FiO2): 6  Independent Airway: airway patency satisfactory and stable  Dentition: dentition unchanged  Vital Signs Stable: post-procedure vital signs reviewed and stable  Report to RN Given: handoff report given  Patient transferred to: PACU    Handoff Report: Identifed the Patient, Identified the Reponsible Provider, Reviewed the pertinent medical history, Discussed the surgical course, Reviewed Intra-OP anesthesia mangement and issues during anesthesia, Set expectations for post-procedure period and Allowed opportunity for questions and acknowledgement of understanding      Vitals:  Vitals Value Taken Time   BP     Temp     Pulse 94 04/18/22 1928   Resp 15 04/18/22 1928   SpO2 99 % 04/18/22 1928   Vitals shown include unvalidated device data.    Electronically Signed By: Belén Beltrán MD  April 18, 2022  7:29 PM

## 2022-04-19 NOTE — ANESTHESIA POSTPROCEDURE EVALUATION
Patient: Bradley Garcia    Procedure: Procedure(s):  Deep brain stimulator placement, phase II, placement of deep brain stimulator generator/battery over the right chest wall       Anesthesia Type:  General    Note:  Disposition: Outpatient   Postop Pain Control: Uneventful            Sign Out: Well controlled pain   PONV: No   Neuro/Psych: Uneventful            Sign Out: Acceptable/Baseline neuro status   Airway/Respiratory: Uneventful            Sign Out: Acceptable/Baseline resp. status   CV/Hemodynamics: Uneventful            Sign Out: Acceptable CV status; No obvious hypovolemia; No obvious fluid overload   Other NRE: NONE   DID A NON-ROUTINE EVENT OCCUR? No           Last vitals:  Vitals Value Taken Time   /75 04/18/22 2015   Temp 36.5  C (97.7  F) 04/18/22 2000   Pulse 79 04/18/22 2015   Resp 14 04/18/22 2015   SpO2 97 % 04/18/22 2021   Vitals shown include unvalidated device data.    Electronically Signed By: VIVI FLEMING MD  April 18, 2022  11:12 PM

## 2022-04-19 NOTE — PROVIDER NOTIFICATION
Dr. Livingston notified, ok for sign-out for patient. Patient ok to have another dose of tylenol, telephone order placed.

## 2022-04-19 NOTE — BRIEF OP NOTE
[facility]    Brief Operative Note    Pre-operative diagnosis: Dystonia [G24.9]  Post-operative diagnosis Same as pre-operative diagnosis    Procedure: Procedure(s):  Deep brain stimulator placement, phase II, placement of deep brain stimulator generator/battery over the right chest wall  Surgeon: Surgeon(s) and Role:     * Ravinder Coleman MD - Primary     * Danielle Jimenez MD - Resident - Assisting  Anesthesia: General   Estimated Blood Loss: 25cc    Drains: None  Specimens: * No specimens in log *  Findings:   None.  Complications: None.  Implants:   Implant Name Type Inv. Item Serial No.  Lot No. LRB No. Used Action   sensight connector plug   [id number]  [id number] Right 1 Implanted   sensight extension kit   [id number]  [id number] Right 1 Implanted   sensight extension kit    [id number]  [id number] Right 1 Implanted   activa RC multiprogram rechargebale neurostimulator for deep brain stimulation   [id number]  [id number] Right 1 Implanted

## 2022-04-19 NOTE — DISCHARGE INSTRUCTIONS
[facility]   Adult Discharge Orders & Instructions     For 24 hours after surgery    Get plenty of rest.  A responsible adult must stay with you for at least 24 hours after you leave the hospital.   Do not drive or use heavy equipment.  If you have weakness or tingling, don't drive or use heavy equipment until this feeling goes away.  Do not drink alcohol.  Avoid strenuous or risky activities.  Ask for help when climbing stairs.   You may feel lightheaded.  IF so, sit for a few minutes before standing.  Have someone help you get up.   If you have nausea (feel sick to your stomach): Drink only clear liquids such as apple juice, ginger ale, broth or 7-Up.  Rest may also help.  Be sure to drink enough fluids.  Move to a regular diet as you feel able.  You may have a slight fever. Call the doctor if your fever is over 100 F (37.7 C) (taken under the tongue) or lasts longer than 24 hours.  You may have a dry mouth, a sore throat, muscle aches or trouble sleeping.  These should go away after 24 hours.  Do not make important or legal decisions.   Call your doctor for any of the followin.  Signs of infection (fever, growing tenderness at the surgery site, a large amount of drainage or bleeding, severe pain, foul-smelling drainage, redness, swelling).    2. It has been over 8 to 10 hours since surgery and you are still not able to urinate (pass water).    3.  Headache for over 24 hours.    4.  Numbness, tingling or weakness the day after surgery (if you had spinal anesthesia).

## 2022-04-20 ENCOUNTER — PATIENT OUTREACH (OUTPATIENT)
Dept: NEUROSURGERY | Facility: CLINIC | Age: 22
End: 2022-04-20
Payer: COMMERCIAL

## 2022-04-20 NOTE — PROGRESS NOTES
Pt s/p placement of DBS generator over the right chest wall by Dr. Coleman on 4/18/22. Left VM checking on pt's postop status and requested that he call me at his earliest convenience.

## 2022-04-20 NOTE — OP NOTE
PATIENT NAME: Bradley Garcia  YOB: 2000  MRN:   [id number]  ACCOUNT:  [id number]  AGE: 21 year old     DATE OF SERVICE:  4/18/2022     PREOPERATIVE DIAGNOSIS:   1. Dystonia [G24.9]     POSTOPERATIVE DIAGNOSIS:    1.  Same as pre-operative diagnosis     PROCEDURES PERFORMED  1.  Deep brain stimulator placement, phase II, placement of new deep brain stimulator generator/battery over the right chest wall.  2.  Placement of one extension wire and connection of the right side deep brain stimulator electrode, one electrode array, to the new generator/battery.  3.  Electrical analysis of the deep brain stimulator system.    Procedure(s):  Deep brain stimulator placement, phase II, placement of deep brain stimulator generator/battery over the right chest wall     ATTENDING SURGEON: Ravinder Coleman MD     RESIDENT SURGEON:  Danielle Jimenez MD     ANESTHESIA:  General anesthesia.     ESTIMATED BLOOD LOSS:  25cc mL.     COMPLICATIONS:  None.     IMPLANT:  sensight connector plug     [id number]   [id number] Right 1 Implanted   sensight extension kit     [id number]   [id number] Right 1 Implanted   sensight extension kit      [id number]   [id number] Right 1 Implanted   activa RC multiprogram rechargebale neurostimulator for deep brain stimulation     [id number]   [id number] Right 1 Implanted              INDICATIONS FOR PROCEDURE: After discussing treatment options, he elected to proceed with above procedure understanding the risks of procedure, infection, damage to vessels or nerves, and risks inherent to anesthesia.     DESCRIPTION OF PROCEDURE:  The patient was taken to the operating theater and positioned supine on the operating room table.  All pressure points were appropriately padded.  With placement of the endotracheal tube, general endotracheal anesthesia was induced. his head was turned to the left side, thus exposing the right parietal area of the head.  The previously implanted connector portion of the stimulating  electrode from the right side could be palpated. A small area of hair was removed over this palpable connector using a surgical hair clipper. Then the right side of the head, neck, and chest area was prepped and draped in normal sterile fashion.  Local anesthetic was injected in the areas of intended incision at the right scalp and right chest wall as well as along the path of the intended tunneling. 1% lidocaine with epinephrine mixed with 0.25% Marcaine plain in a 50:50 combination was used.  A timeout was performed confirming the patient's identity, procedure to be performed, site and side of surgery and administration of preoperative prophylactic antibiotics.     Attention was turned to the head.  A #10 blade scalpel was used to make a 2 cm C-shaped skin incision at the distal end of the connector that was palpated in his scalp.  Hemostasis was achieved with bipolar cautery.  The incision was carried down to the pericranium and through fascial and muscle layer down to the bone. At that point, a 5 mm round cutting fernandez was used to drill out a small trough in the bone to accommodate the extension wire connector.  The distal end of the right brain electrode was found and pulled out gently into the incision using a mosquito.  This was found to be fully intact.  At that point, a pocket was made using a Christen clamp down toward behind the ear into the nuchal line.  This was in preparation for tunneling of the extension wire.       At that point, attention was turned to the right chest wall area.  A #10 blade scalpel was used to make a 6 cm incision on the right chest wall.  A #10 blade scalpel was used to finish dissection down to the proper plane.  A dissecting plane was found superficial to the pectoralis muscle.  A combination of blunt dissection as well as the monopolar cautery was used to establish a subcutaneous pocket about 3 fingerbreadths wide and deep enough to encompass the full length of the fingers.   Hemostasis was achieved with monopolar cautery.  The pocket was copiously irrigated with antibiotic-impregnated saline and sponge was placed in the pocket.       At this point, a tunneler was brought into the field.  We tunneled a passage from the head incision to the chest wall incision.  Subsequently, care was taken to stay superficial and the path of the tunneler was confirmed to be over the clavicle.  The tunneler ware removed, leaving behind a plastic sheath.  Two extension wires were passed from the chest incision to the head incision. Subsequently, the plastic sheath was pulled out from the chest incision, leaving behind the tunneled extension wires.      We then proceeded to make the connection between the right intracranial electrode and both extension wires to ensure that both extension wires were functioning. The protective covering was removed from the connector portion of the stimulating electrode.  The contacts were inspected to be clean and without damage.  Then, the stimulating electrode was inserted into the extension wire connection intended for the eventual left side electrode.  The connection was secured with screwdriver and impedances were checked which confirmed a functioning extension wire. This extension wire was then removed, plugged with a plastic plug and buried towards the left parietal region for his combined phase I/II in a month. Then, the process above was repeated for the opposite extension wire. Again, impedances were checked and the right side lead and extension wire were confirmed to be functioning normally. The extension wires were gently pulled from the chest incision and the excess wire length towards the head was also buried superiorly until the connectors were within the incision.  Then, the connector was buried further superiorly in the incision until it was no longer directly under the incision.     At this time, a new Activa RC generator/battery was brought onto the field.   The extension wires were then cleaned at their contacts and inserted into the generator/battery portal.  These were secured with a screwdriver.  Therefore, two electrode arrays were connected to the new generator/battery.  The previously placed sponge within the pocket was removed and hemostasis was confirmed.  Then, the new generator/battery with the excess wires being placed posterior to the generator/battery was placed within the right chest wall pocket that was created previously.  The entire apparatus fit into the pocket comfortably.      The system was tested electrically.  All the impedance values of the right stimulating electrode were within normal limits.  No problems were found with the system.     With the satisfactory placement of the new system, we began closing the wound.  The right chest incision was closed in the following manner.  The deep pocket was reapproximated using 3-0 Vicryl sutures in an inverted interrupted fashion.  The subcutaneous fat layer was reapproximated using 3-0 Vicryl sutures in an inverted interrupted fashion.  The dermal layer was reapproximated using 3-0 Vicryl sutures in an inverted interrupted fashion.  The skin was reapproximated using 4-0 Monocryl suture in a subcuticular fashion.  The right parietal/posterior auricular incision was irrigated with antibiotic solution and dried.  Meticulous hemostasis was obtained.  It was then closed in the following manner.  The galea was reapproximated over the implanted hardware using 3-0 Vicryl sutures in an inverted interrupted fashion.  This provided a protective layer.  The fascial layer above it was subsequently closed with interrupted 3-0 vicryl sutures as well. The skin was then reapproximated using 4-0 Monocryl suture in a running fashion.  Both wounds were cleaned and dried.  ChoraPrep was used to clean the incisions again.  Steristrips were applied over the incision of the chest wall with Telfa on top.      At the end of the  case, all counts including needle, sponge and instrument counts were correct x 2.  There were no complications.  Patient was extubated and taken to the recovery room in a stable condition.       Dr. Coleman, Neurosurgery Attending, was present and scrubbed for the entire case and performed the key and critical portions of the case.     Danielle Jimenez MD  Neurosurgery Resident PGY-2    Physician Attestation   I was present for the entire procedure between opening and closing.    Ravinder Coleman MD  Date of Service (when I saw the patient): 4/18/22

## 2022-04-29 ENCOUNTER — LAB (OUTPATIENT)
Dept: LAB | Facility: CLINIC | Age: 22
End: 2022-04-29
Payer: COMMERCIAL

## 2022-04-29 DIAGNOSIS — Z11.59 ENCOUNTER FOR SCREENING FOR OTHER VIRAL DISEASES: ICD-10-CM

## 2022-04-29 PROCEDURE — U0005 INFEC AGEN DETEC AMPLI PROBE: HCPCS

## 2022-04-29 PROCEDURE — U0003 INFECTIOUS AGENT DETECTION BY NUCLEIC ACID (DNA OR RNA); SEVERE ACUTE RESPIRATORY SYNDROME CORONAVIRUS 2 (SARS-COV-2) (CORONAVIRUS DISEASE [COVID-19]), AMPLIFIED PROBE TECHNIQUE, MAKING USE OF HIGH THROUGHPUT TECHNOLOGIES AS DESCRIBED BY CMS-2020-01-R: HCPCS

## 2022-04-30 LAB — SARS-COV-2 RNA RESP QL NAA+PROBE: NEGATIVE

## 2022-05-02 ENCOUNTER — APPOINTMENT (OUTPATIENT)
Dept: GENERAL RADIOLOGY | Facility: CLINIC | Age: 22
DRG: 027 | End: 2022-05-02
Attending: NEUROLOGICAL SURGERY
Payer: COMMERCIAL

## 2022-05-02 ENCOUNTER — APPOINTMENT (OUTPATIENT)
Dept: GENERAL RADIOLOGY | Facility: CLINIC | Age: 22
DRG: 027 | End: 2022-05-02
Attending: NURSE PRACTITIONER
Payer: COMMERCIAL

## 2022-05-02 ENCOUNTER — HOSPITAL ENCOUNTER (INPATIENT)
Facility: CLINIC | Age: 22
LOS: 1 days | Discharge: HOME | DRG: 027 | End: 2022-05-03
Attending: NEUROLOGICAL SURGERY | Admitting: NEUROLOGICAL SURGERY
Payer: COMMERCIAL

## 2022-05-02 ENCOUNTER — HOSPITAL ENCOUNTER (OUTPATIENT)
Dept: CT IMAGING | Facility: CLINIC | Age: 22
Discharge: HOME | DRG: 027 | End: 2022-05-02
Attending: NEUROLOGICAL SURGERY | Admitting: NEUROLOGICAL SURGERY
Payer: COMMERCIAL

## 2022-05-02 ENCOUNTER — APPOINTMENT (OUTPATIENT)
Dept: CT IMAGING | Facility: CLINIC | Age: 22
DRG: 027 | End: 2022-05-02
Attending: NURSE PRACTITIONER
Payer: COMMERCIAL

## 2022-05-02 ENCOUNTER — ANESTHESIA (OUTPATIENT)
Dept: SURGERY | Facility: CLINIC | Age: 22
DRG: 027 | End: 2022-05-02
Payer: COMMERCIAL

## 2022-05-02 DIAGNOSIS — G24.9 DYSTONIA: ICD-10-CM

## 2022-05-02 DIAGNOSIS — G24.9 DYSTONIA: Primary | ICD-10-CM

## 2022-05-02 LAB
ANION GAP SERPL CALCULATED.3IONS-SCNC: 6 MMOL/L (ref 3–14)
APTT PPP: 29 SECONDS (ref 22–38)
BUN SERPL-MCNC: 15 MG/DL (ref 7–30)
CALCIUM SERPL-MCNC: 9.4 MG/DL (ref 8.5–10.1)
CHLORIDE BLD-SCNC: 106 MMOL/L (ref 94–109)
CO2 SERPL-SCNC: 27 MMOL/L (ref 20–32)
CREAT SERPL-MCNC: 0.82 MG/DL (ref 0.66–1.25)
ERYTHROCYTE [DISTWIDTH] IN BLOOD BY AUTOMATED COUNT: 11.9 % (ref 10–15)
GFR SERPL CREATININE-BSD FRML MDRD: >90 ML/MIN/1.73M2
GLUCOSE BLD-MCNC: 98 MG/DL (ref 70–99)
GLUCOSE BLDC GLUCOMTR-MCNC: 85 MG/DL (ref 70–99)
HCT VFR BLD AUTO: 46.9 % (ref 40–53)
HGB BLD-MCNC: 16.1 G/DL (ref 13.3–17.7)
INR PPP: 1.03 (ref 0.85–1.15)
MCH RBC QN AUTO: 28.8 PG (ref 26.5–33)
MCHC RBC AUTO-ENTMCNC: 34.3 G/DL (ref 31.5–36.5)
MCV RBC AUTO: 84 FL (ref 78–100)
PLATELET # BLD AUTO: 283 10E3/UL (ref 150–450)
POTASSIUM BLD-SCNC: 3.9 MMOL/L (ref 3.4–5.3)
RBC # BLD AUTO: 5.59 10E6/UL (ref 4.4–5.9)
SODIUM SERPL-SCNC: 139 MMOL/L (ref 133–144)
WBC # BLD AUTO: 6.1 10E3/UL (ref 4–11)

## 2022-05-02 PROCEDURE — 250N000011 HC RX IP 250 OP 636: Performed by: ANESTHESIOLOGY

## 2022-05-02 PROCEDURE — 250N000013 HC RX MED GY IP 250 OP 250 PS 637: Performed by: NURSE PRACTITIONER

## 2022-05-02 PROCEDURE — C1778 LEAD, NEUROSTIMULATOR: HCPCS | Performed by: NEUROLOGICAL SURGERY

## 2022-05-02 PROCEDURE — 00H03MZ INSERTION OF NEUROSTIMULATOR LEAD INTO BRAIN, PERCUTANEOUS APPROACH: ICD-10-PCS | Performed by: NEUROLOGICAL SURGERY

## 2022-05-02 PROCEDURE — 370N000017 HC ANESTHESIA TECHNICAL FEE, PER MIN: Performed by: NEUROLOGICAL SURGERY

## 2022-05-02 PROCEDURE — 710N000010 HC RECOVERY PHASE 1, LEVEL 2, PER MIN: Performed by: NEUROLOGICAL SURGERY

## 2022-05-02 PROCEDURE — 80048 BASIC METABOLIC PNL TOTAL CA: CPT | Performed by: STUDENT IN AN ORGANIZED HEALTH CARE EDUCATION/TRAINING PROGRAM

## 2022-05-02 PROCEDURE — 250N000011 HC RX IP 250 OP 636: Performed by: STUDENT IN AN ORGANIZED HEALTH CARE EDUCATION/TRAINING PROGRAM

## 2022-05-02 PROCEDURE — 85730 THROMBOPLASTIN TIME PARTIAL: CPT | Performed by: STUDENT IN AN ORGANIZED HEALTH CARE EDUCATION/TRAINING PROGRAM

## 2022-05-02 PROCEDURE — 70250 X-RAY EXAM OF SKULL: CPT | Mod: 26 | Performed by: RADIOLOGY

## 2022-05-02 PROCEDURE — C1713 ANCHOR/SCREW BN/BN,TIS/BN: HCPCS | Performed by: NEUROLOGICAL SURGERY

## 2022-05-02 PROCEDURE — 272N000001 HC OR GENERAL SUPPLY STERILE: Performed by: NEUROLOGICAL SURGERY

## 2022-05-02 PROCEDURE — 999N000179 XR SURGERY CARM FLUORO LESS THAN 5 MIN W STILLS: Mod: TC

## 2022-05-02 PROCEDURE — 999N000065 XR SKULL PORT 1/3 VIEWS

## 2022-05-02 PROCEDURE — 70450 CT HEAD/BRAIN W/O DYE: CPT

## 2022-05-02 PROCEDURE — 360N000079 HC SURGERY LEVEL 6, PER MIN: Performed by: NEUROLOGICAL SURGERY

## 2022-05-02 PROCEDURE — 85027 COMPLETE CBC AUTOMATED: CPT | Performed by: STUDENT IN AN ORGANIZED HEALTH CARE EDUCATION/TRAINING PROGRAM

## 2022-05-02 PROCEDURE — 250N000009 HC RX 250: Performed by: ANESTHESIOLOGY

## 2022-05-02 PROCEDURE — 120N000002 HC R&B MED SURG/OB UMMC

## 2022-05-02 PROCEDURE — 272N000004 HC RX 272: Performed by: NEUROLOGICAL SURGERY

## 2022-05-02 PROCEDURE — 250N000009 HC RX 250: Performed by: NEUROLOGICAL SURGERY

## 2022-05-02 PROCEDURE — 95961 ELECTRODE STIMULATION BRAIN: CPT | Mod: 26 | Performed by: PSYCHIATRY & NEUROLOGY

## 2022-05-02 PROCEDURE — 8E09XBH COMPUTER ASSISTED PROCEDURE OF HEAD AND NECK REGION, WITH MAGNETIC RESONANCE IMAGING: ICD-10-PCS | Performed by: NEUROLOGICAL SURGERY

## 2022-05-02 PROCEDURE — 00K73ZZ MAP CEREBRAL HEMISPHERE, PERCUTANEOUS APPROACH: ICD-10-PCS | Performed by: NEUROLOGICAL SURGERY

## 2022-05-02 PROCEDURE — 61867 IMPLANT NEUROELECTRODE: CPT | Mod: 58 | Performed by: NEUROLOGICAL SURGERY

## 2022-05-02 PROCEDURE — 85610 PROTHROMBIN TIME: CPT | Performed by: STUDENT IN AN ORGANIZED HEALTH CARE EDUCATION/TRAINING PROGRAM

## 2022-05-02 PROCEDURE — 272N000002 HC OR SUPPLY OTHER OPNP: Performed by: NEUROLOGICAL SURGERY

## 2022-05-02 PROCEDURE — 70450 CT HEAD/BRAIN W/O DYE: CPT | Mod: 77

## 2022-05-02 PROCEDURE — 999N000141 HC STATISTIC PRE-PROCEDURE NURSING ASSESSMENT: Performed by: NEUROLOGICAL SURGERY

## 2022-05-02 PROCEDURE — 36415 COLL VENOUS BLD VENIPUNCTURE: CPT | Performed by: STUDENT IN AN ORGANIZED HEALTH CARE EDUCATION/TRAINING PROGRAM

## 2022-05-02 PROCEDURE — 8E09XBG COMPUTER ASSISTED PROCEDURE OF HEAD AND NECK REGION, WITH COMPUTERIZED TOMOGRAPHY: ICD-10-PCS | Performed by: NEUROLOGICAL SURGERY

## 2022-05-02 PROCEDURE — 258N000003 HC RX IP 258 OP 636: Performed by: ANESTHESIOLOGY

## 2022-05-02 PROCEDURE — 95962 ELECTRODE STIM BRAIN ADD-ON: CPT | Mod: 26 | Performed by: PSYCHIATRY & NEUROLOGY

## 2022-05-02 PROCEDURE — 70450 CT HEAD/BRAIN W/O DYE: CPT | Mod: 26 | Performed by: RADIOLOGY

## 2022-05-02 PROCEDURE — 250N000011 HC RX IP 250 OP 636: Performed by: NEUROLOGICAL SURGERY

## 2022-05-02 DEVICE — IMP PLATE SYN BOX LOW PROFILE 04H TI 421.511: Type: IMPLANTABLE DEVICE | Site: CRANIAL | Status: FUNCTIONAL

## 2022-05-02 DEVICE — IMP SCR SYN MATRIX LOW PRO 1.5X04MM SELF DRILL 04.503.104.01: Type: IMPLANTABLE DEVICE | Site: CRANIAL | Status: FUNCTIONAL

## 2022-05-02 DEVICE — IMPLANTABLE DEVICE: Type: IMPLANTABLE DEVICE | Site: CRANIAL | Status: FUNCTIONAL

## 2022-05-02 RX ORDER — ONDANSETRON 4 MG/1
4 TABLET, FILM COATED ORAL EVERY 8 HOURS PRN
Status: DISCONTINUED | OUTPATIENT
Start: 2022-05-02 | End: 2022-05-03 | Stop reason: HOSPADM

## 2022-05-02 RX ORDER — OXYCODONE HYDROCHLORIDE 5 MG/1
5 TABLET ORAL EVERY 4 HOURS PRN
Status: DISCONTINUED | OUTPATIENT
Start: 2022-05-02 | End: 2022-05-03 | Stop reason: HOSPADM

## 2022-05-02 RX ORDER — FENTANYL CITRATE 50 UG/ML
25 INJECTION, SOLUTION INTRAMUSCULAR; INTRAVENOUS EVERY 5 MIN PRN
Status: DISCONTINUED | OUTPATIENT
Start: 2022-05-02 | End: 2022-05-02 | Stop reason: HOSPADM

## 2022-05-02 RX ORDER — METHYLPHENIDATE HYDROCHLORIDE 36 MG/1
36 TABLET ORAL EVERY MORNING
Status: DISCONTINUED | OUTPATIENT
Start: 2022-05-03 | End: 2022-05-03 | Stop reason: HOSPADM

## 2022-05-02 RX ORDER — NALOXONE HYDROCHLORIDE 0.4 MG/ML
0.4 INJECTION, SOLUTION INTRAMUSCULAR; INTRAVENOUS; SUBCUTANEOUS
Status: DISCONTINUED | OUTPATIENT
Start: 2022-05-02 | End: 2022-05-03 | Stop reason: HOSPADM

## 2022-05-02 RX ORDER — HYDROMORPHONE HCL IN WATER/PF 6 MG/30 ML
0.2 PATIENT CONTROLLED ANALGESIA SYRINGE INTRAVENOUS EVERY 5 MIN PRN
Status: DISCONTINUED | OUTPATIENT
Start: 2022-05-02 | End: 2022-05-02 | Stop reason: HOSPADM

## 2022-05-02 RX ORDER — SODIUM CHLORIDE, SODIUM LACTATE, POTASSIUM CHLORIDE, CALCIUM CHLORIDE 600; 310; 30; 20 MG/100ML; MG/100ML; MG/100ML; MG/100ML
INJECTION, SOLUTION INTRAVENOUS CONTINUOUS
Status: DISCONTINUED | OUTPATIENT
Start: 2022-05-02 | End: 2022-05-02 | Stop reason: HOSPADM

## 2022-05-02 RX ORDER — SODIUM CHLORIDE, SODIUM LACTATE, POTASSIUM CHLORIDE, CALCIUM CHLORIDE 600; 310; 30; 20 MG/100ML; MG/100ML; MG/100ML; MG/100ML
INJECTION, SOLUTION INTRAVENOUS CONTINUOUS PRN
Status: DISCONTINUED | OUTPATIENT
Start: 2022-05-02 | End: 2022-05-02

## 2022-05-02 RX ORDER — DEXMEDETOMIDINE HYDROCHLORIDE 4 UG/ML
INJECTION, SOLUTION INTRAVENOUS PRN
Status: DISCONTINUED | OUTPATIENT
Start: 2022-05-02 | End: 2022-05-02

## 2022-05-02 RX ORDER — POLYETHYLENE GLYCOL 3350 17 G/17G
17 POWDER, FOR SOLUTION ORAL DAILY
Status: DISCONTINUED | OUTPATIENT
Start: 2022-05-03 | End: 2022-05-03 | Stop reason: HOSPADM

## 2022-05-02 RX ORDER — LIDOCAINE 40 MG/G
CREAM TOPICAL
Status: DISCONTINUED | OUTPATIENT
Start: 2022-05-02 | End: 2022-05-02 | Stop reason: HOSPADM

## 2022-05-02 RX ORDER — NALOXONE HYDROCHLORIDE 0.4 MG/ML
0.2 INJECTION, SOLUTION INTRAMUSCULAR; INTRAVENOUS; SUBCUTANEOUS
Status: DISCONTINUED | OUTPATIENT
Start: 2022-05-02 | End: 2022-05-03 | Stop reason: HOSPADM

## 2022-05-02 RX ORDER — ACETAMINOPHEN 325 MG/1
650 TABLET ORAL EVERY 4 HOURS PRN
Status: DISCONTINUED | OUTPATIENT
Start: 2022-05-05 | End: 2022-05-03 | Stop reason: HOSPADM

## 2022-05-02 RX ORDER — LABETALOL HYDROCHLORIDE 5 MG/ML
10-40 INJECTION, SOLUTION INTRAVENOUS EVERY 10 MIN PRN
Status: DISCONTINUED | OUTPATIENT
Start: 2022-05-02 | End: 2022-05-03 | Stop reason: HOSPADM

## 2022-05-02 RX ORDER — PROPOFOL 10 MG/ML
INJECTION, EMULSION INTRAVENOUS CONTINUOUS PRN
Status: DISCONTINUED | OUTPATIENT
Start: 2022-05-02 | End: 2022-05-02

## 2022-05-02 RX ORDER — DEXMEDETOMIDINE HYDROCHLORIDE 4 UG/ML
.1-1.2 INJECTION, SOLUTION INTRAVENOUS CONTINUOUS
Status: DISCONTINUED | OUTPATIENT
Start: 2022-05-02 | End: 2022-05-02 | Stop reason: HOSPADM

## 2022-05-02 RX ORDER — ONDANSETRON 2 MG/ML
INJECTION INTRAMUSCULAR; INTRAVENOUS PRN
Status: DISCONTINUED | OUTPATIENT
Start: 2022-05-02 | End: 2022-05-02

## 2022-05-02 RX ORDER — LIDOCAINE 40 MG/G
CREAM TOPICAL
Status: DISCONTINUED | OUTPATIENT
Start: 2022-05-02 | End: 2022-05-03 | Stop reason: HOSPADM

## 2022-05-02 RX ORDER — ONDANSETRON 2 MG/ML
4 INJECTION INTRAMUSCULAR; INTRAVENOUS EVERY 30 MIN PRN
Status: DISCONTINUED | OUTPATIENT
Start: 2022-05-02 | End: 2022-05-02 | Stop reason: HOSPADM

## 2022-05-02 RX ORDER — CEFAZOLIN SODIUM 2 G/100ML
2 INJECTION, SOLUTION INTRAVENOUS EVERY 8 HOURS
Status: COMPLETED | OUTPATIENT
Start: 2022-05-02 | End: 2022-05-03

## 2022-05-02 RX ORDER — ONDANSETRON 2 MG/ML
4 INJECTION INTRAMUSCULAR; INTRAVENOUS EVERY 6 HOURS PRN
Status: DISCONTINUED | OUTPATIENT
Start: 2022-05-02 | End: 2022-05-03 | Stop reason: HOSPADM

## 2022-05-02 RX ORDER — PROPOFOL 10 MG/ML
INJECTION, EMULSION INTRAVENOUS PRN
Status: DISCONTINUED | OUTPATIENT
Start: 2022-05-02 | End: 2022-05-02

## 2022-05-02 RX ORDER — ACETAMINOPHEN 325 MG/1
325-650 TABLET ORAL EVERY 6 HOURS PRN
Status: DISCONTINUED | OUTPATIENT
Start: 2022-05-02 | End: 2022-05-03 | Stop reason: HOSPADM

## 2022-05-02 RX ORDER — OXYCODONE HYDROCHLORIDE 5 MG/1
5 TABLET ORAL EVERY 4 HOURS PRN
Status: DISCONTINUED | OUTPATIENT
Start: 2022-05-02 | End: 2022-05-02 | Stop reason: HOSPADM

## 2022-05-02 RX ORDER — CEFAZOLIN SODIUM 2 G/100ML
2 INJECTION, SOLUTION INTRAVENOUS EVERY 8 HOURS
Status: DISCONTINUED | OUTPATIENT
Start: 2022-05-02 | End: 2022-05-02

## 2022-05-02 RX ORDER — AMOXICILLIN 250 MG
1 CAPSULE ORAL 2 TIMES DAILY
Status: DISCONTINUED | OUTPATIENT
Start: 2022-05-02 | End: 2022-05-03 | Stop reason: HOSPADM

## 2022-05-02 RX ORDER — ONDANSETRON 4 MG/1
4 TABLET, ORALLY DISINTEGRATING ORAL EVERY 6 HOURS PRN
Status: DISCONTINUED | OUTPATIENT
Start: 2022-05-02 | End: 2022-05-03 | Stop reason: HOSPADM

## 2022-05-02 RX ORDER — ONDANSETRON 4 MG/1
4 TABLET, ORALLY DISINTEGRATING ORAL EVERY 30 MIN PRN
Status: DISCONTINUED | OUTPATIENT
Start: 2022-05-02 | End: 2022-05-02 | Stop reason: HOSPADM

## 2022-05-02 RX ORDER — ACETAMINOPHEN 325 MG/1
975 TABLET ORAL EVERY 8 HOURS
Status: DISCONTINUED | OUTPATIENT
Start: 2022-05-02 | End: 2022-05-03 | Stop reason: HOSPADM

## 2022-05-02 RX ORDER — SODIUM CHLORIDE 9 MG/ML
INJECTION, SOLUTION INTRAVENOUS CONTINUOUS
Status: ACTIVE | OUTPATIENT
Start: 2022-05-02 | End: 2022-05-02

## 2022-05-02 RX ORDER — LIDOCAINE HYDROCHLORIDE 20 MG/ML
JELLY TOPICAL PRN
Status: DISCONTINUED | OUTPATIENT
Start: 2022-05-02 | End: 2022-05-02 | Stop reason: HOSPADM

## 2022-05-02 RX ORDER — HYDRALAZINE HYDROCHLORIDE 20 MG/ML
10-20 INJECTION INTRAMUSCULAR; INTRAVENOUS EVERY 30 MIN PRN
Status: DISCONTINUED | OUTPATIENT
Start: 2022-05-02 | End: 2022-05-03 | Stop reason: HOSPADM

## 2022-05-02 RX ORDER — PROCHLORPERAZINE MALEATE 10 MG
10 TABLET ORAL EVERY 6 HOURS PRN
Status: DISCONTINUED | OUTPATIENT
Start: 2022-05-02 | End: 2022-05-03 | Stop reason: HOSPADM

## 2022-05-02 RX ORDER — BISACODYL 10 MG
10 SUPPOSITORY, RECTAL RECTAL DAILY PRN
Status: DISCONTINUED | OUTPATIENT
Start: 2022-05-02 | End: 2022-05-03 | Stop reason: HOSPADM

## 2022-05-02 RX ORDER — CEFAZOLIN SODIUM/WATER 2 G/20 ML
2 SYRINGE (ML) INTRAVENOUS SEE ADMIN INSTRUCTIONS
Status: DISCONTINUED | OUTPATIENT
Start: 2022-05-02 | End: 2022-05-02 | Stop reason: HOSPADM

## 2022-05-02 RX ORDER — CEFAZOLIN SODIUM/WATER 2 G/20 ML
2 SYRINGE (ML) INTRAVENOUS
Status: COMPLETED | OUTPATIENT
Start: 2022-05-02 | End: 2022-05-02

## 2022-05-02 RX ORDER — LIDOCAINE HYDROCHLORIDE AND EPINEPHRINE 10; 10 MG/ML; UG/ML
INJECTION, SOLUTION INFILTRATION; PERINEURAL PRN
Status: DISCONTINUED | OUTPATIENT
Start: 2022-05-02 | End: 2022-05-02 | Stop reason: HOSPADM

## 2022-05-02 RX ADMIN — ONDANSETRON 4 MG: 2 INJECTION INTRAMUSCULAR; INTRAVENOUS at 13:12

## 2022-05-02 RX ADMIN — Medication 8 MCG: at 13:47

## 2022-05-02 RX ADMIN — Medication 2 G: at 08:49

## 2022-05-02 RX ADMIN — PROPOFOL 30 MG: 10 INJECTION, EMULSION INTRAVENOUS at 12:21

## 2022-05-02 RX ADMIN — Medication 8 MCG: at 07:47

## 2022-05-02 RX ADMIN — PROPOFOL 20 MG: 10 INJECTION, EMULSION INTRAVENOUS at 14:31

## 2022-05-02 RX ADMIN — ACETAMINOPHEN 650 MG: 325 TABLET ORAL at 15:13

## 2022-05-02 RX ADMIN — PROPOFOL 20 MG: 10 INJECTION, EMULSION INTRAVENOUS at 07:58

## 2022-05-02 RX ADMIN — PROPOFOL 20 MG: 10 INJECTION, EMULSION INTRAVENOUS at 14:25

## 2022-05-02 RX ADMIN — SODIUM CHLORIDE, POTASSIUM CHLORIDE, SODIUM LACTATE AND CALCIUM CHLORIDE: 600; 310; 30; 20 INJECTION, SOLUTION INTRAVENOUS at 07:42

## 2022-05-02 RX ADMIN — SODIUM CHLORIDE, POTASSIUM CHLORIDE, SODIUM LACTATE AND CALCIUM CHLORIDE: 600; 310; 30; 20 INJECTION, SOLUTION INTRAVENOUS at 13:30

## 2022-05-02 RX ADMIN — Medication 2 G: at 12:49

## 2022-05-02 RX ADMIN — PROPOFOL 30 MG: 10 INJECTION, EMULSION INTRAVENOUS at 08:13

## 2022-05-02 RX ADMIN — CEFAZOLIN SODIUM 2 G: 2 INJECTION, SOLUTION INTRAVENOUS at 17:55

## 2022-05-02 RX ADMIN — OXYCODONE HYDROCHLORIDE 5 MG: 5 TABLET ORAL at 22:42

## 2022-05-02 RX ADMIN — PROPOFOL 20 MG: 10 INJECTION, EMULSION INTRAVENOUS at 14:19

## 2022-05-02 RX ADMIN — ACETAMINOPHEN 975 MG: 325 TABLET, FILM COATED ORAL at 19:19

## 2022-05-02 RX ADMIN — PROPOFOL 20 MG: 10 INJECTION, EMULSION INTRAVENOUS at 14:38

## 2022-05-02 RX ADMIN — PROPOFOL 50 MCG/KG/MIN: 10 INJECTION, EMULSION INTRAVENOUS at 07:47

## 2022-05-02 RX ADMIN — ONDANSETRON 4 MG: 2 INJECTION INTRAMUSCULAR; INTRAVENOUS at 08:08

## 2022-05-02 RX ADMIN — Medication 8 MCG: at 14:26

## 2022-05-02 RX ADMIN — DEXMEDETOMIDINE HYDROCHLORIDE 0.5 MCG/KG/HR: 400 INJECTION INTRAVENOUS at 07:48

## 2022-05-02 ASSESSMENT — ACTIVITIES OF DAILY LIVING (ADL)
ADLS_ACUITY_SCORE: 7
ADLS_ACUITY_SCORE: 4
ADLS_ACUITY_SCORE: 7
ADLS_ACUITY_SCORE: 4
ADLS_ACUITY_SCORE: 7
ADLS_ACUITY_SCORE: 4
ADLS_ACUITY_SCORE: 4
ADLS_ACUITY_SCORE: 7
ADLS_ACUITY_SCORE: 4
ADLS_ACUITY_SCORE: 7
ADLS_ACUITY_SCORE: 7
ADLS_ACUITY_SCORE: 5
ADLS_ACUITY_SCORE: 4
ADLS_ACUITY_SCORE: 4
ADLS_ACUITY_SCORE: 7
ADLS_ACUITY_SCORE: 4

## 2022-05-02 NOTE — BRIEF OP NOTE
[facility]    Brief Operative Note    Pre-operative diagnosis: Dystonia [G24.9]  Post-operative diagnosis Same as pre-operative diagnosis    Procedure: Procedure(s):  Stealth Assisted Left side deep brain stimulator placement, phase I & II combined, target left globus pallidus internus, with microelectrode recording and connection to existing generator/battery  Surgeon: Surgeon(s) and Role:     * Ravinder Coleman MD - Primary     * Danielle Jimenez MD - Resident - Assisting  Anesthesia: Combined MAC with Local   Estimated Blood Loss: 10 mL from 5/2/2022  7:42 AM to 5/2/2022  2:48 PM      Drains: None  Specimens: * No specimens in log *  Findings:   None.  Complications: None.  Implants:   Implant Name Type Inv. Item Serial No.  Lot No. LRB No. Used Action   [place] Hole Cover     MEDTRONIC [id number] Left 1 Implanted   Screw for fernandez hole cover    [employer] [id number] Left 1 Implanted   Lead kit   [id number] MEDTRONIC  Left 1 Implanted   IMP PLATE SYN BOX LOW PROFILE 04H .511 - [id number] Metallic Hardware/[place] IMP PLATE SYN BOX LOW PROFILE 04H .511  SYNTHES-STRATEC NA Left 2 Wasted   IMP PLATE SYN STRUT LOW PROFILE 6H .522 - [id number] Metallic Hardware/[place] IMP PLATE SYN STRUT LOW PROFILE 6H .522  SYNTHES-STRATEC NA Left 1 Wasted   IMP PLATE SYN BOX LOW PROFILE 04H .511 - [id number] Metallic Hardware/[place] IMP PLATE SYN BOX LOW PROFILE 04H .511  SYNTHES-STRATEC NA Left 1 Implanted   IMP SCR SYN MATRIX LOW PRO 1.5X04MM SELF DRILL 04.503.104.01 - [id number] Metallic Hardware/[place] IMP SCR SYN MATRIX LOW PRO 1.5X04MM SELF DRILL 04.503.104.01  SYNTHES-STRATEC NA Left 3 Implanted     Plan:  CT stealth  Skull XR  Ancef 3 doses

## 2022-05-02 NOTE — OP NOTE
PATIENT NAME:   Bradley Garcia  PATIENT MRN:   [id number]  PATIENT ACCOUNT:  [id number]  PATIENT YOB: 2000     NEUROSURGERY OPERATIVE REPORT     DATE OF SURGERY:      PREOPERATIVE DIAGNOSIS:  1. Cervical dystonia     POSTOPERATIVE DIAGNOSIS:  1. Same as pre-operative diagnosis     PROCEDURES PERFORMED:  1.  Placement of CRW stereotactic head frame.   2.  Stereotactic neuronavigation planning and CT of the head.   3.  Stereotactic neuronavigation using the [employer] system for surgical planning, targeting and approach.  The target is the left globus pallidus internus.  4.  Left side deep brain stimulator placement, phase I, placement of left side deep brain stimulator electrode, target is the left globus pallidus internus (GPi)  5.  Use of intraoperative microelectrode recording.   6.  Use of intraoperative fluoroscopy.      STAFF SURGEON: Ravinder Coleman MD     RESIDENT SURGEONS:   1. Danielle Jimenez MD     ANESTHESIA: Monitored anesthetic care     ESTIMATED BLOOD LOSS: 10 mL     IMPLANTS:   Implant Name Type Inv. Item Serial No.  Lot No. LRB No. Used Action   [place] Hole Cover        MEDTRONIC [id number] Left 1 Implanted   Screw for fernandez hole cover       MEDTR[employer]IC [id number] Left 1 Implanted   Lead kit     [id number] MEDTRONIC   Left 1 Implanted   IMP PLATE SYN BOX LOW PROFILE 04H .511 - [id number] Metallic Hardware/[place] IMP PLATE SYN BOX LOW PROFILE 04H .511   SYNTHES-STRATEC NA Left 2 Wasted   IMP PLATE SYN STRUT LOW PROFILE 6H .522 - [id number] Metallic Hardware/Mchenry IMP PLATE SYN STRUT LOW PROFILE 6H .522   SYNTHES-STRATEC NA Left 1 Wasted   IMP PLATE SYN BOX LOW PROFILE 04H .511 - [id number] Metallic Hardware/M[place] IMP PLATE SYN BOX LOW PROFILE 04H .511   SYNTHES-STRATEC NA Left 1 Implanted   IMP SCR SYN MATRIX LOW PRO 1.5X04MM SELF DRILL 04.503.104.01 - [id number] Metallic Hardware/Mchenry              EXPLANTS: None     DRAINS: None     FINDINGS:   2.5 mm below target with final electrode in anterior uyen gun position, impedances within normal limits     COMPLICATIONS: None     INDICATIONS: Bradley Garcia is a 21-year-old right-handed male with history of idiopathic generalized dystonia originally with bilateral GPi deep electrodes implanted in 2013 at the age of 13 at an outside hospita.  At that time, he received roughly 2-3 months of benefits.  He underwent DBS explantation on 08/24/2021 as he was not receiving benefit and it was determined that the leads could be replaced with the potential for good benefit.  He underwent a right sided Phase 1 into GPI, with subsequent Phase 2 on 4/11/2022, and 4/18 respectively. He presents today for a planned left sided phase 1 to globus pallidus internus with connection to battery on right chest wall via extension wire.  Risks and benefits were discussed with the patient and his parents and they elected to proceed.      DESCRIPTION OF PROCEDURE:    CRW head frame placement and stereotactic neuronavigation.      The patient was identified in the preoperative holding area.  Four pin sites, 2 in the front and 2 in the back of the head, were cleaned with chloraprep and were injected with local anesthetic, 1% lidocaine with epinephrine, 25 mL were used.  Then, the CRW stereotactic head frame was placed on his head with 4 pins for fixation.  Care was taken not to place the pins over the existing extension wire from previous surgeries.  Care was also taken so that the fiducial box would fit over the head and the frame.  Once the head frame was on, the fiducial box was easily placed without interference from the forehead.  The patient tolerated the procedure well.      After the CRW stereotactic head frame was placed, the patient was taken directly to the CT scanner and a CT head stereotactic protocol was obtained.  Subsequently, the patient was taken back to the operating room.  James catheter was then placed.  He was then  placed supine on the operating room bed with the CRW head frame fixated to the bed using the Cueto.  The patient was placed in the beach chair reclining position.  All pressure points were well padded.  Care was taken to make sure that the neck was in a neutral position.  The Cueto head caputo had room for manipulation in case more flexion, extension was needed throughout the case.    At this time, attention was turned to the neuronavigation imaging that was obtained.  The Stealth neuronavigation device was loaded with the CT head that had just been obtained.  The device also had an MRI of the head stereotactic protocol that was obtained prior to surgery.  CT of the head was merged with the previously obtained MRI of the brain.  The merge demonstrated good coherence/registration.  Then, using this merged image neuronavigation was used to stereotactically target the left globus pallidus internus.  The technique used was the AC-PC line localization technique to target the GPi.  Using stereotactic coordinates and the X, Y, and Z as well as the ring and arc angles for the CRW frame were obtained for the left side.  The entry into the skull as well as the trajectory of the electrode were checked with the probe's eye view to avoid any sulci, ventricles, or vascular structures.    The stereotactic coordinates were then transferred to the [facility] stereotactic targeting apparatus as well as the phantom base.  The coordinates were confirmed and double checked for accuracy.  Accuracy was also confirmed using phantom base.  The coordinates were X= -21.7, Y= +3.8, Z = -21.5, ring angle = 73.6 degrees anterior, and arc angle = 0.5 degrees to the left.      At this time, attention was turned to the patient, using a hair clipper his hair over the left frontal area was clipped using a surgical clipper. An area over the right parietal region where the leads were capped and buried was marked out and shaved as well. The nylon sutures  from his previous right frontal incision were removed with a suture removal kit. Then the surgical area was prepped and draped in routine sterile fashion.       A timeout was then performed confirming the patient's identity, procedure to be performed, side and site of surgery identified, imaging corresponding with the patient and administration perioperative prophylactic antibiotic.     Left sided electrode placement with microelectrode recording: Target left GPi.      The CRW targeting apparatus was attached to the head frame on top of the sterile drapes.  The entry point was marked on the scalp on the left side.  His prior C-shaped incision was visible and was opened on its outer edge with a skin knife, after the area was injected with local anesthetic, 1% Lidocaine with epinephrine and 1/4% Marcaine plain, 50:50 mix. A total of 6 mL of the above mentioned local anesthetic was used. The right parietal area where the leads were buried and the location of the planned incision was also injected with local anesthetic. The incision was carried down to the pericranium.  Hemostasis was obtained using monopolar and bipolar cautery.  Significant scarring from his previous surgery was noted.      At this time, the targeting apparatus was positioned and the entry point to the skull was marked.  The area of the intended fernandez hole was cleaned and pericranial tissue removed.  Then using a 5mm cutting fernandez, a fernandez hole was created over this entry point to expose the dura. The area was vigorously irrigated and bone wax used to control the bone bleeding.  Dura was coagulated with bipolar cautery for hemostasis, and again no active bleeding was noted.      At this time, the electrode fixation cover was fixed to the skull using two screws. Then, the electrode drive was attached to the targeting apparatus.  The entry into the dura was again checked.  It appeared that all positions of the Jagjit Gun electrode caputo were accessible  without any interference from the bone edge.  Then, using a #11 blade, a cruciate incision was made into the dura. A small cortical venous bleeder was encountered. Gelfoam and a cottonoid were used to control bleeding. Bipolar cautery was used to shrink the dural leaflets back. We checked the trajectory of our guide tubes and they all fit through the dural opening. Bipolar cautery was then used to coagulate the evelyn in our intended corticectomy. A #11 blade was used to open the evelyn in a cruciate fashion. Bipolar cautery was used to expand the corticectomy. No active bleeding was noted.    At this time, the electrode guide tubes were inserted into the anterior, posterior, and lateral Jagjit-gun positions and they were advanced slowly until they were fully inserted at which point the tips would be well above the target. No abnormal resistance was present. DuraSeal was used on the entry site to provide a seal and to minimize CSF leak and air entry. Then, recording microelectrodes were brought in and placed within the guide tubes and they were connected for intraoperative recording.  The patient was awakened.    The Neurology Department participated in the recording and neurological testing.  During the microelectrode recording and testing, they took detailed notes of the electrophysiologic data and neurologic exam as well as any stimulation effects.    Using a micro drive, the electrodes were slowly advanced towards the target, collecting microelectrode recording data for mapping the tract.  Throughout the tract, good quality expected recording was observed.  The guide tubes were left in place, and the microelectrodes were removed. It was decided that the anterior tract will likely be the best place for the final electrode. The final DBS electrode was brought into the field.  The DBS electrode was connected to the [employer] wireless transmitter.  It was inserted into the anterior guide tube and was placed 2.5 mm below the  target depth. The neurology team evaluated the placement of the electrode with neuro-physiological testing and found that the placement was adequate with good response, and minimal side effects.    X-ray was then called into the room with the C-arm. The guide tubes were removed after satisfactory verification of placement of the final DBS electrode.  Duraseal was used to seal the entry site to provide a seal and to minimize cerebrospinal fluid leak and air entry.   An effort was made to place the contact 2A facing anteriorly.  The electrode demonstrated good impedance values.      With the satisfactory testing of the electrode position and the stimulation parameters, the electrode was secured at this location.  The patient was again made comfortable. The area was generously irrigated.  Hemostasis was also obtained.  Fluoroscopy was brought into the field to test that the electrode did not move with each manipulation.  The electrode tip was seen to be below the target, as expected.  Then, the electrode stylet was removed.  The electrode was then taken out of the electrode caputo.  The cap cover was finally placed and secured in place.  Fluoroscopy confirmed that the tip of the electrode did not change in position with each manipulation.     We then opened his right parietal marked area to expose the extension wire with a dead-end connector using a 15 blade skin knife. Using a small tunneler with a plastic sheath, we tunneled from the left frontal area to the area of the right parietal. Once through, we pulled the metal stylet out and left the plastic guide tube in place. The electrode was subsequently fed through into the right parietal area. The connector was connected to the extension wire. A small trough was drilled out in the bone to accommodate the connector portion using a 5mm cutting fernandez. A modified synthes plate was used to affix the connector to the bone. The area was then meticulously irrigated and  hemostasis achieved with bipolar cautery. Closure: The scar-formed area approximately at the level of the pericranium was closed with interrupted everted 3-0 vicryls. The galea was subsequently closed with interrupted inverted 3-0 monocryls. The skin was then closed with a 4-0 monocryl running suture. The wound was cleaned, dried, prepped with ChoraPrep and sterile primapore dressing was applied.     Removal of the head frame and end of procedure     First, the stereotactic targeting apparatus was removed.  Then, the sterile drapes were removed.  Subsequently, the patient was taken out of the CRW head frame.  The four pin sites were clean and dry and no active bleeding was noted.  Antibiotic ointment was applied to the pin sites.  Small Primapore dressings were applied to the pin sites.     Patient was further awakened and taken to the recovery room in a stable condition.  At the end of the case, all counts including needle, sponge and instrument counts were correct x 2.  There were no complications.      Dr. Coleman was present for the entirety of the procedure.     Operative note prepared by Danielle Jimenez MD, Neurosurgery, PGY-2    Physician Attestation   I was present for the entire procedure between opening and closing.    Ravinder Coleman MD  Date of Service (when I saw the patient): 5/2/22

## 2022-05-02 NOTE — ANESTHESIA CARE TRANSFER NOTE
Patient: Bradley Garcia    Procedure: Procedure(s):  Stealth Assisted Left side deep brain stimulator placement, phase I & II combined, target left globus pallidus internus, with microelectrode recording and connection to existing generator/battery       Diagnosis: Dystonia [G24.9]  Diagnosis Additional Information: No value filed.    Anesthesia Type:   No value filed.     Note:    Oropharynx: oropharynx clear of all foreign objects and spontaneously breathing  Level of Consciousness: awake  Oxygen Supplementation: room air    Independent Airway: airway patency satisfactory and stable  Dentition: dentition unchanged  Vital Signs Stable: post-procedure vital signs reviewed and stable  Report to RN Given: handoff report given  Patient transferred to: Phase II  Comments: Report given to 6A RN and PACU RN.   Handoff Report: Identifed the Patient, Identified the Reponsible Provider, Reviewed the pertinent medical history, Discussed the surgical course, Reviewed Intra-OP anesthesia mangement and issues during anesthesia, Set expectations for post-procedure period and Allowed opportunity for questions and acknowledgement of understanding      Vitals:  Vitals Value Taken Time   /51 05/02/22 1453   Temp     Pulse 62 05/02/22 1453   Resp     SpO2 96 % 05/02/22 1453   Vitals shown include unvalidated device data.    Electronically Signed By: MAG Jimenez CRNA  May 2, 2022  2:53 PM

## 2022-05-02 NOTE — ANESTHESIA PREPROCEDURE EVALUATION
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      Depression      Neck pain      PONV (postoperative nausea and vomiting)      S/P deep brain stimulator placement      Torsion dystonia       Past Surgical History:   Procedure Laterality Date     ANESTHESIA OUT OF OR MRI       ANESTHESIA OUT OF OR MRI N/A 2021    Procedure: ANESTHESIA OUT OF OR Magnetic resonance imaging Brain @1200;  Surgeon: GENERIC ANESTHESIA PROVIDER;  Location: [facility] OR     ANESTHESIA OUT OF OR MRI N/A 10/8/2021    Procedure: ANESTHESIA OUT OF OR MAGNETIC RESONANCE IMAGING of BRAIN WITH AND WITHOUT CONTRAST@1400;  Surgeon: GENERIC ANESTHESIA PROVIDER;  Location: [facility] OR     EYE SURGERY       IMPLANT DEEP BRAIN STIMULATION GENERATOR / BATTERY Right 2022    Procedure: Deep brain stimulator placement, phase II, placement of deep brain stimulator generator/battery over the right chest wall;  Surgeon: Ravinder Coleman MD;  [facility]     NOSE SURGERY      nasal fracture repair     OPTICAL TRACKING SYSTEM INSERTION DEEP BRAIN STIMULATION Right 2022    Procedure: stealth assisted Right side deep brain stimulator placement, phase I,  target right globus pallidus internus, with microelectrode recording;  Surgeon: Ravinder Coleman MD;  Location:  OR     REMOVE DEPTH ELECTRODES N/A 2021    Procedure: Removal of entire deep brain stimulation system hardware including intracranial electrodes and battery/generator in chest wall;  Surgeon: Ravinder Coleman MD;  Location: [facility] OR     STEREOTACTIC  INSERTION DEEP BRAIN STIMULATION        No Known Allergies   Social History     Tobacco Use     Smoking status: Never Smoker     Smokeless tobacco: Never Used   Substance Use Topics     Alcohol use: Never      Wt Readings from Last 1 Encounters:   05/02/22 61 kg (134 lb 7.7 oz)        Anesthesia Evaluation   Pt has had prior anesthetic. Type: MAC and General.    No history of anesthetic complications       ROS/MED HX  ENT/Pulmonary:       Neurologic:       Cardiovascular:       METS/Exercise Tolerance:     Hematologic:       Musculoskeletal:       GI/Hepatic:       Renal/Genitourinary:       Endo:       Psychiatric/Substance Use:       Infectious Disease:       Malignancy:       Other:            Physical Exam    Airway        Mallampati: I   TM distance: > 3 FB   Neck ROM: full   Mouth opening: > 3 cm    Respiratory Devices and Support         Dental  no notable dental history         Cardiovascular             Pulmonary                   OUTSIDE LABS:  CBC:   Lab Results   Component Value Date    WBC 6.1 05/02/2022    WBC 13.4 (H) 04/12/2022    HGB 16.1 05/02/2022    HGB 14.9 04/12/2022    HCT 46.9 05/02/2022    HCT 43.8 04/12/2022     05/02/2022     04/12/2022     BMP:   Lab Results   Component Value Date     05/02/2022     04/12/2022    POTASSIUM 3.9 05/02/2022    POTASSIUM 4.2 04/12/2022    CHLORIDE 106 05/02/2022    CHLORIDE 105 04/12/2022    CO2 27 05/02/2022    CO2 28 04/12/2022    BUN 15 05/02/2022    BUN 16 04/12/2022    CR 0.82 05/02/2022    CR 0.91 04/12/2022    GLC 98 05/02/2022    GLC 85 05/02/2022     COAGS:   Lab Results   Component Value Date    PTT 29 05/02/2022    INR 1.03 05/02/2022     POC:   Lab Results   Component Value Date     (H) 07/08/2021     HEPATIC:   Lab Results   Component Value Date    ALT 20 02/22/2021    AST 35 02/22/2021     OTHER:   Lab Results   Component Value Date    OSORIO 9.4 05/02/2022    PHOS 4.0 04/11/2022    MAG 2.2 04/11/2022    TSH 1.840  02/22/2021       Anesthesia Plan    ASA Status:  2   NPO Status:  NPO Appropriate    Anesthesia Type: MAC.     - Reason for MAC: straight local not clinically adequate      Maintenance: TIVA.   Techniques and Equipment:       - Drips/Meds: Dexmed. infusion     Consents    Anesthesia Plan(s) and associated risks, benefits, and realistic alternatives discussed. Questions answered and patient/representative(s) expressed understanding.    - Discussed:     - Discussed with:  Patient      - Extended Intubation/Ventilatory Support Discussed: No.      - Patient is DNR/DNI Status: No    Use of blood products discussed: No .     Postoperative Care    Pain management: Multi-modal analgesia.   PONV prophylaxis: Ondansetron (or other 5HT-3), Background Propofol Infusion     Comments:                Marianela Colvin MD

## 2022-05-03 VITALS
OXYGEN SATURATION: 98 % | RESPIRATION RATE: 16 BRPM | HEIGHT: 66 IN | SYSTOLIC BLOOD PRESSURE: 109 MMHG | WEIGHT: 134.48 LBS | DIASTOLIC BLOOD PRESSURE: 64 MMHG | TEMPERATURE: 97.8 F | BODY MASS INDEX: 21.61 KG/M2 | HEART RATE: 92 BPM

## 2022-05-03 LAB
ANION GAP SERPL CALCULATED.3IONS-SCNC: 6 MMOL/L (ref 3–14)
BUN SERPL-MCNC: 15 MG/DL (ref 7–30)
CALCIUM SERPL-MCNC: 9.1 MG/DL (ref 8.5–10.1)
CHLORIDE BLD-SCNC: 105 MMOL/L (ref 94–109)
CO2 SERPL-SCNC: 28 MMOL/L (ref 20–32)
CREAT SERPL-MCNC: 0.93 MG/DL (ref 0.66–1.25)
ERYTHROCYTE [DISTWIDTH] IN BLOOD BY AUTOMATED COUNT: 12.2 % (ref 10–15)
GFR SERPL CREATININE-BSD FRML MDRD: >90 ML/MIN/1.73M2
GLUCOSE BLD-MCNC: 88 MG/DL (ref 70–99)
HCT VFR BLD AUTO: 44.8 % (ref 40–53)
HGB BLD-MCNC: 14.9 G/DL (ref 13.3–17.7)
MCH RBC QN AUTO: 28.4 PG (ref 26.5–33)
MCHC RBC AUTO-ENTMCNC: 33.3 G/DL (ref 31.5–36.5)
MCV RBC AUTO: 86 FL (ref 78–100)
PLATELET # BLD AUTO: 265 10E3/UL (ref 150–450)
POTASSIUM BLD-SCNC: 3.8 MMOL/L (ref 3.4–5.3)
RBC # BLD AUTO: 5.24 10E6/UL (ref 4.4–5.9)
SODIUM SERPL-SCNC: 139 MMOL/L (ref 133–144)
WBC # BLD AUTO: 10.5 10E3/UL (ref 4–11)

## 2022-05-03 PROCEDURE — 250N000011 HC RX IP 250 OP 636: Performed by: STUDENT IN AN ORGANIZED HEALTH CARE EDUCATION/TRAINING PROGRAM

## 2022-05-03 PROCEDURE — 250N000013 HC RX MED GY IP 250 OP 250 PS 637: Performed by: NURSE PRACTITIONER

## 2022-05-03 PROCEDURE — 82310 ASSAY OF CALCIUM: CPT | Performed by: NURSE PRACTITIONER

## 2022-05-03 PROCEDURE — 85027 COMPLETE CBC AUTOMATED: CPT | Performed by: NURSE PRACTITIONER

## 2022-05-03 PROCEDURE — 250N000011 HC RX IP 250 OP 636: Performed by: NURSE PRACTITIONER

## 2022-05-03 PROCEDURE — 36415 COLL VENOUS BLD VENIPUNCTURE: CPT | Performed by: NURSE PRACTITIONER

## 2022-05-03 RX ORDER — OXYCODONE HYDROCHLORIDE 5 MG/1
5 TABLET ORAL EVERY 6 HOURS PRN
Qty: 10 TABLET | Refills: 0 | Status: SHIPPED | OUTPATIENT
Start: 2022-05-03 | End: 2022-07-11

## 2022-05-03 RX ADMIN — ONDANSETRON 4 MG: 4 TABLET, ORALLY DISINTEGRATING ORAL at 08:52

## 2022-05-03 RX ADMIN — CEFAZOLIN SODIUM 2 G: 2 INJECTION, SOLUTION INTRAVENOUS at 01:51

## 2022-05-03 RX ADMIN — OXYCODONE HYDROCHLORIDE 5 MG: 5 TABLET ORAL at 09:59

## 2022-05-03 RX ADMIN — METHYLPHENIDATE HYDROCHLORIDE 36 MG: 36 TABLET ORAL at 07:28

## 2022-05-03 RX ADMIN — ACETAMINOPHEN 975 MG: 325 TABLET, FILM COATED ORAL at 02:10

## 2022-05-03 RX ADMIN — ACETAMINOPHEN 650 MG: 325 TABLET ORAL at 08:53

## 2022-05-03 RX ADMIN — DOCUSATE SODIUM 50 MG AND SENNOSIDES 8.6 MG 1 TABLET: 8.6; 5 TABLET, FILM COATED ORAL at 07:28

## 2022-05-03 RX ADMIN — SERTRALINE HYDROCHLORIDE 50 MG: 50 TABLET ORAL at 07:28

## 2022-05-03 RX ADMIN — CEFAZOLIN SODIUM 2 G: 2 INJECTION, SOLUTION INTRAVENOUS at 09:16

## 2022-05-03 ASSESSMENT — ACTIVITIES OF DAILY LIVING (ADL)
ADLS_ACUITY_SCORE: 7

## 2022-05-03 NOTE — PLAN OF CARE
Status: POD #1 s/p DBS placement (phase I & II combined)  Vitals: VSS on RA. Continuous pulse ox  Neuros: A&Ox4, N/T to right hand (fingers 3, 4, 5), tremors to all extremities, L > R. Gait at a 90 degree angle   IV: PIV SL  Diet: Regular  Bowel status: LBM PTA  : Voiding without difficulty  Skin: 4 pin sites covered with primapore, CDI  Pain: Denies HA   Activity: Stand-by/gait belt  Social: na  Plan: Complete IV abx, discharge tmw AM

## 2022-05-03 NOTE — PLAN OF CARE
Status: POD #0 s/p DBS placement (phase I & II combined)  Vitals: VSS on RA  Neuros: A&Ox4, N/T to right hand (fingers 3, 4, 5), tremors to all extremities, L > R  IV: PIV SL  Diet: Regular, good PO  Bowel status: LBM PTA  : James removed @ 1620, voiding   Skin: 4 pin sites covered with primapore, CDI  Pain: Headache pain controlled with scheduled tylenol, PRN oxy  Activity: Assist of 1, GB  Social: Pts mom here this afternoon, supportive/helpful  Plan: Complete IV abx, discharge tmw AM    Arrived from: PACU  Belongings/meds: Remain with patient - phone  2 RN Skin Assessment Completed by: Laura MASON    Non-intact findings documented (yes/no/NA): Yes

## 2022-05-03 NOTE — ANESTHESIA POSTPROCEDURE EVALUATION
Patient: Bradley Garcia    Procedure: Procedure(s):  Stealth Assisted Left side deep brain stimulator placement, phase I & II combined, target left globus pallidus internus, with microelectrode recording and connection to existing generator/battery       Anesthesia Type:  MAC    Note:  Disposition: Inpatient   Postop Pain Control: Uneventful            Sign Out: Well controlled pain   PONV: No   Neuro/Psych: Uneventful            Sign Out: Acceptable/Baseline neuro status   Airway/Respiratory: Uneventful            Sign Out: Acceptable/Baseline resp. status   CV/Hemodynamics: Uneventful            Sign Out: Acceptable CV status; No obvious hypovolemia; No obvious fluid overload   Other NRE: NONE   DID A NON-ROUTINE EVENT OCCUR? No           Last vitals:  Vitals Value Taken Time   /56 05/02/22 1530   Temp 36.6  C (97.8  F) 05/02/22 1530   Pulse 62 05/02/22 1533   Resp 14 05/02/22 1530   SpO2 93 % 05/02/22 1533   Vitals shown include unvalidated device data.    Electronically Signed By: Marianela Colvin MD  May 3, 2022  7:46 AM

## 2022-05-03 NOTE — PROGRESS NOTES
Discharge time/date: 1030, 5/2/22  Walked or Wheelchair: w/c  PIV removed: yes  Reviewed AVS with patient: yes, and father present  Medication due times added to AVS in EPIC: yes  Verbalized understanding of discharge with teachback: yes  Medications retrieved from pharmacy: none needed  Supplies sent home: none needed  Belongings from security with patient: with patient's father.

## 2022-05-03 NOTE — DISCHARGE SUMMARY
[facility] Discharge Summary and Instructions    Bradley Garcia MRN# [id number]   Age: 21 year old YOB: 2000     Date of Admission:  5/2/2022  Date of Discharge::  5/3/2022  Admitting Physician:  Ravinder Coleman MD  Discharge Physician:  Ravinder Coleman MD          Admission Diagnoses:   Dystonia [G24.9]          Discharge Diagnosis:     Dystonia [G24.9]     In addition to the assessment of diagnoses detailed above, this 21 year old male  patient admitted from home has the following conditions contributing to the complexity of their medical care:    Clinically Significant Risk Factors Present on Admission                         Procedures:   1.  Placement of CRW stereotactic head frame.   2.  Stereotactic neuronavigation planning and CT of the head.   3.  Stereotactic neuronavigation using the [employer] system for surgical planning, targeting and approach.  The target is the left globus pallidus internus.  4.  Left side deep brain stimulator placement, phase I, placement of left side deep brain stimulator electrode, target is the left globus pallidus internus (GPi)  5.  Use of intraoperative microelectrode recording.   6.  Use of intraoperative fluoroscopy           Brief History of Illness:   Bradley Garcia is a 22 yo male with PMH of dystonia [G24.9]. Patient was seen in clinic for consideration of DBS with Ravinder Coleman MD. At that visit, risks and benefits were discussed with patient/authorized representative anbd has elected to undergo above-mentioned procedure.           Hospital Course:   Following surgery the patient was monitored overnight where he remained medically stable.  Postoperative head CT and skull x-rays revealed proper positioning of intracranial leads without evidence of hemorrhage.  Following surgery the patient complained of mild incisional pain however was tolerating diet, voiding without a James, and ambulating safely.   Home medications were restarted prior  "to discharge.  Patient was given 3 doses of IV antibiotic for wound prophylaxis and completed doses prior to discharge.  Patient was able to discharge home with family on postoperative day #1.  Patient will follow-up in 2 weeks for wound evaluation.  Discharge instructions were discussed with the patient who stated understanding.             Discharge Medications:     Current Discharge Medication List      CONTINUE these medications which have NOT CHANGED    Details   acetaminophen (TYLENOL) 325 MG tablet Take 325-650 mg by mouth every 6 hours as needed for mild pain      CONCERTA 36 MG CR tablet Take 36 mg by mouth every morning       ondansetron (ZOFRAN) 4 MG tablet Take 1 tablet (4 mg) by mouth every 8 hours as needed for nausea  Qty: 6 tablet, Refills: 0    Associated Diagnoses: Dystonia      oxyCODONE (ROXICODONE) 5 MG tablet Take 1 tablet (5 mg) by mouth every 4 hours as needed for moderate pain  Qty: 12 tablet, Refills: 0    Associated Diagnoses: Dystonia      sertraline (ZOLOFT) 50 MG tablet Take 50 mg by mouth every morning             Exam:   Physical Exam  /64   Pulse 92   Temp 97.8  F (36.6  C) (Oral)   Resp 16   Ht 1.676 m (5' 6\")   Wt 61 kg (134 lb 7.7 oz)   SpO2 98%   BMI 21.71 kg/m    General: Appears comfortable, NAD  Wound: Incision, clean, dry, intact without strikethrough  Neurologic Exam:  - AOx3.  - Follows commands.  - Speech fluent, spontaneous. No aphasia or dysarthria.  - No gaze preference. No apparent hemineglect.  - PERRL, EOMI.  - Face symmetric with sensation intact to light touch.  - Palate elevates symmetrically, uvula midline, tongue protrudes midline.  - Trapezii and sternocleidomastoid muscles 5/5 bilaterally.  - No pronator drift.  Motor: Normal bulk/tone; no tremor, rigidity, or bradykinesia.   Right:  Deltoid 5/5, tricep 5/5, bicep 5/5, wrist flexor 5/5, wrist extensor 5/5, finger intrinsic 5/5  Left:  Deltoid 5/5, tricep 5/5, bicep 5/5, wrist flexor 5/5, wrist " extensor 5/5, finger intrinsic 5/5  Right: Iliopsoas 5/5, quadricep 5/5, hamstring 5/5, tibialis anterior 5/5, gastrocnemius 5/5, EHL 5/5  Left:  Iliopsoas 5/5, quadricep 5/5, hamstring 5/5, tibialis anterior 5/5, gastrocnemius 5/5, EHL 5/5    Sensation intact in bilateral L4-S1 dermatones                  Discharge Instructions and Follow-Up:     Discharge diet: Regular   Discharge activity: You may advance activity as tolerated. No strenuous exercise or heay lifting greater than 10 lbs for 4 weeks or until seen and cleared in clinic.   Discharge follow-up: Follow-up with Dr. Ravinder Coleman MD on 5/17/22     Wound care: Ok to shower,however no scrubbing of the wound and no soaking of the wound, meaning no bathtubs or swimming pools. Pat dry only. Leave wound open to air.  Sutures are not absorbable and need to be removed in 2 weeks. If patient still at rehab by this time, the sutures may be removed by the rehab physician if he or she considers that the wound has healed completely.     Please call if you have:  1. increased pain, redness, drainage, swelling at your incision  2. fevers > 101.5 F degrees  3. with any questions or concerns.  You may reach the Neurosurgery clinic at [phone] during regular work hours. ER at [phone].    and ask for the Neurosurgery Resident on call at [phone], during off hours or weekends.           Discharge Disposition:     Discharged to home          MAG Spence, CNP  Department of Neurosurgery  Pager: [phone]

## 2022-05-04 ENCOUNTER — PATIENT OUTREACH (OUTPATIENT)
Dept: NEUROSURGERY | Facility: CLINIC | Age: 22
End: 2022-05-04
Payer: COMMERCIAL

## 2022-05-04 ENCOUNTER — TELEPHONE (OUTPATIENT)
Dept: NEUROSURGERY | Facility: CLINIC | Age: 22
End: 2022-05-04
Payer: COMMERCIAL

## 2022-05-04 ENCOUNTER — PATIENT OUTREACH (OUTPATIENT)
Dept: CARE COORDINATION | Facility: CLINIC | Age: 22
End: 2022-05-04
Payer: COMMERCIAL

## 2022-05-04 DIAGNOSIS — Z71.89 OTHER SPECIFIED COUNSELING: ICD-10-CM

## 2022-05-04 NOTE — PROGRESS NOTES
Clinic Care Coordination Contact  [facility]/Voicemail    Clinical Data: Care Coordinator Outreach  Outreach attempted x 1. Left message on patient's voicemail with call back information and requested return call.     Plan:Care Coordinator will try to reach patient again in 1-2 business days.    NIYAH Merritt  [phone]  [facility]

## 2022-05-04 NOTE — PROGRESS NOTES
Pt s/p Left side deep brain stimulator placement, phase I, placement of left side deep brain stimulator electrode, target is the left globus pallidus internus on 5/2/22 by Dr. Coleman. Left VM checking on pt's postop status and requested a return call at pt's convenience.

## 2022-05-04 NOTE — TELEPHONE ENCOUNTER
Writer routed to Neurosurgery Nurse Pool  Attn: Alesia Rocha, RNCC     [employer] requesting information    Adrienne Sinha LPN  Neurosurgery

## 2022-05-04 NOTE — TELEPHONE ENCOUNTER
Returned call to [employer]. Confirmed pt's DBS lead placement and battery placement dates as well as previous explant date. No further information requested by [employer] at this time.

## 2022-05-04 NOTE — TELEPHONE ENCOUNTER
Health Call Center    Phone Message    May a detailed message be left on voicemail: yes     Reason for Call: Other: PARUL from [employer] called requesting information regarding pt procedures on 4/11/22 and 4/18/22. Please call back when available. [phone] ext [phone]    Action Taken: Message routed to:  [facility] ([facility] neurosurgery    Travel Screening: Not Applicable

## 2022-05-05 NOTE — PROGRESS NOTES
"Clinic Care Coordination Contact  [facility]: Post-Discharge Note  SITUATION                                                      Admission:    Admission Date: 05/02/22   Reason for Admission: Dystonia  Discharge:   Discharge Date: 05/03/22  Discharge Diagnosis: Dystonia    BACKGROUND                                                      Per hospital discharge summary and inpatient provider notes:    Bradley Garcia is a 20 yo male with PMH of dystonia [G24.9]. Patient was seen in clinic for consideration of DBS with Ravinder Coleman MD. At that visit, risks and benefits were discussed with patient/authorized representative anbd has elected to undergo above-mentioned procedure.    ASSESSMENT      Enrollment  Primary Care Care Coordination Status: Not a Candidate    Discharge Assessment  How are you doing now that you are home?: \"I'm doing just fine.\"  How are your symptoms? (Red Flag symptoms escalate to triage hotline per guidelines): Improved  Do you feel your condition is stable enough to be safe at home until your provider visit?: Yes  Does the patient have their discharge instructions? : Yes  Does the patient have questions regarding their discharge instructions? : No  Were you started on any new medications or were there changes to any of your previous medications? : No  Does the patient have all of their medications?: Yes  Do you have questions regarding any of your medications? : No  Do you have all of your needed medical supplies or equipment (DME)?  (i.e. oxygen tank, CPAP, cane, etc.): Yes  Discharge follow-up appointment scheduled within 14 calendar days? : Yes  Discharge Follow Up Appointment Date: 05/11/22  Discharge Follow Up Appointment Scheduled with?: Specialty Care Provider (Neurology)    Post-op ([facility] Only)  If the patient had a surgery or procedure, do they have any questions for a nurse?: No    PLAN                                                      Outpatient Plan:      Patient " will follow-up in 2 weeks for wound evaluation.    Adult [facility] Follow-up and recommended labs and tests  Please follow-up with Dr. Coleman on 5/17/22 as scheduled  Appointments on [facility] and/or [facility] (with [facility] or [facility] provider or service). Call [phone] if you haven't heard regarding these appointments within 7 days of discharge.    Future Appointments   Date Time Provider Department Center   5/11/2022  1:00 PM  [facility] DISORDER FELLOW [facility]   5/17/2022  9:00 AM Ravinder Coleman MD [facility]   6/1/2022 12:20 PM [id number]   6/1/2022  1:00 PM  MOVEMENT DISORDER FELLOW [facility]         For any urgent concerns, please contact our 24 hour nurse triage line: [phone] ([phone])         NIYAH Merritt  [phone]  Connected Care Resource [facility]

## 2022-05-09 NOTE — PROGRESS NOTES
Department of Neurology  Movement Disorders Division   DBS Follow-up Note    Patient: Bradley Garcia  MRN: [id number]   : 2000   Date of Visit: 2022    Diagnosis: Dystonia  DBS Target(s): Bilateral GPi  Date(s) of DBS Lead Placement: R 2022, L 2022  Date(s) of IPG Placement: R chest 2022  Device: Medtronic      Chief Complaint:  Bradley Garcia is a 21 year old male who returns to clinic for follow up of dystonia status post bilateral GPi DBS.       Interval History:  He reports that the surgeries went well.  After the first surgery, his right arm worsened.    His is walking the same as before. Thinking and speech are stable as well.      Medications:  Current Outpatient Medications   Medication Sig Dispense Refill     acetaminophen (TYLENOL) 325 MG tablet Take 325-650 mg by mouth every 6 hours as needed for mild pain       CONCERTA 36 MG CR tablet Take 36 mg by mouth every morning        ondansetron (ZOFRAN) 4 MG tablet Take 1 tablet (4 mg) by mouth every 8 hours as needed for nausea 6 tablet 0     oxyCODONE (ROXICODONE) 5 MG tablet Take 1 tablet (5 mg) by mouth every 6 hours as needed 10 tablet 0     oxyCODONE (ROXICODONE) 5 MG tablet Take 1 tablet (5 mg) by mouth every 4 hours as needed for moderate pain 12 tablet 0     sertraline (ZOLOFT) 50 MG tablet Take 50 mg by mouth every morning           Review of Systems:  Other than that noted at the end of this note, the remainder of 12 systems reviewed were negative.    Allergies: has No Known Allergies.    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      Depression      Neck pain      PONV (postoperative nausea and vomiting)      S/P deep brain stimulator placement      Torsion dystonia        Past Surgical History:  Past Surgical History:   Procedure Laterality Date     ANESTHESIA OUT OF OR MRI       ANESTHESIA OUT  OF OR MRI N/A 7/8/2021    Procedure: ANESTHESIA OUT OF OR Magnetic resonance imaging Brain @1200;  Surgeon: GENERIC ANESTHESIA PROVIDER;  Location: [facility]     ANESTHESIA OUT OF OR MRI N/A 10/8/2021    Procedure: ANESTHESIA OUT OF OR MAGNETIC RESONANCE IMAGING of BRAIN WITH AND WITHOUT CONTRAST@1400;  Surgeon: GENERIC ANESTHESIA PROVIDER;  Location: [facility] OR     EYE SURGERY       IMPLANT DEEP BRAIN STIMULATION GENERATOR / BATTERY Right 4/18/2022    Procedure: Deep brain stimulator placement, phase II, placement of deep brain stimulator generator/battery over the right chest wall;  Surgeon: Ravinder Coleman MD;  Location: [facility] OR     NOSE SURGERY      nasal fracture repair     OPTICAL TRACKING SYSTEM INSERTION DEEP BRAIN STIMULATION Right 4/11/2022    Procedure: stealth assisted Right side deep brain stimulator placement, phase I,  target right globus pallidus internus, with microelectrode recording;  Surgeon: Ravinder Coleman MD;  Location: [facility] OR     OPTICAL TRACKING SYSTEM INSERTION DEEP BRAIN STIMULATION Left 5/2/2022    Procedure: Stealth Assisted Left side deep brain stimulator placement, phase I & II combined, target left globus pallidus internus, with microelectrode recording and connection to existing generator/battery;  Surgeon: Ravinder Coleman MD;  Location: [facility] OR     REMOVE DEPTH ELECTRODES N/A 8/23/2021    Procedure: Removal of entire deep brain stimulation system hardware including intracranial electrodes and battery/generator in chest wall;  Surgeon: Ravinder Coleman MD;  Location: [facility] OR     STEREOTACTIC INSERTION DEEP BRAIN STIMULATION         Social History:  Social History     Socioeconomic History     Marital status: Single     Number of children: 0   Occupational History     Occupation: unemployed   Tobacco Use     Smoking status: Never Smoker     Smokeless tobacco: Never Used   Substance and Sexual Activity     Alcohol use: Never     Drug use: Never       Family History:  Family History    Problem Relation Age of Onset     No Known Problems Mother      No Known Problems Father      No Known Problems Sister      Other - See Comments Brother         di bunny syndrome     Myocardial Infarction Maternal Grandmother      Hyperlipidemia Maternal Grandmother      Hyperlipidemia Maternal Grandfather      Unknown/Adopted Paternal Grandmother      Unknown/Adopted Paternal Grandfather      Deep Vein Thrombosis No family hx of      Anesthesia Reaction No family hx of          Physical Exam:  The patient's  weight is 59.9 kg (132 lb). His blood pressure is 120/68 and his pulse is 97. His respiration is 16 and oxygen saturation is 98%.    Incisions: clean, dry, and intact     Neurological Examination:   Dysarthric speech  Severe retrocollis with multidirectional, irregular head/trunk tremor  Left hand unable to open  Increased tone in LUE  Trunk flexed and tilted to right with ambulation      Procedure: DBS Interrogation & Programming  Lead(s):    Left Right   DBS Target GPi GPi   DBS Lead Type Sensight Sensight   Lead Implant Date 5/2/2022 4/11/2022     IPG(s):   1   IPG Activa RC   IPG Implant Date 4/18/2022   Location Right chest   Battery (V) 75%     Impedance Check: No problems found  See scanned report for impedance details.      RVIM:  Contacts Amplitude PW Frequency Effect SE   C+8- 0.5 90 130  NN    1.0    Trans flashes of light    1.5    Same    2.0    Coming and going flashes, eye feels weird   C+9- 1.0 90 130  NN    2.0    Trans eye flash, shoulder sensation    2.5    Same, shoulder sensation    0.0    resolved   C+10- 0.5 90 130  NN    1.0    NN    2.0    NN    3.0    Left eye sensation trans    3.5    Tighter all over and harder to move, more shaking    3.0    Improved    4.0    NN    5.0    Left eye sensation, left tongue sensation    4.8   Gait improved, no dysarthria Resolved, left back not pulling as much, eye flash   C+11- 1.0 90 130  NN    2.0    NN    3.0    NN    4.0    NN    5.0    Neck  looser    6.0    Gait improved    7.0   No dysarthria NC         Group A Left Brain         Right Brain       Initial Final Initial Final    None None None Active   Amplitude (V)      3.0 [0-7.0]   Pulse width (usec)       90   Freq (Hz)       130   Contacts:       C+11-          Assessment/Plan:  Bradley Garcia is a 21 year old male with generalized dystonia s/p bilateral GPi DBS with revision in  who presents for initial programming of RGPi.  On monopolar review, there were phosphenes in the 2 most distal contacts.  He felt improvement in the 2 most proximal contacts with contact 11 producing no adverse effects up to 7.0V.    - Stay at 3.0 for 2 weeks. Then increase by 0.2 weekly as needed  - RTC  for LGPi initial programming           Tahira Saxena MD  Movement Disorders Fellow       17 minutes spent on the date of the encounter doing chart review, history and exam, documentation and further activities as noted above.     Additional time spent for separate DBS programmin min DBS analyzed with reprogramming.

## 2022-05-11 ENCOUNTER — OFFICE VISIT (OUTPATIENT)
Dept: NEUROLOGY | Facility: CLINIC | Age: 22
End: 2022-05-11
Payer: COMMERCIAL

## 2022-05-11 VITALS
RESPIRATION RATE: 16 BRPM | BODY MASS INDEX: 21.31 KG/M2 | WEIGHT: 132 LBS | DIASTOLIC BLOOD PRESSURE: 68 MMHG | SYSTOLIC BLOOD PRESSURE: 120 MMHG | OXYGEN SATURATION: 98 % | HEART RATE: 97 BPM

## 2022-05-11 DIAGNOSIS — G24.1 GENERALIZED TORSION DYSTONIA: Primary | ICD-10-CM

## 2022-05-11 DIAGNOSIS — G24.3 CERVICAL DYSTONIA: ICD-10-CM

## 2022-05-11 DIAGNOSIS — R26.9 GAIT DIFFICULTY: ICD-10-CM

## 2022-05-11 DIAGNOSIS — R47.1 DYSARTHRIA: ICD-10-CM

## 2022-05-11 DIAGNOSIS — Z96.89 S/P DEEP BRAIN STIMULATOR PLACEMENT: ICD-10-CM

## 2022-05-11 PROCEDURE — 95984 ALYS BRN NPGT PRGRMG ADDL 15: CPT | Performed by: STUDENT IN AN ORGANIZED HEALTH CARE EDUCATION/TRAINING PROGRAM

## 2022-05-11 PROCEDURE — 95983 ALYS BRN NPGT PRGRMG 15 MIN: CPT | Performed by: STUDENT IN AN ORGANIZED HEALTH CARE EDUCATION/TRAINING PROGRAM

## 2022-05-11 PROCEDURE — 99212 OFFICE O/P EST SF 10 MIN: CPT | Mod: 25 | Performed by: STUDENT IN AN ORGANIZED HEALTH CARE EDUCATION/TRAINING PROGRAM

## 2022-05-11 ASSESSMENT — PAIN SCALES - GENERAL: PAINLEVEL: NO PAIN (0)

## 2022-05-11 NOTE — PATIENT INSTRUCTIONS
Today we turned on your left body.  There is one group called Group A.  Today it is set at 3.0 but there is range from 0 to 7.0 available.    For 2 weeks stay at 3.0.  If you feel that you need more, increase by 0.2 (2 clicks) and then wait at least 1 week before the next increase.    Let us know if you have any questions, concerns, or worsening symptoms.    Make sure you keep it charged.

## 2022-05-17 ENCOUNTER — OFFICE VISIT (OUTPATIENT)
Dept: NEUROSURGERY | Facility: CLINIC | Age: 22
End: 2022-05-17
Payer: COMMERCIAL

## 2022-05-17 VITALS
WEIGHT: 134 LBS | RESPIRATION RATE: 16 BRPM | OXYGEN SATURATION: 97 % | HEART RATE: 100 BPM | DIASTOLIC BLOOD PRESSURE: 72 MMHG | SYSTOLIC BLOOD PRESSURE: 119 MMHG | BODY MASS INDEX: 21.63 KG/M2

## 2022-05-17 DIAGNOSIS — G24.9 DYSTONIA: Primary | ICD-10-CM

## 2022-05-17 PROCEDURE — 99024 POSTOP FOLLOW-UP VISIT: CPT | Performed by: NEUROLOGICAL SURGERY

## 2022-05-17 ASSESSMENT — PAIN SCALES - GENERAL: PAINLEVEL: NO PAIN (0)

## 2022-05-17 NOTE — PROGRESS NOTES
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 He underwent lead and IPG removal without complication in late August and then underwent a reop bilateral GPi DBS here with us with his second side surgery a couple of weeks ago. He had his left body programmed recently with Dr. Saxena which he said went very well and he has noticed some benefit with hand opening and his neck dystonia thus far. He is excited about his right body programming coming up soon. No other concerns or complaints.      Exam:  Awake, alert, oriented x 3  Attends, follows, dysarthric, fluent  PERRL  EOMI  FS  TML  Left laterocollis, right torticollis, dystonic head tremor, bilateral hand posturing  Wounds healing well, no erythema, swelling or pain. Left scalp incisions nylons removed in the clinic. His right scalp incision is very well healed already, c/d/i. His right posterior auricular incision is healing nicely with a little scabbing and Monocryl still in place. His right chest wall incision is also healing nicely with minimal scabbing, all wounds c/d/i    Programming schedule: Right body programming upcoming    UPDRS/TETRAS outcome: N/A    A/P: 22 y/o gentleman with idiopathic generalized dystonia and cervical dystonic tremor now s/p reop bilateral GPi DBS, with good results so far. Looking forward to second side programming soon. No wound concerns thus far. We discussed activity and physical restrictions today.     - RTC in one month

## 2022-05-17 NOTE — LETTER
5/17/2022       RE: Bradley Garcia  [id number]  [facility] [address]     Dear Colleague,    Thank you for referring your patient, Bradley Garcia, to the [facility]UROS[facility] [place] at [facility]. Please see a copy of my visit note below.    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 He underwent lead and IPG removal without complication in late August and then underwent a reop bilateral GPi DBS here with us with his second side surgery a couple of weeks ago. He had his left body programmed recently with Dr. Saxena which he said went very well and he has noticed some benefit with hand opening and his neck dystonia thus far. He is excited about his right body programming coming up soon. No other concerns or complaints.      Exam:  Awake, alert, oriented x 3  Attends, follows, dysarthric, fluent  PERRL  EOMI  FS  TML  Left laterocollis, right torticollis, dystonic head tremor, bilateral hand posturing  Wounds healing well, no erythema, swelling or pain. Left scalp incisions nylons removed in the clinic. His right scalp incision is very well healed already, c/d/i. His right posterior auricular incision is healing nicely with a little scabbing and Monocryl still in place. His right chest wall incision is also healing nicely with minimal scabbing, all wounds c/d/i    Programming schedule: Right body programming upcoming    UPDRS/TETRAS outcome: N/A    A/P: 20 y/o gentleman with idiopathic generalized dystonia and cervical dystonic tremor now s/p reop bilateral GPi DBS, with good results so far. Looking forward to second side programming soon. No wound concerns thus far. We discussed activity and physical restrictions today.     - RTC in one  month      Sincerely,    Ravidner Coleman MD

## 2022-05-31 NOTE — PROGRESS NOTES
Department of Neurology  Movement Disorders Division   DBS Follow-up Note    Patient: Bradley Garcia  MRN: [id number]   : 2000   Date of Visit: 2022    Diagnosis: Dystonia  DBS Target(s): Bilateral GPi  Date(s) of DBS Lead Placement: R 2022, L 2022  Date(s) of IPG Placement: R chest 2022  Device: [employer] Activa RC      Chief Complaint:  Bradley Garcia is a 21 year old male who returns to clinic for follow up of dystonia status post bilateral GPi DBS.  He was last seen 2022 at which time he underwent initial R GPi programming.       Interval History:  After the last programming, his felt his dystonia worsened on the second day.  His neck was tighter and felt harder to move.  A few days later it was harder to open his left hand.  He then increased to 3.4 and noticed a little improvement.  It still isn't back to how it was immediately after turning him on.        Medications:  Current Outpatient Medications   Medication Sig Dispense Refill     acetaminophen (TYLENOL) 325 MG tablet Take 325-650 mg by mouth every 6 hours as needed for mild pain       CONCERTA 36 MG CR tablet Take 36 mg by mouth every morning        ondansetron (ZOFRAN) 4 MG tablet Take 1 tablet (4 mg) by mouth every 8 hours as needed for nausea 6 tablet 0     oxyCODONE (ROXICODONE) 5 MG tablet Take 1 tablet (5 mg) by mouth every 6 hours as needed 10 tablet 0     oxyCODONE (ROXICODONE) 5 MG tablet Take 1 tablet (5 mg) by mouth every 4 hours as needed for moderate pain 12 tablet 0     sertraline (ZOLOFT) 50 MG tablet Take 50 mg by mouth every morning           Review of Systems:  Other than that noted at the end of this note, the remainder of 12 systems reviewed were negative.    Allergies: has No Known Allergies.    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      Depression      Neck pain       PONV (postoperative nausea and vomiting)      S/P deep brain stimulator placement      Torsion dystonia        Past Surgical History:  Past Surgical History:   Procedure Laterality Date     ANESTHESIA OUT OF OR MRI       ANESTHESIA OUT OF OR MRI N/A 7/8/2021    Procedure: ANESTHESIA OUT OF OR Magnetic resonance imaging Brain @1200;  Surgeon: GENERIC ANESTHESIA PROVIDER;  Location: [facility]     ANESTHESIA OUT OF OR MRI N/A 10/8/2021    Procedure: ANESTHESIA OUT OF OR MAGNETIC RESONANCE IMAGING of BRAIN WITH AND WITHOUT CONTRAST@1400;  Surgeon: GENERIC ANESTHESIA PROVIDER;  Location: [facility] OR     EYE SURGERY       IMPLANT DEEP BRAIN STIMULATION GENERATOR / BATTERY Right 4/18/2022    Procedure: Deep brain stimulator placement, phase II, placement of deep brain stimulator generator/battery over the right chest wall;  Surgeon: Ravinder Coleman MD;  Location: [facility] OR     NOSE SURGERY      nasal fracture repair     OPTICAL TRACKING SYSTEM INSERTION DEEP BRAIN STIMULATION Right 4/11/2022    Procedure: stealth assisted Right side deep brain stimulator placement, phase I,  target right globus pallidus internus, with microelectrode recording;  Surgeon: Ravinder Coleman MD;  Location: [facility]     OPTICAL TRACKING SYSTEM INSERTION DEEP BRAIN STIMULATION Left 5/2/2022    Procedure: Stealth Assisted Left side deep brain stimulator placement, phase I & II combined, target left globus pallidus internus, with microelectrode recording and connection to existing generator/battery;  Surgeon: Ravinder Coleman MD;  Location: [facility]     REMOVE DEPTH ELECTRODES N/A 8/23/2021    Procedure: Removal of entire deep brain stimulation system hardware including intracranial electrodes and battery/generator in chest wall;  Surgeon: Ravinder Coleman MD;  Location: [facility] OR     STEREOTACTIC INSERTION DEEP BRAIN STIMULATION         Social History:  Social History     Socioeconomic History     Marital status: Single     Number of children: 0    Occupational History     Occupation: unemployed   Tobacco Use     Smoking status: Never Smoker     Smokeless tobacco: Never Used   Substance and Sexual Activity     Alcohol use: Never     Drug use: Never       Family History:  Family History   Problem Relation Age of Onset     No Known Problems Mother      No Known Problems Father      No Known Problems Sister      Other - See Comments Brother         di bunny syndrome     Myocardial Infarction Maternal Grandmother      Hyperlipidemia Maternal Grandmother      Hyperlipidemia Maternal Grandfather      Unknown/Adopted Paternal Grandmother      Unknown/Adopted Paternal Grandfather      Deep Vein Thrombosis No family hx of      Anesthesia Reaction No family hx of          Physical Exam:  The patient's  weight is 60.8 kg (134 lb). His blood pressure is 118/68 and his pulse is 91. His respiration is 16 and oxygen saturation is 98%.      Incisions: clean, dry, and intact     Neurological Examination:   Mild dysarthria  Moderate to severe retrocollis with multidirectional, irregular head/trunk tremor  Able to slightly open his left hand, 2nd and 3rd fingers  Symmetric UE tone, 0-1  Trunk flexed and tilted to right with ambulation      Imaging:  CT head w/o contrast 6/1/2022 - personally reviewed: no signs of intracranial hemorrhage      Procedure: DBS Interrogation & Programming  Lead(s):    Left Right   DBS Target GPi GPi   DBS Lead Type Sensight Sensight   Lead Implant Date 5/2/2022 4/11/2022      IPG(s):    1   IPG Activa RC   IPG Implant Date 4/18/2022   Location Right chest   Battery (V) 100%, JAMES 4/15/2036     Impedance Check: No problems found  See scanned report for impedance details.    Group A Left Brain         Right Brain       Initial Final Initial Final     None Inactive Active Inactive   Amplitude (V)   3.0 [0-4.0] 3.4 [0-7.0] 3.4 [0-7.0]   Pulse width (usec)   60 90 90   Freq (Hz)   130 130 130   Contacts:   C+3- C+11- C+11-   Impedance: / / 1262/2.7 /        Group B Left Brain         Right Brain       Initial Final Initial Final     None Active None Active   Amplitude (V)   3.0 [0-4.0]  4.0 [0-5.6]   Pulse width (usec)   60  60   Freq (Hz)   130  130   Contacts:   C+3-  C+11-   Impedance: / / / /         L GPi:  Contacts Amplitude PW Frequency Effect SE   C+0- 1.0 60 130  Mouth and right hemibody feeling    0.0    Resolved   C+1- 1.0 60 130  NN    2.0    Mouth feeling    1.5    Resolved    2.0    Tongue not working    0.0    Resolved   C+2- 1.0 60 130  NN    2.0    Neck maybe looser    3.0    Tongue sensation    4.0   Dysarthria, RUE posturing Arm and hand contraction   C+3- 1.0 60 130  NN    2.0    NN    3.0   Increased RUE tremor RUE more shaky    4.0    ?tongue sensation    5.0   Gait same same    5.5    Tongue sensation    5.0    improving    4.5    improving    4.0    resolved       R GPi:  Contacts Amplitude PW Frequency Effect SE   C+10- 1.0 60 130  NN    2.0    NN    3.0    L hand movement improving    4.0    NN    5.0    ?flashes, slight tongue sensation    0.0    improved   C+11- 1.0 60 130  L hand looser    2.0    NN    3.0    NN    4.0    DIP joint tighter    5.0   Gait same NN    6.0    Harder to speak    0.0    Resolved           Assessment/Plan:  Bradley Garcia is a 21 year old male with generalized dystonia s/p bilateral GPi DBS who returns for initial L GPi programming.  Monopolar review of the L GPi lead showed increasing side effect thresholds at each lead more proximally.  R GPi PW was decreased in a new Group B.    - RTC 4 weeks, 1 hr DBS optimization          Tahira Saxena MD  Movement Disorders Fellow       11 minutes spent on the date of the encounter doing chart review, history and exam, documentation and further activities as noted above.     Additional time spent for separate DBS programmin min DBS analyzed with reprogramming.

## 2022-06-01 ENCOUNTER — ANCILLARY PROCEDURE (OUTPATIENT)
Dept: CT IMAGING | Facility: CLINIC | Age: 22
End: 2022-06-01
Attending: STUDENT IN AN ORGANIZED HEALTH CARE EDUCATION/TRAINING PROGRAM
Payer: COMMERCIAL

## 2022-06-01 ENCOUNTER — OFFICE VISIT (OUTPATIENT)
Dept: NEUROLOGY | Facility: CLINIC | Age: 22
End: 2022-06-01
Payer: COMMERCIAL

## 2022-06-01 VITALS
RESPIRATION RATE: 16 BRPM | BODY MASS INDEX: 21.63 KG/M2 | SYSTOLIC BLOOD PRESSURE: 118 MMHG | DIASTOLIC BLOOD PRESSURE: 68 MMHG | WEIGHT: 134 LBS | OXYGEN SATURATION: 98 % | HEART RATE: 91 BPM

## 2022-06-01 DIAGNOSIS — G24.1 DYSTONIA, TORSION, FRAGMENTS OF: ICD-10-CM

## 2022-06-01 DIAGNOSIS — Z96.89 S/P DEEP BRAIN STIMULATOR PLACEMENT: ICD-10-CM

## 2022-06-01 DIAGNOSIS — G24.1 GENERALIZED TORSION DYSTONIA: Primary | ICD-10-CM

## 2022-06-01 DIAGNOSIS — R26.9 GAIT DIFFICULTY: ICD-10-CM

## 2022-06-01 DIAGNOSIS — R25.9 ABNORMAL INVOLUNTARY MOVEMENT: ICD-10-CM

## 2022-06-01 DIAGNOSIS — R47.1 DYSARTHRIA: ICD-10-CM

## 2022-06-01 PROCEDURE — 95983 ALYS BRN NPGT PRGRMG 15 MIN: CPT | Performed by: STUDENT IN AN ORGANIZED HEALTH CARE EDUCATION/TRAINING PROGRAM

## 2022-06-01 PROCEDURE — 99212 OFFICE O/P EST SF 10 MIN: CPT | Mod: 25 | Performed by: STUDENT IN AN ORGANIZED HEALTH CARE EDUCATION/TRAINING PROGRAM

## 2022-06-01 PROCEDURE — 95984 ALYS BRN NPGT PRGRMG ADDL 15: CPT | Performed by: STUDENT IN AN ORGANIZED HEALTH CARE EDUCATION/TRAINING PROGRAM

## 2022-06-01 PROCEDURE — 70450 CT HEAD/BRAIN W/O DYE: CPT | Mod: GC | Performed by: RADIOLOGY

## 2022-06-01 NOTE — LETTER
June 1, 2022          To whom it may concern:    Bradley Garcia is under my care in the Movement Disorders Clinic for dystonia.  He is able to return to work as of today June 6, 2022 without restrictions.  Please contact my office with any further questions.    Sincerely,          Tahira Saxena MD  Movement Disorders Fellow

## 2022-06-01 NOTE — PATIENT INSTRUCTIONS
Today we turned your right body.  You are on Group B which is also a little different for your left body.  The right body is at 3.0 with a range from 0-4.0 and your left body is at 4.0 with a range from 0-5.6.    Try to stay on these settings for about 2 weeks.  After that, you can try increasing one side at a time by 0.2      Group A is your old left body settings plus the right body.

## 2022-06-20 NOTE — OP NOTE
[facility] Neurosurgery Neurophysiology Summary    Name: Bradley Garcia  : 2000  MRN: [id number]  Surgery Date: 2022  Surgery Performed: R GPi DBS  Neurologist Ryan Coreas MD, PhD  Ordering Physician: Ravinder Coleman MD, PhD    Diagnosis: Dystonia    Target: GPi    Side: Right    Procedure: The surgical field was prepared as per the neurosurgeon's note. A microelectrode was attached to the stereotactic frame and lowered through the brain with a calibrated microdrive. Neuronal activity was recorded along each tract. Single units were isolated and somatosensory responses were determined.     Functional Physiologic Mapping: 3 Hours    Electrophysiological Mapping: The target was physiologically mapped using electrophysiological guidance with 3 simultaneous microelectrode tracks.    Microelectrode Mapping: BenGun with 3 CAM[place] tracks Anterior, Posterior and Lateral  Anterior Track - ~3.8-4.6mm GPi with SSR to Shoulder, Elbow and Wrist. OT identified and IC in tongue with ring stimulation at 5-5.5mA at (+)3.6mm with ventral border between 0 to +0.8.   Posterior Track - ~2.5-3mm GPi with SSR to knee and hip -OT, +IC with ustim 60-70ua at depth of -0.6mm  Lateral Track - 4.66mm GPi with SSR to shoulder, elbow, wrist, hip & fingers    Lead Placement- SenSight 0.5. To place lead Bronson was rotated 30degree clockwise to place lead 1mm lateral and 0.3mm posterior to the Anterior Track    Macrostimulation:   (-)0 (+)2a,b,c possible IC activation with 90us, 130Hz at 6V but very difficult to discern.    Comments:   Relatively short runs of GPi but pallidum appears smaller in this patient. Critical landmarks were identified (IC and OT) with good SSR s and reasonable thresholds to stimulation. Based on mapping the bottom of contact 0 was placed at (-)0.5mm just at the ventral border of GPi and well above the OT.

## 2022-06-20 NOTE — OP NOTE
[facility] Neurosurgery Neurophysiology Summary    Name: Bradley Garcia  : 2000  MRN: [id number]  Surgery Date: 22  Surgery Performed: L GPi DBS  Neurologist Ryan Coreas MD, PhD  Ordering Physician: Ravinder Coleman MD, PhD    Diagnosis: Dystonia    Target: GPi    Side: left    Procedure: The surgical field was prepared as per the neurosurgeon's note. A microelectrode was attached to the stereotactic frame and lowered through the brain with a calibrated microdrive. Neuronal activity was recorded along each tract. Single units were isolated and somatosensory responses were determined.     Functional Physiologic Mappin Hours    Electrophysiological Mapping: The target was physiologically mapped using neurophysiological recordings to define the target. Three tracks were used and recordings were performed simultaneously from Anterior, Posterior and Lateral tracks.    Microelectrode Mapping: Mapping for the second side was not as clear as for the first. Cells were not as well isolated or defined. Following implantation, we were informed that anesthesia had left a low dose of presedex on rather than discontinuing it as we normally do.  Anterior Track - 5.8mm of GPi with SSR to shoulder. OT was (-) to ustim up to 80uA at  -4.5mm in CAMILLE notes but later note in stimulation form described blue phosphenes at 100uA at -4.08 in the anterior track with unclear response at 60 and 80uA  Posterior Track - ~4.1mm of GPi with no SSR s clearly determined, OT not tested but IC (+) at 70uA at -4.58mm and at 1.5mA with ring stimulation  Lateral Track - ~2.3mm of GPi with SSR to hand and wrist, OT not tested    Lead Placement: Medtronic SenSight 0.5 Lead placed in the anterior track at -2.5mm given GPi ventral border was -3.1mm.    Macrostimulation:   (-)0, (+)2 60us and 130Hz  3.5mA IC + upper lip twitch  (-) 2 (+) 0 60us and 130 Hz 6.5mA IC + mild upper lip twitch    Comments:   Recording somewhat more difficult to  define given residual anesthesia effects. SM GPi defined with OT and IC identified. Lead placed for ventral contact would be 0.5mm above the ventral border of GPi based on electrophysiology.The upper segments should be located at the dorsal border of GPi slightly into the laminae between Chago and GPi.

## 2022-07-09 ENCOUNTER — HEALTH MAINTENANCE LETTER (OUTPATIENT)
Age: 22
End: 2022-07-09

## 2022-07-11 ENCOUNTER — OFFICE VISIT (OUTPATIENT)
Dept: NEUROLOGY | Facility: CLINIC | Age: 22
End: 2022-07-11
Payer: COMMERCIAL

## 2022-07-11 VITALS
OXYGEN SATURATION: 94 % | HEART RATE: 88 BPM | DIASTOLIC BLOOD PRESSURE: 77 MMHG | BODY MASS INDEX: 21.95 KG/M2 | SYSTOLIC BLOOD PRESSURE: 127 MMHG | WEIGHT: 136 LBS

## 2022-07-11 DIAGNOSIS — G24.1 GENERALIZED TORSION DYSTONIA: ICD-10-CM

## 2022-07-11 DIAGNOSIS — Z96.89 S/P DEEP BRAIN STIMULATOR PLACEMENT: Primary | ICD-10-CM

## 2022-07-11 PROCEDURE — 95984 ALYS BRN NPGT PRGRMG ADDL 15: CPT | Performed by: NURSE PRACTITIONER

## 2022-07-11 PROCEDURE — 99215 OFFICE O/P EST HI 40 MIN: CPT | Mod: 25 | Performed by: NURSE PRACTITIONER

## 2022-07-11 PROCEDURE — 95983 ALYS BRN NPGT PRGRMG 15 MIN: CPT | Performed by: NURSE PRACTITIONER

## 2022-07-11 ASSESSMENT — PAIN SCALES - GENERAL: PAINLEVEL: NO PAIN (0)

## 2022-07-11 NOTE — LETTER
2022       RE: Bradley Garcia  [id number]  [facility] [address]     Dear Colleague,    Thank you for referring your patient, Bradley Garcia, to the [facility] NEUROLOGY CLINIC [place] at [facility]. Please see a copy of my visit note below.    PATIENT: Bradley Garcia    : 2000    REGGIE: 2022    REASON FOR VISIT: Deep brain stimulation (DBS) programming optimization for the treatment of dystonia.     HPI:  Mr. Bradley Garcia is a 21 year old right - handed young adult who came to the [facility] neurology clinic by himself for DBS programming optimization.      DBS Hx: He is s/p Bilateral Globus Pallidus Internus (Gpi) DBS implantation on 2013 at [facility]. Due to lack of efficacy and possible the leads many not be in optimal place, he underwent removal of entire DBS system hardware (leads & battery) on 2021 by Dr. Coleman.  After about 8 months, he underwent bilateral GPi lead reimplantation: Right GPi on 2022 and Left on 2022, and Right Chest Activa-RC DBS battery placement on 2022 by Dr. Coleman.    His initial programming for the Right GPi was completed on 2022 and Left GPi on 2022 by Dr. Saxena.     During his last programming visit on 2022, his Left GPi was turned on and programmed, and the R GPi PW was reduced from 90 msec to 60 msec using Group B.  Patient left the clinic in Group B.    Since his last visit, Pt didn't notice any difference, with a lower PW in his left body; therefore, he went back to Group A.  He didn't try increasing the current/amplitude.     Side Effects: The day after his last programming session, the Rt arm (Lt GPi) started shaking more compared to before he was turn ON. He waited for a week and no change. He reduced the current by 0.5 mA (from 3.0 mA to 2.5 mA) and the shakiness got better. He didn't make any changes to his Left body (Rt  "GPi).    Improvement: Left neck and left trunk pulling have improved \"less pulling\" and muscle felt looser.\"     Charging: No issues charging. Sometimes he charges his DBS battery daily for 10 minutes, and other times every 2 - 3 days and it takes him 1 hour.     MEDICATIONS:   Outpatient Medications Marked as Taking for the 7/11/22 encounter (Office Visit) with Miriam Van APRN CNP   Medication Sig     acetaminophen (TYLENOL) 325 MG tablet Take 325-650 mg by mouth every 6 hours as needed for mild pain     CONCERTA 36 MG CR tablet Take 36 mg by mouth every morning      sertraline (ZOLOFT) 50 MG tablet Take 50 mg by mouth every morning        PHYSICAL EXAM:    Vital Signs:  /77 (BP Location: Right arm, Patient Position: Sitting, Cuff Size: Adult Regular)   Pulse 88   Wt 61.7 kg (136 lb)   SpO2 94%   BMI 21.95 kg/m       General:  Mr. Garcia is a pleasant  male who is well-groomed sitting comfortably in the exam room without any distress.  Affect is appropriate.    Procedure: DBS Interrogation & Analysis:    Lead(s):    Left Right   DBS Target Left GPi Right GPi   DBS Lead Type SenSight - Directional SenSight - Directional   Lead Placement Date 5/2/2022 4/11/2022     IPG(s):   1   IPG Activa-RC   IPG Implant Date 4/18/2022   Location Right chest   Battery (V) Battery Level: 100%  JAMES Date: April 15, 2026     Left Brain -- Group A ACTIVE    C +, 3 -  Voltage:  2.5 volts  PW:  60  s  Rate:  130 Hz    Therapy impedance:  1320 Ohms  Therapy Current:  1.9 mA    Range: 0.0 - 4.0 volts    Electrode Impedance Check:  Left Lead: No Problems Found.       Right Brain -- Group A ACTIVE    C +, 11 -  Voltage:  3.8 volts  PW:  90  s  Rate:  130 Hz    Range: 0.0 - 7.0 volts    Therapy impedance:  1219 Ohms  Therapy Current:  3.1 mA    Electrode Impedance Check:  Right Lead: No Problems Found.      See scanned report for impedance details.    DBS PROGRAMMING:  __  Ra [employer] Rep called to help " set up double monopolar.  With the Medtronic , double monopolar with simultaneous stimulation of 2 contacts is not an option; however, she recommended interleaving.      Therefore, Group C created to create Left GPi Interleaving.    __  Monopolar review for bilateral GPi reviewed.     Left GPi  Program 1  C +, 3 -  Amplitude:  2.5 mA  PW:  60  s  Rate:  125 Hz    Range 0.5 - 3.1 mA    Program 2  C +, 2abc -  Amplitude:  2.0 mA  PW:  60  s  Rate:  125 Hz    Range 0.0 - 2.6 mA    __  Patient had no side effects.     When adjusting the patient range, the  simultaneously changes the other side.  Unable to give individual range limits for Program 1 and Program B.     __ I went over patient controller with pt. Unable to individually adjust Program 1 and Program 2.  When he made a change for Program 1, from 2.5 volt to 2.6 volts, both Program 1 and 2 would increase by 0.1 volts.    __ I called Christen back and informed her the issue above. After a few trial, we concluded that pt can only adjust one side or if both at the same time. Since during his last visit he had increased shakiness when his voltage was set at 3.0 volts in the Program 1, I programmed his Program 2 with range so that he could only make a change in contact 2.    __  Pt was able to double check his settings.  Since Program 2 is set at 2.0 volts, he was able to see that was the contact that he could change.  See below for his final settings.      __  He was instructed to call if there is any side effects or loss of benefit from today's programming.    Final Settings:  Patient Left in Group C    Left GPi      Program 1  C +, 3 -  Voltage:  2.5 volts  PW:  60  s  Rate:  125 Hz    Range -- No range.     Program 2  C +, 2abc -  Voltage:  2.0 volts  PW:  60  s  Rate:  125 Hz    Range 0.0 - 2.6 volts    Program 1: 1336 Ohms, 1.9 mA  Program 2: 1437 Ohms, 1.4 mA Right GPi      No change.     __ Since Dr. Saxena is out until September, I'll see  him back for DBS programming in 1 month.     Today I spent 100 minutes caring for the patient. 60 minutes was spent in DBS programming.  40 minutes was spent in troubleshooting the programming issues, reviewing records, meeting with the patient, answering questions, examining, and documentation.     MAG Dooley, CNP  [facility]

## 2022-07-11 NOTE — PROGRESS NOTES
"PATIENT: Bradley Garcia    : 2000    REGGIE: 2022    REASON FOR VISIT: Deep brain stimulation (DBS) programming optimization for the treatment of dystonia.     HPI:  Mr. Bradley Garcia is a 21 year old right - handed young adult who came to the [facility] neurology clinic by himself for DBS programming optimization.      DBS Hx: He is s/p Bilateral Globus Pallidus Internus (Gpi) DBS implantation on 2013 at [facility]. Due to lack of efficacy and possible the leads many not be in optimal place, he underwent removal of entire DBS system hardware (leads & battery) on 2021 by Dr. Coleman.  After about 8 months, he underwent bilateral GPi lead reimplantation: Right GPi on 2022 and Left on 2022, and Right Chest Activa-RC DBS battery placement on 2022 by Dr. Coleman.    His initial programming for the Right GPi was completed on 2022 and Left GPi on 2022 by Dr. Saxena.     During his last programming visit on 2022, his Left GPi was turned on and programmed, and the R GPi PW was reduced from 90 msec to 60 msec using Group B.  Patient left the clinic in Group B.    Since his last visit, Pt didn't notice any difference, with a lower PW in his left body; therefore, he went back to Group A.  He didn't try increasing the current/amplitude.     Side Effects: The day after his last programming session, the Rt arm (Lt GPi) started shaking more compared to before he was turn ON. He waited for a week and no change. He reduced the current by 0.5 mA (from 3.0 mA to 2.5 mA) and the shakiness got better. He didn't make any changes to his Left body (Rt GPi).    Improvement: Left neck and left trunk pulling have improved \"less pulling\" and muscle felt looser.\"     Charging: No issues charging. Sometimes he charges his DBS battery daily for 10 minutes, and other times every 2 - 3 days and it takes him 1 hour.     MEDICATIONS:   Outpatient Medications Marked as Taking for the " 7/11/22 encounter (Office Visit) with Miriam Van APRN CNP   Medication Sig     acetaminophen (TYLENOL) 325 MG tablet Take 325-650 mg by mouth every 6 hours as needed for mild pain     CONCERTA 36 MG CR tablet Take 36 mg by mouth every morning      sertraline (ZOLOFT) 50 MG tablet Take 50 mg by mouth every morning        PHYSICAL EXAM:    Vital Signs:  /77 (BP Location: Right arm, Patient Position: Sitting, Cuff Size: Adult Regular)   Pulse 88   Wt 61.7 kg (136 lb)   SpO2 94%   BMI 21.95 kg/m       General:  Mr. Garcia is a pleasant  male who is well-groomed sitting comfortably in the exam room without any distress.  Affect is appropriate.    Procedure: DBS Interrogation & Analysis:    Lead(s):    Left Right   DBS Target Left GPi Right GPi   DBS Lead Type SenSight - Directional SenSight - Directional   Lead Placement Date 5/2/2022 4/11/2022     IPG(s):   1   IPG Activa-RC   IPG Implant Date 4/18/2022   Location Right chest   Battery (V) Battery Level: 100%  JAMES Date: April 15, 2026     Left Brain -- Group A ACTIVE    C +, 3 -  Voltage:  2.5 volts  PW:  60  s  Rate:  130 Hz    Therapy impedance:  1320 Ohms  Therapy Current:  1.9 mA    Range: 0.0 - 4.0 volts    Electrode Impedance Check:  Left Lead: No Problems Found.       Right Brain -- Group A ACTIVE    C +, 11 -  Voltage:  3.8 volts  PW:  90  s  Rate:  130 Hz    Range: 0.0 - 7.0 volts    Therapy impedance:  1219 Ohms  Therapy Current:  3.1 mA    Electrode Impedance Check:  Right Lead: No Problems Found.      See scanned report for impedance details.    DBS PROGRAMMING:    __  Christen Terrell [employer] Rep called to help set up double monopolar.  With the Medtronic , double monopolar with simultaneous stimulation of 2 contacts is not an option; however, she recommended interleaving.      Therefore, Group C created to create Left GPi Interleaving.    __  Monopolar review for bilateral GPi reviewed.     Left GPi  Program 1  C +, 3  -  Amplitude:  2.5 mA  PW:  60  s  Rate:  125 Hz    Range 0.5 - 3.1 mA    Program 2  C +, 2abc -  Amplitude:  2.0 mA  PW:  60  s  Rate:  125 Hz    Range 0.0 - 2.6 mA    __  Patient had no side effects.     When adjusting the patient range, the  simultaneously changes the other side.  Unable to give individual range limits for Program 1 and Program B.     __ I went over patient controller with pt. Unable to individually adjust Program 1 and Program 2.  When he made a change for Program 1, from 2.5 volt to 2.6 volts, both Program 1 and 2 would increase by 0.1 volts.    __ I called Christen back and informed her the issue above. After a few trial, we concluded that pt can only adjust one side or if both at the same time. Since during his last visit he had increased shakiness when his voltage was set at 3.0 volts in the Program 1, I programmed his Program 2 with range so that he could only make a change in contact 2.    __  Pt was able to double check his settings.  Since Program 2 is set at 2.0 volts, he was able to see that was the contact that he could change.  See below for his final settings.      __  He was instructed to call if there is any side effects or loss of benefit from today's programming.    Final Settings:  Patient Left in Group C    Left GPi      Program 1  C +, 3 -  Voltage:  2.5 volts  PW:  60  s  Rate:  125 Hz    Range -- No range.     Program 2  C +, 2abc -  Voltage:  2.0 volts  PW:  60  s  Rate:  125 Hz    Range 0.0 - 2.6 volts    Program 1: 1336 Ohms, 1.9 mA  Program 2: 1437 Ohms, 1.4 mA     Right GPi      No change.     __ Since Dr. Saxena is out until September, I'll see him back for DBS programming in 1 month.     Today I spent 100 minutes caring for the patient. 60 minutes was spent in DBS programming.  40 minutes was spent in troubleshooting the programming issues, reviewing records, meeting with the patient, answering questions, examining, and documentation.     Sofia Van,  APRN, CNP  [facility]

## 2022-07-11 NOTE — PATIENT INSTRUCTIONS
Dear  Bradley MASON Jose,    Thank you for coming today.  During your visit, we have discussed the following:     __ Your DBS electrical system function (impedance) is normal. The battery life is also good.    __ The changes that were made today include:    __  No changes made in Group A & B.    Final Settings:  Patient Left in Group C    Left GPi    Program 1  C +, 3 -  Voltage:  2.5 volts    Range -- No range.     Program 2  C +, 2abc -  Voltage:  2.0 volts    Range 0.0 - 2.6 volts   Right GPi      No change.     __ Please call if your symptoms worsen or you experience side effects. You can also switch to Group A, which is the group you had when you came to the clinic today.     __  Return on Aug 11th at 9:40 am for DBS programming.       To contact our clinic, you may call [phone] or use [employer] message.     MAG Dooley, CNP  [facility]

## 2022-08-11 ENCOUNTER — OFFICE VISIT (OUTPATIENT)
Dept: NEUROLOGY | Facility: CLINIC | Age: 22
End: 2022-08-11
Payer: COMMERCIAL

## 2022-08-11 VITALS
RESPIRATION RATE: 16 BRPM | WEIGHT: 138 LBS | BODY MASS INDEX: 22.27 KG/M2 | SYSTOLIC BLOOD PRESSURE: 129 MMHG | OXYGEN SATURATION: 100 % | HEART RATE: 101 BPM | DIASTOLIC BLOOD PRESSURE: 77 MMHG

## 2022-08-11 DIAGNOSIS — Z96.89 S/P DEEP BRAIN STIMULATOR PLACEMENT: Primary | ICD-10-CM

## 2022-08-11 DIAGNOSIS — G24.1 GENERALIZED TORSION DYSTONIA: ICD-10-CM

## 2022-08-11 PROCEDURE — 99212 OFFICE O/P EST SF 10 MIN: CPT | Mod: 25 | Performed by: PSYCHIATRY & NEUROLOGY

## 2022-08-11 PROCEDURE — 95983 ALYS BRN NPGT PRGRMG 15 MIN: CPT | Performed by: PSYCHIATRY & NEUROLOGY

## 2022-08-11 PROCEDURE — 95984 ALYS BRN NPGT PRGRMG ADDL 15: CPT | Performed by: PSYCHIATRY & NEUROLOGY

## 2022-08-11 ASSESSMENT — PAIN SCALES - GENERAL: PAINLEVEL: NO PAIN (0)

## 2022-08-11 NOTE — PROGRESS NOTES
PATIENT: Bradley Garcia    : 2000    REGGIE: 2022    REASON FOR VISIT: Deep brain stimulation (DBS) programming optimization for the treatment of dystonia.     HPI:  Mr. Bradley Garcia is a 21 year old young male who came to the [facility] neurology clinic by himself for DBS programming optimization.    Brief Dystonia Hx: Before the age of 3, his parents noticed that he was crawling and wasn't walking that led them to bringing him to the doctor. He also wasn't using his left hand the way he used his right.     [facility] Neurology Notes by Dr. Parr from 2019 -   2019: Genetic test for PRKRA was negative.       Per Dr. Elizabeth's note on 2018, patient had a negative genetic testing of the following: THAP1, TOR1A, myotonic dystrophy, and facioscapulohumeral dystrophy.   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      DBS Hx: He is s/p Bilateral Globus Pallidus Internus (Gpi) DBS implantation on 2013 at [facility]. Due to lack of efficacy and the leads many not be in optimal place, he underwent removal of entire DBS system hardware (leads & battery) on 2021 by Dr. Coleman.  After about 8 months, he underwent bilateral GPi lead reimplantation: Right GPi on 2022 and Left on 2022, and Right Chest Activa-RC DBS battery placement on 2022 by Dr. Coleman. His initial programming for the Right GPi was completed on 2022 and Left GPi on 2022 by Dr. Saxena.     He was last seen in the clinic on 2022 for DBS programming optimization, and his Left GPi was programmed using interleaving (can't do double monopolar with the Medtronic Activa RC).  Today, pt reported that initially, he didn't notice any change. However, after increasing the voltage from 2.0 to 2.5 volts, he noticed his right leg less shaky.    MEDICATIONS:   Outpatient Medications Marked as  Taking for the 8/11/22 encounter (Office Visit) with Ryan Coeras MD   Medication Sig     acetaminophen (TYLENOL) 325 MG tablet Take 325-650 mg by mouth every 6 hours as needed for mild pain     CONCERTA 36 MG CR tablet Take 36 mg by mouth every morning      sertraline (ZOLOFT) 50 MG tablet Take 50 mg by mouth every morning      PHYSICAL EXAM:  Vital Signs:  /77   Pulse 101   Resp 16   Wt 62.6 kg (138 lb)   SpO2 100%   BMI 22.27 kg/m   Body mass index is 22.27 kg/m .    General:  Mr. Garcia is a pleasant  male who is well-groomed and well-developed sitting comfortably in the exam room without any distress.  Affect is appropriate.     Procedure: DBS Interrogation & Analysis:     Lead(s):     Left Right   DBS Target Left GPi Right GPi   DBS Lead Type SenSight - Directional SenSight - Directional   Lead Placement Date 5/2/2022 4/11/2022      IPG(s):    1   IPG Activa-RC   IPG Implant Date 4/18/2022   Location Right chest   Battery (V) Battery Level: 100%  JAMSE Date: April 15, 2036        Group C ACTIVE     Left GPi    Program 1  C +, 3 -  Voltage:  2.5 volts  PW:  60  s  Rate:  125 Hz     Range - No Range     Program 2  C +, 2abc -  Voltage:  2.5 volts  PW:  60  s  Rate:  125 Hz     Range 0.0 - 2.6 volts    Therapy impedance:   Program 1: 1105 ohms/2.3 mA  Program 2: 1369 ohms/1.8 mA     Electrode Impedance Check:  Left Lead: No Problems Found.       Right Brain      C +, 11 -  Voltage:  3.8 volts  PW:  90  s  Rate:  125 Hz     Range: 0.0 -  volts     Therapy impedance: 1350 Ohms  Therapy Current: 2.8 mA     Electrode Impedance Check:  Right Lead: No Problems Found.       See scanned report for impedance details.    DBS PROGRAMMING:    Right GPi  __  Reviewed the OR mapping and monopolar review with Dr. Coreas and Dr. Peoples.   __  Decided to do double monopolar (interleaving) using contact 10 & 11. Will program 10 with a lower voltage as the threshold is lower.     Program 1  C +, 11 - // 60   s // 125 Hz  Voltage slowly increased and set at 3.0 volts. No side effects.  Ran.0 - 5 volts    Program 2  C +, 10abc - //  60  s // 125 Hz  Voltage slowly increased and was set at 2.5 volts  No range given.    Left GPi  The settings stayed the same.  However, the patient range in Program 2 was increased     Final Settings: Group C ACTIVE    Left GPi -- Interleaving     Program 1:  C +, 3 -   Voltage:  2.5 volts  PW:  60  s  Rate:  125 Hz    Patient : Program 1  No range   Right GPi -- Interleaving    Program 1  C +, 11 -  Voltage:  3.0 volts  PW:  60  s  Rate:  125 Hz    Patient : Program 1  Upper Limit: 5.0 volts  Lower Limit: 0.0 volts     Program 2  C +, 2abc -   Voltage:  2.5 volts  PW:  60  s  Rate:  125 Hz    Patient : Program 2  Upper Limit: 3.5 volts  Lower Limit: 0.0 volts   Program 2  C +, 10abc -  Voltage:  2.5 volts  PW:  90  s  Rate:  125 Hz    Patient : Program 1  No range       __  In the future, may consider bipolar setting,   __  He was instructed to call if there is any side effects or loss of benefit from today's programming.  __  Patient will follow up with Dr. Sxaena for programming. Gael Zacarias CMA notified to put patient on the list to schedule when Dr. Saxena's template in Epic.     Patient was seen, examined, & discussed with Dr. Coreas who agrees with assessment and plan. Dr. Peoples (fellow) was present. Today we spent 60 minutes caring for the patient. 45 minutes was spent in DBS programming.  15 minutes was spent with reviewing records, meeting with the patient, answering questions, examining, and documentation.     Sofia Van, APRN, CNP  [facility]     I,, Ryan Coreas MD, PhD saw the patient with Ms Van and Dr. Peoples. At this point he has experienced only mild benefit to his right leg. Some of the IC thresholds were a little lower than I would have liked so suggested we work with double monopolar as noted above to start  and if we do not get the success we are looking for we should consider bipolar settings to allow for increasing current amplitude while focusing the current field within the GPi. By limiting currrent spread we maay be able to increased the volume of tissue affected and keep it within the GP rather than spreading into the internal capsule.    Ryan Coreas MD, PhD

## 2022-08-11 NOTE — LETTER
2022       RE: Bradley Garcia  [address]  [facility] [address]     Dear Colleague,    Thank you for referring your patient, Bradley Garcia, to the [facility] NEUROLOGY CLINIC [facility]. Please see a copy of my visit note below.    PATIENT: Bradley Garcia    : 2000    REGGIE: 2022    REASON FOR VISIT: Deep brain stimulation (DBS) programming optimization for the treatment of dystonia.     HPI:  Mr. Bradley Garcia is a 21 year old young male who came to the [facility] neurology clinic by himself for DBS programming optimization.    Brief Dystonia Hx: Before the age of 3, his parents noticed that he was crawling and wasn't walking that led them to bringing him to the doctor. He also wasn't using his left hand the way he used his right.     [facility] Neurology Notes by Dr. Parr from 2019 -   2019: Genetic test for PRKRA was negative.       Per Dr. Elizabeth's note on 2018, patient had a negative genetic testing of the following: THAP1, TOR1A, myotonic dystrophy, and facioscapulohumeral dystrophy.   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      DBS Hx: He is s/p Bilateral Globus Pallidus Internus (Gpi) DBS implantation on 2013 at [facility]. Due to lack of efficacy and the leads many not be in optimal place, he underwent removal of entire DBS system hardware (leads & battery) on 2021 by Dr. Coleman.  After about 8 months, he underwent bilateral GPi lead reimplantation: Right GPi on 2022 and Left on 2022, and Right Chest Activa-RC DBS battery placement on 2022 by Dr. Coleman. His initial programming for the Right GPi was completed on 2022 and Left GPi on 2022 by Dr. Saxena.     He was last seen in the clinic on 2022 for DBS programming optimization, and  his Left GPi was programmed using interleaving (can't do double monopolar with the [employer] Activa RC).  Today, pt reported that initially, he didn't notice any change. However, after increasing the voltage from 2.0 to 2.5 volts, he noticed his right leg less shaky.    MEDICATIONS:   Outpatient Medications Marked as Taking for the 8/11/22 encounter (Office Visit) with Ryan Coreas MD   Medication Sig     acetaminophen (TYLENOL) 325 MG tablet Take 325-650 mg by mouth every 6 hours as needed for mild pain     CONCERTA 36 MG CR tablet Take 36 mg by mouth every morning      sertraline (ZOLOFT) 50 MG tablet Take 50 mg by mouth every morning      PHYSICAL EXAM:  Vital Signs:  /77   Pulse 101   Resp 16   Wt 62.6 kg (138 lb)   SpO2 100%   BMI 22.27 kg/m   Body mass index is 22.27 kg/m .    General:  Mr. Garcia is a pleasant  male who is well-groomed and well-developed sitting comfortably in the exam room without any distress.  Affect is appropriate.     Procedure: DBS Interrogation & Analysis:     Lead(s):     Left Right   DBS Target Left GPi Right GPi   DBS Lead Type SenSight - Directional SenSight - Directional   Lead Placement Date 5/2/2022 4/11/2022      IPG(s):    1   IPG Activa-RC   IPG Implant Date 4/18/2022   Location Right chest   Battery (V) Battery Level: 100%  JAMES Date: April 15, 2036        Group C ACTIVE     Left GPi    Program 1  C +, 3 -  Voltage:  2.5 volts  PW:  60  s  Rate:  125 Hz     Range - No Range     Program 2  C +, 2abc -  Voltage:  2.5 volts  PW:  60  s  Rate:  125 Hz     Range 0.0 - 2.6 volts    Therapy impedance:   Program 1: 1105 ohms/2.3 mA  Program 2: 1369 ohms/1.8 mA     Electrode Impedance Check:  Left Lead: No Problems Found.       Right Brain      C +, 11 -  Voltage:  3.8 volts  PW:  90  s  Rate:  125 Hz     Range: 0.0 -  volts     Therapy impedance: 1350 Ohms  Therapy Current: 2.8 mA     Electrode Impedance Check:  Right Lead: No Problems Found.       See  scanned report for impedance details.    DBS PROGRAMMING:    Right GPi  __  Reviewed the OR mapping and monopolar review with Dr. Coreas and Dr. Peoples.   __  Decided to do double monopolar (interleaving) using contact 10 & 11. Will program 10 with a lower voltage as the threshold is lower.     Program 1  C +, 11 - // 60  s // 125 Hz  Voltage slowly increased and set at 3.0 volts. No side effects.  Ran.0 - 5 volts    Program 2  C +, 10abc - //  60  s // 125 Hz  Voltage slowly increased and was set at 2.5 volts  No range given.    Left GPi  The settings stayed the same.  However, the patient range in Program 2 was increased     Final Settings: Group C ACTIVE    Left GPi -- Interleaving     Program 1:  C +, 3 -   Voltage:  2.5 volts  PW:  60  s  Rate:  125 Hz    Patient : Program 1  No range   Right GPi -- Interleaving    Program 1  C +, 11 -  Voltage:  3.0 volts  PW:  60  s  Rate:  125 Hz    Patient : Program 1  Upper Limit: 5.0 volts  Lower Limit: 0.0 volts     Program 2  C +, 2abc -   Voltage:  2.5 volts  PW:  60  s  Rate:  125 Hz    Patient : Program 2  Upper Limit: 3.5 volts  Lower Limit: 0.0 volts   Program 2  C +, 10abc -  Voltage:  2.5 volts  PW:  90  s  Rate:  125 Hz    Patient : Program 1  No range       __  In the future, may consider bipolar setting,   __  He was instructed to call if there is any side effects or loss of benefit from today's programming.  __  Patient will follow up with Dr. Saxnea for programming. Gael Zacarias CMA notified to put patient on the list to schedule when Dr. Saxena's template in Epic.     Patient was seen, examined, & discussed with Dr. Coreas who agrees with assessment and plan. Dr. Peoples (fellow) was present. Today we spent 60 minutes caring for the patient. 45 minutes was spent in DBS programming.  15 minutes was spent with reviewing records, meeting with the patient, answering questions, examining, and documentation.     Sofia SALEH  MAG Van, CNP  [facility]     I,, Ryan Coreas MD, PhD saw the patient with Ms Carlota and Dr. Peoples. At this point he has experienced only mild benefit to his right leg. Some of the IC thresholds were a little lower than I would have liked so suggested we work with double monopolar as noted above to start and if we do not get the success we are looking for we should consider bipolar settings to allow for increasing current amplitude while focusing the current field within the GPi. By limiting currrent spread we maay be able to increased the volume of tissue affected and keep it within the GP rather than spreading into the internal capsule.      Ryan Coreas MD, PhD

## 2022-09-03 ENCOUNTER — HEALTH MAINTENANCE LETTER (OUTPATIENT)
Age: 22
End: 2022-09-03

## 2022-09-26 ENCOUNTER — MYC MEDICAL ADVICE (OUTPATIENT)
Dept: NEUROLOGY | Facility: CLINIC | Age: 22
End: 2022-09-26

## 2022-09-26 NOTE — CONFIDENTIAL NOTE
8/11/2022 office visit: Patient will follow up with Dr. Saxena for programming. Gael Zacarias CMA notified to put patient on the list to schedule when Dr. Saxena's template in Epic.

## 2022-09-26 NOTE — PROGRESS NOTES
Department of Neurology  Movement Disorders Division   DBS Follow-up Note    Patient: Bradley Garcia  MRN: [id number]   : 2000   Date of Visit: 2022    Diagnosis: Dystonia  DBS Target(s): Bilateral GPi  Date(s) of DBS Lead Placement: R 2022, L 2022  Date(s) of IPG Placement: R chest 2022  Device: [employer] Activa RC      Chief Complaint:  Bradley Garcia is a 22 year old male who returns to clinic for follow up of generalized dystonia status post bilateral GPi DBS.   He was last seen 2022 at which time R GPi was switched to double monopolar interleaving to improve remaining dystonia..    Interval History:  His right sided symptoms were worse when stationary due to shaking.  It was similar to prior to programming.  He turned up the stimulator and it didn't improve.  Then he turned it down and it eventually improved to his prior level of symptoms but doesn't feel that he has noticed any improvement.    The left side was also worse so he increased it.  This improved but not back to how it was before.  He feels that changes to the left side improve his neck for only 1-2 days.    He speech and balance are unchanged.    Starting two weeks ago, he is feeling less confident driving because of his neck tightness.  He is consdering Botox in his neck. Previously this worked well and did not cause side effects.        Medications:  Current Outpatient Medications   Medication Sig Dispense Refill     sertraline (ZOLOFT) 50 MG tablet Take 50 mg by mouth every morning        acetaminophen (TYLENOL) 325 MG tablet Take 325-650 mg by mouth every 6 hours as needed for mild pain (Patient not taking: Reported on 2022)       CONCERTA 36 MG CR tablet Take 36 mg by mouth every morning  (Patient not taking: Reported on 2022)          Review of Systems:  Other than that noted at the end of this note, the remainder of 12 systems reviewed were negative.    Allergies: has No Known Allergies.    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      Depression      Neck pain      PONV (postoperative nausea and vomiting)      S/P deep brain stimulator placement      Torsion dystonia        Past Surgical History:  Past Surgical History:   Procedure Laterality Date     ANESTHESIA OUT OF OR MRI       ANESTHESIA OUT OF OR MRI N/A 7/8/2021    Procedure: ANESTHESIA OUT OF OR Magnetic resonance imaging Brain @1200;  Surgeon: GENERIC ANESTHESIA PROVIDER;  Location: [facility] OR     ANESTHESIA OUT OF OR MRI N/A 10/8/2021    Procedure: ANESTHESIA OUT OF OR MAGNETIC RESONANCE IMAGING of BRAIN WITH AND WITHOUT CONTRAST@1400;  Surgeon: GENERIC ANESTHESIA PROVIDER;  Location: [facility] OR     EYE SURGERY       IMPLANT DEEP BRAIN STIMULATION GENERATOR / BATTERY Right 4/18/2022    Procedure: Deep brain stimulator placement, phase II, placement of deep brain stimulator generator/battery over the right chest wall;  Surgeon: Ravinder Coleman MD;  Location:  OR     NOSE SURGERY      nasal fracture repair     OPTICAL TRACKING SYSTEM INSERTION DEEP BRAIN STIMULATION Right 4/11/2022    Procedure: stealth assisted Right side deep brain stimulator placement, phase I,  target right globus pallidus internus, with microelectrode recording;  Surgeon: Ravinder Coleman MD;  Location: [facility] OR     OPTICAL TRACKING SYSTEM INSERTION DEEP BRAIN STIMULATION Left 5/2/2022    Procedure: Stealth Assisted Left side deep brain stimulator placement, phase I & II combined, target left globus pallidus internus, with microelectrode recording and connection to existing generator/battery;  Surgeon: Ravinder Coleman MD;  Location:  OR     REMOVE DEPTH ELECTRODES N/A 8/23/2021    Procedure: Removal of entire deep brain stimulation system hardware including intracranial electrodes and battery/generator in chest wall;  Surgeon: Ravinder Coleman  MD;  Location: [facility] OR     STEREOTACTIC INSERTION DEEP BRAIN STIMULATION         Social History:  Social History     Socioeconomic History     Marital status: Single     Number of children: 0   Occupational History     Occupation: unemployed   Tobacco Use     Smoking status: Never Smoker     Smokeless tobacco: Never Used   Substance and Sexual Activity     Alcohol use: Never     Drug use: Never       Family History:  Family History   Problem Relation Age of Onset     No Known Problems Mother      No Known Problems Father      No Known Problems Sister      Other - See Comments Brother         di bunny syndrome     Myocardial Infarction Maternal Grandmother      Hyperlipidemia Maternal Grandmother      Hyperlipidemia Maternal Grandfather      Unknown/Adopted Paternal Grandmother      Unknown/Adopted Paternal Grandfather      Deep Vein Thrombosis No family hx of      Anesthesia Reaction No family hx of          Physical Exam:  The patient's  weight is 64.1 kg (141 lb 6.4 oz). His blood pressure is 122/79 and his pulse is 86. His oxygen saturation is 99%.       Neurological Examination:   Mild dysarthria  Severe retrocollis  Left hand opening slowed  Gait is more upright than prior        Procedure: DBS Interrogation & Programming  Lead(s):    Left Right   DBS Target Left GPi Right GPi   DBS Lead Type SenSight - Directional SenSight - Directional   Lead Placement Date 5/2/2022 4/11/2022      IPG(s):    1   IPG Activa-RC   IPG Implant Date 4/18/2022   Location Right chest   Battery (V) Battery Level: 75%  JAMES Date: April 15, 2036     Impedance Check: No problems found  See scanned report for impedance details.    Group A Left Brain         Right Brain       Initial Final Initial Final     Inactive Inactive Inactive Inactive   Amplitude (V) 2.5 [0-4.0] 2.5 [0-4.0] 3.8 [0-7.0] 3.8 [0-7.0]   Pulse width (usec) 60 60 90 90   Freq (Hz) 130 130 130 130   Contacts: C+3- C+3- C+11- C+11-   Impedance: / / / /      Group B Left  Brain          Right Brain        Initial Final  Initial Final      GPi 1 GPi 2  GPi 1 GPi 2     Inactive Inactive Inactive Inactive Inactive Inactive   Amplitude (V) 2.5 [0-4.0] 2.5 2.0 [0-2.6] 4.0 [0-5.6] 3.8 [0-6.8] 0.0   Pulse width (usec) 60 60 60 60 90 60   Freq (Hz) 130 125 125 130 125 125   Contacts: C+3- C+3- C+2abc- C+11- C+11- C+10abc-   Impedance: / / / / / /     Group C Left Brain           Right Brain        GPi 1 GPi 2 GPi 1 GPi 2 GPi 1 GPi 2 GPi 1 GPi 2     Initial  Final  Initial  Final     Active Active Active Active Active Active Active Active   Amplitude (V) 2.5 1.5 [0-3.5] 2.5 1.5 [0-3.5] 4.0 [0-5.0] 2.5 4.0 [0-5.0] 2.5   Pulse width (usec)  60 60 60 60 60 60 60 60   Freq (Hz) 125 125 125 125 125 125 125 125   Contacts:  C+3- C+2abc- C+3- C+2abc- C+11- C+10abc- C+11- C+10abc-   Impedances 1564/1.6 1816/0.8 / / 1304/3.1 1839/1.4 / /     Group D Left Brain           Right Brain       GPi 1 GPi 2 GPi 1 GPi 2 GPi 1 GPi 2 GPi 1 GPi 2     Initial  Final  Initial  Final     None None Inactive Inactive None None Inactive Inactive   Amplitude (V)   4.0 [0-4.0] 0.0   4.0 [0-5.0] 2.5   Pulse width (usec)   90  90   60 60   Freq (Hz)   125 125   125 125   Contacts:    C+3- C+2abc-   C+11- C+10abc-   Impedances / / / / / / / /         L GPI:  Contacts Amplitude PW Frequency Effect SE   C+2- 0.0 90 125  Right thumb feels looser    1.0    NN    2.0   Intermittent tremor at rest Right hand more shaky    3.0    Harder to speak    2.5    Resolved   C+3- 1.0 90 125  NN    2.0    NN, right hand less shaky    3.0    NN    4.0    NN    5.0    NN, RUE shaking, harder to speak    4.5   Gait unchanged Resolved, gait more stable   C+3-2- 4.5/1.0 90/90 125/125  Right hand stiffer, thumb and pointer    4.5/0.0    Thumb looser    4.0/0.0    NC    3.5/0.0    ?index looser    3.0/0.0    Index looser    0.0/0.0    Looser    3.0/0.0    NC    4.5/0.0    Slight trouble speaking    4.0/0.0    Resolved, moving tongue more          Assessment/Plan:  Bradley Garcia is a 22 year old male who returns to clinic for follow up of generalized dystonia status post bilateral GPi DBS.  He continues to have recalcitrant dystonia. Today two new programs.  Group D has new L GPi settings with increased PW.  Group B was changed to his prior settings from July, when he felt the best.  He requested botulinum toxin injections for his retrocollis because it is beginning to interfere with his driving.    - Trial Group D and increase as needed.  If not better, go back to Group B  - 300 units of Botox ordered for retrocollis  - RTC 1 month, 30 min botox  - RTC 2 months, 2hr DBS programming      Tahira Saxena MD    Movement Disorders Division  Department of Neurology       Additional time spent for separate DBS programmin min DBS analyzed with reprogramming.

## 2022-09-26 NOTE — TELEPHONE ENCOUNTER
Spoke to the patient about scheduling with Dr. Saxena. The patient was scheduled on 9/27/22 at 10 AM with Dr. Saxena.

## 2022-09-27 ENCOUNTER — OFFICE VISIT (OUTPATIENT)
Dept: NEUROLOGY | Facility: CLINIC | Age: 22
End: 2022-09-27
Payer: COMMERCIAL

## 2022-09-27 VITALS
HEART RATE: 86 BPM | OXYGEN SATURATION: 99 % | DIASTOLIC BLOOD PRESSURE: 79 MMHG | WEIGHT: 141.4 LBS | BODY MASS INDEX: 22.82 KG/M2 | SYSTOLIC BLOOD PRESSURE: 122 MMHG

## 2022-09-27 DIAGNOSIS — Z96.89 S/P DEEP BRAIN STIMULATOR PLACEMENT: ICD-10-CM

## 2022-09-27 DIAGNOSIS — G24.1 GENERALIZED TORSION DYSTONIA: Primary | ICD-10-CM

## 2022-09-27 DIAGNOSIS — G24.3 CERVICAL DYSTONIA: ICD-10-CM

## 2022-09-27 PROCEDURE — 95983 ALYS BRN NPGT PRGRMG 15 MIN: CPT | Performed by: STUDENT IN AN ORGANIZED HEALTH CARE EDUCATION/TRAINING PROGRAM

## 2022-09-27 PROCEDURE — 95984 ALYS BRN NPGT PRGRMG ADDL 15: CPT | Performed by: STUDENT IN AN ORGANIZED HEALTH CARE EDUCATION/TRAINING PROGRAM

## 2022-09-27 PROCEDURE — 99214 OFFICE O/P EST MOD 30 MIN: CPT | Mod: 25 | Performed by: STUDENT IN AN ORGANIZED HEALTH CARE EDUCATION/TRAINING PROGRAM

## 2022-09-27 ASSESSMENT — PAIN SCALES - GENERAL: PAINLEVEL: NO PAIN (0)

## 2022-09-27 NOTE — PATIENT INSTRUCTIONS
You went home on Group C.  These are your old settings.    Tomorrow, switch to Group D.  This has new settings for your right body.  Try to stay on this for 1 week.  Your right body is at 4.0 which is the max.  Your left body is on the same settings 4.0 (max is 5.0).    If you turn it up to maximum and don't feel better after 1 week, try Group B.  These are the settings from July.

## 2022-09-27 NOTE — LETTER
2022       RE: Bradley Garcia  [id number]  [facility] [address]     Dear Colleague,    Thank you for referring your patient, Bradley Garcia, to the [facility] NEUROLOGY CLINIC [place] at [facility]. Please see a copy of my visit note below.    Department of Neurology  Movement Disorders Division   DBS Follow-up Note    Patient: Bradley Garcia  MRN: [id number]   : 2000   Date of Visit: 2022    Diagnosis: Dystonia  DBS Target(s): Bilateral GPi  Date(s) of DBS Lead Placement: R 2022, L 2022  Date(s) of IPG Placement: R chest 2022  Device: [employer] Activa RC      Chief Complaint:  Bradley Garcia is a 22 year old male who returns to clinic for follow up of generalized dystonia status post bilateral GPi DBS.   He was last seen 2022 at which time R GPi was switched to double monopolar interleaving to improve remaining dystonia..    Interval History:  His right sided symptoms were worse when stationary due to shaking.  It was similar to prior to programming.  He turned up the stimulator and it didn't improve.  Then he turned it down and it eventually improved to his prior level of symptoms but doesn't feel that he has noticed any improvement.    The left side was also worse so he increased it.  This improved but not back to how it was before.  He feels that changes to the left side improve his neck for only 1-2 days.    He speech and balance are unchanged.    Starting two weeks ago, he is feeling less confident driving because of his neck tightness.  He is consdering Botox in his neck. Previously this worked well and did not cause side effects.        Medications:  Current Outpatient Medications   Medication Sig Dispense Refill     sertraline (ZOLOFT) 50 MG tablet Take 50 mg by mouth every morning        acetaminophen (TYLENOL) 325 MG tablet Take 325-650 mg by mouth every 6 hours as needed for mild pain (Patient not  taking: Reported on 9/27/2022)       CONCERTA 36 MG CR tablet Take 36 mg by mouth every morning  (Patient not taking: Reported on 9/27/2022)          Review of Systems:  Other than that noted at the end of this note, the remainder of 12 systems reviewed were negative.    Allergies: has No Known Allergies.    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      Depression      Neck pain      PONV (postoperative nausea and vomiting)      S/P deep brain stimulator placement      Torsion dystonia        Past Surgical History:  Past Surgical History:   Procedure Laterality Date     ANESTHESIA OUT OF OR MRI       ANESTHESIA OUT OF OR MRI N/A 7/8/2021    Procedure: ANESTHESIA OUT OF OR Magnetic resonance imaging Brain @1200;  Surgeon: GENERIC ANESTHESIA PROVIDER;  [facility]     ANESTHESIA OUT OF OR MRI N/A 10/8/2021    Procedure: ANESTHESIA OUT OF OR MAGNETIC RESONANCE IMAGING of BRAIN WITH AND WITHOUT CONTRAST@1400;  Surgeon: GENERIC ANESTHESIA PROVIDER;  [facility]     EYE SURGERY       IMPLANT DEEP BRAIN STIMULATION GENERATOR / BATTERY Right 4/18/2022    Procedure: Deep brain stimulator placement, phase II, placement of deep brain stimulator generator/battery over the right chest wall;  Surgeon: Ravinder Coleman MD;  [facility]     NOSE SURGERY      nasal fracture repair     OPTICAL TRACKING SYSTEM INSERTION DEEP BRAIN STIMULATION Right 4/11/2022    Procedure: stealth assisted Right side deep brain stimulator placement, phase I,  target right globus pallidus internus, with microelectrode recording;  Surgeon: Ravinder Coleman MD;  [facility]     OPTICAL TRACKING SYSTEM INSERTION DEEP BRAIN STIMULATION Left 5/2/2022    Procedure: Stealth Assisted Left side deep brain stimulator placement, phase I & II combined, target left globus pallidus internus, with microelectrode  recording and connection to existing generator/battery;  Surgeon: Ravinder Coleman MD;  [facility]     REMOVE DEPTH ELECTRODES N/A 8/23/2021    Procedure: Removal of entire deep brain stimulation system hardware including intracranial electrodes and battery/generator in chest wall;  Surgeon: Ravinder Coleman MD;  [facility]     STEREOTACTIC INSERTION DEEP BRAIN STIMULATION         Social History:  Social History     Socioeconomic History     Marital status: Single     Number of children: 0   Occupational History     Occupation: unemployed   Tobacco Use     Smoking status: Never Smoker     Smokeless tobacco: Never Used   Substance and Sexual Activity     Alcohol use: Never     Drug use: Never       Family History:  Family History   Problem Relation Age of Onset     No Known Problems Mother      No Known Problems Father      No Known Problems Sister      Other - See Comments Brother         di bunny syndrome     Myocardial Infarction Maternal Grandmother      Hyperlipidemia Maternal Grandmother      Hyperlipidemia Maternal Grandfather      Unknown/Adopted Paternal Grandmother      Unknown/Adopted Paternal Grandfather      Deep Vein Thrombosis No family hx of      Anesthesia Reaction No family hx of          Physical Exam:  The patient's  weight is 64.1 kg (141 lb 6.4 oz). His blood pressure is 122/79 and his pulse is 86. His oxygen saturation is 99%.       Neurological Examination:   Mild dysarthria  Severe retrocollis  Left hand opening slowed  Gait is more upright than prior        Procedure: DBS Interrogation & Programming  Lead(s):    Left Right   DBS Target Left GPi Right GPi   DBS Lead Type SenSight - Directional SenSight - Directional   Lead Placement Date 5/2/2022 4/11/2022      IPG(s):    1   IPG Activa-RC   IPG Implant Date 4/18/2022   Location Right chest   Battery (V) Battery Level: 75%  JAMES Date: April 15, 2036     Impedance Check: No problems found  See scanned report for impedance  details.    Group A Left Brain         Right Brain       Initial Final Initial Final     Inactive Inactive Inactive Inactive   Amplitude (V) 2.5 [0-4.0] 2.5 [0-4.0] 3.8 [0-7.0] 3.8 [0-7.0]   Pulse width (usec) 60 60 90 90   Freq (Hz) 130 130 130 130   Contacts: C+3- C+3- C+11- C+11-   Impedance: / / / /      Group B Left Brain          Right Brain        Initial Final  Initial Final      GPi 1 GPi 2  GPi 1 GPi 2     Inactive Inactive Inactive Inactive Inactive Inactive   Amplitude (V) 2.5 [0-4.0] 2.5 2.0 [0-2.6] 4.0 [0-5.6] 3.8 [0-6.8] 0.0   Pulse width (usec) 60 60 60 60 90 60   Freq (Hz) 130 125 125 130 125 125   Contacts: C+3- C+3- C+2abc- C+11- C+11- C+10abc-   Impedance: / / / / / /     Group C Left Brain           Right Brain        GPi 1 GPi 2 GPi 1 GPi 2 GPi 1 GPi 2 GPi 1 GPi 2     Initial  Final  Initial  Final     Active Active Active Active Active Active Active Active   Amplitude (V) 2.5 1.5 [0-3.5] 2.5 1.5 [0-3.5] 4.0 [0-5.0] 2.5 4.0 [0-5.0] 2.5   Pulse width (usec)  60 60 60 60 60 60 60 60   Freq (Hz) 125 125 125 125 125 125 125 125   Contacts:  C+3- C+2abc- C+3- C+2abc- C+11- C+10abc- C+11- C+10abc-   Impedances 1564/1.6 1816/0.8 / / 1304/3.1 1839/1.4 / /     Group D Left Brain           Right Brain       GPi 1 GPi 2 GPi 1 GPi 2 GPi 1 GPi 2 GPi 1 GPi 2     Initial  Final  Initial  Final     None None Inactive Inactive None None Inactive Inactive   Amplitude (V)   4.0 [0-4.0] 0.0   4.0 [0-5.0] 2.5   Pulse width (usec)   90  90   60 60   Freq (Hz)   125 125   125 125   Contacts:    C+3- C+2abc-   C+11- C+10abc-   Impedances / / / / / / / /         L GPI:  Contacts Amplitude PW Frequency Effect SE   C+2- 0.0 90 125  Right thumb feels looser    1.0    NN    2.0   Intermittent tremor at rest Right hand more shaky    3.0    Harder to speak    2.5    Resolved   C+3- 1.0 90 125  NN    2.0    NN, right hand less shaky    3.0    NN    4.0    NN    5.0    NN, RUE shaking, harder to speak    4.5   Gait unchanged  Resolved, gait more stable   C+3-2- 4.5/1.0 90/90 125/125  Right hand stiffer, thumb and pointer    4.5/0.0    Thumb looser    4.0/0.0    NC    3.5/0.0    ?index looser    3.0/0.0    Index looser    0.0/0.0    Looser    3.0/0.0    NC    4.5/0.0    Slight trouble speaking    4.0/0.0    Resolved, moving tongue more         Assessment/Plan:  Bradley Garcia is a 22 year old male who returns to clinic for follow up of generalized dystonia status post bilateral GPi DBS.  He continues to have recalcitrant dystonia. Today two new programs.  Group D has new L GPi settings with increased PW.  Group B was changed to his prior settings from July, when he felt the best.  He requested botulinum toxin injections for his retrocollis because it is beginning to interfere with his driving.    - Trial Group D and increase as needed.  If not better, go back to Group B  - 300 units of Botox ordered for retrocollis  - RTC 1 month, 30 min botox  - RTC 2 months, 2hr DBS programming      Tahira Saxena MD    Movement Disorders Division  Department of Neurology       Additional time spent for separate DBS programmin min DBS analyzed with reprogramming.

## 2022-10-17 ENCOUNTER — TELEPHONE (OUTPATIENT)
Dept: NEUROLOGY | Facility: CLINIC | Age: 22
End: 2022-10-17

## 2022-10-17 NOTE — TELEPHONE ENCOUNTER
Writer left a message for the patient to call back to reschedule his appointment on 11/30 since Dr. Saxena will not available. Asked if the patient was interested in rescheduling to 12/7 at 12 PM.

## 2022-10-31 NOTE — PROGRESS NOTES
Movement Disorders Botulinum Toxin Clinic Note    Chief Complaint: cervical dystonia    History of Present Illness:  Bradley Garcia is a 22 year old male who presents to clinic for botulinum toxin injections for treatment of cervical dystonia.  This is his first injection.    He switched to program B and turned up the settings which led to improvements overall.    Turning off the device didn't affect his left hand today.        Current Outpatient Medications   Medication Sig Dispense Refill     sertraline (ZOLOFT) 50 MG tablet Take 50 mg by mouth every morning        acetaminophen (TYLENOL) 325 MG tablet Take 325-650 mg by mouth every 6 hours as needed for mild pain (Patient not taking: Reported on 9/27/2022)       CONCERTA 36 MG CR tablet Take 36 mg by mouth every morning  (Patient not taking: Reported on 9/27/2022)         Allergies: He has No Known Allergies.    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      Depression      Neck pain      PONV (postoperative nausea and vomiting)      S/P deep brain stimulator placement      Torsion dystonia        Past Surgical History:   Procedure Laterality Date     ANESTHESIA OUT OF OR MRI       ANESTHESIA OUT OF OR MRI N/A 7/8/2021    Procedure: ANESTHESIA OUT OF OR Magnetic resonance imaging Brain @1200;  Surgeon: GENERIC ANESTHESIA PROVIDER;  [facility]     ANESTHESIA OUT OF OR MRI N/A 10/8/2021    Procedure: ANESTHESIA OUT OF OR MAGNETIC RESONANCE IMAGING of BRAIN WITH AND WITHOUT CONTRAST@1400;  Surgeon: GENERIC ANESTHESIA PROVIDER;  [facility]     EYE SURGERY       IMPLANT DEEP BRAIN STIMULATION GENERATOR / BATTERY Right 4/18/2022    Procedure: Deep brain stimulator placement, phase II, placement of deep brain stimulator generator/battery over the right chest wall;  Surgeon: Ravinder Coleman MD;  [facility]     NOSE SURGERY      nasal fracture  repair     OPTICAL TRACKING SYSTEM INSERTION DEEP BRAIN STIMULATION Right 4/11/2022    Procedure: stealth assisted Right side deep brain stimulator placement, phase I,  target right globus pallidus internus, with microelectrode recording;  Surgeon: Ravinder Coleman MD;  [facility]     OPTICAL TRACKING SYSTEM INSERTION DEEP BRAIN STIMULATION Left 5/2/2022    Procedure: Stealth Assisted Left side deep brain stimulator placement, phase I & II combined, target left globus pallidus internus, with microelectrode recording and connection to existing generator/battery;  Surgeon: Ravinder Coleman MD;  [facility]     REMOVE DEPTH ELECTRODES N/A 8/23/2021    Procedure: Removal of entire deep brain stimulation system hardware including intracranial electrodes and battery/generator in chest wall;  Surgeon: Ravinder Coleman MD;  [facility]     STEREOTACTIC INSERTION DEEP BRAIN STIMULATION         Social History     Socioeconomic History     Marital status: Single     Spouse name: Not on file     Number of children: 0     Years of education: Not on file     Highest education level: Not on file   Occupational History     Occupation: unemployed   Tobacco Use     Smoking status: Never     Smokeless tobacco: Never   Substance and Sexual Activity     Alcohol use: Never     Drug use: Never     Sexual activity: Not on file   Other Topics Concern     Not on file   Social History Narrative     Not on file     Social Determinants of Health     Financial Resource Strain: Not on file   Food Insecurity: Not on file   Transportation Needs: Not on file   Physical Activity: Not on file   Stress: Not on file   Social Connections: Not on file   Intimate Partner Violence: Not on file   Housing Stability: Not on file       Family History   Problem Relation Age of Onset     No Known Problems Mother      No Known Problems Father      No Known Problems Sister      Other - See Comments Brother         di bunny syndrome      Myocardial Infarction Maternal Grandmother      Hyperlipidemia Maternal Grandmother      Hyperlipidemia Maternal Grandfather      Unknown/Adopted Paternal Grandmother      Unknown/Adopted Paternal Grandfather      Deep Vein Thrombosis No family hx of      Anesthesia Reaction No family hx of        Physical Examination:  Vital Signs:   blood pressure is 125/78 and his pulse is 105. His oxygen saturation is 97%.   Severe retrocollis with tremor    BOTULINUM NEUROTOXIN INJECTION PROCEDURES:    VERIFICATION OF PATIENT IDENTIFICATION AND PROCEDURE     Initials   Patient Name [facility]   Patient  acc   Procedure Verified by: [facility]     Above assessments performed by:  Tahira Saxena MD      INDICATION/S FOR PROCEDURE/S:  Bradley Garcia is a 22 year old year old patient with dystonia affecting the  head, neck and shoulder girdle musculature secondary to a diagnosis of generalized dystonia with associated  tremor, spasms, loss of volitional motor control and difficulty with activities of daily living.     His baseline symptoms have been recalcitrant to oral medications and conservative therapy.  He is here today for an injection of Botox.      GOAL OF PROCEDURE:  The goal of this procedure is to improve volitional motor control associated with dystonic movements.    TOTAL DOSE ADMINISTERED:  Dose Administered:  90 units Botox    Diluent Used:  Preservative Free Normal Saline  Total Volume of Diluent Used:  1 ml  NDC #: Botox 100u ([id number])    CONSENT:  The risks, benefits, and treatment options were discussed with Bradley Garcia and he agreed to proceed.      Written consent was obtained by [facility].     EQUIPMENT USED:  Needle-37mm stimulating/recording  EMG/NCS Machine    SKIN PREPARATION:  Skin preparation was performed using an alcohol wipe.    GUIDANCE DESCRIPTION:  Electro-myographic guidance was necessary throughout the procedure to accurately identify all areas of dystonic muscles while avoiding injection of  non-dystonic muscles, neighboring nerves and nearby vascular structures.     AREA/MUSCLE INJECTED:    Visual display of locations injections are scanned into the chart under MEDIA tab.    Muscles Injected Units Injected Number of Injections   Left splenius capitis 20 1   Left cervical paraspinals 25 2   Right splenius capitis 20 1   Right cervical paraspinals 25 2        Total Units Injected: 90    Unavoidable Waste: 10    Total Units Billed 100      The patient tolerated the injections without difficulty.      Assessment:    Bradley Garcia is a 22 year old male with generalized dystonia.  Today we did initial botulinum toxin injections.    Plan  Follow-up in 3 months' time to consider repeat injections      Tahira Saxena MD    Movement Disorders Division  Department of Neurology

## 2022-11-01 ENCOUNTER — OFFICE VISIT (OUTPATIENT)
Dept: NEUROLOGY | Facility: CLINIC | Age: 22
End: 2022-11-01
Payer: COMMERCIAL

## 2022-11-01 VITALS — OXYGEN SATURATION: 97 % | SYSTOLIC BLOOD PRESSURE: 125 MMHG | HEART RATE: 105 BPM | DIASTOLIC BLOOD PRESSURE: 78 MMHG

## 2022-11-01 DIAGNOSIS — G24.3 CERVICAL DYSTONIA: Primary | ICD-10-CM

## 2022-11-01 PROCEDURE — 95874 GUIDE NERV DESTR NEEDLE EMG: CPT | Performed by: STUDENT IN AN ORGANIZED HEALTH CARE EDUCATION/TRAINING PROGRAM

## 2022-11-01 PROCEDURE — 64616 CHEMODENERV MUSC NECK DYSTON: CPT | Mod: 50 | Performed by: STUDENT IN AN ORGANIZED HEALTH CARE EDUCATION/TRAINING PROGRAM

## 2022-11-01 NOTE — LETTER
11/1/2022       RE: Bradley Garcia  [address]  [facility] [address]     Dear Colleague,    Thank you for referring your patient, Bradley Garcia, to the [facility] NEUROLOGY CLINIC [place] at [facility]. Please see a copy of my visit note below.    Movement Disorders Botulinum Toxin Clinic Note    Chief Complaint: cervical dystonia    History of Present Illness:  Bradley Garcia is a 22 year old male who presents to clinic for botulinum toxin injections for treatment of cervical dystonia.  This is his first injection.    He switched to program B and turned up the settings which led to improvements overall.    Turning off the device didn't affect his left hand today.        Current Outpatient Medications   Medication Sig Dispense Refill     sertraline (ZOLOFT) 50 MG tablet Take 50 mg by mouth every morning        acetaminophen (TYLENOL) 325 MG tablet Take 325-650 mg by mouth every 6 hours as needed for mild pain (Patient not taking: Reported on 9/27/2022)       CONCERTA 36 MG CR tablet Take 36 mg by mouth every morning  (Patient not taking: Reported on 9/27/2022)         Allergies: He has No Known Allergies.    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      Depression      Neck pain      PONV (postoperative nausea and vomiting)      S/P deep brain stimulator placement      Torsion dystonia        Past Surgical History:   Procedure Laterality Date     ANESTHESIA OUT OF OR MRI       ANESTHESIA OUT OF OR MRI N/A 7/8/2021    Procedure: ANESTHESIA OUT OF OR Magnetic resonance imaging Brain @1200;  Surgeon: GENERIC ANESTHESIA PROVIDER;  [facility]     ANESTHESIA OUT OF OR MRI N/A 10/8/2021    Procedure: ANESTHESIA OUT OF OR MAGNETIC RESONANCE IMAGING of BRAIN WITH AND WITHOUT CONTRAST@1400;  Surgeon: GENERIC ANESTHESIA PROVIDER;  [facility]      EYE SURGERY       IMPLANT DEEP BRAIN STIMULATION GENERATOR / BATTERY Right 4/18/2022    Procedure: Deep brain stimulator placement, phase II, placement of deep brain stimulator generator/battery over the right chest wall;  Surgeon: Ravinder Coleman MD;  [facility]     NOSE SURGERY      nasal fracture repair     OPTICAL TRACKING SYSTEM INSERTION DEEP BRAIN STIMULATION Right 4/11/2022    Procedure: stealth assisted Right side deep brain stimulator placement, phase I,  target right globus pallidus internus, with microelectrode recording;  Surgeon: Ravinder Coleman MD;  [facility]     OPTICAL TRACKING SYSTEM INSERTION DEEP BRAIN STIMULATION Left 5/2/2022    Procedure: Stealth Assisted Left side deep brain stimulator placement, phase I & II combined, target left globus pallidus internus, with microelectrode recording and connection to existing generator/battery;  Surgeon: Ravinder Coleman MD;  [facility]     REMOVE DEPTH ELECTRODES N/A 8/23/2021    Procedure: Removal of entire deep brain stimulation system hardware including intracranial electrodes and battery/generator in chest wall;  Surgeon: Ravinder Coleman MD;  [facility]     STEREOTACTIC INSERTION DEEP BRAIN STIMULATION         Social History     Socioeconomic History     Marital status: Single     Spouse name: Not on file     Number of children: 0     Years of education: Not on file     Highest education level: Not on file   Occupational History     Occupation: unemployed   Tobacco Use     Smoking status: Never     Smokeless tobacco: Never   Substance and Sexual Activity     Alcohol use: Never     Drug use: Never     Sexual activity: Not on file   Other Topics Concern     Not on file   Social History Narrative     Not on file     Social Determinants of Health     Financial Resource Strain: Not on file   Food Insecurity: Not on file   Transportation Needs: Not on file   Physical Activity: Not on file   Stress: Not on file    Social Connections: Not on file   Intimate Partner Violence: Not on file   Housing Stability: Not on file       Family History   Problem Relation Age of Onset     No Known Problems Mother      No Known Problems Father      No Known Problems Sister      Other - See Comments Brother         di bunny syndrome     Myocardial Infarction Maternal Grandmother      Hyperlipidemia Maternal Grandmother      Hyperlipidemia Maternal Grandfather      Unknown/Adopted Paternal Grandmother      Unknown/Adopted Paternal Grandfather      Deep Vein Thrombosis No family hx of      Anesthesia Reaction No family hx of        Physical Examination:  Vital Signs:   blood pressure is 125/78 and his pulse is 105. His oxygen saturation is 97%.   Severe retrocollis with tremor    BOTULINUM NEUROTOXIN INJECTION PROCEDURES:    VERIFICATION OF PATIENT IDENTIFICATION AND PROCEDURE     Initials   Patient Name [facility]   Patient  acc   Procedure Verified by: [facility]     Above assessments performed by:  Tahira Saxena MD      INDICATION/S FOR PROCEDURE/S:  Bradley Garcia is a 22 year old year old patient with dystonia affecting the  head, neck and shoulder girdle musculature secondary to a diagnosis of generalized dystonia with associated  tremor, spasms, loss of volitional motor control and difficulty with activities of daily living.     His baseline symptoms have been recalcitrant to oral medications and conservative therapy.  He is here today for an injection of Botox.      GOAL OF PROCEDURE:  The goal of this procedure is to improve volitional motor control associated with dystonic movements.    TOTAL DOSE ADMINISTERED:  Dose Administered:  90 units Botox    Diluent Used:  Preservative Free Normal Saline  Total Volume of Diluent Used:  1 ml  NDC #: Botox 100u ([id number])    CONSENT:  The risks, benefits, and treatment options were discussed with Bradley Garcia and he agreed to proceed.      Written consent was obtained by [facility].     EQUIPMENT  USED:  Needle-37mm stimulating/recording  EMG/NCS Machine    SKIN PREPARATION:  Skin preparation was performed using an alcohol wipe.    GUIDANCE DESCRIPTION:  Electro-myographic guidance was necessary throughout the procedure to accurately identify all areas of dystonic muscles while avoiding injection of non-dystonic muscles, neighboring nerves and nearby vascular structures.     AREA/MUSCLE INJECTED:    Visual display of locations injections are scanned into the chart under MEDIA tab.    Muscles Injected Units Injected Number of Injections   Left splenius capitis 20 1   Left cervical paraspinals 25 2   Right splenius capitis 20 1   Right cervical paraspinals 25 2        Total Units Injected: 90    Unavoidable Waste: 10    Total Units Billed 100      The patient tolerated the injections without difficulty.      Assessment:    Bradley Garcia is a 22 year old male with generalized dystonia.  Today we did initial botulinum toxin injections.    Plan  Follow-up in 3 months' time to consider repeat injections      Tahira Saxena MD    Movement Disorders Division  Department of Neurology

## 2022-12-05 NOTE — PROGRESS NOTES
Department of Neurology  Movement Disorders Division   DBS Follow-up Note    Patient: Bradley Garcia  MRN: [id number]   : 2000   Date of Visit: 2022    Diagnosis: Dystonia  DBS Target(s): Bilateral GPi  Date(s) of DBS Lead Placement: R 2022, L 2022  Date(s) of IPG Placement: R chest 2022  Device: [employer] Activa RC      Chief Complaint:  Bradley Garcia is a 22 year old male who returns to clinic for follow up of dystonia status post bilateral GPi DBS.    Interval History:  The botox injections were helpful but he still has some limited neck flexion.    He tried program D and all of his symptoms were worse so he went back to program B.  In particular, symptoms were worse in his arms and hands but also in her core.  On group D his right hand seemed better only during activity.  His tongue doesn't work as well on group D.      Medications:  Current Outpatient Medications   Medication Sig Dispense Refill     sertraline (ZOLOFT) 50 MG tablet Take 75 mg by mouth every morning       acetaminophen (TYLENOL) 325 MG tablet Take 325-650 mg by mouth every 6 hours as needed for mild pain (Patient not taking: Reported on 2022)       CONCERTA 36 MG CR tablet Take 36 mg by mouth every morning  (Patient not taking: Reported on 2022)          Review of Systems:  Other than that noted at the end of this note, the remainder of 12 systems reviewed were negative.    Allergies: has No Known Allergies.    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      Depression      Neck pain      PONV (postoperative nausea and vomiting)      S/P deep brain stimulator placement      Torsion dystonia        Past Surgical History:  Past Surgical History:   Procedure Laterality Date     ANESTHESIA OUT OF OR MRI       ANESTHESIA OUT OF OR MRI N/A 2021    Procedure: ANESTHESIA OUT OF OR  Magnetic resonance imaging Brain @1200;  Surgeon: GENERIC ANESTHESIA PROVIDER;  Location: [facility] OR     ANESTHESIA OUT OF OR MRI N/A 10/8/2021    Procedure: ANESTHESIA OUT OF OR MAGNETIC RESONANCE IMAGING of BRAIN WITH AND WITHOUT CONTRAST@1400;  Surgeon: GENERIC ANESTHESIA PROVIDER;  Location: [facility] OR     EYE SURGERY       IMPLANT DEEP BRAIN STIMULATION GENERATOR / BATTERY Right 4/18/2022    Procedure: Deep brain stimulator placement, phase II, placement of deep brain stimulator generator/battery over the right chest wall;  Surgeon: Ravinder Coleman MD;  Location: [facility] OR     NOSE SURGERY      nasal fracture repair     OPTICAL TRACKING SYSTEM INSERTION DEEP BRAIN STIMULATION Right 4/11/2022    Procedure: stealth assisted Right side deep brain stimulator placement, phase I,  target right globus pallidus internus, with microelectrode recording;  Surgeon: Ravinder Coleman MD;  Location: [facility] OR     OPTICAL TRACKING SYSTEM INSERTION DEEP BRAIN STIMULATION Left 5/2/2022    Procedure: Stealth Assisted Left side deep brain stimulator placement, phase I & II combined, target left globus pallidus internus, with microelectrode recording and connection to existing generator/battery;  Surgeon: Ravinder Coleman MD;  Location: [facility] OR     REMOVE DEPTH ELECTRODES N/A 8/23/2021    Procedure: Removal of entire deep brain stimulation system hardware including intracranial electrodes and battery/generator in chest wall;  Surgeon: Ravinder Coleman MD;  Location: [facility] OR     STEREOTACTIC INSERTION DEEP BRAIN STIMULATION         Social History:  Social History     Socioeconomic History     Marital status: Single     Number of children: 0   Occupational History     Occupation: unemployed   Tobacco Use     Smoking status: Never     Smokeless tobacco: Never   Substance and Sexual Activity     Alcohol use: Never     Drug use: Never       Family History:  Family History   Problem Relation Age of Onset     No Known Problems Mother      No Known  Problems Father      No Known Problems Sister      Other - See Comments Brother         di bunny syndrome     Myocardial Infarction Maternal Grandmother      Hyperlipidemia Maternal Grandmother      Hyperlipidemia Maternal Grandfather      Unknown/Adopted Paternal Grandmother      Unknown/Adopted Paternal Grandfather      Deep Vein Thrombosis No family hx of      Anesthesia Reaction No family hx of          Physical Exam:  The patient's  blood pressure is 124/63 and his pulse is 80. His respiration is 16 and oxygen saturation is 97%.    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    Group A Left Brain          Right Brain        Initial Final  Initial Final      GPi 1 GPi 2  GPi 1 GPi 2     Inactive Active Active Inactive Active Active   Amplitude (V) 2.5 [0-4.0] 2.5 2.0 [0-2.6] 3.8 [0-7.0] 5.5 6.0 [0-6.0]   Pulse width (usec) 60 60 60 90 90 90   Freq (Hz) 130 125 125 130 125 125   Contacts: C+3- C+3- C+2abc- C+11- C+11- C+10abc-      Group B Left Brain            Right Brain          Initial  Final   Initial  Final       GPi 1 GPi 2 GPi 1 GPi 2 GPi 1 GPi 2 GPi 1 GPi 2     Active Active Inactive Inactive Active Active Inactive Inactive   Amplitude (V) 2.5 2.0 [0-2.6] 2.5 2.0 [0-2.6] 5.5 [0-6.8] 0.0 5.5 [0-6.8] 0.0   Pulse width  (usec) 60 60 60 60 90 60 90 60   Freq (Hz) 125 125 125 125 125 125 125 125   Contacts: C+3- C+2abc- C+3- C+2abc- C+11- C+10abc- C+11- C+10abc-   Impedance: 1504/1.7 1562/1.3 / / / 1213/4.5 / /     Group C Left Brain             Right Brain          Initial  Final  Initial  Final      GPi 1 GPi 2 GPi 1 GPi 2 GPi 1 GPi 2 GPi 1 GPi 2     Inactive Inactive Inactive Inactive Inactive Inactive Inactive Inactive   Amplitude (V) 2.5 1.5 [0-3.5] 3.0 2.0 [0-2.0] 4.0 [0-5.0] 2.5 5.5 [0-6.8] 0.0   Pulse width (usec)  60 60 90 90 60 60 90 60   Freq (Hz) 125 125 125 125 125 125 125 125   Contacts:  C+3- C+2abc- C+3- C+2abc- C+11- C+10abc- C+11- C+10abc-      Group D Left Brain             Right Brain          Initial  Final  Initial  Final      GPi 1 GPi 2 GPi 1 GPi 2 GPi 1 GPi 2 GPi 1 GPi 2     Inactive Inactive Inactive Inactive Inactive Inactive Inactive Inactive   Amplitude (V) 4.0 [0-4.0] 0.0 3.0 2.0 [0-2.0] 4.5 [0-5.0]  2.5 5.5 6.0 [0-6.0]   Pulse width (usec)  90 90  90  90 60  60 90 90   Freq (Hz) 125  125 125 125 125  125 125 125   Contacts: C+3-  C+2abc-  C+3- C+2abc- C+11-  C+10abc- C+11- C+10abc-       R GPi:  Contacts Amplitude PW Frequency Effect SE   C+11-  C+10- 5.5  1.0 90 125  NN    5.5  2.0    NN    5.5  3.0    NN    5.5  4.0    Tongue worse?    5.5  4.5    Better    5.5  5.0        5.5  5.5        5.5  6.0    NN, gait more stable       L GPi:  Contacts Amplitude PW Frequency Effect SE   C+3-  C+2abc- 2.5  1.0 60  90 125  NN    2.5  2.0    NN    2.5  3.0    RUE and tongue tight   C+3-  C+2abc- 1.0  2.0 90  60 125  NN    2.0  2.0    NN    3.0  2.0    NN, better hand opening    4.0  2.0    Hard to open hand    5.0  2.0    Tongue stiff    6.0  2.0    Hand and tongue stiff   C+3-  C+2abc- 3.0  1.0 90 125  NN    3.0  2.0    NN    3.0  2.5    Tongue stiff    3.0  3.0    worse    3.0  2.0    Better, gait stable           Assessment/Plan:  Bradley Garcia is a 22 year old male with dystonia s/p bilateral GPi DBS.  He  is having a good response to the botulinum toxin injections but will increase the dose next time for even better neck flexion.  Today I created 3 new programs based on group 3 (which has been the best) with increased pulse width to further improve his dystonia    Group A - old left brain settings, new right brain settings  Group B - old left and right brain settings (best prior)  Group C - new left brain settings, old right brain settings  Group D - new left and right brain settings    - Next botox injection in end of January, increase cervical paraspinal injections  - RTC early 2023 for 1 hr DBS programming    Tahira Saxena MD    Movement Disorders Division  Department of Neurology       27 minutes spent on the date of the encounter doing chart review, history and exam, documentation and further activities as noted above.     Additional time spent for separate DBS programmin min DBS analyzed with reprogramming.

## 2022-12-07 ENCOUNTER — OFFICE VISIT (OUTPATIENT)
Dept: NEUROLOGY | Facility: CLINIC | Age: 22
End: 2022-12-07
Payer: COMMERCIAL

## 2022-12-07 VITALS
DIASTOLIC BLOOD PRESSURE: 63 MMHG | RESPIRATION RATE: 16 BRPM | OXYGEN SATURATION: 97 % | HEART RATE: 80 BPM | SYSTOLIC BLOOD PRESSURE: 124 MMHG

## 2022-12-07 DIAGNOSIS — G24.1 GENERALIZED TORSION DYSTONIA: Primary | ICD-10-CM

## 2022-12-07 DIAGNOSIS — Z96.89 S/P DEEP BRAIN STIMULATOR PLACEMENT: ICD-10-CM

## 2022-12-07 PROCEDURE — 95983 ALYS BRN NPGT PRGRMG 15 MIN: CPT | Performed by: STUDENT IN AN ORGANIZED HEALTH CARE EDUCATION/TRAINING PROGRAM

## 2022-12-07 PROCEDURE — 99213 OFFICE O/P EST LOW 20 MIN: CPT | Mod: 25 | Performed by: STUDENT IN AN ORGANIZED HEALTH CARE EDUCATION/TRAINING PROGRAM

## 2022-12-07 PROCEDURE — 95984 ALYS BRN NPGT PRGRMG ADDL 15: CPT | Performed by: STUDENT IN AN ORGANIZED HEALTH CARE EDUCATION/TRAINING PROGRAM

## 2022-12-07 ASSESSMENT — PAIN SCALES - GENERAL: PAINLEVEL: NO PAIN (0)

## 2022-12-07 NOTE — PATIENT INSTRUCTIONS
Program A - old right body settings, new left body settings    Program B - old best left and right body settings    Program C - new right body settings, old left body settings    Program D - new left and right body settings

## 2022-12-07 NOTE — LETTER
2022       RE: Bradley Garcia  [id number]  [facility] [address]     Dear Colleague,    Thank you for referring your patient, Bradley Garcia, to the [facility] [place] [facility]. Please see a copy of my visit note below.    Department of Neurology  Movement Disorders Division   DBS Follow-up Note    Patient: Bradley Garcia  MRN: [id number]   : 2000   Date of Visit: 2022    Diagnosis: Dystonia  DBS Target(s): Bilateral GPi  Date(s) of DBS Lead Placement: R 2022, L 2022  Date(s) of IPG Placement: R chest 2022  Device: [employer] Activa RC      Chief Complaint:  Bradley Garcia is a 22 year old male who returns to clinic for follow up of dystonia status post bilateral GPi DBS.    Interval History:  The botox injections were helpful but he still has some limited neck flexion.    He tried program D and all of his symptoms were worse so he went back to program B.  In particular, symptoms were worse in his arms and hands but also in her core.  On group D his right hand seemed better only during activity.  His tongue doesn't work as well on group D.      Medications:  Current Outpatient Medications   Medication Sig Dispense Refill     sertraline (ZOLOFT) 50 MG tablet Take 75 mg by mouth every morning       acetaminophen (TYLENOL) 325 MG tablet Take 325-650 mg by mouth every 6 hours as needed for mild pain (Patient not taking: Reported on 2022)       CONCERTA 36 MG CR tablet Take 36 mg by mouth every morning  (Patient not taking: Reported on 2022)          Review of Systems:  Other than that noted at the end of this note, the remainder of 12 systems reviewed were negative.    Allergies: has No Known Allergies.    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      Depression       Neck pain      PONV (postoperative nausea and vomiting)      S/P deep brain stimulator placement      Torsion dystonia        Past Surgical History:  Past Surgical History:   Procedure Laterality Date     ANESTHESIA OUT OF OR MRI       ANESTHESIA OUT OF OR MRI N/A 7/8/2021    Procedure: ANESTHESIA OUT OF OR Magnetic resonance imaging Brain @1200;  Surgeon: GENERIC ANESTHESIA PROVIDER;  Location: [facility] OR     ANESTHESIA OUT OF OR MRI N/A 10/8/2021    Procedure: ANESTHESIA OUT OF OR MAGNETIC RESONANCE IMAGING of BRAIN WITH AND WITHOUT CONTRAST@1400;  Surgeon: GENERIC ANESTHESIA PROVIDER;  Location: [facility] OR     EYE SURGERY       IMPLANT DEEP BRAIN STIMULATION GENERATOR / BATTERY Right 4/18/2022    Procedure: Deep brain stimulator placement, phase II, placement of deep brain stimulator generator/battery over the right chest wall;  Surgeon: Ravinder Coleman MD;  Location: [facility]     NOSE SURGERY      nasal fracture repair     OPTICAL TRACKING SYSTEM INSERTION DEEP BRAIN STIMULATION Right 4/11/2022    Procedure: stealth assisted Right side deep brain stimulator placement, phase I,  target right globus pallidus internus, with microelectrode recording;  Surgeon: Ravinder Coleman MD;  Location: [facility] OR     OPTICAL TRACKING SYSTEM INSERTION DEEP BRAIN STIMULATION Left 5/2/2022    Procedure: Stealth Assisted Left side deep brain stimulator placement, phase I & II combined, target left globus pallidus internus, with microelectrode recording and connection to existing generator/battery;  Surgeon: Ravinder Coleman MD;  Location: [facility]     REMOVE DEPTH ELECTRODES N/A 8/23/2021    Procedure: Removal of entire deep brain stimulation system hardware including intracranial electrodes and battery/generator in chest wall;  Surgeon: Ravinder Coleman MD;  Location: [facility]     STEREOTACTIC INSERTION DEEP BRAIN STIMULATION         Social History:  Social History     Socioeconomic History     Marital status: Single     Number of  children: 0   Occupational History     Occupation: unemployed   Tobacco Use     Smoking status: Never     Smokeless tobacco: Never   Substance and Sexual Activity     Alcohol use: Never     Drug use: Never       Family History:  Family History   Problem Relation Age of Onset     No Known Problems Mother      No Known Problems Father      No Known Problems Sister      Other - See Comments Brother         di bunny syndrome     Myocardial Infarction Maternal Grandmother      Hyperlipidemia Maternal Grandmother      Hyperlipidemia Maternal Grandfather      Unknown/Adopted Paternal Grandmother      Unknown/Adopted Paternal Grandfather      Deep Vein Thrombosis No family hx of      Anesthesia Reaction No family hx of          Physical Exam:  The patient's  blood pressure is 124/63 and his pulse is 80. His respiration is 16 and oxygen saturation is 97%.    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    Group A Left Brain          Right Brain        Initial Final  Initial Final      GPi 1 GPi 2  GPi 1 GPi 2     Inactive Active Active Inactive Active Active   Amplitude (V) 2.5 [0-4.0] 2.5 2.0 [0-2.6] 3.8 [0-7.0] 5.5 6.0 [0-6.0]   Pulse width (usec) 60 60 60 90 90 90   Freq (Hz) 130 125 125 130 125 125   Contacts: C+3- C+3- C+2abc- C+11- C+11-  C+10abc-      Group B Left Brain            Right Brain          Initial  Final   Initial  Final       GPi 1 GPi 2 GPi 1 GPi 2 GPi 1 GPi 2 GPi 1 GPi 2     Active Active Inactive Inactive Active Active Inactive Inactive   Amplitude (V) 2.5 2.0 [0-2.6] 2.5 2.0 [0-2.6] 5.5 [0-6.8] 0.0 5.5 [0-6.8] 0.0   Pulse width (usec) 60 60 60 60 90 60 90 60   Freq (Hz) 125 125 125 125 125 125 125 125   Contacts: C+3- C+2abc- C+3- C+2abc- C+11- C+10abc- C+11- C+10abc-   Impedance: 1504/1.7 1562/1.3 / / / 1213/4.5 / /     Group C Left Brain             Right Brain          Initial  Final  Initial  Final      GPi 1 GPi 2 GPi 1 GPi 2 GPi 1 GPi 2 GPi 1 GPi 2     Inactive Inactive Inactive Inactive Inactive Inactive Inactive Inactive   Amplitude (V) 2.5 1.5 [0-3.5] 3.0 2.0 [0-2.0] 4.0 [0-5.0] 2.5 5.5 [0-6.8] 0.0   Pulse width (usec)  60 60 90 90 60 60 90 60   Freq (Hz) 125 125 125 125 125 125 125 125   Contacts:  C+3- C+2abc- C+3- C+2abc- C+11- C+10abc- C+11- C+10abc-      Group D Left Brain             Right Brain          Initial  Final  Initial  Final      GPi 1 GPi 2 GPi 1 GPi 2 GPi 1 GPi 2 GPi 1 GPi 2     Inactive Inactive Inactive Inactive Inactive Inactive Inactive Inactive   Amplitude (V) 4.0 [0-4.0] 0.0 3.0 2.0 [0-2.0] 4.5 [0-5.0]  2.5 5.5 6.0 [0-6.0]   Pulse width (usec)  90 90  90  90 60  60 90 90   Freq (Hz) 125  125 125 125 125  125 125 125   Contacts: C+3-  C+2abc-  C+3- C+2abc- C+11-  C+10abc- C+11- C+10abc-       R GPi:  Contacts Amplitude PW Frequency Effect SE   C+11-  C+10- 5.5  1.0 90 125  NN    5.5  2.0    NN    5.5  3.0    NN    5.5  4.0    Tongue worse?    5.5  4.5    Better    5.5  5.0        5.5  5.5        5.5  6.0    NN, gait more stable       L GPi:  Contacts Amplitude PW Frequency Effect SE   C+3-  C+2abc- 2.5  1.0 60  90 125  NN    2.5  2.0    NN    2.5  3.0    RUE and tongue tight   C+3-  C+2abc- 1.0  2.0 90  60 125  NN    2.0  2.0    NN    3.0  2.0    NN, better hand opening    4.0  2.0    Hard to open  hand    5.0  2.0    Tongue stiff    6.0  2.0    Hand and tongue stiff   C+3-  C+2abc- 3.0  1.0 90 125  NN    3.0  2.0    NN    3.0  2.5    Tongue stiff    3.0  3.0    worse    3.0  2.0    Better, gait stable           Assessment/Plan:  Bradley Garcia is a 22 year old male with dystonia s/p bilateral GPi DBS.  He is having a good response to the botulinum toxin injections but will increase the dose next time for even better neck flexion.  Today I created 3 new programs based on group 3 (which has been the best) with increased pulse width to further improve his dystonia    Group A - old left brain settings, new right brain settings  Group B - old left and right brain settings (best prior)  Group C - new left brain settings, old right brain settings  Group D - new left and right brain settings    - Next botox injection in end , increase cervical paraspinal injections  - RTC early 2023 for 1 hr DBS programming    Tahira Saxena MD    Movement Disorders Division  Department of Neurology       27 minutes spent on the date of the encounter doing chart review, history and exam, documentation and further activities as noted above.     Additional time spent for separate DBS programmin min DBS analyzed with reprogramming.

## 2023-01-03 NOTE — OR NURSING
Late Entry:  Dr Jimenez at bedside to assess pt's status post procedure.   complains of pain/discomfort

## 2023-01-17 ENCOUNTER — TELEPHONE (OUTPATIENT)
Dept: NEUROLOGY | Facility: CLINIC | Age: 23
End: 2023-01-17
Payer: COMMERCIAL

## 2023-01-17 NOTE — TELEPHONE ENCOUNTER
Writer left a message for the patient to call back to get him scheduled for a neuropsychological evaluation following 1 year after his Deep Brain Stimulation surgery.   dental

## 2023-01-30 NOTE — PROGRESS NOTES
Movement Disorders Botulinum Toxin Clinic Note    Chief Complaint: cervical dystonia    History of Present Illness:  Bradley Garcia is a 22 year old male who presents to clinic for botulinum toxin injections for treatment of cervical dystonia.    Response to Last Injection: 11/1/2022    Onset of effect:overnight    Benefit from last injections: improved voluntary neck flexion    Wearing off: starting last month    Side effects: denies neck weakness and dysphagia    Additional interval history: neck is more relaxed with last DBS change        Current Outpatient Medications   Medication Sig Dispense Refill     sertraline (ZOLOFT) 50 MG tablet Take 75 mg by mouth every morning       acetaminophen (TYLENOL) 325 MG tablet Take 325-650 mg by mouth every 6 hours as needed for mild pain (Patient not taking: Reported on 9/27/2022)       CONCERTA 36 MG CR tablet Take 36 mg by mouth every morning  (Patient not taking: Reported on 9/27/2022)         Allergies: He has No Known Allergies.    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      Depression      Neck pain      PONV (postoperative nausea and vomiting)      S/P deep brain stimulator placement      Torsion dystonia        Past Surgical History:   Procedure Laterality Date     ANESTHESIA OUT OF OR MRI       ANESTHESIA OUT OF OR MRI N/A 7/8/2021    Procedure: ANESTHESIA OUT OF OR Magnetic resonance imaging Brain @1200;  Surgeon: GENERIC ANESTHESIA PROVIDER;  Location: [facility]     ANESTHESIA OUT OF OR MRI N/A 10/8/2021    Procedure: ANESTHESIA OUT OF OR MAGNETIC RESONANCE IMAGING of BRAIN WITH AND WITHOUT CONTRAST@1400;  Surgeon: GENERIC ANESTHESIA PROVIDER;  Location: [facility] OR     EYE SURGERY       IMPLANT DEEP BRAIN STIMULATION GENERATOR / BATTERY Right 4/18/2022    Procedure: Deep brain stimulator placement, phase II, placement of deep brain stimulator generator/battery over  the right chest wall;  Surgeon: Ravinder Coleman MD;  Location: [facility] OR     NOSE SURGERY      nasal fracture repair     OPTICAL TRACKING SYSTEM INSERTION DEEP BRAIN STIMULATION Right 4/11/2022    Procedure: stealth assisted Right side deep brain stimulator placement, phase I,  target right globus pallidus internus, with microelectrode recording;  Surgeon: Ravinder Coleman MD;  Location: [facility] OR     OPTICAL TRACKING SYSTEM INSERTION DEEP BRAIN STIMULATION Left 5/2/2022    Procedure: Stealth Assisted Left side deep brain stimulator placement, phase I & II combined, target left globus pallidus internus, with microelectrode recording and connection to existing generator/battery;  Surgeon: Ravinder Coleman MD;  Location: [facility] OR     REMOVE DEPTH ELECTRODES N/A 8/23/2021    Procedure: Removal of entire deep brain stimulation system hardware including intracranial electrodes and battery/generator in chest wall;  Surgeon: Ravinder Coleman MD;  Location: [facility] OR     STEREOTACTIC INSERTION DEEP BRAIN STIMULATION         Social History     Socioeconomic History     Marital status: Single     Spouse name: Not on file     Number of children: 0     Years of education: Not on file     Highest education level: Not on file   Occupational History     Occupation: unemployed   Tobacco Use     Smoking status: Never     Smokeless tobacco: Never   Substance and Sexual Activity     Alcohol use: Never     Drug use: Never     Sexual activity: Not on file   Other Topics Concern     Not on file   Social History Narrative     Not on file     Social Determinants of Health     Financial Resource Strain: Not on file   Food Insecurity: Not on file   Transportation Needs: Not on file   Physical Activity: Not on file   Stress: Not on file   Social Connections: Not on file   Intimate Partner Violence: Not on file   Housing Stability: Not on file       Family History   Problem Relation Age of Onset     No Known Problems Mother      No Known  Problems Father      No Known Problems Sister      Other - See Comments Brother         di bunny syndrome     Myocardial Infarction Maternal Grandmother      Hyperlipidemia Maternal Grandmother      Hyperlipidemia Maternal Grandfather      Unknown/Adopted Paternal Grandmother      Unknown/Adopted Paternal Grandfather      Deep Vein Thrombosis No family hx of      Anesthesia Reaction No family hx of        Physical Examination:  Vital Signs:   blood pressure is 121/73 and his pulse is 99. His oxygen saturation is 98%.   Severe retrocollis, limited neck flexion, normal rotation and lateral bend    BOTULINUM NEUROTOXIN INJECTION PROCEDURES:    VERIFICATION OF PATIENT IDENTIFICATION AND PROCEDURE     Initials   Patient Name [facility]   Patient  [facility]   Procedure Verified by: [facility]     Above assessments performed by:  Tahira Saxena MD      INDICATION/S FOR PROCEDURE/S:  Bradley Garcia is a 22 year old year old patient with dystonia affecting the  head, neck and shoulder girdle musculature secondary to a diagnosis of generalized dystonia with associated  tremor, spasms, loss of volitional motor control and difficulty with activities of daily living.     His baseline symptoms have been recalcitrant to oral medications and conservative therapy.  He is here today for an injection of Botox.      GOAL OF PROCEDURE:  The goal of this procedure is to improve volitional motor control associated with dystonic movements.    TOTAL DOSE ADMINISTERED:  Dose Administered: 120 units Botox    Diluent Used:  Preservative Free Normal Saline  Total Volume of Diluent Used: 2 ml  NDC #: Botox 100u ([id number])    CONSENT:  The risks, benefits, and treatment options were discussed with Bradley Garcia and he agreed to proceed.      Written consent was obtained by [facility].     EQUIPMENT USED:  Needle-37mm stimulating/recording  EMG/NCS Machine    SKIN PREPARATION:  Skin preparation was performed using an alcohol wipe.    GUIDANCE  DESCRIPTION:  Electro-myographic guidance was necessary throughout the procedure to accurately identify all areas of dystonic muscles while avoiding injection of non-dystonic muscles, neighboring nerves and nearby vascular structures.     AREA/MUSCLE INJECTED:    Visual display of locations injections are scanned into the chart under MEDIA tab.    Muscles Injected Units Injected Number of Injections   Left splenius capitis 35 1   Left cervical paraspinals 25 2   Right splenius capitis 35 1   Right cervical paraspinals 25 2        Total Units Injected: 120    Unavoidable Waste: 80    Total Units Billed 200      The patient tolerated the injections without difficulty.      Assessment:    Bradley Garcia is a 22 year old male with generalized dystonia.  Today we did repeat botulinum toxin injections with increased dosage in the splenius capitis.    Plan  Follow-up in 3 months' time to consider repeat injections.      Tahira Saxena MD    Movement Disorders Division  Department of Neurology

## 2023-01-31 ENCOUNTER — OFFICE VISIT (OUTPATIENT)
Dept: NEUROLOGY | Facility: CLINIC | Age: 23
End: 2023-01-31
Payer: COMMERCIAL

## 2023-01-31 VITALS — HEART RATE: 99 BPM | SYSTOLIC BLOOD PRESSURE: 121 MMHG | DIASTOLIC BLOOD PRESSURE: 73 MMHG | OXYGEN SATURATION: 98 %

## 2023-01-31 DIAGNOSIS — G24.3 CERVICAL DYSTONIA: Primary | ICD-10-CM

## 2023-01-31 PROCEDURE — 95874 GUIDE NERV DESTR NEEDLE EMG: CPT | Performed by: STUDENT IN AN ORGANIZED HEALTH CARE EDUCATION/TRAINING PROGRAM

## 2023-01-31 PROCEDURE — 64616 CHEMODENERV MUSC NECK DYSTON: CPT | Mod: 50 | Performed by: STUDENT IN AN ORGANIZED HEALTH CARE EDUCATION/TRAINING PROGRAM

## 2023-01-31 NOTE — LETTER
1/31/2023       RE: Bradley Garcia  [id number]  [facility] [id number]     Dear Colleague,    Thank you for referring your patient, Bradley Garcia, to the [facility] [place] at [facility]. Please see a copy of my visit note below.    Movement Disorders Botulinum Toxin Clinic Note    Chief Complaint: cervical dystonia    History of Present Illness:  Bradley Garcia is a 22 year old male who presents to clinic for botulinum toxin injections for treatment of cervical dystonia.    Response to Last Injection: 11/1/2022    Onset of effect:overnight    Benefit from last injections: improved voluntary neck flexion    Wearing off: starting last month    Side effects: denies neck weakness and dysphagia    Additional interval history: neck is more relaxed with last DBS change        Current Outpatient Medications   Medication Sig Dispense Refill     sertraline (ZOLOFT) 50 MG tablet Take 75 mg by mouth every morning       acetaminophen (TYLENOL) 325 MG tablet Take 325-650 mg by mouth every 6 hours as needed for mild pain (Patient not taking: Reported on 9/27/2022)       CONCERTA 36 MG CR tablet Take 36 mg by mouth every morning  (Patient not taking: Reported on 9/27/2022)         Allergies: He has No Known Allergies.    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      Depression      Neck pain      PONV (postoperative nausea and vomiting)      S/P deep brain stimulator placement      Torsion dystonia        Past Surgical History:   Procedure Laterality Date     ANESTHESIA OUT OF OR MRI       ANESTHESIA OUT OF OR MRI N/A 7/8/2021    Procedure: ANESTHESIA OUT OF OR Magnetic resonance imaging Brain @1200;  Surgeon: GENERIC ANESTHESIA PROVIDER;  Location: [facility]     ANESTHESIA OUT OF OR MRI N/A 10/8/2021    Procedure: ANESTHESIA OUT OF OR MAGNETIC  RESONANCE IMAGING of BRAIN WITH AND WITHOUT CONTRAST@1400;  Surgeon: GENERIC ANESTHESIA PROVIDER;  Location: [facility] OR     EYE SURGERY       IMPLANT DEEP BRAIN STIMULATION GENERATOR / BATTERY Right 4/18/2022    Procedure: Deep brain stimulator placement, phase II, placement of deep brain stimulator generator/battery over the right chest wall;  Surgeon: Ravinder Coleman MD;  Location: [facility]     NOSE SURGERY      nasal fracture repair     OPTICAL TRACKING SYSTEM INSERTION DEEP BRAIN STIMULATION Right 4/11/2022    Procedure: stealth assisted Right side deep brain stimulator placement, phase I,  target right globus pallidus internus, with microelectrode recording;  Surgeon: Ravinder Coleman MD;  Location: [facility]     OPTICAL TRACKING SYSTEM INSERTION DEEP BRAIN STIMULATION Left 5/2/2022    Procedure: Stealth Assisted Left side deep brain stimulator placement, phase I & II combined, target left globus pallidus internus, with microelectrode recording and connection to existing generator/battery;  Surgeon: Ravinder Coleman MD;  Location[facility]     REMOVE DEPTH ELECTRODES N/A 8/23/2021    Procedure: Removal of entire deep brain stimulation system hardware including intracranial electrodes and battery/generator in chest wall;  Surgeon: Ravinder Coleman MD;  Location: [facility] OR     STEREOTACTIC INSERTION DEEP BRAIN STIMULATION         Social History     Socioeconomic History     Marital status: Single     Spouse name: Not on file     Number of children: 0     Years of education: Not on file     Highest education level: Not on file   Occupational History     Occupation: unemployed   Tobacco Use     Smoking status: Never     Smokeless tobacco: Never   Substance and Sexual Activity     Alcohol use: Never     Drug use: Never     Sexual activity: Not on file   Other Topics Concern     Not on file   Social History Narrative     Not on file     Social Determinants of Health     Financial Resource Strain: Not on file   Food  Insecurity: Not on file   Transportation Needs: Not on file   Physical Activity: Not on file   Stress: Not on file   Social Connections: Not on file   Intimate Partner Violence: Not on file   Housing Stability: Not on file       Family History   Problem Relation Age of Onset     No Known Problems Mother      No Known Problems Father      No Known Problems Sister      Other - See Comments Brother         di bunny syndrome     Myocardial Infarction Maternal Grandmother      Hyperlipidemia Maternal Grandmother      Hyperlipidemia Maternal Grandfather      Unknown/Adopted Paternal Grandmother      Unknown/Adopted Paternal Grandfather      Deep Vein Thrombosis No family hx of      Anesthesia Reaction No family hx of        Physical Examination:  Vital Signs:   blood pressure is 121/73 and his pulse is 99. His oxygen saturation is 98%.   Severe retrocollis, limited neck flexion, normal rotation and lateral bend    BOTULINUM NEUROTOXIN INJECTION PROCEDURES:    VERIFICATION OF PATIENT IDENTIFICATION AND PROCEDURE     Initials   Patient Name acc   Patient  acc   Procedure Verified by: [facility]     Above assessments performed by:  Tahira Saxena MD      INDICATION/S FOR PROCEDURE/S:  Bradley Garcia is a 22 year old year old patient with dystonia affecting the  head, neck and shoulder girdle musculature secondary to a diagnosis of generalized dystonia with associated  tremor, spasms, loss of volitional motor control and difficulty with activities of daily living.     His baseline symptoms have been recalcitrant to oral medications and conservative therapy.  He is here today for an injection of Botox.      GOAL OF PROCEDURE:  The goal of this procedure is to improve volitional motor control associated with dystonic movements.    TOTAL DOSE ADMINISTERED:  Dose Administered: 120 units Botox    Diluent Used:  Preservative Free Normal Saline  Total Volume of Diluent Used: 2 ml  NDC #: Botox 100u ([id number])    CONSENT:  The  risks, benefits, and treatment options were discussed with Bradley Garcia and he agreed to proceed.      Written consent was obtained by [facility].     EQUIPMENT USED:  Needle-37mm stimulating/recording  EMG/NCS Machine    SKIN PREPARATION:  Skin preparation was performed using an alcohol wipe.    GUIDANCE DESCRIPTION:  Electro-myographic guidance was necessary throughout the procedure to accurately identify all areas of dystonic muscles while avoiding injection of non-dystonic muscles, neighboring nerves and nearby vascular structures.     AREA/MUSCLE INJECTED:    Visual display of locations injections are scanned into the chart under MEDIA tab.    Muscles Injected Units Injected Number of Injections   Left splenius capitis 35 1   Left cervical paraspinals 25 2   Right splenius capitis 35 1   Right cervical paraspinals 25 2        Total Units Injected: 120    Unavoidable Waste: 80    Total Units Billed 200      The patient tolerated the injections without difficulty.      Assessment:    Bradley Garcia is a 22 year old male with generalized dystonia.  Today we did repeat botulinum toxin injections with increased dosage in the splenius capitis.    Plan  Follow-up in 3 months' time to consider repeat injections.      Tahira Saxena MD    Movement Disorders Division  Department of Neurology

## 2023-02-14 NOTE — PROGRESS NOTES
Department of Neurology  Movement Disorders Division   DBS Follow-up Note    Patient: Bradley Garcia  MRN: [id number]   : 2000   Date of Visit: 2/15/2023    Diagnosis: Dystonia  DBS Target(s): Bilateral GPi  Date(s) of DBS Lead Placement: R 2022, L 2022  Date(s) of IPG Placement: R chest 2022  Device: [id number] Activa RC      Chief Complaint:  Bradley Garcia is a 22 year old male who returns to clinic for follow up of dystonia status post bilateral GPi DBS.       Interval History:  The most recent injections are similar to the prior injections, perhaps slightly harder to flex neck.    He feels that the last programming helped loosen his neck a little.  On group A (he thinks), his left hand and arm were harder to control until he turned down from 6 to 5. He was able to walk more upright.  Then on Group C, he didn't feel right or off.  His right hand and arm felt harder to control.  His posture was worse again.  On Group D, his right side still felt off and less under his control.  He tried decreasing the stimulation with no change.  Now back on Group A, decreased from 5 to 4 which made his left side improved. When he decreased to 3, his posture was worse.  He is very happy with his present symptom control.  Would like to work on his left hand and arm more if possible.    He has noticed that his left shoulder is pulling in and out.  He feels pain in his left hip and then his thigh goes numb.    The IPG ran out of battery once since the last visit.      Medications:  Current Outpatient Medications   Medication Sig Dispense Refill     sertraline (ZOLOFT) 50 MG tablet Take 75 mg by mouth every morning       acetaminophen (TYLENOL) 325 MG tablet Take 325-650 mg by mouth every 6 hours as needed for mild pain (Patient not taking: Reported on 2022)       CONCERTA 36 MG CR tablet Take 36 mg by mouth every morning  (Patient not taking: Reported on 2022)          Review of Systems:  Other than  that noted at the end of this note, the remainder of 12 systems reviewed were negative.    Allergies: has No Known Allergies.    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      Depression      Neck pain      PONV (postoperative nausea and vomiting)      S/P deep brain stimulator placement      Torsion dystonia        Past Surgical History:  Past Surgical History:   Procedure Laterality Date     ANESTHESIA OUT OF OR MRI       ANESTHESIA OUT OF OR MRI N/A 7/8/2021    Procedure: ANESTHESIA OUT OF OR Magnetic resonance imaging Brain @1200;  Surgeon: GENERIC ANESTHESIA PROVIDER;  Location: [facility]     ANESTHESIA OUT OF OR MRI N/A 10/8/2021    Procedure: ANESTHESIA OUT OF OR MAGNETIC RESONANCE IMAGING of BRAIN WITH AND WITHOUT CONTRAST@1400;  Surgeon: GENERIC ANESTHESIA PROVIDER;  Location: [facility] OR     EYE SURGERY       IMPLANT DEEP BRAIN STIMULATION GENERATOR / BATTERY Right 4/18/2022    Procedure: Deep brain stimulator placement, phase II, placement of deep brain stimulator generator/battery over the right chest wall;  Surgeon: Ravinder Coleman MD;  Location:  OR     NOSE SURGERY      nasal fracture repair     OPTICAL TRACKING SYSTEM INSERTION DEEP BRAIN STIMULATION Right 4/11/2022    Procedure: stealth assisted Right side deep brain stimulator placement, phase I,  target right globus pallidus internus, with microelectrode recording;  Surgeon: Ravinder Coleman MD;  Location: [facility]     OPTICAL TRACKING SYSTEM INSERTION DEEP BRAIN STIMULATION Left 5/2/2022    Procedure: Stealth Assisted Left side deep brain stimulator placement, phase I & II combined, target left globus pallidus internus, with microelectrode recording and connection to existing generator/battery;  Surgeon: Ravinder Coleman MD;  Location:  OR     REMOVE DEPTH ELECTRODES N/A 8/23/2021    Procedure: Removal of entire deep  brain stimulation system hardware including intracranial electrodes and battery/generator in chest wall;  Surgeon: Ravidner Coleman MD;  Location: [facility] OR     STEREOTACTIC INSERTION DEEP BRAIN STIMULATION         Social History:  Social History     Socioeconomic History     Marital status: Single     Number of children: 0   Occupational History     Occupation: unemployed   Tobacco Use     Smoking status: Never     Smokeless tobacco: Never   Substance and Sexual Activity     Alcohol use: Never     Drug use: Never       Family History:  Family History   Problem Relation Age of Onset     No Known Problems Mother      No Known Problems Father      No Known Problems Sister      Other - See Comments Brother         di bunny syndrome     Myocardial Infarction Maternal Grandmother      Hyperlipidemia Maternal Grandmother      Hyperlipidemia Maternal Grandfather      Unknown/Adopted Paternal Grandmother      Unknown/Adopted Paternal Grandfather      Deep Vein Thrombosis No family hx of      Anesthesia Reaction No family hx of          Physical Exam:  The patient's  blood pressure is 127/78 and his pulse is 93. His oxygen saturation is 96%.       Neurological Examination:   Mild dysarthria  Severe retrocollis at rest but AROM is full (flexion is more effortful)  Mild L>R postural tremor  Left hand is more difficult to open compared to right  Posture is nearly upright with gait        Procedure: DBS Interrogation & Programming  Lead(s):    Left Right   DBS Target Left GPi Right GPi   DBS Lead Type SenSight SenSight   Lead Placement Date 5/2/2022 4/11/2022      IPG(s):    1   IPG Activa-RC   IPG Implant Date 4/18/2022   Location Right chest   Battery (V) 100%, JAMES 4/15/2036     Impedance Check: No problems found. See scanned report for impedance details.    Group A Left Brain            Right Brain          Initial  Final   Initial  Final        GPi 1 GPi 2 GPi 1 GPi 2  GPi 1 GPi 2 GPi 1 GPi 2     Active Active Inactive  Inactive Active Active Inactive Inactive   Amplitude (V) 2.5 2.0 [0-2.6] 2.5 2.0 [0-2.6] 5.5 3.1 [0-6.0] 5.5 3.0 [0-6.0]   Pulse width (usec) 60 60 60 60 90 90 90 90   Freq (Hz) 125 125 125 125 125 125 125 125   Contacts: C+3- C+2abc- C+3- C+2abc- C+11- C+10abc- C+11- C+10abc-   Impedance 1569/1.6 1554/1.3 / / 1177/4.6 1676/1.8 / /      Group B Left Brain             Right Bra bin           Initial   Final   Initial   Final       GPi 1 GPi 2 GPi 1 GPi 2 GPi 1 GPi 2 GPi 1 GPi 2     Inactive Inactive Inactive Inactive Inactive Inactive Inactive Inactive   Amplitude (V) 2.5 2.0 [0-2.6] 2.5 2.0 [0-2.6] 5.5 [0-6.8] 0.0 5.5 [0-5.5] 3.0   Pulse width (usec) 60 60 60 60 90 60 90 90   Freq (Hz) 125 125 125 125 125 125 125 125   Contacts: C+3- C+2abc- C+3- C+2abc- C+11- C+10abc- C+11- C+10abc-     Group C Left Brain             Right Brain           Initial   Final   Initial   Final       GPi 1 GPi 2 GPi 1 GPi 2 GPi 1 GPi 2 GPi 1 GPi 2     Inactive Inactive Active Active Inactive Inactive Active Active   Amplitude (V) 3.0 2.0 [0-2.0] 2.5 2.0 [0-2.6] 5.5 [0-6.8] 0.0 4.0 [0-6.0] 3.0   Pulse width (usec) 90 90 60 60 90 60 110 90   Freq (Hz) 125 125 125 125 125 125 125 125   Contacts:  C+3- C+2abc- C+3- C+2abc- C+11- C+10abc- C+11- C+10abc-      Group D Left Brain             Right Brain           Initial   Final   Initial   Final       GPi 1 GPi 2 GPi 1 GPi 2 GPi 1 GPi 2 GPi 1 GPi 2     Inactive Inactive None None Inactive Inactive None None   Amplitude (V) 3.0 2.0 [0-2.0]   5.5 4.0 [0-6.0]     Pulse width (usec)  90 90    90 90     Freq (Hz) 125  125   125  125     Contacts: C+3-  C+2abc-   C+11-  C+10abc-       Increasing R GPi C+11- above 5.5 gave more LUE tighteness  Possibly LUE looser when to 3.5    R GPi:  Contacts Amplitude PW Frequency Effect SE   C+11-  C+10abc- 1.0  3.1 110  90 125  125  NN    2.0  3.1    NN    3.0  3.1    Arm is looser    4.0  3.1    Arm and hand looser    5.0  3.1        6.0  3.1   Gait good             Assessment/Plan:  Bradley Garcia is a 22 year old male with dystonia s/p bilateral GPi DBS who returns for follow-up.  The last right brain programming improved his posture significantly.  Today I focused on addressing his left arm.    Group A - old left and right brain settings (best prior)  Group B - same settings are Group A except R GPi 1 is adjustable  Group C - old left brain settings, increase pulse width in R GPi 1    He also noted that he is still having trouble with neck flexion despite our recent changes to his botulinum toxin dosage.    - Increase paraspinal botulinum toxin injections next time  - RTC 3 months, 1 hr DBS programming      Tahira Saxena MD   of Neurology  Movement Disorders Division       20 minutes spent on the date of the encounter doing chart review, history and exam, documentation and further activities as noted above.     Additional time spent for separate DBS programmin min DBS analyzed with reprogramming.

## 2023-02-15 ENCOUNTER — OFFICE VISIT (OUTPATIENT)
Dept: NEUROLOGY | Facility: CLINIC | Age: 23
End: 2023-02-15
Payer: COMMERCIAL

## 2023-02-15 VITALS — HEART RATE: 93 BPM | DIASTOLIC BLOOD PRESSURE: 78 MMHG | OXYGEN SATURATION: 96 % | SYSTOLIC BLOOD PRESSURE: 127 MMHG

## 2023-02-15 DIAGNOSIS — Z96.89 S/P DEEP BRAIN STIMULATOR PLACEMENT: ICD-10-CM

## 2023-02-15 DIAGNOSIS — G24.1 GENERALIZED TORSION DYSTONIA: Primary | ICD-10-CM

## 2023-02-15 PROCEDURE — 95983 ALYS BRN NPGT PRGRMG 15 MIN: CPT | Performed by: STUDENT IN AN ORGANIZED HEALTH CARE EDUCATION/TRAINING PROGRAM

## 2023-02-15 PROCEDURE — 99213 OFFICE O/P EST LOW 20 MIN: CPT | Mod: 25 | Performed by: STUDENT IN AN ORGANIZED HEALTH CARE EDUCATION/TRAINING PROGRAM

## 2023-02-15 PROCEDURE — 95984 ALYS BRN NPGT PRGRMG ADDL 15: CPT | Performed by: STUDENT IN AN ORGANIZED HEALTH CARE EDUCATION/TRAINING PROGRAM

## 2023-02-15 NOTE — LETTER
2/15/2023       RE: Bradley Garcia  [address]  [place] [id number]     Dear Colleague,    Thank you for referring your patient, Bradley Garcia, to the [facility]. Please see a copy of my visit note below.    Department of Neurology  Movement Disorders Division   DBS Follow-up Note    Patient: Bradley Garcia  MRN: [id number]   : 2000   Date of Visit: 2/15/2023    Diagnosis: Dystonia  DBS Target(s): Bilateral GPi  Date(s) of DBS Lead Placement: R 2022, L 2022  Date(s) of IPG Placement: R chest 2022  Device: Immediately Activa RC      Chief Complaint:  Bradley Garcia is a 22 year old male who returns to clinic for follow up of dystonia status post bilateral GPi DBS.       Interval History:  The most recent injections are similar to the prior injections, perhaps slightly harder to flex neck.    He feels that the last programming helped loosen his neck a little.  On group A (he thinks), his left hand and arm were harder to control until he turned down from 6 to 5. He was able to walk more upright.  Then on Group C, he didn't feel right or off.  His right hand and arm felt harder to control.  His posture was worse again.  On Group D, his right side still felt off and less under his control.  He tried decreasing the stimulation with no change.  Now back on Group A, decreased from 5 to 4 which made his left side improved. When he decreased to 3, his posture was worse.  He is very happy with his present symptom control.  Would like to work on his left hand and arm more if possible.    He has noticed that his left shoulder is pulling in and out.  He feels pain in his left hip and then his thigh goes numb.    The IPG ran out of battery once since the last visit.      Medications:  Current Outpatient Medications   Medication Sig Dispense Refill     sertraline (ZOLOFT) 50 MG tablet Take 75 mg by mouth  every morning       acetaminophen (TYLENOL) 325 MG tablet Take 325-650 mg by mouth every 6 hours as needed for mild pain (Patient not taking: Reported on 9/27/2022)       CONCERTA 36 MG CR tablet Take 36 mg by mouth every morning  (Patient not taking: Reported on 9/27/2022)          Review of Systems:  Other than that noted at the end of this note, the remainder of 12 systems reviewed were negative.    Allergies: has No Known Allergies.    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      Depression      Neck pain      PONV (postoperative nausea and vomiting)      S/P deep brain stimulator placement      Torsion dystonia        Past Surgical History:  Past Surgical History:   Procedure Laterality Date     ANESTHESIA OUT OF OR MRI       ANESTHESIA OUT OF OR MRI N/A 7/8/2021    Procedure: ANESTHESIA OUT OF OR Magnetic resonance imaging Brain @1200;  Surgeon: GENERIC ANESTHESIA PROVIDER;  Location: [facility] OR     ANESTHESIA OUT OF OR MRI N/A 10/8/2021    Procedure: ANESTHESIA OUT OF OR MAGNETIC RESONANCE IMAGING of BRAIN WITH AND WITHOUT CONTRAST@1400;  Surgeon: GENERIC ANESTHESIA PROVIDER;  Location: [facility] OR     EYE SURGERY       IMPLANT DEEP BRAIN STIMULATION GENERATOR / BATTERY Right 4/18/2022    Procedure: Deep brain stimulator placement, phase II, placement of deep brain stimulator generator/battery over the right chest wall;  Surgeon: Ravinder Coleman MD;  Location: [facility] OR     NOSE SURGERY      nasal fracture repair     OPTICAL TRACKING SYSTEM INSERTION DEEP BRAIN STIMULATION Right 4/11/2022    Procedure: stealth assisted Right side deep brain stimulator placement, phase I,  target right globus pallidus internus, with microelectrode recording;  Surgeon: Ravinder Coleman MD;  Location: [facility] OR     OPTICAL TRACKING SYSTEM INSERTION DEEP BRAIN STIMULATION Left 5/2/2022    Procedure: Stealth  Assisted Left side deep brain stimulator placement, phase I & II combined, target left globus pallidus internus, with microelectrode recording and connection to existing generator/battery;  Surgeon: Ravinder Coleman MD;  Location: [facility] OR     REMOVE DEPTH ELECTRODES N/A 8/23/2021    Procedure: Removal of entire deep brain stimulation system hardware including intracranial electrodes and battery/generator in chest wall;  Surgeon: Ravinder Coleman MD;  Location: [facility] OR     STEREOTACTIC INSERTION DEEP BRAIN STIMULATION         Social History:  Social History     Socioeconomic History     Marital status: Single     Number of children: 0   Occupational History     Occupation: unemployed   Tobacco Use     Smoking status: Never     Smokeless tobacco: Never   Substance and Sexual Activity     Alcohol use: Never     Drug use: Never       Family History:  Family History   Problem Relation Age of Onset     No Known Problems Mother      No Known Problems Father      No Known Problems Sister      Other - See Comments Brother         di bunny syndrome     Myocardial Infarction Maternal Grandmother      Hyperlipidemia Maternal Grandmother      Hyperlipidemia Maternal Grandfather      Unknown/Adopted Paternal Grandmother      Unknown/Adopted Paternal Grandfather      Deep Vein Thrombosis No family hx of      Anesthesia Reaction No family hx of          Physical Exam:  The patient's  blood pressure is 127/78 and his pulse is 93. His oxygen saturation is 96%.       Neurological Examination:   Mild dysarthria  Severe retrocollis at rest but AROM is full (flexion is more effortful)  Mild L>R postural tremor  Left hand is more difficult to open compared to right  Posture is nearly upright with gait        Procedure: DBS Interrogation & Programming  Lead(s):    Left Right   DBS Target Left GPi Right GPi   DBS Lead Type SenSight SenSight   Lead Placement Date 5/2/2022 4/11/2022      IPG(s):    1   IPG Activa-   IPG Implant Date  4/18/2022   Location Right chest   Battery (V) 100%, JAMES 4/15/2036     Impedance Check: No problems found. See scanned report for impedance details.    Group A Left Brain            Right Brain          Initial  Final   Initial  Final        GPi 1 GPi 2 GPi 1 GPi 2  GPi 1 GPi 2 GPi 1 GPi 2     Active Active Inactive Inactive Active Active Inactive Inactive   Amplitude (V) 2.5 2.0 [0-2.6] 2.5 2.0 [0-2.6] 5.5 3.1 [0-6.0] 5.5 3.0 [0-6.0]   Pulse width (usec) 60 60 60 60 90 90 90 90   Freq (Hz) 125 125 125 125 125 125 125 125   Contacts: C+3- C+2abc- C+3- C+2abc- C+11- C+10abc- C+11- C+10abc-   Impedance 1569/1.6 1554/1.3 / / 1177/4.6 1676/1.8 / /      Group B Left Brain             Right Bra bin           Initial   Final   Initial   Final       GPi 1 GPi 2 GPi 1 GPi 2 GPi 1 GPi 2 GPi 1 GPi 2     Inactive Inactive Inactive Inactive Inactive Inactive Inactive Inactive   Amplitude (V) 2.5 2.0 [0-2.6] 2.5 2.0 [0-2.6] 5.5 [0-6.8] 0.0 5.5 [0-5.5] 3.0   Pulse width (usec) 60 60 60 60 90 60 90 90   Freq (Hz) 125 125 125 125 125 125 125 125   Contacts: C+3- C+2abc- C+3- C+2abc- C+11- C+10abc- C+11- C+10abc-     Group C Left Brain             Right Brain           Initial   Final   Initial   Final       GPi 1 GPi 2 GPi 1 GPi 2 GPi 1 GPi 2 GPi 1 GPi 2     Inactive Inactive Active Active Inactive Inactive Active Active   Amplitude (V) 3.0 2.0 [0-2.0] 2.5 2.0 [0-2.6] 5.5 [0-6.8] 0.0 4.0 [0-6.0] 3.0   Pulse width (usec) 90 90 60 60 90 60 110 90   Freq (Hz) 125 125 125 125 125 125 125 125   Contacts:  C+3- C+2abc- C+3- C+2abc- C+11- C+10abc- C+11- C+10abc-      Group D Left Brain             Right Brain           Initial   Final   Initial   Final       GPi 1 GPi 2 GPi 1 GPi 2 GPi 1 GPi 2 GPi 1 GPi 2     Inactive Inactive None None Inactive Inactive None None   Amplitude (V) 3.0 2.0 [0-2.0]   5.5 4.0 [0-6.0]     Pulse width (usec)  90 90    90 90     Freq (Hz) 125  125   125  125     Contacts: C+3-  C+2abc-   C+11-  C+10abc-        Increasing R GPi C+11- above 5.5 gave more LUE tighteness  Possibly LUE looser when to 3.5    R GPi:  Contacts Amplitude PW Frequency Effect SE   C+11-  C+10abc- 1.0  3.1 110  90 125  125  NN    2.0  3.1    NN    3.0  3.1    Arm is looser    4.0  3.1    Arm and hand looser    5.0  3.1        6.0  3.1   Gait good            Assessment/Plan:  Bradley Garcia is a 22 year old male with dystonia s/p bilateral GPi DBS who returns for follow-up.  The last right brain programming improved his posture significantly.  Today I focused on addressing his left arm.    Group A - old left and right brain settings (best prior)  Group B - same settings are Group A except R GPi 1 is adjustable  Group C - old left brain settings, increase pulse width in R GPi 1    He also noted that he is still having trouble with neck flexion despite our recent changes to his botulinum toxin dosage.    - Increase paraspinal botulinum toxin injections next time  - RTC 3 months, 1 hr DBS programming      Tahira Saxena MD   of Neurology  Movement Disorders Division       20 minutes spent on the date of the encounter doing chart review, history and exam, documentation and further activities as noted above.     Additional time spent for separate DBS programmin min DBS analyzed with reprogramming.

## 2023-02-15 NOTE — PATIENT INSTRUCTIONS
Today we changed your left body and kept your right body the same.    Group A is the old best settings.  Group B is the same settings as A but you can adjust the voltage down from 5.5 on the other half compared to A.  Group C is a longer pulse width and you can adjust between 0 and 6.0.  You went home on C.

## 2023-04-18 ENCOUNTER — OFFICE VISIT (OUTPATIENT)
Dept: NEUROPSYCHOLOGY | Facility: CLINIC | Age: 23
End: 2023-04-18
Payer: MEDICARE

## 2023-04-18 DIAGNOSIS — F09 MENTAL DISORDER DUE TO GENERAL MEDICAL CONDITION: Primary | ICD-10-CM

## 2023-04-18 DIAGNOSIS — G24.1 DYSTONIA, TORSION, FRAGMENTS OF: ICD-10-CM

## 2023-04-18 PROCEDURE — 90791 PSYCH DIAGNOSTIC EVALUATION: CPT

## 2023-04-18 PROCEDURE — 96139 PSYCL/NRPSYC TST TECH EA: CPT

## 2023-04-18 PROCEDURE — 96133 NRPSYC TST EVAL PHYS/QHP EA: CPT

## 2023-04-18 PROCEDURE — 96132 NRPSYC TST EVAL PHYS/QHP 1ST: CPT

## 2023-04-18 PROCEDURE — 96138 PSYCL/NRPSYC TECH 1ST: CPT

## 2023-04-18 NOTE — LETTER
4/18/2023      RE: Bradley Garcia  [address] [facility] [address]       NEUROPSYCHOLOGICAL EVALUATION    RELEVANT HISTORY AND REASON FOR REFERRAL    Bradley Garcia is a 22 year old, right handed [employer]  with 12 years of formal education. Information was obtained via interview with the patient and review of his medical records, including a prior neuropsychological evaluation. Records indicate a history of dystonia.  He underwent bilateral GPi DBS implantation in 2013, and had good benefit for 3 months until it wore off.  The system was removed and replaced, and he is now s/p bilateral GPi DBS lead implantation on 4/11/22 and 5/2/22.  He has had benefit from surgery, with improvements in posture.  He continues to work with his neurologist to optimize his settings.  He was referred for neuropsychological evaluation by Tahira Saxena M.D., in 1 year follow-up, to assess cognitive functioning and mood, in order to update his treatment plan.    Mr. Garcia underwent a baseline neuropsychological evaluation under my direction on 6/8/2021.  Full details regarding his history and the findings of the evaluation can be found in the report from that date.  Results indicated slowed information and psychomotor processing speed, variable memory, and performance otherwise falling within normal limits.  Assessment of mood and personality was suggestive of an above average level of stress, as well as apathy, and otherwise fell within normal limits.    CLINICAL INTERVIEW FINDINGS    Upon interview for today's evaluation, Mr. Garcia stated that he had good benefit from surgery.  He is walking better and he has had small improvements with left hand control.  He feels that the surgery reversed a year or two of those symptoms but his walking has improved to where it was 3 to 5 years ago.    Mr. Garcia has not noticed any changes in cognition, including memory, word finding, attention or concentration, or  decision-making.  He lives with his parents.  He manages his own finances, medications, driving, and cooking, apparently without difficulty.  He handles his personal cares independently.    Mr. Garcia reported a history of ADHD.  He is not currently working with a psychologist.  He described his mood as good.  He stated that he used to be on medications for ADHD but is learning to control it without using medications.  He thinks that perhaps that has caused some depression, so now he is prescribed antidepressants.  He stated that he is not necessarily feeling depressed, but he has had some apathy with less enjoyment.  He is not feeling hopeless, helpless, or guilty.  He is not feeling anxious.  He is sleeping well, 6 to 8 hours a night.  He noted that he should be going to sleep at a set time but that he does not have a consistent schedule.  He does not always feel rested.  He may nap once every 2 weeks.  His appetite is good and he has not had any recent weight changes.  His energy level is a little bit lower which he attributes to depression.  His interest level as noted is lower.  He denied visual or auditory hallucinations.  He denied suicidal ideation or any history of suicide attempts.  He denied alcohol, tobacco, or illicit drug use.    Mr. Garcia continues to work as a  with [employer].  Work is going well.  He is working 16 hours a week and receiving disability.  He is considering switching to full-time sometime this year because his dystonia is improving.    Mr. Garcia has not had any major illnesses or injuries since his last evaluation.  He never had COVID.  He feels that his balance has improved and he has not been falling.  He denied unilateral weakness or numbness.  He experiences headaches once every 2 months, which he attributes to the stiffness in his neck.    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    Family History    Significant for a brother with DiGeorge syndrome, and a maternal grandmother with myocardial infarction.    BEHAVIORAL OBSERVATIONS    During the evaluation, Mr. Garcia was pleasant, cooperative, and seemed to understand the instructions.  He was alert and oriented to person, place, and time.  Tremors were observed clinically in his right arm and hand.  He used his left elbow to hold down paper when he was writing or drawing.  Mood was euthymic.  Speech was fluent, with some difficulty enunciating the letter \"r\", and normal volume and rate.  He presented his thoughts in a clear, logical manner.  Internal performance validity measures fell within normal limits.  The results are believed to accurately reflect his current level of functioning.    MEASURES ADMINISTERED    The following measures were administered by a trained psychometrist, under the direct supervision of a licensed psychologist.    Wechsler Abbreviated Scale of Intelligence - 2 (WASI-2); Subtests of the Wechsler Adult Intelligence Scale-4; Reading subtest of the Wide Range Achievement Test-4; subtests of the Wechsler Memory Scale-4; Swanson Verbal Learning Test-Revised, Form 6; Brief Visual Memory Test - Revised, Form 2; Malta Naming Test; D-KEFS Verbal Fluency, Standard Form; Trail Making Test; Stroop; Wisconsin Card Sorting Test - 64; Laboy Judgement of Line Orientation Form H; Bernard-Osterrieth Complex Figure; Clock Drawing; Davis Depression Inventory-2 (BDI-2), HAM-D, HAM-A, Apathy Scale, Davis Anxiety Inventory (CYNTHIA). "     RESULTS AND INTERPRETATION    Overall intellectual functioning fell in the average range, consistent with premorbid estimates of average based on single word reading abilities administered at his prior evaluation.      Confrontation naming was low average for his age.  Verbal abstract reasoning was average. Expressive vocabulary was also average  Letter fluency was exceptionally low. Generative naming to category was exceptionally low, and switching fluency was exceptionally low for his age.    Attention span was average for his age.  Divided attention was below average on a timed, visuomotor sequencing task, and was average on an untimed, auditory sequencing task.  Performance on a measure of distractibility was average.  Psychomotor processing speed was low average.    Basic visual perception, including matching lines and angles, was high average for his age.  Construction of a clock was fell within normal limits. Assembly of visual material was average. Nonverbal deductive reasoning was high average.    Novel problem-solving, including the ability to generate strategies and solutions, fell within normal limits for his age and level of education.    Immediate recall of verbal narrative material was average. Recall of the learned material following a 30 minute delay was average. Recognition memory on this task was high average.  On a multiple trial list learning task, immediate recall was high average, with low average recall following a 25-minute delay.  Recognition memory on this task was average.  Immediate recall of visual material was average, withaverage recall following a 25-minute delay. Recognition memory on this task fell within normal limits.    On the BDI-2, a self-report questionnaire, he endorsed minimal depressive symptomatology. He endorsed significant apathy on a scale.  On the CYNTHIA, he did not endorse symptoms of anxiety.    IMPRESSIONS AND RECOMMENDATIONS    Bradley MARLON Jose is a 22 year old  man with a history of  generalized dystonia. He is s/p bilateral GPi DBS lead implantation in 2013, with removal and replacement of the system in April and May 2022. He underwent a baseline neuropsychological evaluation on 6/8/2021, which was notable for slowed information and psychomotor processing speed and variable memory. Assessment of mood and personality was suggestive of stress and apathy.    Results of today's evaluation indicate slowed information and psychomotor processing speed.  Performance otherwise falls within normal limits across cognitive domains, including learning and memory, language, visual processing, complex attention, and executive functioning.  He endorses significant apathy but does not endorse significant depressive symptomatology or anxiety.    Compared to his baseline evaluation in 2021, he has had improvements in most aspects of memory, and psychomotor processing speed.  Performance otherwise remains stable across cognitive domains.    This pattern of performance does not reflect dementia at this time, and overall reflects improvement from baseline.  He reports no concerns regarding cognitive decline.  He continues to experience apathy, which was also present prior to surgery.  He is being treated for depression, which seems to be well managed.  After consulting with his physician, he could be encouraged exercise, which may have a beneficial effect mood and apathy.    In terms of daily functioning, Mr. Garcia's cognitive difficulties are not likely to interfere with his ability to actively participate in treatment, or to manage his instrumental activities of daily living independently. He may find that it takes him longer to complete tasks, so he may find it helpful to provide himself with ample time when working on a task so that he does not become overwhelmed and work less efficiently.    Results may serve as an updated baseline of his neurocognitive functioning.  The evaluation may  be repeated in the future for comparison should a change in mental status occur.    Katarina Klein, Ph.D., ABPP  Licensed Psychologist, LP [id number]  Board Certified in Clinical Neuropsychology      Time spent: One unit of professional time, including interview (CPT 50063). 60 minutes (1 unit) neuropsychological testing evaluation by licensed and board-certified neuropsychologist, including integration of patient data, interpretation of standardized test results and clinical data, clinical decision-making, treatment planning, report, and interactive feedback to the patient, first hour (CPT 04757). 98 minutes (2 units) of neuropsychological testing evaluation by licensed and board-certified neuropsychologist, including integration of patient data, interpretation of standardized test results and clinical data, clinical decision-making, treatment planning, report, and interactive feedback to the patient, subsequent hours (CPT 60751). 30 minutes of neuropsychological test administration and scoring by technician, first 30 minutes (CPT 15778). 223 additional minutes (7 units) neuropsychological test administration and scoring by technician, subsequent 30 minutes (CPT 05062). ICD-10 Diagnoses: G24.1; F06.8.        Katarina Klein, PhD LP

## 2023-04-19 NOTE — NURSING NOTE
The patient was seen for neuropsychological evaluation at the request of Tahira Saxena MD for the purposes of diagnostic clarification and treatment planning. 253 minutes of test administration and scoring were provided by this writer. Please see Dr. Katarina Klein's report for a full interpretation of the findings.    Roma Andrade  Psychometrist

## 2023-04-21 NOTE — PROGRESS NOTES
NEUROPSYCHOLOGICAL EVALUATION    RELEVANT HISTORY AND REASON FOR REFERRAL    Bradley Garcia is a 22 year old, right handed [employer]  with 12 years of formal education. Information was obtained via interview with the patient and review of his medical records, including a prior neuropsychological evaluation. Records indicate a history of dystonia.  He underwent bilateral GPi DBS implantation in 2013, and had good benefit for 3 months until it wore off.  The system was removed and replaced, and he is now s/p bilateral GPi DBS lead implantation on 4/11/22 and 5/2/22.  He has had benefit from surgery, with improvements in posture.  He continues to work with his neurologist to optimize his settings.  He was referred for neuropsychological evaluation by Tahira Saxena M.D., in 1 year follow-up, to assess cognitive functioning and mood, in order to update his treatment plan.    Mr. Garcia underwent a baseline neuropsychological evaluation under my direction on 6/8/2021.  Full details regarding his history and the findings of the evaluation can be found in the report from that date.  Results indicated slowed information and psychomotor processing speed, variable memory, and performance otherwise falling within normal limits.  Assessment of mood and personality was suggestive of an above average level of stress, as well as apathy, and otherwise fell within normal limits.    CLINICAL INTERVIEW FINDINGS    Upon interview for today's evaluation, Mr. Garcia stated that he had good benefit from surgery.  He is walking better and he has had small improvements with left hand control.  He feels that the surgery reversed a year or two of those symptoms but his walking has improved to where it was 3 to 5 years ago.    Mr. Garcia has not noticed any changes in cognition, including memory, word finding, attention or concentration, or decision-making.  He lives with his parents.  He manages his own finances, medications,  driving, and cooking, apparently without difficulty.  He handles his personal cares independently.    Mr. Garcia reported a history of ADHD.  He is not currently working with a psychologist.  He described his mood as good.  He stated that he used to be on medications for ADHD but is learning to control it without using medications.  He thinks that perhaps that has caused some depression, so now he is prescribed antidepressants.  He stated that he is not necessarily feeling depressed, but he has had some apathy with less enjoyment.  He is not feeling hopeless, helpless, or guilty.  He is not feeling anxious.  He is sleeping well, 6 to 8 hours a night.  He noted that he should be going to sleep at a set time but that he does not have a consistent schedule.  He does not always feel rested.  He may nap once every 2 weeks.  His appetite is good and he has not had any recent weight changes.  His energy level is a little bit lower which he attributes to depression.  His interest level as noted is lower.  He denied visual or auditory hallucinations.  He denied suicidal ideation or any history of suicide attempts.  He denied alcohol, tobacco, or illicit drug use.    Mr. Garcia continues to work as a  with [employer].  Work is going well.  He is working 16 hours a week and receiving disability.  He is considering switching to full-time sometime this year because his dystonia is improving.    Mr. Garcia has not had any major illnesses or injuries since his last evaluation.  He never had COVID.  He feels that his balance has improved and he has not been falling.  He denied unilateral weakness or numbness.  He experiences headaches once every 2 months, which he attributes to the stiffness in his neck.    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    Family History    Significant for a brother with DiGeorge syndrome, and a maternal grandmother with myocardial infarction.    BEHAVIORAL OBSERVATIONS    During the evaluation, Mr. Garcia was pleasant, cooperative, and seemed to understand the instructions.  He was alert and oriented to person, place, and time.  Tremors were observed clinically in his right arm and hand.  He used his left elbow to hold down paper when he was writing or drawing.  Mood was euthymic.  Speech was fluent, with some difficulty enunciating the letter \"r\", and normal volume and rate.  He presented his thoughts in a clear, logical manner.  Internal performance validity measures fell within normal limits.  The results are believed to accurately reflect his current level of functioning.    MEASURES ADMINISTERED    The following measures were administered by a trained psychometrist, under the direct supervision of a licensed psychologist.    Wechsler Abbreviated Scale of Intelligence - 2 (WASI-2); Subtests of the Wechsler Adult Intelligence Scale-4; Reading subtest of the Wide Range Achievement Test-4; subtests of the Wechsler Memory Scale-4; Swanson Verbal Learning Test-Revised, Form 6; Brief Visual Memory Test - Revised, Form 2; Madrid Naming Test; D-KEFS Verbal Fluency, Standard Form; Trail Making Test; Stroop; Wisconsin Card Sorting Test - 64; [place]oy Judgement of Line Orientation Form H; Bernard-Osterrieth Complex Figure; Clock Drawing; Davis Depression Inventory-2 (BDI-2), HAM-D, HAM-A, Apathy Scale, Davis Anxiety Inventory (CYNTHIA).     RESULTS AND INTERPRETATION    Overall intellectual functioning fell in the average range, " consistent with premorbid estimates of average based on single word reading abilities administered at his prior evaluation.      Confrontation naming was low average for his age.  Verbal abstract reasoning was average. Expressive vocabulary was also average  Letter fluency was exceptionally low. Generative naming to category was exceptionally low, and switching fluency was exceptionally low for his age.    Attention span was average for his age.  Divided attention was below average on a timed, visuomotor sequencing task, and was average on an untimed, auditory sequencing task.  Performance on a measure of distractibility was average.  Psychomotor processing speed was low average.    Basic visual perception, including matching lines and angles, was high average for his age.  Construction of a clock was fell within normal limits. Assembly of visual material was average. Nonverbal deductive reasoning was high average.    Novel problem-solving, including the ability to generate strategies and solutions, fell within normal limits for his age and level of education.    Immediate recall of verbal narrative material was average. Recall of the learned material following a 30 minute delay was average. Recognition memory on this task was high average.  On a multiple trial list learning task, immediate recall was high average, with low average recall following a 25-minute delay.  Recognition memory on this task was average.  Immediate recall of visual material was average, withaverage recall following a 25-minute delay. Recognition memory on this task fell within normal limits.    On the BDI-2, a self-report questionnaire, he endorsed minimal depressive symptomatology. He endorsed significant apathy on a scale.  On the CYNTHIA, he did not endorse symptoms of anxiety.    IMPRESSIONS AND RECOMMENDATIONS    Bradley MARLON Garcia is a 22 year old man with a history of  generalized dystonia. He is s/p bilateral GPi DBS lead implantation in  2013, with removal and replacement of the system in April and May 2022. He underwent a baseline neuropsychological evaluation on 6/8/2021, which was notable for slowed information and psychomotor processing speed and variable memory. Assessment of mood and personality was suggestive of stress and apathy.    Results of today's evaluation indicate slowed information and psychomotor processing speed.  Performance otherwise falls within normal limits across cognitive domains, including learning and memory, language, visual processing, complex attention, and executive functioning.  He endorses significant apathy but does not endorse significant depressive symptomatology or anxiety.    Compared to his baseline evaluation in 2021, he has had improvements in most aspects of memory, and psychomotor processing speed.  Performance otherwise remains stable across cognitive domains.    This pattern of performance does not reflect dementia at this time, and overall reflects improvement from baseline.  He reports no concerns regarding cognitive decline.  He continues to experience apathy, which was also present prior to surgery.  He is being treated for depression, which seems to be well managed.  After consulting with his physician, he could be encouraged exercise, which may have a beneficial effect mood and apathy.    In terms of daily functioning, Mr. Garcia's cognitive difficulties are not likely to interfere with his ability to actively participate in treatment, or to manage his instrumental activities of daily living independently. He may find that it takes him longer to complete tasks, so he may find it helpful to provide himself with ample time when working on a task so that he does not become overwhelmed and work less efficiently.    Results may serve as an updated baseline of his neurocognitive functioning.  The evaluation may be repeated in the future for comparison should a change in mental status occur.    Katarina COSTELLO  Latoya, Ph.D., ABPP  Licensed Psychologist, [id number]  Board Certified in Clinical Neuropsychology      Time spent: One unit of professional time, including interview (CPT 70349). 60 minutes (1 unit) neuropsychological testing evaluation by licensed and board-certified neuropsychologist, including integration of patient data, interpretation of standardized test results and clinical data, clinical decision-making, treatment planning, report, and interactive feedback to the patient, first hour (CPT 71733). 98 minutes (2 units) of neuropsychological testing evaluation by licensed and board-certified neuropsychologist, including integration of patient data, interpretation of standardized test results and clinical data, clinical decision-making, treatment planning, report, and interactive feedback to the patient, subsequent hours (CPT 72278). 30 minutes of neuropsychological test administration and scoring by technician, first 30 minutes (CPT 80961). 223 additional minutes (7 units) neuropsychological test administration and scoring by technician, subsequent 30 minutes (CPT 64510). ICD-10 Diagnoses: G24.1; F06.8.

## 2023-04-23 NOTE — CONFIDENTIAL NOTE
Patient Bradley Garcia  THOMAS [birth year]    MRN [id number]   Provider      [address]   [address] [place]    Sex Male   Occupation     Education 12   Language     Preferred Hand Right   Station OP    Age 22                 WAIS-IV          Raw SS       Letter Num Seq 18 8       Digit Span 27 9                WECHSLER MEMORY SCALE - IV         Raw SS/%ile       Info/Orientation 14        Logical Memory I 27 11       Logical Memory II 21 9       LM Recognition  27 >75                BOSTON NAMING TEST         Score (Correct+Stim Cue)  51 MAS 7     # Correct Stimulus Cue  0 T 43     # Correct Phonemic Cue  5                D-KEFS VERBAL FLUENCY    TEST FORM Standard     Raw SS       Letter Fluency 10 2       Category Fluency 19 3       Switching Fluency             Total Correct 6 1       Switching Accuracy 5 3                CLOCK DRAWING         Command 3 /3 Rating   Normal    Copy 3 /3 Rating  Normal    TRAIL MAKING TEST          Seconds Errors MAS z     Trails A 35 0  -0.79     Trails B 59 0  -1.91              STROOP          Raw T MAS      Word 77 32       Color  55 31       Color/Word 38 44                WISCONSIN CARD SORTING TEST - 1 deck       # Categories 5 %ile >16      # Persev Error 5 T 49      Concept. Lev Resp. 53 T 54      FMS 0                 GUALLPA JUDGMENT OF LINE ORIENTATION  TEST FORM H    Raw Score 30 Raw Correction      MAS  Guallpa %ile 86-              HVLT-R    TEST FORM 6     Raw T       Trial 1 4        Trial 2 8        Trial 3 10        Total 1-3 31 58       Learning 6        Delay 9 40       Percent Retained 90% 45       True Positives 12        Discrimination Index 12 55       False Positives 0                 BRIEF VISUAL MEMORY TEST - REVISED  TEST FORM 2     Raw T       Trial 1 8        Trial 2 9        Trial 3 11        Total 1-3 28 49       Learning 3 46       Delay 10 45       Percent Retained 91% >16 %ile      Recognition Hits 6        Discrimination Index 6 >16 %ile      False Alarms 0                  WASI-II           Raw T      Vocabulary  37 49      Similarities  28 44      Block Design  51 54      Matrix Reasoning  25 61               VCI 95        DIANA 112        FSIQ 4 104        FSIQ 2 109                 BDI         Score 3        Interpretation Minimal                 APATHY SCALE         Score 17                 CYNTHIA         Score 0        Interpretation Minimal

## 2023-05-08 NOTE — PROGRESS NOTES
Movement Disorders Botulinum Toxin Clinic Note    Chief Complaint: cervical dystonia    History of Present Illness:  Bradley Garcia is a 22 year old male who presents to clinic for botulinum toxin injections for treatment of cervical dystonia.    Response to Last Injection: 1/31/2022    Onset: next day    Benefit from last injections: less than prior    Wearing off: yes, about 7 weeks ago with worsening of tightness    Side effects: denies dysphapia or head heaviness    Additional interval history: harder to look to left than right.  Neck and left hand were worse on Group C        Current Outpatient Medications   Medication Sig Dispense Refill     sertraline (ZOLOFT) 50 MG tablet Take 75 mg by mouth every morning       acetaminophen (TYLENOL) 325 MG tablet Take 325-650 mg by mouth every 6 hours as needed for mild pain (Patient not taking: Reported on 9/27/2022)       CONCERTA 36 MG CR tablet Take 36 mg by mouth every morning  (Patient not taking: Reported on 9/27/2022)         Allergies: He has No Known Allergies.    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      Depression      Neck pain      PONV (postoperative nausea and vomiting)      S/P deep brain stimulator placement      Torsion dystonia        Past Surgical History:   Procedure Laterality Date     ANESTHESIA OUT OF OR MRI       ANESTHESIA OUT OF OR MRI N/A 7/8/2021    Procedure: ANESTHESIA OUT OF OR Magnetic resonance imaging Brain @1200;  Surgeon: GENERIC ANESTHESIA PROVIDER;  Location: [facility] OR     ANESTHESIA OUT OF OR MRI N/A 10/8/2021    Procedure: ANESTHESIA OUT OF OR MAGNETIC RESONANCE IMAGING of BRAIN WITH AND WITHOUT CONTRAST@1400;  Surgeon: GENERIC ANESTHESIA PROVIDER;  Location: [facility] OR     EYE SURGERY       IMPLANT DEEP BRAIN STIMULATION GENERATOR / BATTERY Right 4/18/2022    Procedure: Deep brain stimulator placement, phase II, placement of deep  brain stimulator generator/battery over the right chest wall;  Surgeon: Ravinder Coleman MD;  Location: [facility] OR     NOSE SURGERY      nasal fracture repair     OPTICAL TRACKING SYSTEM INSERTION DEEP BRAIN STIMULATION Right 4/11/2022    Procedure: stealth assisted Right side deep brain stimulator placement, phase I,  target right globus pallidus internus, with microelectrode recording;  Surgeon: Ravinder Coleman MD;  Location: [facility] OR     OPTICAL TRACKING SYSTEM INSERTION DEEP BRAIN STIMULATION Left 5/2/2022    Procedure: Stealth Assisted Left side deep brain stimulator placement, phase I & II combined, target left globus pallidus internus, with microelectrode recording and connection to existing generator/battery;  Surgeon: Ravinder Coleman MD;  Location: [facility] OR     REMOVE DEPTH ELECTRODES N/A 8/23/2021    Procedure: Removal of entire deep brain stimulation system hardware including intracranial electrodes and battery/generator in chest wall;  Surgeon: Ravinder Coleman MD;  Location: [facility] OR     STEREOTACTIC INSERTION DEEP BRAIN STIMULATION         Social History     Socioeconomic History     Marital status: Single     Spouse name: Not on file     Number of children: 0     Years of education: Not on file     Highest education level: Not on file   Occupational History     Occupation: unemployed   Tobacco Use     Smoking status: Never     Smokeless tobacco: Never   Vaping Use     Vaping status: Not on file   Substance and Sexual Activity     Alcohol use: Never     Drug use: Never     Sexual activity: Not on file   Other Topics Concern     Not on file   Social History Narrative     Not on file     Social Determinants of Health     Financial Resource Strain: Not on file   Food Insecurity: Not on file   Transportation Needs: Not on file   Physical Activity: Not on file   Stress: Not on file   Social Connections: Not on file   Intimate Partner Violence: Not on file   Housing Stability: Not on file       Family  History   Problem Relation Age of Onset     No Known Problems Mother      No Known Problems Father      No Known Problems Sister      Other - See Comments Brother         di bunny syndrome     Myocardial Infarction Maternal Grandmother      Hyperlipidemia Maternal Grandmother      Hyperlipidemia Maternal Grandfather      Unknown/Adopted Paternal Grandmother      Unknown/Adopted Paternal Grandfather      Deep Vein Thrombosis No family hx of      Anesthesia Reaction No family hx of        Physical Examination:  Vital Signs:   blood pressure is 126/75 and his pulse is 82. His oxygen saturation is 97%.   Moderate retrocollis, reduced left head turn    BOTULINUM NEUROTOXIN INJECTION PROCEDURES:    VERIFICATION OF PATIENT IDENTIFICATION AND PROCEDURE     Initials   Patient Name [facility]   Patient  acc   Procedure Verified by: [facility]     Above assessments performed by:  Tahira Saxena MD      INDICATION/S FOR PROCEDURE/S:  Bradley Garcia is a 22 year old year old patient with dystonia affecting the  head, neck and shoulder girdle musculature secondary to a diagnosis of generalized dystonia with associated  tremor, spasms, loss of volitional motor control and difficulty with activities of daily living.     His baseline symptoms have been recalcitrant to oral medications and conservative therapy.  He is here today for an injection of Botox.      GOAL OF PROCEDURE:  The goal of this procedure is to improve volitional motor control associated with dystonic movements.    TOTAL DOSE ADMINISTERED:  Dose Administered: 180 units Botox    Diluent Used:  Preservative Free Normal Saline  Total Volume of Diluent Used: 2 ml  NDC #: Botox 100u ([id number])    CONSENT:  The risks, benefits, and treatment options were discussed with Bradley Garcia and he agreed to proceed.      Written consent was obtained by [facility].     EQUIPMENT USED:  Needle-37mm stimulating/recording  EMG/NCS Machine    SKIN PREPARATION:  Skin preparation was  performed using an alcohol wipe.    GUIDANCE DESCRIPTION:  Electro-myographic guidance was necessary throughout the procedure to accurately identify all areas of dystonic muscles while avoiding injection of non-dystonic muscles, neighboring nerves and nearby vascular structures.     AREA/MUSCLE INJECTED:    Visual display of locations injections are scanned into the chart under MEDIA tab.    Muscles Injected Units Injected Number of Injections   Left splenius capitis 35 1   Left cervical paraspinals 45 3   Left trapezius 20 1   Right splenius capitis 35 1   Right cervical paraspinals 45 3        Total Units Injected: 180    Unavoidable Waste: 20    Total Units Billed 200      The patient tolerated the injections without difficulty.      Assessment:    Bradley Garcia is a 22 year old male with generalized dystonia.  Today we did repeat botulinum toxin injections with increased dosage in the cervical paraspinals and addition of the left trapezius.    Plan  Follow-up in 3 months' time to consider repeat injections.      Tahira Saxena MD    Movement Disorders Division  Department of Neurology

## 2023-05-09 ENCOUNTER — OFFICE VISIT (OUTPATIENT)
Dept: NEUROLOGY | Facility: CLINIC | Age: 23
End: 2023-05-09
Payer: MEDICARE

## 2023-05-09 VITALS — DIASTOLIC BLOOD PRESSURE: 75 MMHG | HEART RATE: 82 BPM | SYSTOLIC BLOOD PRESSURE: 126 MMHG | OXYGEN SATURATION: 97 %

## 2023-05-09 DIAGNOSIS — G24.3 CERVICAL DYSTONIA: Primary | ICD-10-CM

## 2023-05-09 PROCEDURE — 64616 CHEMODENERV MUSC NECK DYSTON: CPT | Mod: 50 | Performed by: STUDENT IN AN ORGANIZED HEALTH CARE EDUCATION/TRAINING PROGRAM

## 2023-05-09 PROCEDURE — 95874 GUIDE NERV DESTR NEEDLE EMG: CPT | Performed by: STUDENT IN AN ORGANIZED HEALTH CARE EDUCATION/TRAINING PROGRAM

## 2023-05-09 ASSESSMENT — PAIN SCALES - GENERAL: PAINLEVEL: NO PAIN (0)

## 2023-05-09 NOTE — LETTER
5/9/2023       RE: Bradley Garcia  [id number]  [facility] [address]     Dear Colleague,    Thank you for referring your patient, Bradley Garcia, to the [facility] [place] at [facility]. Please see a copy of my visit note below.    Movement Disorders Botulinum Toxin Clinic Note    Chief Complaint: cervical dystonia    History of Present Illness:  Bradley Garcia is a 22 year old male who presents to clinic for botulinum toxin injections for treatment of cervical dystonia.    Response to Last Injection: 1/31/2022    Onset: next day    Benefit from last injections: less than prior    Wearing off: yes, about 7 weeks ago with worsening of tightness    Side effects: denies dysphapia or head heaviness    Additional interval history: harder to look to left than right.  Neck and left hand were worse on Group C        Current Outpatient Medications   Medication Sig Dispense Refill    sertraline (ZOLOFT) 50 MG tablet Take 75 mg by mouth every morning      acetaminophen (TYLENOL) 325 MG tablet Take 325-650 mg by mouth every 6 hours as needed for mild pain (Patient not taking: Reported on 9/27/2022)      CONCERTA 36 MG CR tablet Take 36 mg by mouth every morning  (Patient not taking: Reported on 9/27/2022)         Allergies: He has No Known Allergies.    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     Depression     Neck pain     PONV (postoperative nausea and vomiting)     S/P deep brain stimulator placement     Torsion dystonia        Past Surgical History:   Procedure Laterality Date    ANESTHESIA OUT OF OR MRI      ANESTHESIA OUT OF OR MRI N/A 7/8/2021    Procedure: ANESTHESIA OUT OF OR Magnetic resonance imaging Brain @1200;  Surgeon: GENERIC ANESTHESIA PROVIDER;  Location: [facility] OR    ANESTHESIA OUT OF OR MRI N/A 10/8/2021    Procedure:  ANESTHESIA OUT OF OR MAGNETIC RESONANCE IMAGING of BRAIN WITH AND WITHOUT CONTRAST@1400;  Surgeon: GENERIC ANESTHESIA PROVIDER;  Location: [facility] OR    EYE SURGERY      IMPLANT DEEP BRAIN STIMULATION GENERATOR / BATTERY Right 4/18/2022    Procedure: Deep brain stimulator placement, phase II, placement of deep brain stimulator generator/battery over the right chest wall;  Surgeon: Ravinder Coleman MD;  Location: [facility] OR    NOSE SURGERY      nasal fracture repair    OPTICAL TRACKING SYSTEM INSERTION DEEP BRAIN STIMULATION Right 4/11/2022    Procedure: stealth assisted Right side deep brain stimulator placement, phase I,  target right globus pallidus internus, with microelectrode recording;  Surgeon: Ravinder Coleman MD;  Location: [facility] OR    OPTICAL TRACKING SYSTEM INSERTION DEEP BRAIN STIMULATION Left 5/2/2022    Procedure: Stealth Assisted Left side deep brain stimulator placement, phase I & II combined, target left globus pallidus internus, with microelectrode recording and connection to existing generator/battery;  Surgeon: Ravinder Coleman MD;  Location: [facility] OR    REMOVE DEPTH ELECTRODES N/A 8/23/2021    Procedure: Removal of entire deep brain stimulation system hardware including intracranial electrodes and battery/generator in chest wall;  Surgeon: Ravinder Coleman MD;  Location: [facility] OR    STEREOTACTIC INSERTION DEEP BRAIN STIMULATION         Social History     Socioeconomic History    Marital status: Single     Spouse name: Not on file    Number of children: 0    Years of education: Not on file    Highest education level: Not on file   Occupational History    Occupation: unemployed   Tobacco Use    Smoking status: Never    Smokeless tobacco: Never   Vaping Use    Vaping status: Not on file   Substance and Sexual Activity    Alcohol use: Never    Drug use: Never    Sexual activity: Not on file   Other Topics Concern    Not on file   Social History Narrative    Not on file     Social Determinants of Health      Financial Resource Strain: Not on file   Food Insecurity: Not on file   Transportation Needs: Not on file   Physical Activity: Not on file   Stress: Not on file   Social Connections: Not on file   Intimate Partner Violence: Not on file   Housing Stability: Not on file       Family History   Problem Relation Age of Onset    No Known Problems Mother     No Known Problems Father     No Known Problems Sister     Other - See Comments Brother         di bunny syndrome    Myocardial Infarction Maternal Grandmother     Hyperlipidemia Maternal Grandmother     Hyperlipidemia Maternal Grandfather     Unknown/Adopted Paternal Grandmother     Unknown/Adopted Paternal Grandfather     Deep Vein Thrombosis No family hx of     Anesthesia Reaction No family hx of        Physical Examination:  Vital Signs:   blood pressure is 126/75 and his pulse is 82. His oxygen saturation is 97%.   Moderate retrocollis, reduced left head turn    BOTULINUM NEUROTOXIN INJECTION PROCEDURES:    VERIFICATION OF PATIENT IDENTIFICATION AND PROCEDURE     Initials   Patient Name acc   Patient  acc   Procedure Verified by: [facility]     Above assessments performed by:  Tahira Saxena MD      INDICATION/S FOR PROCEDURE/S:  Bradley Garcia is a 22 year old year old patient with dystonia affecting the  head, neck and shoulder girdle musculature secondary to a diagnosis of generalized dystonia with associated  tremor, spasms, loss of volitional motor control and difficulty with activities of daily living.     His baseline symptoms have been recalcitrant to oral medications and conservative therapy.  He is here today for an injection of Botox.      GOAL OF PROCEDURE:  The goal of this procedure is to improve volitional motor control associated with dystonic movements.    TOTAL DOSE ADMINISTERED:  Dose Administered: 180 units Botox    Diluent Used:  Preservative Free Normal Saline  Total Volume of Diluent Used: 2 ml  NDC #: Botox 100u  ([id number])    CONSENT:  The risks, benefits, and treatment options were discussed with Bradley Garcia and he agreed to proceed.      Written consent was obtained by acc.     EQUIPMENT USED:  Needle-37mm stimulating/recording  EMG/NCS Machine    SKIN PREPARATION:  Skin preparation was performed using an alcohol wipe.    GUIDANCE DESCRIPTION:  Electro-myographic guidance was necessary throughout the procedure to accurately identify all areas of dystonic muscles while avoiding injection of non-dystonic muscles, neighboring nerves and nearby vascular structures.     AREA/MUSCLE INJECTED:    Visual display of locations injections are scanned into the chart under MEDIA tab.    Muscles Injected Units Injected Number of Injections   Left splenius capitis 35 1   Left cervical paraspinals 45 3   Left trapezius 20 1   Right splenius capitis 35 1   Right cervical paraspinals 45 3        Total Units Injected: 180    Unavoidable Waste: 20    Total Units Billed 200      The patient tolerated the injections without difficulty.      Assessment:    Bradley Garcia is a 22 year old male with generalized dystonia.  Today we did repeat botulinum toxin injections with increased dosage in the cervical paraspinals and addition of the left trapezius.    Plan  Follow-up in 3 months' time to consider repeat injections.        Again, thank you for allowing me to participate in the care of your patient.      Sincerely,    Tahira Saxena MD

## 2023-05-10 NOTE — PROGRESS NOTES
Department of Neurology  Movement Disorders Division   DBS Follow-up Note    Patient: Bradley Garcia  MRN: [id number]   : 2000   Date of Visit: 5/15/2023    Diagnosis: Dystonia  DBS Target(s): Bilateral GPi  Date(s) of DBS Lead Placement: R 2022, L 2022  Date(s) of IPG Placement: R chest 2022  Device: MedSossee Activa RC      Chief Complaint:  Bradley Garcia is a 22 year old male who returns to clinic for follow up of generalized dystonia status post bilateral GPi DBS.       Interval History:  Group C see worsened his whole neck but improved his left shoulder.  He tried group C again after the Botox injections with the same result.  He tried decreasing the RGPi 1 to 0 with no change in his symptoms.  Never tried increasing above 4.    Tried decreasing voltage of R GPi 1 on group B which resulted in some improvement in his left shoulder but not as good as Group C.    Thus far, it is easier to look left but still feels some tightness.  The first day after injections, his retrocollis felt perfect but now it isn't as good as after the first injections.    Most bothersome symptoms today are neck tightness.  His left shoulder blade feels tight but he says that he can deal with this.  Ideally he'd like both addressed but first priority is his neck.      Medications:  Current Outpatient Medications   Medication Sig Dispense Refill     sertraline (ZOLOFT) 50 MG tablet Take 75 mg by mouth every morning       acetaminophen (TYLENOL) 325 MG tablet Take 325-650 mg by mouth every 6 hours as needed for mild pain (Patient not taking: Reported on 2022)       CONCERTA 36 MG CR tablet Take 36 mg by mouth every morning  (Patient not taking: Reported on 2022)          Allergies: has No Known Allergies.    Physical Exam:  The patient's  blood pressure is 113/69 and his pulse is 77. His oxygen saturation is 94%.       Neurological Examination:   Moderate retrocollis with some left laterocollis  Left arm  flexed at elbow and fisted          Procedure: DBS Interrogation & Programming  Lead(s):    Left Right   DBS Target Left GPi Right GPi   DBS Lead Type SenSight SenSight   Lead Placement Date 5/2/2022 4/11/2022      IPG(s):    1   IPG Activa-RC   IPG Implant Date 4/18/2022   Location Right chest   Battery (V) 100%, JAMES 4/15/2036     Impedance Check: No problems found.  See scanned report for impedance details.    Group A Left Brain             Right Brain           Initial   Final   Initial   Final        GPi 1 GPi 2 GPi 1 GPi 2 GPi 1 GPi 2 GPi 1 GPi 2     Inactive Inactive Active Active Inactive Inactive Active Active   Amplitude (V) 2.5 2.0 [0-2.6] 2.5 2.0 5.5 3.0 [0-6.0] 4.0 4.0 [0-5.6]   Pulse width (usec) 60 60 60 60 90 90 90 90   Freq (Hz) 125 125 125 125 125 125 125 125   Contacts: C+3- C+2abc- C+3- C+2abc- C+11- C+10abc- C+11- C+10a-      Group B Left Brain             Right Brain           Initial   Final   Initial   Final       GPi 1 GPi 2 GPi 1 GPi 2 GPi 1 GPi 2 GPi 1 GPi 2     Active Active Inactive Inactive Active Active Inactive Inactive   Amplitude (V) 2.5 2.0 [0-2.6] 2.5 2.0 [0-2.6] 4.0 [0-5.5] 3.0 4.0 [0-5.5] 3.0   Pulse width (usec) 60 60 60 60 90 90 90 90   Freq (Hz) 125 125 125 125 125 125 125 125   Contacts: C+3- C+2abc- C+3- C+2abc- C+11- C+10abc- C+11- C+10abc-      Group C Left Brain             Right Brain           Initial   Final   Initial   Final       GPi 1 GPi 2 GPi 1 GPi 2 GPi 1 GPi 2 GPi 1 GPi 2     Inactive Inactive Inactive Inactive Inactive Inactive Inactve Inactive   Amplitude (V) 2.5 2.0 [0-2.6] 2.5 2.0 [0-2.6] 4.0 [0-6.0] 3.0 4.0 [0-6.0] 3.0   Pulse width (usec) 90 90 60 60 110 90 110 90   Freq (Hz) 125 125 125 125 125 125 125 125   Contacts:  C+3- C+2abc- C+3- C+2abc- C+11- C+10abc- C+11- C+10abc-          R GPi:  Contacts Amplitude PW Frequency Effect SE   C+11-  C+10a- 4.0  1.0 90  90 125  125  NN    4.0  2.0    NN    4.0  3.0    NN    4.0  4.0    LUE looser    4.0  5.0    NN     4.0  6.0    LUE tighter    4.0  7.0    Improved   C+11-  C+10b- 4.0  2.0 90 125  NN    4.0  4.0    NN    4.0  5.0    LUE tight   C+11-  C+10c- 4.0  2.0 90 125  NN    4.0  4.0    L shoulder tighter    4.0  6.0    ?Tighter    4.0  7.0    Tighter       Group C - R GPi1 as voltage decreased his arm felt looser        Assessment/Plan:  Bradley Garcia is a 22 year old male with dystonia s/p bilateral GPi DBS who returns for follow-up.  Today I performed directional programming of R GPi 2 and found the contact with the widest threshold.    - Further increase left trapezius dosage  - RTC 3 months, 1 hr DBS programming      Tahira Saxena MD   of Neurology  Movement Disorders Division       21 minutes spent on the date of the encounter doing chart review, history and exam, documentation and further activities as noted above.     Additional time spent for separate DBS programmin min DBS analyzed with reprogramming.

## 2023-05-15 ENCOUNTER — OFFICE VISIT (OUTPATIENT)
Dept: NEUROLOGY | Facility: CLINIC | Age: 23
End: 2023-05-15
Payer: MEDICARE

## 2023-05-15 VITALS — DIASTOLIC BLOOD PRESSURE: 69 MMHG | OXYGEN SATURATION: 94 % | HEART RATE: 77 BPM | SYSTOLIC BLOOD PRESSURE: 113 MMHG

## 2023-05-15 DIAGNOSIS — G24.1 GENERALIZED TORSION DYSTONIA: Primary | ICD-10-CM

## 2023-05-15 DIAGNOSIS — Z96.89 S/P DEEP BRAIN STIMULATOR PLACEMENT: ICD-10-CM

## 2023-05-15 PROCEDURE — 95984 ALYS BRN NPGT PRGRMG ADDL 15: CPT | Performed by: STUDENT IN AN ORGANIZED HEALTH CARE EDUCATION/TRAINING PROGRAM

## 2023-05-15 PROCEDURE — 99213 OFFICE O/P EST LOW 20 MIN: CPT | Mod: 24 | Performed by: STUDENT IN AN ORGANIZED HEALTH CARE EDUCATION/TRAINING PROGRAM

## 2023-05-15 PROCEDURE — 95983 ALYS BRN NPGT PRGRMG 15 MIN: CPT | Performed by: STUDENT IN AN ORGANIZED HEALTH CARE EDUCATION/TRAINING PROGRAM

## 2023-05-15 NOTE — LETTER
5/15/2023       RE: Bradley Garcia  [id number]  [facility] [address]       Dear Colleague,    Thank you for referring your patient, Bradley Garica, to the [facility]UROLOGY CLINIC [place] at [facility]. Please see a copy of my visit note below.    Department of Neurology  Movement Disorders Division   DBS Follow-up Note    Patient: Bradley Garcia  MRN: [id number]   : 2000   Date of Visit: 5/15/2023    Diagnosis: Dystonia  DBS Target(s): Bilateral GPi  Date(s) of DBS Lead Placement: R 2022, L 2022  Date(s) of IPG Placement: R chest 2022  Device: [id number] Activa RC      Chief Complaint:  Bradley Garcia is a 22 year old male who returns to clinic for follow up of generalized dystonia status post bilateral GPi DBS.       Interval History:  Group C see worsened his whole neck but improved his left shoulder.  He tried group C again after the Botox injections with the same result.  He tried decreasing the RGPi 1 to 0 with no change in his symptoms.  Never tried increasing above 4.    Tried decreasing voltage of R GPi 1 on group B which resulted in some improvement in his left shoulder but not as good as Group C.    Thus far, it is easier to look left but still feels some tightness.  The first day after injections, his retrocollis felt perfect but now it isn't as good as after the first injections.    Most bothersome symptoms today are neck tightness.  His left shoulder blade feels tight but he says that he can deal with this.  Ideally he'd like both addressed but first priority is his neck.      Medications:  Current Outpatient Medications   Medication Sig Dispense Refill    sertraline (ZOLOFT) 50 MG tablet Take 75 mg by mouth every morning      acetaminophen (TYLENOL) 325 MG tablet Take 325-650 mg by mouth every 6 hours as needed for mild pain (Patient not taking: Reported on 2022)      CONCERTA 36 MG CR tablet Take 36 mg by  mouth every morning  (Patient not taking: Reported on 9/27/2022)          Allergies: has No Known Allergies.    Physical Exam:  The patient's  blood pressure is 113/69 and his pulse is 77. His oxygen saturation is 94%.       Neurological Examination:   Moderate retrocollis with some left laterocollis  Left arm flexed at elbow and fisted          Procedure: DBS Interrogation & Programming  Lead(s):    Left Right   DBS Target Left GPi Right GPi   DBS Lead Type SenSight SenSight   Lead Placement Date 5/2/2022 4/11/2022      IPG(s):    1   IPG Activa-RC   IPG Implant Date 4/18/2022   Location Right chest   Battery (V) 100%, JAMES 4/15/2036     Impedance Check: No problems found.  See scanned report for impedance details.    Group A Left Brain             Right Brain           Initial   Final   Initial   Final        GPi 1 GPi 2 GPi 1 GPi 2 GPi 1 GPi 2 GPi 1 GPi 2     Inactive Inactive Active Active Inactive Inactive Active Active   Amplitude (V) 2.5 2.0 [0-2.6] 2.5 2.0 5.5 3.0 [0-6.0] 4.0 4.0 [0-5.6]   Pulse width (usec) 60 60 60 60 90 90 90 90   Freq (Hz) 125 125 125 125 125 125 125 125   Contacts: C+3- C+2abc- C+3- C+2abc- C+11- C+10abc- C+11- C+10a-      Group B Left Brain             Right Brain           Initial   Final   Initial   Final       GPi 1 GPi 2 GPi 1 GPi 2 GPi 1 GPi 2 GPi 1 GPi 2     Active Active Inactive Inactive Active Active Inactive Inactive   Amplitude (V) 2.5 2.0 [0-2.6] 2.5 2.0 [0-2.6] 4.0 [0-5.5] 3.0 4.0 [0-5.5] 3.0   Pulse width (usec) 60 60 60 60 90 90 90 90   Freq (Hz) 125 125 125 125 125 125 125 125   Contacts: C+3- C+2abc- C+3- C+2abc- C+11- C+10abc- C+11- C+10abc-      Group C Left Brain             Right Brain           Initial   Final   Initial   Final       GPi 1 GPi 2 GPi 1 GPi 2 GPi 1 GPi 2 GPi 1 GPi 2     Inactive Inactive Inactive Inactive Inactive Inactive Inactve Inactive   Amplitude (V) 2.5 2.0 [0-2.6] 2.5 2.0 [0-2.6] 4.0 [0-6.0] 3.0 4.0 [0-6.0] 3.0   Pulse width (usec) 90 90 60 60  110 90 110 90   Freq (Hz) 125 125 125 125 125 125 125 125   Contacts:  C+3- C+2abc- C+3- C+2abc- C+11- C+10abc- C+11- C+10abc-          R GPi:  Contacts Amplitude PW Frequency Effect SE   C+11-  C+10a- 4.0  1.0 90  90 125  125  NN    4.0  2.0    NN    4.0  3.0    NN    4.0  4.0    LUE looser    4.0  5.0    NN    4.0  6.0    LUE tighter    4.0  7.0    Improved   C+11-  C+10b- 4.0  2.0 90 125  NN    4.0  4.0    NN    4.0  5.0    LUE tight   C+11-  C+10c- 4.0  2.0 90 125  NN    4.0  4.0    L shoulder tighter    4.0  6.0    ?Tighter    4.0  7.0    Tighter       Group C - R GPi1 as voltage decreased his arm felt looser    Assessment/Plan:  Bradley Garcia is a 22 year old male with dystonia s/p bilateral GPi DBS who returns for follow-up.  Today I performed directional programming of R GPi 2 and found the contact with the widest threshold.    - Further increase left trapezius dosage  - RTC 3 months, 1 hr DBS programming       21 minutes spent on the date of the encounter doing chart review, history and exam, documentation and further activities as noted above.     Additional time spent for separate DBS programmin min DBS analyzed with reprogramming.        Again, thank you for allowing me to participate in the care of your patient.      Sincerely,    Tahira Saxena MD

## 2023-05-15 NOTE — PATIENT INSTRUCTIONS
All of the groups are the same for your right body.    Group A - Changed the second group so that it is only stimulating in one direction.  It is set at 4.0 (range 0-5.6).  You went home on this.  Group B - Old settings (best).  Try decreasing further from 4.0 (range 0-5.5).  Group C - Old settings (increased pulse width).

## 2023-05-15 NOTE — NURSING NOTE
Chief Complaint   Patient presents with     DBS Programming     /69   Pulse 77   SpO2 94%     Jazmyne Knight, EMT

## 2023-06-02 ENCOUNTER — HEALTH MAINTENANCE LETTER (OUTPATIENT)
Age: 23
End: 2023-06-02

## 2023-07-12 ENCOUNTER — TELEPHONE (OUTPATIENT)
Dept: NEUROLOGY | Facility: CLINIC | Age: 23
End: 2023-07-12
Payer: MEDICARE

## 2023-07-12 NOTE — TELEPHONE ENCOUNTER
Called the patient back and he requested to be seen sooner than 8/1/23. The patient was rescheduled to 7/19/23 at 12 PM.

## 2023-07-12 NOTE — TELEPHONE ENCOUNTER
[facility] Call Center    Phone Message    May a detailed message be left on voicemail: yes     Reason for Call: Other: Patient requesting to reschedule DBS programming on 8/7/23.  Please call patient to reschedule as writer notes in protocols     Action Taken: Message routed to:  [facility]    Travel Screening: Not Applicable

## 2023-07-18 NOTE — PROGRESS NOTES
Department of Neurology  Movement Disorders Division   DBS Follow-up Note    Patient: Bradley Garcia  MRN: [id number]   : 2000   Date of Visit: 2023    Diagnosis: Dystonia  DBS Target(s): Bilateral GPi  Date(s) of DBS Lead Placement: R 2022, L 2022  Date(s) of IPG Placement: R chest 2022  Device: [employer] Activa RC      Chief Complaint:  Bradley Garcia is a 22 year old male who returns to clinic for follow up of generalized dystonia status post bilateral GPi DBS.  He was last seen for programming on 5/15/2023 at which time I performed directional programming of the R GPi.    Interval History:  Program A is better for his neck (best at 5.6V).  Shoulder worsened as he used.  Group B is worse for his left arm when turned up (neck best at 5.5, overall best at 5V).  Group C was the worst of the 3 for his neck.    Botox started to wear off in . By July 75% worn off.  Easier to turn his neck to the left.        Medications:  Current Outpatient Medications   Medication Sig Dispense Refill     acetaminophen (TYLENOL) 325 MG tablet Take 325-650 mg by mouth every 6 hours as needed for mild pain (Patient not taking: Reported on 2022)       CONCERTA 36 MG CR tablet Take 36 mg by mouth every morning  (Patient not taking: Reported on 2022)       sertraline (ZOLOFT) 50 MG tablet Take 75 mg by mouth every morning (Patient not taking: Reported on 2023)          Allergies: has No Known Allergies.    Physical Exam:  The patient's  blood pressure is 125/86 and his pulse is 86. His oxygen saturation is 97%.       Neurological Examination:   Moderate retrocollis, more easily able to flex to neutral  Moderate right shoulder elevation  Slight right laterocollis  Left hand fisted but able to be opened more easily  Normal tone throughout  Upright posture  Left arm postured with gait        Procedure: DBS Interrogation & Programming  Lead(s):    Left Right   DBS Target Left GPi Right GPi   DBS  Lead Type SenSight SenSight   Lead Placement Date 5/2/2022 4/11/2022      IPG(s):    1   IPG Activa-RC   IPG Implant Date 4/18/2022   Location Right chest   Battery (V) 75%, JAMES 4/15/2036      Impedance Check: No problems found.  See scanned report for impedance details.     Group A Left Brain             Right Brain           Initial   Final   Initial   Final        GPi 1 GPi 2 GPi 1 GPi 2 GPi 1 GPi 2 GPi 1 GPi 2     Active Active Active Active Active Active Active Active   Amplitude (V) 2.5 2.0 2.5 2.0 4.0 5.6 [0-5.6] 4.0 5.6 [0-7.0]   Pulse width (usec) 60 60 60 60 90 90 90 90   Freq (Hz) 125 125 125 125 125 125 125 125   Contacts: C+3- C+2abc- C+3- C+2abc- C+11- C+10a- C+11- C+10a-   Impedance 1369/1.8 1508/1.3 / / 1086/3.6 1904/2.9 / /      Group B Left Brain             Right Brain           Initial   Final   Initial   Final       GPi 1 GPi 2 GPi 1 GPi 2 GPi 1 GPi 2 GPi 1 GPi 2     Inactive Inactive Inactive Inactive Inactive Inactive Inactive Inactive   Amplitude (V) 2.5 2.0 [0-2.6] 2.5 2.0 5.5 [0-5.5] 3.0 4.0 3.0 [0-4.0]   Pulse width (usec) 60 60 60 60 90 90 90 120   Freq (Hz) 125 125 125 125 125 125 125 125   Contacts: C+3- C+2abc- C+3- C+2abc- C+11- C+10abc- C+11- C+10a-      Group C Left Brain             Right Brain           Initial   Final   Initial   Final       GPi 1 GPi 2 GPi 1 GPi 2 GPi 1 GPi 2 GPi 1 GPi 2     Inactive Inactive Inactive Inactive Inactive Inactive Inactve Inactive   Amplitude (V) 2.5 2.0 [0-2.6] 2.5 2.0 [0-2.6] 4.0 [0-6.0] 3.0 4.0 [0-6.0] 5.6   Pulse width (usec) 90 90 60 60 110 90 110 90   Freq (Hz) 125 125 125 125 125 125 125 125   Contacts:  C+3- C+2abc- C+3- C+2abc- C+11- C+10abc- C+11- C+10a-     Group D Left Brain             Right Brain           Initial   Final   Initial   Final        GPi 1 GPi 2 GPi 1 GPi 2 GPi 1 GPi 2 GPi 1 GPi 2     None None Inactive Inactive None None Inactive Inactive   Amplitude (V)   2.5 2.0   4.0 [0-5.4] 5.6   Pulse width (usec)   60 60   90 90    Freq (Hz)   125 125   125 125   Contacts:   C+3- C+2abc-   C+11- C+10a-         R GPi:  Contacts Amplitude PW Frequency Effect SE   C+11-  C+10a- 4.0  6.0 90 125  NN    4.0  6.5    ?Tiny brief phosphene    4.0  7.0    Tiny brief phosphene   Charge density warning 7.4       C+11-  C+10a- 4.0  2.0 90  120   125  NN    4.0  4.0    NN   Charge density limit 4.0  4.2                 Assessment/Plan:  Bradley Garcia is a 22 year old male with generalized dystonia s/p bilateral GPi DBS who returns for follow-up.  He has noticed improvement in his neck and left shoulder dystonia with a combination of new DBS settings (Group A) and botulinum toxin injections.  Again today I addressed only the R GPi as his right body symptoms are well controlled.  Group A was most effective overall to the only change was to increase the range to the maximum allowed by the device.  The new group B was designed with a longer pulse width for the C+10a- program.  The old group C was best for his neck which suggests that this symptom is most impacted by C+11- program.  The new group C and D have his preferred directional settings from the old group A and the C+11- program varied by pulse width.    - He will test the various programs and report his observations  - Botox scheduled for , increase left trapezius and posterior neck injections  - RTC 3 months, 1 hr DBS programming      Tahira Saxena MD   of Neurology  Movement Disorders Division       32 minutes spent on the date of the encounter doing chart review, history and exam, documentation and further activities as noted above.     Additional time spent for separate DBS programmin min DBS analyzed without reprogramming.

## 2023-07-19 ENCOUNTER — OFFICE VISIT (OUTPATIENT)
Dept: NEUROLOGY | Facility: CLINIC | Age: 23
End: 2023-07-19
Payer: MEDICARE

## 2023-07-19 VITALS — HEART RATE: 86 BPM | SYSTOLIC BLOOD PRESSURE: 125 MMHG | DIASTOLIC BLOOD PRESSURE: 86 MMHG | OXYGEN SATURATION: 97 %

## 2023-07-19 DIAGNOSIS — Z96.89 S/P DEEP BRAIN STIMULATOR PLACEMENT: ICD-10-CM

## 2023-07-19 DIAGNOSIS — G24.1 GENERALIZED TORSION DYSTONIA: Primary | ICD-10-CM

## 2023-07-19 PROCEDURE — 99214 OFFICE O/P EST MOD 30 MIN: CPT | Mod: 25 | Performed by: STUDENT IN AN ORGANIZED HEALTH CARE EDUCATION/TRAINING PROGRAM

## 2023-07-19 PROCEDURE — 95983 ALYS BRN NPGT PRGRMG 15 MIN: CPT | Performed by: STUDENT IN AN ORGANIZED HEALTH CARE EDUCATION/TRAINING PROGRAM

## 2023-07-19 PROCEDURE — 95984 ALYS BRN NPGT PRGRMG ADDL 15: CPT | Performed by: STUDENT IN AN ORGANIZED HEALTH CARE EDUCATION/TRAINING PROGRAM

## 2023-07-19 ASSESSMENT — PAIN SCALES - GENERAL: PAINLEVEL: NO PAIN (0)

## 2023-07-19 NOTE — NURSING NOTE
Chief Complaint   Patient presents with     RECHECK     Here for DBS programming confirmed with patient.      Alexandra Pleitez

## 2023-07-19 NOTE — LETTER
2023       RE: Bradley Garcia  [address]  [facility] [address]     Dear Colleague,    Thank you for referring your patient, Bradley Garcia, to the [facility] [place] at [facility]. Please see a copy of my visit note below.    Department of Neurology  Movement Disorders Division   DBS Follow-up Note    Patient: Bradley Garcia  MRN: [id number]   : 2000   Date of Visit: 2023    Diagnosis: Dystonia  DBS Target(s): Bilateral GPi  Date(s) of DBS Lead Placement: R 2022, L 2022  Date(s) of IPG Placement: R chest 2022  Device: [employer] Activa RC      Chief Complaint:  Bradley Garcia is a 22 year old male who returns to clinic for follow up of generalized dystonia status post bilateral GPi DBS.  He was last seen for programming on 5/15/2023 at which time I performed directional programming of the R GPi.    Interval History:  Program A is better for his neck (best at 5.6V).  Shoulder worsened as he used.  Group B is worse for his left arm when turned up (neck best at 5.5, overall best at 5V).  Group C was the worst of the 3 for his neck.    Botox started to wear off in . By July 75% worn off.  Easier to turn his neck to the left.        Medications:  Current Outpatient Medications   Medication Sig Dispense Refill    acetaminophen (TYLENOL) 325 MG tablet Take 325-650 mg by mouth every 6 hours as needed for mild pain (Patient not taking: Reported on 2022)      CONCERTA 36 MG CR tablet Take 36 mg by mouth every morning  (Patient not taking: Reported on 2022)      sertraline (ZOLOFT) 50 MG tablet Take 75 mg by mouth every morning (Patient not taking: Reported on 2023)          Allergies: has No Known Allergies.    Physical Exam:  The patient's  blood pressure is 125/86 and his pulse is 86. His oxygen saturation is 97%.       Neurological Examination:   Moderate retrocollis, more easily able to  flex to neutral  Moderate right shoulder elevation  Slight right laterocollis  Left hand fisted but able to be opened more easily  Normal tone throughout  Upright posture  Left arm postured with gait        Procedure: DBS Interrogation & Programming  Lead(s):    Left Right   DBS Target Left GPi Right GPi   DBS Lead Type SenSight SenSight   Lead Placement Date 5/2/2022 4/11/2022      IPG(s):    1   IPG Activa-RC   IPG Implant Date 4/18/2022   Location Right chest   Battery (V) 75%, JAMES 4/15/2036      Impedance Check: No problems found.  See scanned report for impedance details.     Group A Left Brain             Right Brain           Initial   Final   Initial   Final        GPi 1 GPi 2 GPi 1 GPi 2 GPi 1 GPi 2 GPi 1 GPi 2     Active Active Active Active Active Active Active Active   Amplitude (V) 2.5 2.0 2.5 2.0 4.0 5.6 [0-5.6] 4.0 5.6 [0-7.0]   Pulse width (usec) 60 60 60 60 90 90 90 90   Freq (Hz) 125 125 125 125 125 125 125 125   Contacts: C+3- C+2abc- C+3- C+2abc- C+11- C+10a- C+11- C+10a-   Impedance 1369/1.8 1508/1.3 / / 1086/3.6 1904/2.9 / /      Group B Left Brain             Right Brain           Initial   Final   Initial   Final       GPi 1 GPi 2 GPi 1 GPi 2 GPi 1 GPi 2 GPi 1 GPi 2     Inactive Inactive Inactive Inactive Inactive Inactive Inactive Inactive   Amplitude (V) 2.5 2.0 [0-2.6] 2.5 2.0 5.5 [0-5.5] 3.0 4.0 3.0 [0-4.0]   Pulse width (usec) 60 60 60 60 90 90 90 120   Freq (Hz) 125 125 125 125 125 125 125 125   Contacts: C+3- C+2abc- C+3- C+2abc- C+11- C+10abc- C+11- C+10a-      Group C Left Brain             Right Brain           Initial   Final   Initial   Final       GPi 1 GPi 2 GPi 1 GPi 2 GPi 1 GPi 2 GPi 1 GPi 2     Inactive Inactive Inactive Inactive Inactive Inactive Inactve Inactive   Amplitude (V) 2.5 2.0 [0-2.6] 2.5 2.0 [0-2.6] 4.0 [0-6.0] 3.0 4.0 [0-6.0] 5.6   Pulse width (usec) 90 90 60 60 110 90 110 90   Freq (Hz) 125 125 125 125 125 125 125 125   Contacts:  C+3- C+2abc- C+3- C+2abc- C+11-  C+10abc- C+11- C+10a-     Group D Left Brain             Right Brain           Initial   Final   Initial   Final        GPi 1 GPi 2 GPi 1 GPi 2 GPi 1 GPi 2 GPi 1 GPi 2     None None Inactive Inactive None None Inactive Inactive   Amplitude (V)   2.5 2.0   4.0 [0-5.4] 5.6   Pulse width (usec)   60 60   90 90   Freq (Hz)   125 125   125 125   Contacts:   C+3- C+2abc-   C+11- C+10a-         R GPi:  Contacts Amplitude PW Frequency Effect SE   C+11-  C+10a- 4.0  6.0 90 125  NN    4.0  6.5    ?Tiny brief phosphene    4.0  7.0    Tiny brief phosphene   Charge density warning 7.4       C+11-  C+10a- 4.0  2.0 90  120   125  NN    4.0  4.0    NN   Charge density limit 4.0  4.2                 Assessment/Plan:  Bradley Garcia is a 22 year old male with generalized dystonia s/p bilateral GPi DBS who returns for follow-up.  He has noticed improvement in his neck and left shoulder dystonia with a combination of new DBS settings (Group A) and botulinum toxin injections.  Again today I addressed only the R GPi as his right body symptoms are well controlled.  Group A was most effective overall to the only change was to increase the range to the maximum allowed by the device.  The new group B was designed with a longer pulse width for the C+10a- program.  The old group C was best for his neck which suggests that this symptom is most impacted by C+11- program.  The new group C and D have his preferred directional settings from the old group A and the C+11- program varied by pulse width.    - He will test the various programs and report his observations  - Botox scheduled for , increase left trapezius and posterior neck injections  - RTC 3 months, 1 hr DBS programming             32 minutes spent on the date of the encounter doing chart review, history and exam, documentation and further activities as noted above.     Additional time spent for separate DBS programmin min DBS analyzed without reprogramming.        Again, thank  you for allowing me to participate in the care of your patient.      Sincerely,    Tahira Saxena MD

## 2023-07-19 NOTE — PATIENT INSTRUCTIONS
Group A - Same as before but you can adjust the directional stimulation up to 7.0.  This is what you went home on.    Group B - Increased the pulse width on the directional stimulation.  It's set at 3.0 with range up to 4.0.    Group C - Same directional settings as group A but you can adjust the other part (maybe shoulder). It's set at 4.0 with range up to 6.0.    Group D - Same directional settings as group A but you can adjust the other part (maybe shoulder) with a lower pulse width.  It's set at 4.0 with a range up to 5.4.

## 2023-07-31 NOTE — PROGRESS NOTES
Movement Disorders Botulinum Toxin Clinic Note    Chief Complaint: cervical dystonia    History of Present Illness:  Bradley Garcia is a 22 year old male who presents to clinic for botulinum toxin injections for treatment of cervical dystonia.    Response to Last Injection: 5/9/2023    Onset: within 1 day    Benefit from last injections: improved neck extension and left head turn    Wearing off: significantly from yesterday, started in June    Side effects: denies neck weakness    Additional interval history: Minimal change with Group A increased to 6V  At 7V, his neck was relax but his left shoulder was much worse.        Current Outpatient Medications   Medication Sig Dispense Refill    acetaminophen (TYLENOL) 325 MG tablet Take 325-650 mg by mouth every 6 hours as needed for mild pain (Patient not taking: Reported on 9/27/2022)      CONCERTA 36 MG CR tablet Take 36 mg by mouth every morning  (Patient not taking: Reported on 9/27/2022)      sertraline (ZOLOFT) 50 MG tablet Take 75 mg by mouth every morning (Patient not taking: Reported on 7/19/2023)         Allergies: He has No Known Allergies.    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     Depression     Neck pain     PONV (postoperative nausea and vomiting)     S/P deep brain stimulator placement     Torsion dystonia        Past Surgical History:   Procedure Laterality Date    ANESTHESIA OUT OF OR MRI      ANESTHESIA OUT OF OR MRI N/A 7/8/2021    Procedure: ANESTHESIA OUT OF OR Magnetic resonance imaging Brain @1200;  Surgeon: GENERIC ANESTHESIA PROVIDER;  Location: [facility] OR    ANESTHESIA OUT OF OR MRI N/A 10/8/2021    Procedure: ANESTHESIA OUT OF OR MAGNETIC RESONANCE IMAGING of BRAIN WITH AND WITHOUT CONTRAST@1400;  Surgeon: GENERIC ANESTHESIA PROVIDER;  Location: [facility] OR    EYE SURGERY      IMPLANT DEEP BRAIN STIMULATION GENERATOR / BATTERY Right 4/18/2022     Procedure: Deep brain stimulator placement, phase II, placement of deep brain stimulator generator/battery over the right chest wall;  Surgeon: Ravinder Coleman MD;  Location: [facility] OR    NOSE SURGERY      nasal fracture repair    OPTICAL TRACKING SYSTEM INSERTION DEEP BRAIN STIMULATION Right 4/11/2022    Procedure: stealth assisted Right side deep brain stimulator placement, phase I,  target right globus pallidus internus, with microelectrode recording;  Surgeon: Ravinder Coleman MD;  Location: [facility] OR    OPTICAL TRACKING SYSTEM INSERTION DEEP BRAIN STIMULATION Left 5/2/2022    Procedure: Stealth Assisted Left side deep brain stimulator placement, phase I & II combined, target left globus pallidus internus, with microelectrode recording and connection to existing generator/battery;  Surgeon: Ravinder Coleman MD;  Location: [facility] OR    REMOVE DEPTH ELECTRODES N/A 8/23/2021    Procedure: Removal of entire deep brain stimulation system hardware including intracranial electrodes and battery/generator in chest wall;  Surgeon: Ravinder Coleman MD;  Location: [facility] OR    STEREOTACTIC INSERTION DEEP BRAIN STIMULATION         Social History     Socioeconomic History    Marital status: Single     Spouse name: Not on file    Number of children: 0    Years of education: Not on file    Highest education level: Not on file   Occupational History    Occupation: unemployed   Tobacco Use    Smoking status: Never    Smokeless tobacco: Never   Substance and Sexual Activity    Alcohol use: Never    Drug use: Never    Sexual activity: Not on file   Other Topics Concern    Not on file   Social History Narrative    Not on file     Social Determinants of Health     Financial Resource Strain: Not on file   Food Insecurity: Not on file   Transportation Needs: Not on file   Physical Activity: Not on file   Stress: Not on file   Social Connections: Not on file   Intimate Partner Violence: Not on file   Housing Stability: Not on file        Family History   Problem Relation Age of Onset    No Known Problems Mother     No Known Problems Father     No Known Problems Sister     Other - See Comments Brother         di bunny syndrome    Myocardial Infarction Maternal Grandmother     Hyperlipidemia Maternal Grandmother     Hyperlipidemia Maternal Grandfather     Unknown/Adopted Paternal Grandmother     Unknown/Adopted Paternal Grandfather     Deep Vein Thrombosis No family hx of     Anesthesia Reaction No family hx of        Physical Examination:  Vital Signs:   blood pressure is 126/76 and his pulse is 68. His respiration is 16 and oxygen saturation is 98%.   Severe retrocollis, moderate left laterocollis      BOTULINUM NEUROTOXIN INJECTION PROCEDURES:    VERIFICATION OF PATIENT IDENTIFICATION AND PROCEDURE     Initials   Patient Name acc   Patient  acc   Procedure Verified by: [facility]     Above assessments performed by:  Tahira Saxena MD      INDICATION/S FOR PROCEDURE/S:  Bradley Garcia is a 22 year old year old patient with dystonia affecting the  head, neck and shoulder girdle musculature secondary to a diagnosis of generalized dystonia with associated  tremor, spasms, loss of volitional motor control and difficulty with activities of daily living.     His baseline symptoms have been recalcitrant to oral medications and conservative therapy.  He is here today for an injection of Botox.      GOAL OF PROCEDURE:  The goal of this procedure is to improve volitional motor control associated with dystonic movements.    TOTAL DOSE ADMINISTERED:  Dose Administered: 197 units Botox    Diluent Used:  Preservative Free Normal Saline  Total Volume of Diluent Used: 2 ml  NDC #: Botox 100u ([address]-02)    CONSENT:  The risks, benefits, and treatment options were discussed with Bradley Garcia and he agreed to proceed.      Written consent was obtained by [facility].     EQUIPMENT USED:  Needle-37mm stimulating/recording  EMG/NCS Machine    SKIN  PREPARATION:  Skin preparation was performed using an alcohol wipe.    GUIDANCE DESCRIPTION:  Electro-myographic guidance was necessary throughout the procedure to accurately identify all areas of dystonic muscles while avoiding injection of non-dystonic muscles, neighboring nerves and nearby vascular structures.     AREA/MUSCLE INJECTED:    Visual display of locations injections are scanned into the chart under MEDIA tab.    Muscles Injected Units Injected Number of Injections   Left splenius capitis 42 (35) 1   Left cervical paraspinals 45 (45) 3   Left trapezius 20 (20) 1   Right splenius capitis 35 (35) 1   Right cervical paraspinals 45 (45) 3   Right SCM 10 (0) 1        Total Units Injected: 197    Unavoidable Waste: 3    Total Units Billed 200      The patient tolerated the injections without difficulty.      Assessment:    Bradley Garcia is a 22 year old male with generalized dystonia.  Today we did repeat botulinum toxin injections.    Plan  Follow-up in 3 months' time to consider repeat injections.      Tahira Saxena MD    Movement Disorders Division  Department of Neurology

## 2023-08-01 ENCOUNTER — OFFICE VISIT (OUTPATIENT)
Dept: NEUROLOGY | Facility: CLINIC | Age: 23
End: 2023-08-01
Payer: MEDICARE

## 2023-08-01 VITALS
OXYGEN SATURATION: 98 % | HEART RATE: 68 BPM | SYSTOLIC BLOOD PRESSURE: 126 MMHG | DIASTOLIC BLOOD PRESSURE: 76 MMHG | RESPIRATION RATE: 16 BRPM

## 2023-08-01 DIAGNOSIS — G24.3 CERVICAL DYSTONIA: Primary | ICD-10-CM

## 2023-08-01 PROCEDURE — 64616 CHEMODENERV MUSC NECK DYSTON: CPT | Mod: 50 | Performed by: STUDENT IN AN ORGANIZED HEALTH CARE EDUCATION/TRAINING PROGRAM

## 2023-08-01 PROCEDURE — 95874 GUIDE NERV DESTR NEEDLE EMG: CPT | Performed by: STUDENT IN AN ORGANIZED HEALTH CARE EDUCATION/TRAINING PROGRAM

## 2023-08-01 ASSESSMENT — PAIN SCALES - GENERAL: PAINLEVEL: MILD PAIN (2)

## 2023-08-01 NOTE — LETTER
8/1/2023       RE: Bradley Garcia  [id number]  [facility] [address]       Dear Colleague,    Thank you for referring your patient, Bradley Garcia, to the [facility] NEUROLOGY CLINIC [place] at [facility]. Please see a copy of my visit note below.    Movement Disorders Botulinum Toxin Clinic Note    Chief Complaint: cervical dystonia    History of Present Illness:  Bradley Garcia is a 22 year old male who presents to clinic for botulinum toxin injections for treatment of cervical dystonia.    Response to Last Injection: 5/9/2023    Onset: within 1 day    Benefit from last injections: improved neck extension and left head turn    Wearing off: significantly from yesterday, started in June    Side effects: denies neck weakness    Additional interval history: Minimal change with Group A increased to 6V  At 7V, his neck was relax but his left shoulder was much worse.        Current Outpatient Medications   Medication Sig Dispense Refill    acetaminophen (TYLENOL) 325 MG tablet Take 325-650 mg by mouth every 6 hours as needed for mild pain (Patient not taking: Reported on 9/27/2022)      CONCERTA 36 MG CR tablet Take 36 mg by mouth every morning  (Patient not taking: Reported on 9/27/2022)      sertraline (ZOLOFT) 50 MG tablet Take 75 mg by mouth every morning (Patient not taking: Reported on 7/19/2023)         Allergies: He has No Known Allergies.    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     Depression     Neck pain     PONV (postoperative nausea and vomiting)     S/P deep brain stimulator placement     Torsion dystonia        Past Surgical History:   Procedure Laterality Date    ANESTHESIA OUT OF OR MRI      ANESTHESIA OUT OF OR MRI N/A 7/8/2021    Procedure: ANESTHESIA OUT OF OR Magnetic resonance imaging Brain @1200;  Surgeon: GENERIC ANESTHESIA  PROVIDER;  Location: [facility] OR    ANESTHESIA OUT OF OR MRI N/A 10/8/2021    Procedure: ANESTHESIA OUT OF OR MAGNETIC RESONANCE IMAGING of BRAIN WITH AND WITHOUT CONTRAST@1400;  Surgeon: GENERIC ANESTHESIA PROVIDER;  Location: [facility] OR    EYE SURGERY      IMPLANT DEEP BRAIN STIMULATION GENERATOR / BATTERY Right 4/18/2022    Procedure: Deep brain stimulator placement, phase II, placement of deep brain stimulator generator/battery over the right chest wall;  Surgeon: Ravinder Coleman MD;  Location: [facility] OR    NOSE SURGERY      nasal fracture repair    OPTICAL TRACKING SYSTEM INSERTION DEEP BRAIN STIMULATION Right 4/11/2022    Procedure: stealth assisted Right side deep brain stimulator placement, phase I,  target right globus pallidus internus, with microelectrode recording;  Surgeon: Ravinder Coleman MD;  Location: [facility] OR    OPTICAL TRACKING SYSTEM INSERTION DEEP BRAIN STIMULATION Left 5/2/2022    Procedure: Stealth Assisted Left side deep brain stimulator placement, phase I & II combined, target left globus pallidus internus, with microelectrode recording and connection to existing generator/battery;  Surgeon: Ravinder Coleman MD;  Location: [facility] OR    REMOVE DEPTH ELECTRODES N/A 8/23/2021    Procedure: Removal of entire deep brain stimulation system hardware including intracranial electrodes and battery/generator in chest wall;  Surgeon: Ravinder Coleman MD;  Location: [facility] OR    STEREOTACTIC INSERTION DEEP BRAIN STIMULATION         Social History     Socioeconomic History    Marital status: Single     Spouse name: Not on file    Number of children: 0    Years of education: Not on file    Highest education level: Not on file   Occupational History    Occupation: unemployed   Tobacco Use    Smoking status: Never    Smokeless tobacco: Never   Substance and Sexual Activity    Alcohol use: Never    Drug use: Never    Sexual activity: Not on file   Other Topics Concern    Not on file   Social History Narrative    Not on  file     Social Determinants of Health     Financial Resource Strain: Not on file   Food Insecurity: Not on file   Transportation Needs: Not on file   Physical Activity: Not on file   Stress: Not on file   Social Connections: Not on file   Intimate Partner Violence: Not on file   Housing Stability: Not on file       Family History   Problem Relation Age of Onset    No Known Problems Mother     No Known Problems Father     No Known Problems Sister     Other - See Comments Brother         di bunny syndrome    Myocardial Infarction Maternal Grandmother     Hyperlipidemia Maternal Grandmother     Hyperlipidemia Maternal Grandfather     Unknown/Adopted Paternal Grandmother     Unknown/Adopted Paternal Grandfather     Deep Vein Thrombosis No family hx of     Anesthesia Reaction No family hx of        Physical Examination:  Vital Signs:   blood pressure is 126/76 and his pulse is 68. His respiration is 16 and oxygen saturation is 98%.   Severe retrocollis, moderate left laterocollis      BOTULINUM NEUROTOXIN INJECTION PROCEDURES:    VERIFICATION OF PATIENT IDENTIFICATION AND PROCEDURE     Initials   Patient Name acc   Patient  acc   Procedure Verified by: [facility]     Above assessments performed by:  Tahira Saxena MD      INDICATION/S FOR PROCEDURE/S:  Bradley Garcia is a 22 year old year old patient with dystonia affecting the  head, neck and shoulder girdle musculature secondary to a diagnosis of generalized dystonia with associated  tremor, spasms, loss of volitional motor control and difficulty with activities of daily living.     His baseline symptoms have been recalcitrant to oral medications and conservative therapy.  He is here today for an injection of Botox.      GOAL OF PROCEDURE:  The goal of this procedure is to improve volitional motor control associated with dystonic movements.    TOTAL DOSE ADMINISTERED:  Dose Administered: 197 units Botox    Diluent Used:  Preservative Free Normal Saline  Total Volume  of Diluent Used: 2 ml  NDC #: Botox 100u ([address]-13)    CONSENT:  The risks, benefits, and treatment options were discussed with Bradley Garcia and he agreed to proceed.      Written consent was obtained by [facility].     EQUIPMENT USED:  Needle-37mm stimulating/recording  EMG/NCS Machine    SKIN PREPARATION:  Skin preparation was performed using an alcohol wipe.    GUIDANCE DESCRIPTION:  Electro-myographic guidance was necessary throughout the procedure to accurately identify all areas of dystonic muscles while avoiding injection of non-dystonic muscles, neighboring nerves and nearby vascular structures.     AREA/MUSCLE INJECTED:    Visual display of locations injections are scanned into the chart under MEDIA tab.    Muscles Injected Units Injected Number of Injections   Left splenius capitis 42 (35) 1   Left cervical paraspinals 45 (45) 3   Left trapezius 20 (20) 1   Right splenius capitis 35 (35) 1   Right cervical paraspinals 45 (45) 3   Right SCM 10 (0) 1        Total Units Injected: 197    Unavoidable Waste: 3    Total Units Billed 200      The patient tolerated the injections without difficulty.      Assessment:    Bradley Garcia is a 22 year old male with generalized dystonia.  Today we did repeat botulinum toxin injections.    Plan  Follow-up in 3 months' time to consider repeat injections.        Again, thank you for allowing me to participate in the care of your patient.      Sincerely,    Tahira Saxena MD

## 2023-10-02 DIAGNOSIS — G24.3 CERVICAL DYSTONIA: ICD-10-CM

## 2023-10-23 ENCOUNTER — OFFICE VISIT (OUTPATIENT)
Dept: NEUROLOGY | Facility: CLINIC | Age: 23
End: 2023-10-23
Payer: MEDICARE

## 2023-10-23 VITALS
HEART RATE: 110 BPM | RESPIRATION RATE: 16 BRPM | OXYGEN SATURATION: 98 % | DIASTOLIC BLOOD PRESSURE: 87 MMHG | SYSTOLIC BLOOD PRESSURE: 133 MMHG

## 2023-10-23 DIAGNOSIS — Z96.89 S/P DEEP BRAIN STIMULATOR PLACEMENT: ICD-10-CM

## 2023-10-23 DIAGNOSIS — G24.1 GENERALIZED TORSION DYSTONIA: Primary | ICD-10-CM

## 2023-10-23 PROCEDURE — 95983 ALYS BRN NPGT PRGRMG 15 MIN: CPT | Performed by: STUDENT IN AN ORGANIZED HEALTH CARE EDUCATION/TRAINING PROGRAM

## 2023-10-23 PROCEDURE — 95984 ALYS BRN NPGT PRGRMG ADDL 15: CPT | Performed by: STUDENT IN AN ORGANIZED HEALTH CARE EDUCATION/TRAINING PROGRAM

## 2023-10-23 PROCEDURE — 99213 OFFICE O/P EST LOW 20 MIN: CPT | Mod: 25 | Performed by: STUDENT IN AN ORGANIZED HEALTH CARE EDUCATION/TRAINING PROGRAM

## 2023-10-23 ASSESSMENT — PAIN SCALES - GENERAL: PAINLEVEL: NO PAIN (0)

## 2023-10-23 NOTE — PROGRESS NOTES
Department of Neurology  Movement Disorders Division   DBS Follow-up Note    Patient: Bradley Garcia  MRN: [id number]   : 2000   Date of Visit: 10/23/2023    Diagnosis: Dystonia  DBS Target(s): Bilateral GPi  Date(s) of DBS Lead Placement: R 2022, L 2022  Date(s) of IPG Placement: R chest 2022  Device: [employer] Activa RC      Chief Complaint:  Bradley Garcia is a 23 year old male who returns to clinic for follow up of generalized dystonia status post bilateral GPi DBS.  He was last seen 2023.    Interval History:  He didn't experiment much with the DBS because the Botox wore off earlier.  Increased stimulation on C (?D) worsened his shoulder.  Decreased stimulation on this program worsened his back.  He didn't try B.  No change in symptoms between A and C.  Group A above 6.5 caused phosphenes.  Increased stim on A improved neck but worsened shoulder.  On Group B, shoulder is better but neck is worse than A.  Increase stimulation may have been a bit better.    Botox wore off 2 months ago and wasn't as effective as prior.  He wants to try doing injections every 4 months instead because he suspects that when they're overlapping they don't work as well.  He also wants to do the same injections (dose and location as was done in August).      Medications:  Current Outpatient Medications   Medication Sig Dispense Refill    acetaminophen (TYLENOL) 325 MG tablet Take 325-650 mg by mouth every 6 hours as needed for mild pain (Patient not taking: Reported on 2022)      CONCERTA 36 MG CR tablet Take 36 mg by mouth every morning  (Patient not taking: Reported on 2022)      sertraline (ZOLOFT) 50 MG tablet Take 75 mg by mouth every morning (Patient not taking: Reported on 2023)          Allergies: has No Known Allergies.    Physical Exam:  The patient's  blood pressure is 133/87 and his pulse is 110. His respiration is 16 and oxygen saturation is 98%.       Neurological Examination:    Severe retrocollis  Able to flex neck barely past midline  Right shoulder elevation  Good rotation bilaterally (but feels harder to left)  Left hand fisted but able to slowly open  Left finger tapping limited  Slow right hand tapping  Foot tapping similar bilaterally  Upright posture with standing  Slight left lean with gait  Reduced left arm swing        Procedure: DBS Interrogation & Programming  Lead(s):     Left Right   DBS Target Left GPi Right GPi   DBS Lead Type SenSight SenSight   Lead Placement Date 5/2/2022 4/11/2022      IPG(s):    1   IPG Activa-RC   IPG Implant Date 4/18/2022   Location Right chest   Battery (V) 75%, JAMES 4/15/2036      Impedance Check: No problems found.  See scanned report for impedance details.     Group A Left Brain             Right Brain           Initial   Final   Initial   Final        GPi 1 GPi 2 GPi 1 GPi 2 GPi 1 GPi 2 GPi 1 GPi 2     Inactive Inactive Active Active Inactive Inactive Active Active   Amplitude (V) 2.5 2.0 2.5 2.0 4.0 6.0 [0-7.0] 4.0 6.0 [0-7.0]   Pulse width (usec) 60 60 60 60 90 90 90 90   Freq (Hz) 125 125 125 125 125 125 125 125   Contacts: C+3- C+2abc- C+3- C+2abc- C+11- C+10a- C+11- C+10a-   Impedance / / / / / / / /      Group B Left Brain             Right Brain           Initial   Final   Initial   Final       GPi 1 GPi 2 GPi 1 GPi 2 GPi 1 GPi 2 GPi 1 GPi 2     Inactive Inactive Inactive Inactive Inactive Inactive Inactive Inactive   Amplitude (V) 2.5 2.0 [0-2.6] 2.5 2.0 4.0 4.0 [0-4.0] 4.0 4.0 [0-4.0]   Pulse width (usec) 60 60 60 60 90 120 90 120   Freq (Hz) 125 125 125 125 125 125 125 125   Contacts: C+3- C+2abc- C+3- C+2abc- C+11- C+10a- C+11- C+10a-      Group C Left Brain             Right Brain           Initial   Final   Initial   Final       GPi 1 GPi 2 GPi 1 GPi 2 GPi 1 GPi 2 GPi 1 GPi 2     Active Active Inactive Inactive Active Active Inactve Inactive   Amplitude (V) 2.5 2.0 2.5 2.0 4.0 [0-6.0] 5.6 4.0 [0-6.0] 6.0   Pulse width (usec) 60 60  60 60 110 90 120 90   Freq (Hz) 125 125 125 125 125 125 100 100   Contacts:  C+3- C+2abc- C+3- C+2abc- C+11- C+10a- C+11- C+10a-   Impedance 1344/1.8 1453/1.4 / / 1489/2.6 2413/2.3 / /      Group D Left Brain             Right Brain           Initial   Final   Initial   Final        GPi 1 GPi 2 GPi 1 GPi 2 GPi 1 GPi 2 GPi 1 GPi 2     Inactive Inactive Inactive Inactive None None Inactive Inactive   Amplitude (V) 2.5 2.0 2.5 2.0 4.0 [0-5.4] 5.6 4.0 [0-7.0] 6.0   Pulse width (usec) 60 60 60 60 90 90 140 90   Freq (Hz) 125 125 125 125     80 80   Contacts:  C+3- C+2abc- C+3- C+2abc- C+11- C+10a- C+11- C+10a       LGPi:  Contacts Amplitude PW Frequency Effect SE   C+11-  C+10a- 4.0  6.0 120  90 100  100  NN    5.0  6.0    NN    5.5  6.0    Left eye feeling transient    6.0  6.0    Same but less intense    7.0  6.0    LUE tight    6.0  6.0    Improved but still a little tight   C+11-  C+10a- 4.0  6.0 140  90 80  80  NN    5.0  6.0    NN    5.6  6.0    Easier to turn left    7.0  6.0    Neck looser but turning not smooth    8.0  6.0    Left hand and tongue worse           Assessment/Plan:  Bradley Garcia is a 23 year old male with generalized dystonia s/p bilateral GPi DBS who returns for follow-up.  Two new programs created with reduced frequency and increased PW.    - Try out new groups C and D  - RTC 4 months, 1 hr DBS programming      Tahira Saxena MD   of Neurology  Movement Disorders Division       25 minutes spent on the date of the encounter doing chart review, history and exam, documentation and further activities as noted above. 16 min     Additional time spent for separate DBS programmin min DBS analyzed with reprogramming.

## 2023-10-23 NOTE — PATIENT INSTRUCTIONS
You are on Group A.    Group B is old settings with increased pulse width for program 1.    Group C and D are new programs with increased pulse width and decreased frequency.

## 2023-10-23 NOTE — LETTER
10/23/2023       RE: Bradley Garcia  [address]  [facility] [address]       Dear Colleague,    Thank you for referring your patient, Bradley Garcia, to the [facility] NEUROLOGY CLINIC [place] at [facility]. Please see a copy of my visit note below.    Department of Neurology  Movement Disorders Division   DBS Follow-up Note    Patient: Bradley Garcia  MRN: [id number]   : 2000   Date of Visit: 10/23/2023    Diagnosis: Dystonia  DBS Target(s): Bilateral GPi  Date(s) of DBS Lead Placement: R 2022, L 2022  Date(s) of IPG Placement: R chest 2022  Device: [employer] Activa RC      Chief Complaint:  Bradley Garcia is a 23 year old male who returns to clinic for follow up of generalized dystonia status post bilateral GPi DBS.  He was last seen 2023.    Interval History:  He didn't experiment much with the DBS because the Botox wore off earlier.  Increased stimulation on C (?D) worsened his shoulder.  Decreased stimulation on this program worsened his back.  He didn't try B.  No change in symptoms between A and C.  Group A above 6.5 caused phosphenes.  Increased stim on A improved neck but worsened shoulder.  On Group B, shoulder is better but neck is worse than A.  Increase stimulation may have been a bit better.    Botox wore off 2 months ago and wasn't as effective as prior.  He wants to try doing injections every 4 months instead because he suspects that when they're overlapping they don't work as well.  He also wants to do the same injections (dose and location as was done in August).      Medications:  Current Outpatient Medications   Medication Sig Dispense Refill    acetaminophen (TYLENOL) 325 MG tablet Take 325-650 mg by mouth every 6 hours as needed for mild pain (Patient not taking: Reported on 2022)      CONCERTA 36 MG CR tablet Take 36 mg by mouth every morning  (Patient not taking: Reported on 2022)       sertraline (ZOLOFT) 50 MG tablet Take 75 mg by mouth every morning (Patient not taking: Reported on 7/19/2023)          Allergies: has No Known Allergies.    Physical Exam:  The patient's  blood pressure is 133/87 and his pulse is 110. His respiration is 16 and oxygen saturation is 98%.       Neurological Examination:   Severe retrocollis  Able to flex neck barely past midline  Right shoulder elevation  Good rotation bilaterally (but feels harder to left)  Left hand fisted but able to slowly open  Left finger tapping limited  Slow right hand tapping  Foot tapping similar bilaterally  Upright posture with standing  Slight left lean with gait  Reduced left arm swing        Procedure: DBS Interrogation & Programming  Lead(s):     Left Right   DBS Target Left GPi Right GPi   DBS Lead Type SenSight SenSight   Lead Placement Date 5/2/2022 4/11/2022      IPG(s):    1   IPG Activa-RC   IPG Implant Date 4/18/2022   Location Right chest   Battery (V) 75%, JAMES 4/15/2036      Impedance Check: No problems found.  See scanned report for impedance details.     Group A Left Brain             Right Brain           Initial   Final   Initial   Final        GPi 1 GPi 2 GPi 1 GPi 2 GPi 1 GPi 2 GPi 1 GPi 2     Inactive Inactive Active Active Inactive Inactive Active Active   Amplitude (V) 2.5 2.0 2.5 2.0 4.0 6.0 [0-7.0] 4.0 6.0 [0-7.0]   Pulse width (usec) 60 60 60 60 90 90 90 90   Freq (Hz) 125 125 125 125 125 125 125 125   Contacts: C+3- C+2abc- C+3- C+2abc- C+11- C+10a- C+11- C+10a-   Impedance / / / / / / / /      Group B Left Brain             Right Brain           Initial   Final   Initial   Final       GPi 1 GPi 2 GPi 1 GPi 2 GPi 1 GPi 2 GPi 1 GPi 2     Inactive Inactive Inactive Inactive Inactive Inactive Inactive Inactive   Amplitude (V) 2.5 2.0 [0-2.6] 2.5 2.0 4.0 4.0 [0-4.0] 4.0 4.0 [0-4.0]   Pulse width (usec) 60 60 60 60 90 120 90 120   Freq (Hz) 125 125 125 125 125 125 125 125   Contacts: C+3- C+2abc- C+3- C+2abc- C+11-  C+10a- C+11- C+10a-      Group C Left Brain             Right Brain           Initial   Final   Initial   Final       GPi 1 GPi 2 GPi 1 GPi 2 GPi 1 GPi 2 GPi 1 GPi 2     Active Active Inactive Inactive Active Active Inactve Inactive   Amplitude (V) 2.5 2.0 2.5 2.0 4.0 [0-6.0] 5.6 4.0 [0-6.0] 6.0   Pulse width (usec) 60 60 60 60 110 90 120 90   Freq (Hz) 125 125 125 125 125 125 100 100   Contacts:  C+3- C+2abc- C+3- C+2abc- C+11- C+10a- C+11- C+10a-   Impedance 1344/1.8 1453/1.4 / / 1489/2.6 2413/2.3 / /      Group D Left Brain             Right Brain           Initial   Final   Initial   Final        GPi 1 GPi 2 GPi 1 GPi 2 GPi 1 GPi 2 GPi 1 GPi 2     Inactive Inactive Inactive Inactive None None Inactive Inactive   Amplitude (V) 2.5 2.0 2.5 2.0 4.0 [0-5.4] 5.6 4.0 [0-7.0] 6.0   Pulse width (usec) 60 60 60 60 90 90 140 90   Freq (Hz) 125 125 125 125     80 80   Contacts:  C+3- C+2abc- C+3- C+2abc- C+11- C+10a- C+11- C+10a       LGPi:  Contacts Amplitude PW Frequency Effect SE   C+11-  C+10a- 4.0  6.0 120  90 100  100  NN    5.0  6.0    NN    5.5  6.0    Left eye feeling transient    6.0  6.0    Same but less intense    7.0  6.0    LUE tight    6.0  6.0    Improved but still a little tight   C+11-  C+10a- 4.0  6.0 140  90 80  80  NN    5.0  6.0    NN    5.6  6.0    Easier to turn left    7.0  6.0    Neck looser but turning not smooth    8.0  6.0    Left hand and tongue worse           Assessment/Plan:  Bradley Garcia is a 23 year old male with generalized dystonia s/p bilateral GPi DBS who returns for follow-up.  Two new programs created with reduced frequency and increased PW.    - Try out new groups C and D  - RTC 4 months, 1 hr DBS programming         25 minutes spent on the date of the encounter doing chart review, history and exam, documentation and further activities as noted above. 16 min     Additional time spent for separate DBS programmin min DBS analyzed with reprogramming.          Again, thank you  for allowing me to participate in the care of your patient.      Sincerely,    Tahira Saxena MD

## 2023-10-24 ENCOUNTER — OFFICE VISIT (OUTPATIENT)
Dept: NEUROLOGY | Facility: CLINIC | Age: 23
End: 2023-10-24
Payer: MEDICARE

## 2023-10-24 DIAGNOSIS — G24.3 CERVICAL DYSTONIA: Primary | ICD-10-CM

## 2023-10-24 PROCEDURE — 64616 CHEMODENERV MUSC NECK DYSTON: CPT | Mod: 50 | Performed by: STUDENT IN AN ORGANIZED HEALTH CARE EDUCATION/TRAINING PROGRAM

## 2023-10-24 PROCEDURE — 95874 GUIDE NERV DESTR NEEDLE EMG: CPT | Mod: 59 | Performed by: STUDENT IN AN ORGANIZED HEALTH CARE EDUCATION/TRAINING PROGRAM

## 2023-10-24 NOTE — LETTER
10/24/2023       RE: Bradley Garcia  [id number]  [facility] [id number]     Dear Colleague,    Thank you for referring your patient, Bradley Garcia, to the [facility] NEUROLOGY [facility] [place] at [facility]. Please see a copy of my visit note below.    Movement Disorders Botulinum Toxin Clinic Note    Chief Complaint: cervical dystonia    History of Present Illness:  Bradley Garcia is a 22 year old male who presents to clinic for botulinum toxin injections for treatment of cervical dystonia.    Response to Last Injection: 8/1/23    Onset: within 1 day    Benefit from last injections:  not as much improvement neck extension and left head turn as prior    Improvement in function/activity: easier to drive    Wearing off: completely 2 months ago    Side effects: denies neck weakness        Current Outpatient Medications   Medication Sig Dispense Refill    acetaminophen (TYLENOL) 325 MG tablet Take 325-650 mg by mouth every 6 hours as needed for mild pain (Patient not taking: Reported on 9/27/2022)      CONCERTA 36 MG CR tablet Take 36 mg by mouth every morning  (Patient not taking: Reported on 9/27/2022)      sertraline (ZOLOFT) 50 MG tablet Take 75 mg by mouth every morning (Patient not taking: Reported on 7/19/2023)         Allergies: He has No Known Allergies.    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     Depression     Neck pain     PONV (postoperative nausea and vomiting)     S/P deep brain stimulator placement     Torsion dystonia        Past Surgical History:   Procedure Laterality Date    ANESTHESIA OUT OF OR MRI      ANESTHESIA OUT OF OR MRI N/A 7/8/2021    Procedure: ANESTHESIA OUT OF OR Magnetic resonance imaging Brain @1200;  Surgeon: GENERIC ANESTHESIA PROVIDER;  Location: [facility] OR    ANESTHESIA OUT OF OR MRI N/A 10/8/2021    Procedure:  ANESTHESIA OUT OF OR MAGNETIC RESONANCE IMAGING of BRAIN WITH AND WITHOUT CONTRAST@1400;  Surgeon: GENERIC ANESTHESIA PROVIDER;  Location: [facility] OR    EYE SURGERY      IMPLANT DEEP BRAIN STIMULATION GENERATOR / BATTERY Right 4/18/2022    Procedure: Deep brain stimulator placement, phase II, placement of deep brain stimulator generator/battery over the right chest wall;  Surgeon: Ravinder Coleman MD;  Location: [facility] OR    NOSE SURGERY      nasal fracture repair    OPTICAL TRACKING SYSTEM INSERTION DEEP BRAIN STIMULATION Right 4/11/2022    Procedure: stealth assisted Right side deep brain stimulator placement, phase I,  target right globus pallidus internus, with microelectrode recording;  Surgeon: Ravinder Coleman MD;  Location: [facility] OR    OPTICAL TRACKING SYSTEM INSERTION DEEP BRAIN STIMULATION Left 5/2/2022    Procedure: Stealth Assisted Left side deep brain stimulator placement, phase I & II combined, target left globus pallidus internus, with microelectrode recording and connection to existing generator/battery;  Surgeon: Ravinder Coleman MD;  Location[facility] OR    REMOVE DEPTH ELECTRODES N/A 8/23/2021    Procedure: Removal of entire deep brain stimulation system hardware including intracranial electrodes and battery/generator in chest wall;  Surgeon: Ravinder Coleman MD;  Location: [facility] OR    STEREOTACTIC INSERTION DEEP BRAIN STIMULATION         Social History     Socioeconomic History    Marital status: Single     Spouse name: Not on file    Number of children: 0    Years of education: Not on file    Highest education level: Not on file   Occupational History    Occupation: unemployed   Tobacco Use    Smoking status: Never    Smokeless tobacco: Never   Substance and Sexual Activity    Alcohol use: Never    Drug use: Never    Sexual activity: Not on file   Other Topics Concern    Not on file   Social History Narrative    Not on file     Social Determinants of Health     Financial Resource Strain: Not on file    Food Insecurity: Not on file   Transportation Needs: Not on file   Physical Activity: Not on file   Stress: Not on file   Social Connections: Not on file   Interpersonal Safety: Not on file   Housing Stability: Not on file       Family History   Problem Relation Age of Onset    No Known Problems Mother     No Known Problems Father     No Known Problems Sister     Other - See Comments Brother         di bunny syndrome    Myocardial Infarction Maternal Grandmother     Hyperlipidemia Maternal Grandmother     Hyperlipidemia Maternal Grandfather     Unknown/Adopted Paternal Grandmother     Unknown/Adopted Paternal Grandfather     Deep Vein Thrombosis No family hx of     Anesthesia Reaction No family hx of        Physical Examination:  Vital Signs:   vitals were not taken for this visit.   Severe retrocollis      BOTULINUM NEUROTOXIN INJECTION PROCEDURES:    VERIFICATION OF PATIENT IDENTIFICATION AND PROCEDURE     Initials   Patient Name acc   Patient  acc   Procedure Verified by: [facility]     Above assessments performed by:  Tahira Saxena MD      INDICATION/S FOR PROCEDURE/S:  Bradley Garcia is a 22 year old year old patient with dystonia affecting the  head, neck and shoulder girdle musculature secondary to a diagnosis of generalized dystonia with associated  tremor, spasms, loss of volitional motor control and difficulty with activities of daily living.     His baseline symptoms have been recalcitrant to oral medications and conservative therapy.  He is here today for an injection of Botox.      GOAL OF PROCEDURE:  The goal of this procedure is to improve volitional motor control associated with dystonic movements.    TOTAL DOSE ADMINISTERED:  Dose Administered: 95 units Botox    Diluent Used:  Preservative Free Normal Saline  Total Volume of Diluent Used: 2 ml  NDC #: Botox 100u ([address]-42)    CONSENT:  The risks, benefits, and treatment options were discussed with Bradley Garcia and he agreed to proceed.       Written consent was obtained by [facility].     EQUIPMENT USED:  Needle-37mm stimulating/recording  EMG/NCS Machine    SKIN PREPARATION:  Skin preparation was performed using an alcohol wipe.    GUIDANCE DESCRIPTION:  Electro-myographic guidance was necessary throughout the procedure to accurately identify all areas of dystonic muscles while avoiding injection of non-dystonic muscles, neighboring nerves and nearby vascular structures.     AREA/MUSCLE INJECTED:    Visual display of locations injections are scanned into the chart under MEDIA tab.    Muscles Injected Units Injected Number of Injections   Left splenius capitis 45 (42) 1   Left cervical paraspinals 45 (45) 3   Left trapezius 20 (20) 1   Right splenius capitis 35 (35) 1   Right cervical paraspinals 45 (45) 3   Right SCM 5 (10) 1        Total Units Injected: 195    Unavoidable Waste: 5    Total Units Billed 200      The patient tolerated the injections without difficulty.      Assessment:    Bradley Garcia is a 22 year old male with generalized dystonia.  Today we did repeat botulinum toxin injections.    Plan  Follow-up in 4 months' time to consider repeat injections.          Again, thank you for allowing me to participate in the care of your patient.      Sincerely,    Thaira Saxena MD

## 2023-10-24 NOTE — PROGRESS NOTES
Movement Disorders Botulinum Toxin Clinic Note    Chief Complaint: cervical dystonia    History of Present Illness:  Bradley Garcia is a 22 year old male who presents to clinic for botulinum toxin injections for treatment of cervical dystonia.    Response to Last Injection: 8/1/23    Onset: within 1 day    Benefit from last injections:  not as much improvement neck extension and left head turn as prior    Improvement in function/activity: easier to drive    Wearing off: completely 2 months ago    Side effects: denies neck weakness        Current Outpatient Medications   Medication Sig Dispense Refill    acetaminophen (TYLENOL) 325 MG tablet Take 325-650 mg by mouth every 6 hours as needed for mild pain (Patient not taking: Reported on 9/27/2022)      CONCERTA 36 MG CR tablet Take 36 mg by mouth every morning  (Patient not taking: Reported on 9/27/2022)      sertraline (ZOLOFT) 50 MG tablet Take 75 mg by mouth every morning (Patient not taking: Reported on 7/19/2023)         Allergies: He has No Known Allergies.    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     Depression     Neck pain     PONV (postoperative nausea and vomiting)     S/P deep brain stimulator placement     Torsion dystonia        Past Surgical History:   Procedure Laterality Date    ANESTHESIA OUT OF OR MRI      ANESTHESIA OUT OF OR MRI N/A 7/8/2021    Procedure: ANESTHESIA OUT OF OR Magnetic resonance imaging Brain @1200;  Surgeon: GENERIC ANESTHESIA PROVIDER;  [facility]: [facility]    ANESTHESIA OUT OF OR MRI N/A 10/8/2021    Procedure: ANESTHESIA OUT OF OR MAGNETIC RESONANCE IMAGING of BRAIN WITH AND WITHOUT CONTRAST@1400;  Surgeon: GENERIC ANESTHESIA PROVIDER;  Location: [facility] OR    EYE SURGERY      IMPLANT DEEP BRAIN STIMULATION GENERATOR / BATTERY Right 4/18/2022    Procedure: Deep brain stimulator placement, phase II, placement of deep brain stimulator  generator/battery over the right chest wall;  Surgeon: Ravinder Coleman MD;  Location: [facility] OR    NOSE SURGERY      nasal fracture repair    OPTICAL TRACKING SYSTEM INSERTION DEEP BRAIN STIMULATION Right 4/11/2022    Procedure: stealth assisted Right side deep brain stimulator placement, phase I,  target right globus pallidus internus, with microelectrode recording;  Surgeon: Ravinder Coleman MD;  Location: [facility] OR    OPTICAL TRACKING SYSTEM INSERTION DEEP BRAIN STIMULATION Left 5/2/2022    Procedure: Stealth Assisted Left side deep brain stimulator placement, phase I & II combined, target left globus pallidus internus, with microelectrode recording and connection to existing generator/battery;  Surgeon: Ravinder Coleman MD;  Location: [facility] OR    REMOVE DEPTH ELECTRODES N/A 8/23/2021    Procedure: Removal of entire deep brain stimulation system hardware including intracranial electrodes and battery/generator in chest wall;  Surgeon: Ravinder Coleman MD;  Location: [facility] OR    STEREOTACTIC INSERTION DEEP BRAIN STIMULATION         Social History     Socioeconomic History    Marital status: Single     Spouse name: Not on file    Number of children: 0    Years of education: Not on file    Highest education level: Not on file   Occupational History    Occupation: unemployed   Tobacco Use    Smoking status: Never    Smokeless tobacco: Never   Substance and Sexual Activity    Alcohol use: Never    Drug use: Never    Sexual activity: Not on file   Other Topics Concern    Not on file   Social History Narrative    Not on file     Social Determinants of Health     Financial Resource Strain: Not on file   Food Insecurity: Not on file   Transportation Needs: Not on file   Physical Activity: Not on file   Stress: Not on file   Social Connections: Not on file   Interpersonal Safety: Not on file   Housing Stability: Not on file       Family History   Problem Relation Age of Onset    No Known Problems Mother     No Known Problems  Father     No Known Problems Sister     Other - See Comments Brother         di bunny syndrome    Myocardial Infarction Maternal Grandmother     Hyperlipidemia Maternal Grandmother     Hyperlipidemia Maternal Grandfather     Unknown/Adopted Paternal Grandmother     Unknown/Adopted Paternal Grandfather     Deep Vein Thrombosis No family hx of     Anesthesia Reaction No family hx of        Physical Examination:  Vital Signs:   vitals were not taken for this visit.   Severe retrocollis      BOTULINUM NEUROTOXIN INJECTION PROCEDURES:    VERIFICATION OF PATIENT IDENTIFICATION AND PROCEDURE     Initials   Patient Name [facility]   Patient  acc   Procedure Verified by: [facility]     Above assessments performed by:  Tahira Saxena MD      INDICATION/S FOR PROCEDURE/S:  Bradley Garcia is a 22 year old year old patient with dystonia affecting the  head, neck and shoulder girdle musculature secondary to a diagnosis of generalized dystonia with associated  tremor, spasms, loss of volitional motor control and difficulty with activities of daily living.     His baseline symptoms have been recalcitrant to oral medications and conservative therapy.  He is here today for an injection of Botox.      GOAL OF PROCEDURE:  The goal of this procedure is to improve volitional motor control associated with dystonic movements.    TOTAL DOSE ADMINISTERED:  Dose Administered: 195 units Botox    Diluent Used:  Preservative Free Normal Saline  Total Volume of Diluent Used: 2 ml  NDC #: Botox 100u ([id number])    CONSENT:  The risks, benefits, and treatment options were discussed with Bradley Garcia and he agreed to proceed.      Written consent was obtained by [facility].     EQUIPMENT USED:  Needle-37mm stimulating/recording  EMG/NCS Machine    SKIN PREPARATION:  Skin preparation was performed using an alcohol wipe.    GUIDANCE DESCRIPTION:  Electro-myographic guidance was necessary throughout the procedure to accurately identify all areas of dystonic  muscles while avoiding injection of non-dystonic muscles, neighboring nerves and nearby vascular structures.     AREA/MUSCLE INJECTED:    Visual display of locations injections are scanned into the chart under MEDIA tab.    Muscles Injected Units Injected Number of Injections   Left splenius capitis 45 (42) 1   Left cervical paraspinals 45 (45) 3   Left trapezius 20 (20) 1   Right splenius capitis 35 (35) 1   Right cervical paraspinals 45 (45) 3   Right SCM 5 (10) 1        Total Units Injected: 195    Unavoidable Waste: 5    Total Units Billed 200      The patient tolerated the injections without difficulty.      Assessment:    Bradley Garcia is a 22 year old male with generalized dystonia.  Today we did repeat botulinum toxin injections.    Plan  Follow-up in 4 months' time to consider repeat injections.      Tahira Saxena MD   of Neurology  Movement Disorders Division

## 2024-01-12 ENCOUNTER — TELEPHONE (OUTPATIENT)
Dept: NEUROLOGY | Facility: CLINIC | Age: 24
End: 2024-01-12
Payer: MEDICARE

## 2024-01-12 NOTE — TELEPHONE ENCOUNTER
Called the patient to get him rescheduled for his appointment on 2/26 with Dr. Saxena since she is not available on that day. Writer left a message for the patient to call back.

## 2024-02-12 NOTE — PROGRESS NOTES
Movement Disorders Botulinum Toxin Clinic Note    Chief Complaint: cervical dystonia    History of Present Illness:  Bradley Garcia is a 22 year old male who presents to clinic for botulinum toxin injections for treatment of cervical dystonia.    Response to Last Injection: 10/24/23    Onset: About one day, he notices benefit very quickly    Benefit from last injections: This last round was around a 5/10 in terms of improvement. He is wondering if this time was worse was because the dosage per injection was not increased. He feels that increasing the injection amounts will be helpful. He states that being able to move his head from left to right was good - he didn't notice any tightness when shaking his head horizontally and didn't feel limited more on one side than the other. He felt like his horizontal head control was good. He still had a harder time with vertical head movements.     Improvement in function/activity: It makes everything easier. All ADLs are easier when botulinum toxin is working. Easier to drive.     Wearing off: He believes that his injections wore off toward the end of December. It could have been a week or two into January. He tends to feel that there are sudden jumps in terms of decreased benefit (he will tend to notice that he wakes up with significantly less benefit) - he is not certain if his botulinum toxin has worn off completely since his last injections.     He reviewed his notes - this last time he only had one noticeable jump in improvement on January 20th. At that time he noticed that his neck wanted to extend more. However, once his head was down from hyperextension it seemed easier to keep it down (chin tucked in). Keeping his head level was more difficult after this jump than prior. Typically he will have two jumps down over the course of the month.     Side effects: Denies any overweakness - no neck weakness.     A week and a half after his injections he had pain just to the left  of midline in his neck, about an inch down from the base of his skull. The pain lasted 2-3 days. When he was looking down past a level pain and to the right - the pain was worse. Looking up and to the left made the pain better. This pain is not present currently. He does not believe that there were any inciting events to this pain.     Dystonia is always worse when his stress is higher.     He would like shoulder injection back. His last injections we had held that injection.     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 He needs to be able to use his left arm more.       Current Outpatient Medications   Medication Sig Dispense Refill    acetaminophen (TYLENOL) 325 MG tablet Take 325-650 mg by mouth every 6 hours as needed for mild pain (Patient not taking: Reported on 9/27/2022)      CONCERTA 36 MG CR tablet Take 36 mg by mouth every morning  (Patient not taking: Reported on 9/27/2022)      sertraline (ZOLOFT) 50 MG tablet Take 75 mg by mouth every morning (Patient not taking: Reported on 7/19/2023)         Allergies: He has No Known Allergies.    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     Depression     Neck pain     PONV (postoperative nausea and vomiting)     S/P deep brain stimulator placement     Torsion dystonia        Past Surgical History:   Procedure Laterality Date    ANESTHESIA OUT OF OR MRI      ANESTHESIA OUT OF OR MRI N/A 7/8/2021    Procedure: ANESTHESIA OUT OF OR Magnetic resonance imaging Brain @1200;  Surgeon: GENERIC ANESTHESIA PROVIDER;  [facility]    ANESTHESIA OUT OF OR MRI N/A 10/8/2021    Procedure: ANESTHESIA OUT OF OR MAGNETIC RESONANCE IMAGING of BRAIN WITH AND WITHOUT CONTRAST@1400;  Surgeon: GENERIC ANESTHESIA PROVIDER;  Location: [facility] OR    EYE SURGERY      IMPLANT DEEP BRAIN STIMULATION GENERATOR / BATTERY Right 4/18/2022     Procedure: Deep brain stimulator placement, phase II, placement of deep brain stimulator generator/battery over the right chest wall;  Surgeon: Ravinder Coleman MD;  Location: [facility] OR    NOSE SURGERY      nasal fracture repair    OPTICAL TRACKING SYSTEM INSERTION DEEP BRAIN STIMULATION Right 4/11/2022    Procedure: stealth assisted Right side deep brain stimulator placement, phase I,  target right globus pallidus internus, with microelectrode recording;  Surgeon: Ravinder Coleman MD;  Location: [facility] OR    OPTICAL TRACKING SYSTEM INSERTION DEEP BRAIN STIMULATION Left 5/2/2022    Procedure: Stealth Assisted Left side deep brain stimulator placement, phase I & II combined, target left globus pallidus internus, with microelectrode recording and connection to existing generator/battery;  Surgeon: Ravinder Coleman MD;  Location: [facility] OR    REMOVE DEPTH ELECTRODES N/A 8/23/2021    Procedure: Removal of entire deep brain stimulation system hardware including intracranial electrodes and battery/generator in chest wall;  Surgeon: Ravinder Coleman MD;  Location: [facility] OR    STEREOTACTIC INSERTION DEEP BRAIN STIMULATION         Social History     Socioeconomic History    Marital status: Single     Spouse name: Not on file    Number of children: 0    Years of education: Not on file    Highest education level: Not on file   Occupational History    Occupation: unemployed   Tobacco Use    Smoking status: Never    Smokeless tobacco: Never   Substance and Sexual Activity    Alcohol use: Never    Drug use: Never    Sexual activity: Not on file   Other Topics Concern    Not on file   Social History Narrative    Not on file     Social Determinants of Health     Financial Resource Strain: Not on file   Food Insecurity: Not on file   Transportation Needs: Not on file   Physical Activity: Not on file   Stress: Not on file   Social Connections: Not on file   Interpersonal Safety: Not on file   Housing Stability: Not on file        Family History   Problem Relation Age of Onset    No Known Problems Mother     No Known Problems Father     No Known Problems Sister     Other - See Comments Brother         di bunny syndrome    Myocardial Infarction Maternal Grandmother     Hyperlipidemia Maternal Grandmother     Hyperlipidemia Maternal Grandfather     Unknown/Adopted Paternal Grandmother     Unknown/Adopted Paternal Grandfather     Deep Vein Thrombosis No family hx of     Anesthesia Reaction No family hx of        Physical Examination:  Vital Signs:   blood pressure is 144/85 (abnormal) and his pulse is 101. His respiration is 16 and oxygen saturation is 98%.   Severe retrocollis  Right shoulder elevation  Reported tightness at left side, back of the neck when looking to the left. Less tightness when looking to the right but some still present.   To lower head, needs to look to the left and then down.     BOTULINUM NEUROTOXIN INJECTION PROCEDURES:    VERIFICATION OF PATIENT IDENTIFICATION AND PROCEDURE     Initials   Patient Name acc   Patient  acc   Procedure Verified by: [facility]     Above assessments performed by:  Tahira Saxena MD      INDICATION/S FOR PROCEDURE/S:  Bradley Garcia is a 22 year old year old patient with dystonia affecting the  head, neck and shoulder girdle musculature secondary to a diagnosis of generalized dystonia with associated  tremor, spasms, loss of volitional motor control and difficulty with activities of daily living.     His baseline symptoms have been recalcitrant to oral medications and conservative therapy.  He is here today for an injection of Botox.      GOAL OF PROCEDURE:  The goal of this procedure is to improve volitional motor control associated with dystonic movements.    TOTAL DOSE ADMINISTERED:  Dose Administered: 290 units Botox    Diluent Used:  Preservative Free Normal Saline  Total Volume of Diluent Used: 2 ml  NDC #: Botox 100u ([id number])    CONSENT:  The risks, benefits, and  treatment options were discussed with Bradley Garcia and he agreed to proceed.      Written consent was obtained by acc.     EQUIPMENT USED:  Needle-37mm stimulating/recording  EMG/NCS Machine    SKIN PREPARATION:  Skin preparation was performed using an alcohol wipe.    GUIDANCE DESCRIPTION:  Electro-myographic guidance was necessary throughout the procedure to accurately identify all areas of dystonic muscles while avoiding injection of non-dystonic muscles, neighboring nerves and nearby vascular structures.     AREA/MUSCLE INJECTED:    Visual display of locations injections are scanned into the chart under MEDIA tab.    Muscles Injected Units Injected Number of Injections   Left splenius capitis 45 (45) 1   Left cervical paraspinals 80 (45) 4   Left trapezius 45 (20) 3   Right splenius capitis 35 (35) 1   Right cervical paraspinals 80 (45) 4   Right SCM 5 (5) 1        Total Units Injected: 290    Unavoidable Waste: 10    Total Units Billed 300      The patient tolerated the injections without difficulty.      Assessment:    Bradley Garcia is a 22 year old male with generalized dystonia.  Today we did repeat botulinum toxin injections.    Plan  Follow-up in 3 months' time to consider repeat injections.        Neurology Attending Attestation:   I personally saw this patient with our fellow and agree with the their findings and plan of care as documented in the note. I personally performed salient aspects of the history and neurological examination.  I was present for and assisted in the injections.    Tahira Saxena MD   of Neurology  Movement Disorders Division

## 2024-02-13 ENCOUNTER — OFFICE VISIT (OUTPATIENT)
Dept: NEUROLOGY | Facility: CLINIC | Age: 24
End: 2024-02-13
Payer: MEDICARE

## 2024-02-13 VITALS
DIASTOLIC BLOOD PRESSURE: 85 MMHG | SYSTOLIC BLOOD PRESSURE: 144 MMHG | RESPIRATION RATE: 16 BRPM | HEART RATE: 101 BPM | OXYGEN SATURATION: 98 %

## 2024-02-13 DIAGNOSIS — G24.3 CERVICAL DYSTONIA: Primary | ICD-10-CM

## 2024-02-13 PROCEDURE — 95874 GUIDE NERV DESTR NEEDLE EMG: CPT | Performed by: STUDENT IN AN ORGANIZED HEALTH CARE EDUCATION/TRAINING PROGRAM

## 2024-02-13 PROCEDURE — 64616 CHEMODENERV MUSC NECK DYSTON: CPT | Mod: 50 | Performed by: STUDENT IN AN ORGANIZED HEALTH CARE EDUCATION/TRAINING PROGRAM

## 2024-02-13 ASSESSMENT — PAIN SCALES - GENERAL: PAINLEVEL: MILD PAIN (2)

## 2024-02-13 NOTE — LETTER
2/13/2024       RE: Bradley Garcia  [id number]  [facility] [address]       Dear Colleague,    Thank you for referring your patient, Bradley Garcia, to the [facility] NEUROLOGY [facility] [place] at [facility]. Please see a copy of my visit note below.    Movement Disorders Botulinum Toxin Clinic Note    Chief Complaint: cervical dystonia    History of Present Illness:  Bradley Garcia is a 22 year old male who presents to clinic for botulinum toxin injections for treatment of cervical dystonia.    Response to Last Injection: 10/24/23    Onset: About one day, he notices benefit very quickly    Benefit from last injections: This last round was around a 5/10 in terms of improvement. He is wondering if this time was worse was because the dosage per injection was not increased. He feels that increasing the injection amounts will be helpful. He states that being able to move his head from left to right was good - he didn't notice any tightness when shaking his head horizontally and didn't feel limited more on one side than the other. He felt like his horizontal head control was good. He still had a harder time with vertical head movements.     Improvement in function/activity: It makes everything easier. All ADLs are easier when botulinum toxin is working. Easier to drive.     Wearing off: He believes that his injections wore off toward the end of December. It could have been a week or two into January. He tends to feel that there are sudden jumps in terms of decreased benefit (he will tend to notice that he wakes up with significantly less benefit) - he is not certain if his botulinum toxin has worn off completely since his last injections.     He reviewed his notes - this last time he only had one noticeable jump in improvement on January 20th. At that time he noticed that his neck wanted to extend more. However, once his head was down from hyperextension it  seemed easier to keep it down (chin tucked in). Keeping his head level was more difficult after this jump than prior. Typically he will have two jumps down over the course of the month.     Side effects: Denies any overweakness - no neck weakness.     A week and a half after his injections he had pain just to the left of midline in his neck, about an inch down from the base of his skull. The pain lasted 2-3 days. When he was looking down past a level pain and to the right - the pain was worse. Looking up and to the left made the pain better. This pain is not present currently. He does not believe that there were any inciting events to this pain.     Dystonia is always worse when his stress is higher.     He would like shoulder injection back. His last injections we had held that injection.     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 He needs to be able to use his left arm more.       Current Outpatient Medications   Medication Sig Dispense Refill    acetaminophen (TYLENOL) 325 MG tablet Take 325-650 mg by mouth every 6 hours as needed for mild pain (Patient not taking: Reported on 9/27/2022)      CONCERTA 36 MG CR tablet Take 36 mg by mouth every morning  (Patient not taking: Reported on 9/27/2022)      sertraline (ZOLOFT) 50 MG tablet Take 75 mg by mouth every morning (Patient not taking: Reported on 7/19/2023)         Allergies: He has No Known Allergies.    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     Depression     Neck pain     PONV (postoperative nausea and vomiting)     S/P deep brain stimulator placement     Torsion dystonia        Past Surgical History:   Procedure Laterality Date    ANESTHESIA OUT OF OR MRI      ANESTHESIA OUT OF OR MRI N/A 7/8/2021    Procedure: ANESTHESIA OUT OF OR Magnetic resonance imaging Brain @1200;  Surgeon: GENERIC  ANESTHESIA PROVIDER;  Location: [facility]    ANESTHESIA OUT OF OR MRI N/A 10/8/2021    Procedure: ANESTHESIA OUT OF OR MAGNETIC RESONANCE IMAGING of BRAIN WITH AND WITHOUT CONTRAST@1400;  Surgeon: GENERIC ANESTHESIA PROVIDER;  Location: [facility] OR    EYE SURGERY      IMPLANT DEEP BRAIN STIMULATION GENERATOR / BATTERY Right 4/18/2022    Procedure: Deep brain stimulator placement, phase II, placement of deep brain stimulator generator/battery over the right chest wall;  Surgeon: Ravinder Coleman MD;  Location: [facility] OR    NOSE SURGERY      nasal fracture repair    OPTICAL TRACKING SYSTEM INSERTION DEEP BRAIN STIMULATION Right 4/11/2022    Procedure: stealth assisted Right side deep brain stimulator placement, phase I,  target right globus pallidus internus, with microelectrode recording;  Surgeon: Ravinder Coleman MD;  Location[facility] OR    OPTICAL TRACKING SYSTEM INSERTION DEEP BRAIN STIMULATION Left 5/2/2022    Procedure: Stealth Assisted Left side deep brain stimulator placement, phase I & II combined, target left globus pallidus internus, with microelectrode recording and connection to existing generator/battery;  Surgeon: Ravinder Coleman MD;  Location: [facility] OR    REMOVE DEPTH ELECTRODES N/A 8/23/2021    Procedure: Removal of entire deep brain stimulation system hardware including intracranial electrodes and battery/generator in chest wall;  Surgeon: Ravinder Coleman MD;  Location: [facility] OR    STEREOTACTIC INSERTION DEEP BRAIN STIMULATION         Social History     Socioeconomic History    Marital status: Single     Spouse name: Not on file    Number of children: 0    Years of education: Not on file    Highest education level: Not on file   Occupational History    Occupation: unemployed   Tobacco Use    Smoking status: Never    Smokeless tobacco: Never   Substance and Sexual Activity    Alcohol use: Never    Drug use: Never    Sexual activity: Not on file   Other Topics Concern    Not on file   Social History Narrative     Not on file     Social Determinants of Health     Financial Resource Strain: Not on file   Food Insecurity: Not on file   Transportation Needs: Not on file   Physical Activity: Not on file   Stress: Not on file   Social Connections: Not on file   Interpersonal Safety: Not on file   Housing Stability: Not on file       Family History   Problem Relation Age of Onset    No Known Problems Mother     No Known Problems Father     No Known Problems Sister     Other - See Comments Brother         di bunny syndrome    Myocardial Infarction Maternal Grandmother     Hyperlipidemia Maternal Grandmother     Hyperlipidemia Maternal Grandfather     Unknown/Adopted Paternal Grandmother     Unknown/Adopted Paternal Grandfather     Deep Vein Thrombosis No family hx of     Anesthesia Reaction No family hx of        Physical Examination:  Vital Signs:   blood pressure is 144/85 (abnormal) and his pulse is 101. His respiration is 16 and oxygen saturation is 98%.   Severe retrocollis  Right shoulder elevation  Reported tightness at left side, back of the neck when looking to the left. Less tightness when looking to the right but some still present.   To lower head, needs to look to the left and then down.     BOTULINUM NEUROTOXIN INJECTION PROCEDURES:    VERIFICATION OF PATIENT IDENTIFICATION AND PROCEDURE     Initials   Patient Name acc   Patient  acc   Procedure Verified by: [facility]     Above assessments performed by:  Tahira Saxena MD      INDICATION/S FOR PROCEDURE/S:  Bradley Garcia is a 22 year old year old patient with dystonia affecting the  head, neck and shoulder girdle musculature secondary to a diagnosis of generalized dystonia with associated  tremor, spasms, loss of volitional motor control and difficulty with activities of daily living.     His baseline symptoms have been recalcitrant to oral medications and conservative therapy.  He is here today for an injection of Botox.      GOAL OF PROCEDURE:  The goal of  this procedure is to improve volitional motor control associated with dystonic movements.    TOTAL DOSE ADMINISTERED:  Dose Administered: 290 units Botox    Diluent Used:  Preservative Free Normal Saline  Total Volume of Diluent Used: 2 ml  NDC #: Botox 100u ([id number])    CONSENT:  The risks, benefits, and treatment options were discussed with Bradley Garcia and he agreed to proceed.      Written consent was obtained by acc.     EQUIPMENT USED:  Needle-37mm stimulating/recording  EMG/NCS Machine    SKIN PREPARATION:  Skin preparation was performed using an alcohol wipe.    GUIDANCE DESCRIPTION:  Electro-myographic guidance was necessary throughout the procedure to accurately identify all areas of dystonic muscles while avoiding injection of non-dystonic muscles, neighboring nerves and nearby vascular structures.     AREA/MUSCLE INJECTED:    Visual display of locations injections are scanned into the chart under MEDIA tab.    Muscles Injected Units Injected Number of Injections   Left splenius capitis 45 (45) 1   Left cervical paraspinals 80 (45) 4   Left trapezius 45 (20) 3   Right splenius capitis 35 (35) 1   Right cervical paraspinals 80 (45) 4   Right SCM 5 (5) 1        Total Units Injected: 290    Unavoidable Waste: 10    Total Units Billed 300      The patient tolerated the injections without difficulty.      Assessment:    Bradley Garcia is a 22 year old male with generalized dystonia.  Today we did repeat botulinum toxin injections.    Plan  Follow-up in 3 months' time to consider repeat injections.        Neurology Attending Attestation:   I personally saw this patient with our fellow and agree with the their findings and plan of care as documented in the note. I personally performed salient aspects of the history and neurological examination.  I was present for and assisted in the injections.        Again, thank you for allowing me to participate in the care of your patient.      Sincerely,    Tahira  MD Hemanth

## 2024-03-13 ENCOUNTER — TELEPHONE (OUTPATIENT)
Dept: NEUROLOGY | Facility: CLINIC | Age: 24
End: 2024-03-13

## 2024-03-13 NOTE — TELEPHONE ENCOUNTER
Spoke to the patient to reschedule his programming visit that was scheduled today at 12 PM. The patient was offered 3/15 at 10 AM but the patient declined. The patient was offered to wait for a new date for the canceled clinic today but the patient stated he wanted to reschedule without waiting, on a Monday or Tuesday instead. The patient was rescheduled to 5/14/24 at 4 PM with Dr. Saxena.

## 2024-03-16 NOTE — ANESTHESIA PREPROCEDURE EVALUATION
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%, severe RV dilation, moderate MR, severe TR, LV thrombus  - HF following, f/u recs  GDMT  - c/w bumex to 1 mg po BID  - c/w losartan 25mg daily  - c/w spironolactone 25mg daily  - HOLDING farxiga 10mg daily for ablation on 3/15  - BB on hold for now-- per dr lewis, do not resume   - Per cards, Given recent substance use, not an advanced therapies candidate  - strict Is/Os, daily weights Anesthesia Pre-Procedure Evaluation    Patient: Bradley Garcia   MRN: [id number] : 2000        Preoperative Diagnosis: * No surgery found *   Procedure :      Past Medical History:   Diagnosis Date     Depression      Dystonia, torsion, fragments of      PONV (postoperative nausea and vomiting)       Past Surgical History:   Procedure Laterality Date     EYE SURGERY       NOSE SURGERY      nasal fracture repair     STEREOTACTIC INSERTION DEEP BRAIN STIMULATION        No Known Allergies   Social History     Tobacco Use     Smoking status: Never Smoker     Smokeless tobacco: Never Used   Substance Use Topics     Alcohol use: Never     Frequency: Never     Binge frequency: Never      Wt Readings from Last 1 Encounters:   22 59.4 kg (131 lb) (13 %, Z= -1.13)*     * Growth percentiles are based on [facility] (Boys, 2-20 Years) data.        Anesthesia Evaluation   Pt has had prior anesthetic. Type: General.    History of anesthetic complications  - PONV.      ROS/MED HX  ENT/Pulmonary: Comment: Hasn't had covid vaccines.  Has never had covid    (-) tobacco use, asthma, COPD, GURVINDER risk factors and recent URI   Neurologic: Comment: Dystonia - neck pulling, neck pain, head tremors.  DBS right chest.        Cardiovascular:     (+) -----No previous cardiac testing     METS/Exercise Tolerance: >4 METS    Hematologic:  - neg hematologic  ROS  (-) history of blood clots and history of blood transfusion   Musculoskeletal: Comment: Chronic neck pain secondary to dystonia      GI/Hepatic:  - neg GI/hepatic ROS     Renal/Genitourinary:  - neg Renal ROS     Endo:  - neg endo ROS     Psychiatric/Substance Use:     (+) psychiatric history depression     Infectious Disease:  - neg infectious disease ROS  (-) Recent Fever   Malignancy:  - neg malignancy ROS     Other:  - neg other ROS    (+) , H/O Chronic Pain,           OUTSIDE LABS:  CBC: No results found for: WBC, HGB, HCT, PLT  BMP:   Lab Results   Component Value Date     POTASSIUM 4.3 02/22/2021    CR 0.9 02/22/2021    GLC 84 02/22/2021     COAGS: No results found for: PTT, INR, FIBR  POC: No results found for: BGM, HCG, HCGS  HEPATIC:   Lab Results   Component Value Date    ALT 20 02/22/2021    AST 35 02/22/2021     OTHER:   Lab Results   Component Value Date    TSH 1.840 02/22/2021             PAC Discussion and Assessment    ASA Classification: 1  Case is suitable for: [facility]  Anesthetic techniques and relevant risks discussed: GA                  PAC Resident/NP Anesthesia Assessment: Bradley Garcia 20 year old male scheduled for ANESTHESIA OUT OF OR Magnetic resonance imaging Brain on 6/16/21 by general anesthesia in evaluation of dystonia and DBS.  PAC referral for risk assessment and optimization for anesthesia with comorbid conditions of: depression.     Pre-operative considerations:  1.  Cardiac:  Functional status- METS >4.  He doesn't exercise purposefully, but reports he recently walked 10 minutes and can take the full flight of stairs in his home with no complications.  He has no known cardiac conditions.  Low risk procedure with 0.4% risk of major adverse cardiac event.   2.  Pulm:   GURVINDER risk: low.   3.  GI:  Risk of PONV score = 1.  If > 2, anti-emetic intervention recommended.  4. Neuro:  DBS in place.  Instructed to bring his remote for procedure.   Dystonia symptoms include neck pulling, neck pain, tremors and other involuntary movements.     VTE risk: 0.5%    Virtual visit - Please refer to the physical examination documented by the anesthesiologist in the anesthesia record on the day of surgery      Patient is optimized and is acceptable candidate for the proposed procedure.  No further diagnostic evaluation is needed.         Ann Mcconnell DNP, RN, APRN      Reviewed and Signed by PAC Mid-Level Provider/Resident  Mid-Level Provider/Resident: Ann Mcconnell DNP, RN, APRN  Date: 5/20/21  Time: 1731                               Ann Mcconnell  APRN CNP

## 2024-05-13 NOTE — PROGRESS NOTES
Movement Disorders Botulinum Toxin Clinic Note    Chief Complaint: cervical dystonia    History of Present Illness:  Bradley Garcia is a 23 year old male who presents to clinic for botulinum toxin injections for treatment of cervical dystonia.    Response to Last Injection: 2/13/24    Onset: About one day, he notices benefit very quickly    Benefit from last injections: not as much improvement from the last round, says this is typical for every other round. On a scale of 1-10 where 10 is excellent improvement, he feels this was a 2 (has gotten up to 8).     Improvement in function/activity: It makes everything easier. All ADLs are easier when botulinum toxin is working. Easier to drive.     Wearing off: ~6wks ago      Side effects: Denies any overweakness - no neck weakness.       Dystonia is always worse when his stress is higher. This last round was particularly tough. End of February he noticed increased tightness, sooner than usual. He feels the pulling back is more bothersome than turning in a direction.     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 He needs to be able to use his left arm more.       Current Outpatient Medications   Medication Sig Dispense Refill    acetaminophen (TYLENOL) 325 MG tablet Take 325-650 mg by mouth every 6 hours as needed for mild pain      CONCERTA 36 MG CR tablet Take 36 mg by mouth every morning  (Patient not taking: Reported on 9/27/2022)      sertraline (ZOLOFT) 50 MG tablet Take 75 mg by mouth every morning (Patient not taking: Reported on 7/19/2023)         Allergies: He has No Known Allergies.    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     Depression     Neck pain     PONV (postoperative nausea and vomiting)     S/P deep brain stimulator placement     Torsion dystonia        Past Surgical History:   Procedure  Laterality Date    ANESTHESIA OUT OF OR MRI      ANESTHESIA OUT OF OR MRI N/A 7/8/2021    Procedure: ANESTHESIA OUT OF OR Magnetic resonance imaging Brain @1200;  Surgeon: GENERIC ANESTHESIA PROVIDER;  Location: [facility]    ANESTHESIA OUT OF OR MRI N/A 10/8/2021    Procedure: ANESTHESIA OUT OF OR MAGNETIC RESONANCE IMAGING of BRAIN WITH AND WITHOUT CONTRAST@1400;  Surgeon: GENERIC ANESTHESIA PROVIDER;  Location: [facility] OR    EYE SURGERY      IMPLANT DEEP BRAIN STIMULATION GENERATOR / BATTERY Right 4/18/2022    Procedure: Deep brain stimulator placement, phase II, placement of deep brain stimulator generator/battery over the right chest wall;  Surgeon: Ravinder Coleman MD;  Location: [facility] OR    NOSE SURGERY      nasal fracture repair    OPTICAL TRACKING SYSTEM INSERTION DEEP BRAIN STIMULATION Right 4/11/2022    Procedure: stealth assisted Right side deep brain stimulator placement, phase I,  target right globus pallidus internus, with microelectrode recording;  Surgeon: Ravinder Coleman MD;  Location: [facility] OR    OPTICAL TRACKING SYSTEM INSERTION DEEP BRAIN STIMULATION Left 5/2/2022    Procedure: Stealth Assisted Left side deep brain stimulator placement, phase I & II combined, target left globus pallidus internus, with microelectrode recording and connection to existing generator/battery;  Surgeon: Ravinder Coleman MD;  Location: [facility] OR    REMOVE DEPTH ELECTRODES N/A 8/23/2021    Procedure: Removal of entire deep brain stimulation system hardware including intracranial electrodes and battery/generator in chest wall;  Surgeon: Ravinder Coleman MD;  Location: [facility] OR    STEREOTACTIC INSERTION DEEP BRAIN STIMULATION         Social History     Socioeconomic History    Marital status: Single     Spouse name: Not on file    Number of children: 0    Years of education: Not on file    Highest education level: Not on file   Occupational History    Occupation: unemployed   Tobacco Use    Smoking status: Never    Smokeless  tobacco: Never   Substance and Sexual Activity    Alcohol use: Never    Drug use: Never    Sexual activity: Not on file   Other Topics Concern    Not on file   Social History Narrative    Not on file     Social Determinants of Health     Financial Resource Strain: Not on file   Food Insecurity: Not on file   Transportation Needs: Not on file   Physical Activity: Not on file   Stress: Not on file   Social Connections: Not on file   Interpersonal Safety: Not on file   Housing Stability: Not on file       Family History   Problem Relation Age of Onset    No Known Problems Mother     No Known Problems Father     No Known Problems Sister     Other - See Comments Brother         di bunny syndrome    Myocardial Infarction Maternal Grandmother     Hyperlipidemia Maternal Grandmother     Hyperlipidemia Maternal Grandfather     Unknown/Adopted Paternal Grandmother     Unknown/Adopted Paternal Grandfather     Deep Vein Thrombosis No family hx of     Anesthesia Reaction No family hx of        Physical Examination:  Vital Signs:   vitals were not taken for this visit.   Severe retrocollis  Right shoulder elevation  Reported tightness at left side, back of the neck when looking to the left. Less tightness when looking to the right but some still present.   To lower head, needs to look to the left and then down.     BOTULINUM NEUROTOXIN INJECTION PROCEDURES:    VERIFICATION OF PATIENT IDENTIFICATION AND PROCEDURE     Initials   Patient Name acc   Patient  acc   Procedure Verified by: [facility]     Above assessments performed by:  Tahira Saxena MD      INDICATION/S FOR PROCEDURE/S:  Bradley Garcia is a 23 year old patient with dystonia affecting the  head, neck and shoulder girdle musculature secondary to a diagnosis of generalized dystonia with associated  tremor, spasms, loss of volitional motor control and difficulty with activities of daily living.     His baseline symptoms have been recalcitrant to oral medications and  conservative therapy.  He is here today for an injection of Botox.      GOAL OF PROCEDURE:  The goal of this procedure is to improve volitional motor control associated with dystonic movements.    TOTAL DOSE ADMINISTERED:  Dose Administered: 385 units Botox    Diluent Used:  Preservative Free Normal Saline  Total Volume of Diluent Used: 2 ml  NDC #: Botox 100u ([id number])    CONSENT:  The risks, benefits, and treatment options were discussed with Bradley Garcia and he agreed to proceed.      Written consent was obtained by acc.     EQUIPMENT USED:  Needle-37mm stimulating/recording  EMG/NCS Machine    SKIN PREPARATION:  Skin preparation was performed using an alcohol wipe.    GUIDANCE DESCRIPTION:  Electro-myographic guidance was necessary throughout the procedure to accurately identify all areas of dystonic muscles while avoiding injection of non-dystonic muscles, neighboring nerves and nearby vascular structures.     AREA/MUSCLE INJECTED:    Visual display of locations injections are scanned into the chart under MEDIA tab.    Muscles Injected Units Injected Number of Injections   Left splenius capitis 75 (45) 2   Left cervical paraspinals 80 (80) 4   Left trapezius 65 (45) 3   Right splenius capitis 60 (35) 2   Right cervical paraspinals 80 (80) 4   Right SCM 5 (5) 1   Right trapezius 20 (0) 1        Total Units Injected: 385    Unavoidable Waste: 15    Total Units Billed 400      The patient tolerated the injections without difficulty.      Assessment:    Bradley Garcia is a 23 year old male with generalized dystonia.  Today we did repeat botulinum toxin injections. Added additional injections to bilateral splenius cap, increased dose in L trapezius and added a dose in the R lower medial trapezius    Plan  Follow-up in 3-4 months' time to consider repeat injections.    Waqar Ta MD  Fellow  Movement Disorders Division  [facility] Department of Neurology    Patient seen and discussed with attending  neurologist Dr. Tahira Saxena    Neurology Attending Attestation:   I personally saw this patient with our fellow and agree with the their findings and plan of care as documented in the note. I personally performed salient aspects of the history and neurological examination.  I supervised the injections.    Tahira Saxena MD   of Neurology  Movement Disorders Division

## 2024-05-14 ENCOUNTER — OFFICE VISIT (OUTPATIENT)
Dept: NEUROLOGY | Facility: CLINIC | Age: 24
End: 2024-05-14
Payer: MEDICARE

## 2024-05-14 VITALS
OXYGEN SATURATION: 98 % | RESPIRATION RATE: 16 BRPM | SYSTOLIC BLOOD PRESSURE: 137 MMHG | HEART RATE: 91 BPM | DIASTOLIC BLOOD PRESSURE: 83 MMHG

## 2024-05-14 DIAGNOSIS — G24.1 GENERALIZED TORSION DYSTONIA: Primary | ICD-10-CM

## 2024-05-14 DIAGNOSIS — G24.3 CERVICAL DYSTONIA: Primary | ICD-10-CM

## 2024-05-14 DIAGNOSIS — Z96.89 S/P DEEP BRAIN STIMULATOR PLACEMENT: ICD-10-CM

## 2024-05-14 PROCEDURE — 95984 ALYS BRN NPGT PRGRMG ADDL 15: CPT | Performed by: STUDENT IN AN ORGANIZED HEALTH CARE EDUCATION/TRAINING PROGRAM

## 2024-05-14 PROCEDURE — 95983 ALYS BRN NPGT PRGRMG 15 MIN: CPT | Performed by: STUDENT IN AN ORGANIZED HEALTH CARE EDUCATION/TRAINING PROGRAM

## 2024-05-14 PROCEDURE — 95874 GUIDE NERV DESTR NEEDLE EMG: CPT | Performed by: STUDENT IN AN ORGANIZED HEALTH CARE EDUCATION/TRAINING PROGRAM

## 2024-05-14 PROCEDURE — 64616 CHEMODENERV MUSC NECK DYSTON: CPT | Mod: 50 | Performed by: STUDENT IN AN ORGANIZED HEALTH CARE EDUCATION/TRAINING PROGRAM

## 2024-05-14 RX ORDER — TRIHEXYPHENIDYL HYDROCHLORIDE 2 MG/1
TABLET ORAL
Qty: 90 TABLET | Refills: 5 | Status: SHIPPED | OUTPATIENT
Start: 2024-05-14

## 2024-05-14 ASSESSMENT — PAIN SCALES - GENERAL: PAINLEVEL: NO PAIN (0)

## 2024-05-14 NOTE — LETTER
2024       RE: Bradley Garcia  [address]  [facility] [address]     Dear Colleague,    Thank you for referring your patient, Bradley Garcia, to the [facility] NEUROLOGY CLINIC [place] at [facility]. Please see a copy of my visit note below.  Department of Neurology  Movement Disorders Division   DBS Follow-up Note    Patient: Bradley Garica  MRN: [id number]   : 2000   Date of Visit: 24     Diagnosis: Dystonia  DBS Target(s): Bilateral GPi  Date(s) of DBS Lead Placement: R 2022, L 2022  Date(s) of IPG Placement: R chest 2022  Device: [employer] Activa RC      Chief Complaint:  Bradley Garcia is a 23 year old male who returns to clinic for follow up of generalized dystonia status post bilateral GPi DBS.  He was last seen for programming on 10/23/23 at which time he was given two new programs with reduced frequency and increased pulse width - Groups C and D.    Interval History:  He underwent repeat botulinum toxin injections this morning. He reports that he can already tell that left and right injections are likely going to work well this time.     He has notes on DBS group settings.     On  he tried Group B. The following day, his neck felt tighter but left arm felt easier to control. He turned it down to 3V. The next day his neck and shoulder continued to feel the same and he switched to Group A.     He tried Group C on . He developed neck tightness later on - more difficult to get and keep his head level. The next day neck remained tighter and more difficult to control. Left shoulder symptoms the same. He increased amplitude to 5V. No change in symptoms. The next day he increased to 6V. The following day his left hand and shoulder tightness was definitely worse - neck tightness was improved. Switched back to Group A.     March 10 tried Group D. Left shoulder felt looser but neck felt tighter.  Increased amplitude the following day to 5V - shoulder almost immediately got tighter. The following day, shoulder tightness was worse and neck was maybe a little looser.     Program A has remained his best program thus far.     He is wondering about trying medications again.     When he tries a new program, it typically takes about one day for tightness to develop. It is not immediate.     Medications:  Current Outpatient Medications   Medication Sig Dispense Refill    acetaminophen (TYLENOL) 325 MG tablet Take 325-650 mg by mouth every 6 hours as needed for mild pain          Allergies: has No Known Allergies.    Physical Exam:  The patient's  vitals were not taken for this visit.       Neurological Examination:   Severe retrocollis  Able to flex neck barely past midline  Right shoulder elevation  Good rotation bilaterally (but feels harder to left)  Left hand fisted but able to slowly open  Left finger tapping limited  Slow right hand tapping  Foot tapping similar bilaterally  Upright posture with standing  Slight left lean with gait  Reduced left arm swing      Procedure: DBS Interrogation & Programming  Lead(s):     Left Right   DBS Target Left GPi Right GPi   DBS Lead Type SenSight SenSight   Lead Placement Date 5/2/2022 4/11/2022      IPG(s):    1   IPG Activa-RC   IPG Implant Date 4/18/2022   Location Right chest   Battery (V) 75%, JAMES 4/15/2036      Impedance Check: No problems found.  See scanned report for impedance details.     Group A Left Brain             Right Brain           Initial   Final   Initial   Final        GPi 1 GPi 2 GPi 1 GPi 2 GPi 1 GPi 2 GPi 1 GPi 2     Inactive Inactive Active Active Inactive Inactive Active Active   Amplitude (V) 2.5 2.0 2.5 2.0 4.0 6.0 [0-7.0] 4.0 6.0 [0-7.0]   Pulse width (usec) 60 60 60 60 90 90 90 90   Freq (Hz) 125 125 125 125 125 125 125 125   Contacts: C+3- C+2abc- C+3- C+2abc- C+11- C+10a- C+11- C+10a-   Impedance / / / / / / / /      Group B Left Brain              Right Brain           Initial   Final   Initial   Final       GPi 1 GPi 2 GPi 1 GPi 2 GPi 1 GPi 2 GPi 1 GPi 2     Inactive Inactive Inactive Inactive Inactive Inactive Inactive Inactive   Amplitude (V) 2.5 2.0 [0-2.6] 2.5 2.0 4.0 4.0 [0-4.0] 4.0 5.5 [0-5.5]   Pulse width (usec) 60 60 60 60 90 120 90 120   Freq (Hz) 125 125 125 125 125 125 125 125   Contacts: C+3- C+2abc- C+3- C+2abc- C+11- C+10a- C+11- C+10a-      Group C Left Brain             Right Brain           Initial   Final   Initial   Final       GPi 1 GPi 2 GPi 1 GPi 2 GPi 1 GPi 2 GPi 1 GPi 2     Active Active Inactive Inactive Active Active Inactve Inactive   Amplitude (V) 2.5 2.0   6.0 [0-6.0] 6.0     Pulse width (usec) 60 60   120 90     Freq (Hz) 125 125   100 100     Contacts:  C+3- C+2abc-   C+11- C+10a-     Impedance 1344/1.8 1453/1.4 / / 1489/2.6 2413/2.3        Group D Left Brain             Right Brain           Initial   Final   Initial   Final        GPi 1 GPi 2 GPi 1 GPi 2 GPi 1 GPi 2 GPi 1 GPi 2     Inactive Inactive Inactive Inactive None None Inactive Inactive   Amplitude (V) 2.5 2.0   4.0 [0-7.0] 6.0     Pulse width (usec) 60 60   140 90     Freq (Hz) 125 125    80  80     Contacts:  C+3- C+2abc-   C+11- C+10a-         Programming notes:   Program B - R GP2 amplitude increased to 4 V (maximum) - left shoulder was tighter.     Program C  Neck pulling back and to the left immediately (amplitude is higher than prior initial settings)  Reducing amplitude to settings from prior visit - neck loosened a little but tightness around the left shoulder (bottom edge of the scapula)    Deleted Programs C and D    Program B   R GP2  Increasing amplitude to 6.0 mA - left shoulder a problem, 6.5 mA - definite shoulder pulling   5.5 mA left shoulder is better, neck feels looser.     Group A - transient tightening when switched over the program but improved shortly      Assessment/Plan:  Bradley MASON Jose is a 23 year old male with generalized dystonia  s/p bilateral GPi DBS who returns for follow-up.  Programs A and B have been his best programs thus far. He tried new Programs C and D with increased pulse width but experienced capsular side effects with increased stimulation amplitude. Balancing improvement in neck dystonia with left upper extremity symptoms remains a challenge. Of note, when trying Programs C and D, he went up on amplitude fairly rapidly (day after switching to the new program) due to worsening of neck dystonia symptoms. We opted to delete Programs C and D today as they were clearly worse in clinic. Amplitude to R GP2 was increased in Program B as above. In the future, it may be worth retrying increased pulse width and counseling to stay on settings longer (if able to tolerate) as full effect of DBS changes can take weeks to months to see.     Mr. Garcia is interested in retrying medication. On chart review, it appears that he was on Artane in the past - likely childhood and the dose is not clear from documentation. This did not provide significant benefit per prior [facility] note although this would have been prior to DBS placement. We agree that it is reasonable to try Artane again now to see if this provides further benefit on top of DBS and botulinum toxin. He will try his new program B prior to trying Artane. Reviewed potential side effects and provided a titration schedule to follow. He will otherwise continue botulinum injections.     - Deleted Program C and D - clearly worse  - Program B - amplitude increased to R Gpi2 as above  - Left on Program A, best to date  - Start Artane 2 mg tablets   AM Noon PM   Week 1 1/2  1/2   Week 2 1/2 1/2 1/2   Week 3 and on 1 1 1   - He will try Program B again before starting Artane     - RTC 12 weeks, 1 hr DBS programming and botulinum toxin      Mr. Garcia was seen and discussed with movement disorders attending, Dr. Hemanth Rao MD  Movement Disorders Fellow    Neurology Attending  Attestation:   I personally saw this patient with our fellow and agree with the their findings and plan of care as documented in the note. I personally performed salient aspects of the history and neurological examination.  I personally reviewed the vital signs, medications, and labs. I personally viewed the imaging, and agree with the interpretation documented by the resident      Time spent for separate DBS programmin minutes        Again, thank you for allowing me to participate in the care of your patient.      Sincerely,    Tahira Saxena MD

## 2024-05-14 NOTE — PATIENT INSTRUCTIONS
Start Artane (Trihexyphenidyl) 2 mg tablets - please follow the table below to increase over three weeks   AM Noon PM   Week 1 1/2  1/2   Week 2 1/2 1/2 1/2   Week 3 and on 1 1 1     Please reach out if you notice any side effects with this medication. You can go back to the prior dose if you notice side effects.     Please send us a [email or web address] message and let us know how you are doing.     For you DBS - Groups A and B have been your best programs thus far. We increased the amplitude slightly for one of the programs for your right brain (controlling your left body). We deleted Groups C and D as these did not appear to be as good.     Please start with trying Program B. Once you have given this a good try, you can start trying the Artane.     Group A Left Brain             Right Brain           Initial   Final   Initial   Final        GPi 1 GPi 2 GPi 1 GPi 2 GPi 1 GPi 2 GPi 1 GPi 2     Inactive Inactive Active Active Inactive Inactive Active Active   Amplitude (V) 2.5 2.0 2.5 2.0 4.0 6.0 [0-7.0] 4.0 6.0 [0-7.0]   Pulse width (usec) 60 60 60 60 90 90 90 90   Freq (Hz) 125 125 125 125 125 125 125 125   Contacts: C+3- C+2abc- C+3- C+2abc- C+11- C+10a- C+11- C+10a-   Impedance / / / / / / / /      Group B Left Brain             Right Brain           Initial   Final   Initial   Final       GPi 1 GPi 2 GPi 1 GPi 2 GPi 1 GPi 2 GPi 1 GPi 2     Inactive Inactive Inactive Inactive Inactive Inactive Inactive Inactive   Amplitude (V) 2.5 2.0 [0-2.6] 2.5 2.0 4.0 4.0 [0-4.0] 4.0 5.5 [0-5.5]   Pulse width (usec) 60 60 60 60 90 120 90 120   Freq (Hz) 125 125 125 125 125 125 125 125   Contacts: C+3- C+2abc- C+3- C+2abc- C+11- C+10a- C+11- C+10a-

## 2024-05-14 NOTE — LETTER
5/14/2024       RE: Bradley Garcia  [address]  [facility] [address]     Dear Colleague,    Thank you for referring your patient, Bradley Garcia, to the [facility] NEUROLOGY CLINIC [place] at [facility]. Please see a copy of my visit note below.    Movement Disorders Botulinum Toxin Clinic Note    Chief Complaint: cervical dystonia    History of Present Illness:  Bradley Garcia is a 23 year old male who presents to clinic for botulinum toxin injections for treatment of cervical dystonia.    Response to Last Injection: 2/13/24    Onset: About one day, he notices benefit very quickly    Benefit from last injections: not as much improvement from the last round, says this is typical for every other round. On a scale of 1-10 where 10 is excellent improvement, he feels this was a 2 (has gotten up to 8).     Improvement in function/activity: It makes everything easier. All ADLs are easier when botulinum toxin is working. Easier to drive.     Wearing off: ~6wks ago      Side effects: Denies any overweakness - no neck weakness.       Dystonia is always worse when his stress is higher. This last round was particularly tough. End of February he noticed increased tightness, sooner than usual. He feels the pulling back is more bothersome than turning in a direction.     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 He needs to be able to use his left arm more.       Current Outpatient Medications   Medication Sig Dispense Refill    acetaminophen (TYLENOL) 325 MG tablet Take 325-650 mg by mouth every 6 hours as needed for mild pain      CONCERTA 36 MG CR tablet Take 36 mg by mouth every morning  (Patient not taking: Reported on 9/27/2022)      sertraline (ZOLOFT) 50 MG tablet Take 75 mg by mouth every morning (Patient not taking: Reported on 7/19/2023)         Allergies: He has No Known Allergies.    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     Depression     Neck pain     PONV (postoperative nausea and vomiting)     S/P deep brain stimulator placement     Torsion dystonia        Past Surgical History:   Procedure Laterality Date    ANESTHESIA OUT OF OR MRI      ANESTHESIA OUT OF OR MRI N/A 7/8/2021    Procedure: ANESTHESIA OUT OF OR Magnetic resonance imaging Brain @1200;  Surgeon: GENERIC ANESTHESIA PROVIDER;  Location: [facility] OR    ANESTHESIA OUT OF OR MRI N/A 10/8/2021    Procedure: ANESTHESIA OUT OF OR MAGNETIC RESONANCE IMAGING of BRAIN WITH AND WITHOUT CONTRAST@1400;  Surgeon: GENERIC ANESTHESIA PROVIDER;  Location: [facility] OR    EYE SURGERY      IMPLANT DEEP BRAIN STIMULATION GENERATOR / BATTERY Right 4/18/2022    Procedure: Deep brain stimulator placement, phase II, placement of deep brain stimulator generator/battery over the right chest wall;  Surgeon: Ravinder Coleman MD;  Location: [facility] OR    NOSE SURGERY      nasal fracture repair    OPTICAL TRACKING SYSTEM INSERTION DEEP BRAIN STIMULATION Right 4/11/2022    Procedure: stealth assisted Right side deep brain stimulator placement, phase I,  target right globus pallidus internus, with microelectrode recording;  Surgeon: Ravinder Coleman MD;  Location: [facility] OR    OPTICAL TRACKING SYSTEM INSERTION DEEP BRAIN STIMULATION Left 5/2/2022    Procedure: Stealth Assisted Left side deep brain stimulator placement, phase I & II combined, target left globus pallidus internus, with microelectrode recording and connection to existing generator/battery;  Surgeon: Ravinder Coleman MD;  Location: [facility] OR    REMOVE DEPTH ELECTRODES N/A 8/23/2021    Procedure: Removal of entire deep brain stimulation system hardware including intracranial electrodes and battery/generator in chest wall;  Surgeon: Ravinder Coleman MD;  Location: [facility] OR    STEREOTACTIC INSERTION DEEP BRAIN STIMULATION          Social History     Socioeconomic History    Marital status: Single     Spouse name: Not on file    Number of children: 0    Years of education: Not on file    Highest education level: Not on file   Occupational History    Occupation: unemployed   Tobacco Use    Smoking status: Never    Smokeless tobacco: Never   Substance and Sexual Activity    Alcohol use: Never    Drug use: Never    Sexual activity: Not on file   Other Topics Concern    Not on file   Social History Narrative    Not on file     Social Determinants of Health     Financial Resource Strain: Not on file   Food Insecurity: Not on file   Transportation Needs: Not on file   Physical Activity: Not on file   Stress: Not on file   Social Connections: Not on file   Interpersonal Safety: Not on file   Housing Stability: Not on file       Family History   Problem Relation Age of Onset    No Known Problems Mother     No Known Problems Father     No Known Problems Sister     Other - See Comments Brother         di bunny syndrome    Myocardial Infarction Maternal Grandmother     Hyperlipidemia Maternal Grandmother     Hyperlipidemia Maternal Grandfather     Unknown/Adopted Paternal Grandmother     Unknown/Adopted Paternal Grandfather     Deep Vein Thrombosis No family hx of     Anesthesia Reaction No family hx of        Physical Examination:  Vital Signs:   vitals were not taken for this visit.   Severe retrocollis  Right shoulder elevation  Reported tightness at left side, back of the neck when looking to the left. Less tightness when looking to the right but some still present.   To lower head, needs to look to the left and then down.     BOTULINUM NEUROTOXIN INJECTION PROCEDURES:    VERIFICATION OF PATIENT IDENTIFICATION AND PROCEDURE     Initials   Patient Name acc   Patient  acc   Procedure Verified by: [facility]     Above assessments performed by:  Tahira Saxena MD      INDICATION/S FOR PROCEDURE/S:  Bradley Garcia is a 23 year old patient with  dystonia affecting the  head, neck and shoulder girdle musculature secondary to a diagnosis of generalized dystonia with associated  tremor, spasms, loss of volitional motor control and difficulty with activities of daily living.     His baseline symptoms have been recalcitrant to oral medications and conservative therapy.  He is here today for an injection of Botox.      GOAL OF PROCEDURE:  The goal of this procedure is to improve volitional motor control associated with dystonic movements.    TOTAL DOSE ADMINISTERED:  Dose Administered: 385 units Botox    Diluent Used:  Preservative Free Normal Saline  Total Volume of Diluent Used: 2 ml  NDC #: Botox 100u ([id number])    CONSENT:  The risks, benefits, and treatment options were discussed with Bradley Garcia and he agreed to proceed.      Written consent was obtained by acc.     EQUIPMENT USED:  Needle-37mm stimulating/recording  EMG/NCS Machine    SKIN PREPARATION:  Skin preparation was performed using an alcohol wipe.    GUIDANCE DESCRIPTION:  Electro-myographic guidance was necessary throughout the procedure to accurately identify all areas of dystonic muscles while avoiding injection of non-dystonic muscles, neighboring nerves and nearby vascular structures.     AREA/MUSCLE INJECTED:    Visual display of locations injections are scanned into the chart under MEDIA tab.    Muscles Injected Units Injected Number of Injections   Left splenius capitis 75 (45) 2   Left cervical paraspinals 80 (80) 4   Left trapezius 65 (45) 3   Right splenius capitis 60 (35) 2   Right cervical paraspinals 80 (80) 4   Right SCM 5 (5) 1   Right trapezius 20 (0) 1        Total Units Injected: 385    Unavoidable Waste: 15    Total Units Billed 400      The patient tolerated the injections without difficulty.      Assessment:    Bradley Garcia is a 23 year old male with generalized dystonia.  Today we did repeat botulinum toxin injections. Added additional injections to bilateral  splenius cap, increased dose in L trapezius and added a dose in the R lower medial trapezius    Plan  Follow-up in 3-4 months' time to consider repeat injections.    Waqar Ta MD  Fellow  Movement Disorders Division  [facility] Department of Neurology    Patient seen and discussed with attending neurologist Dr. Tahira Saxena    Neurology Attending Attestation:   I personally saw this patient with our fellow and agree with the their findings and plan of care as documented in the note. I personally performed salient aspects of the history and neurological examination.  I supervised the injections.        Again, thank you for allowing me to participate in the care of your patient.      Sincerely,    Tahira Saxena MD

## 2024-05-14 NOTE — PROGRESS NOTES
Department of Neurology  Movement Disorders Division   DBS Follow-up Note    Patient: Bradley Garcia  MRN: [id number]   : 2000   Date of Visit: 24     Diagnosis: Dystonia  DBS Target(s): Bilateral GPi  Date(s) of DBS Lead Placement: R 2022, L 2022  Date(s) of IPG Placement: R chest 2022  Device: MedDragon Tail Activa RC      Chief Complaint:  Bradley Garcia is a 23 year old male who returns to clinic for follow up of generalized dystonia status post bilateral GPi DBS.  He was last seen for programming on 10/23/23 at which time he was given two new programs with reduced frequency and increased pulse width - Groups C and D.    Interval History:  He underwent repeat botulinum toxin injections this morning. He reports that he can already tell that left and right injections are likely going to work well this time.     He has notes on DBS group settings.     On  he tried Group B. The following day, his neck felt tighter but left arm felt easier to control. He turned it down to 3V. The next day his neck and shoulder continued to feel the same and he switched to Group A.     He tried Group C on . He developed neck tightness later on - more difficult to get and keep his head level. The next day neck remained tighter and more difficult to control. Left shoulder symptoms the same. He increased amplitude to 5V. No change in symptoms. The next day he increased to 6V. The following day his left hand and shoulder tightness was definitely worse - neck tightness was improved. Switched back to Group A.     March 10 tried Group D. Left shoulder felt looser but neck felt tighter. Increased amplitude the following day to 5V - shoulder almost immediately got tighter. The following day, shoulder tightness was worse and neck was maybe a little looser.     Program A has remained his best program thus far.     He is wondering about trying medications again.     When he tries a new program, it typically  takes about one day for tightness to develop. It is not immediate.     Medications:  Current Outpatient Medications   Medication Sig Dispense Refill    acetaminophen (TYLENOL) 325 MG tablet Take 325-650 mg by mouth every 6 hours as needed for mild pain          Allergies: has No Known Allergies.    Physical Exam:  The patient's  vitals were not taken for this visit.       Neurological Examination:   Severe retrocollis  Able to flex neck barely past midline  Right shoulder elevation  Good rotation bilaterally (but feels harder to left)  Left hand fisted but able to slowly open  Left finger tapping limited  Slow right hand tapping  Foot tapping similar bilaterally  Upright posture with standing  Slight left lean with gait  Reduced left arm swing      Procedure: DBS Interrogation & Programming  Lead(s):     Left Right   DBS Target Left GPi Right GPi   DBS Lead Type SenSight SenSight   Lead Placement Date 5/2/2022 4/11/2022      IPG(s):    1   IPG Activa-RC   IPG Implant Date 4/18/2022   Location Right chest   Battery (V) 75%, JAMES 4/15/2036      Impedance Check: No problems found.  See scanned report for impedance details.     Group A Left Brain             Right Brain           Initial   Final   Initial   Final        GPi 1 GPi 2 GPi 1 GPi 2 GPi 1 GPi 2 GPi 1 GPi 2     Inactive Inactive Active Active Inactive Inactive Active Active   Amplitude (V) 2.5 2.0 2.5 2.0 4.0 6.0 [0-7.0] 4.0 6.0 [0-7.0]   Pulse width (usec) 60 60 60 60 90 90 90 90   Freq (Hz) 125 125 125 125 125 125 125 125   Contacts: C+3- C+2abc- C+3- C+2abc- C+11- C+10a- C+11- C+10a-   Impedance / / / / / / / /      Group B Left Brain             Right Brain           Initial   Final   Initial   Final       GPi 1 GPi 2 GPi 1 GPi 2 GPi 1 GPi 2 GPi 1 GPi 2     Inactive Inactive Inactive Inactive Inactive Inactive Inactive Inactive   Amplitude (V) 2.5 2.0 [0-2.6] 2.5 2.0 4.0 4.0 [0-4.0] 4.0 5.5 [0-5.5]   Pulse width (usec) 60 60 60 60 90 120 90 120   Freq (Hz)  125 125 125 125 125 125 125 125   Contacts: C+3- C+2abc- C+3- C+2abc- C+11- C+10a- C+11- C+10a-      Group C Left Brain             Right Brain           Initial   Final   Initial   Final       GPi 1 GPi 2 GPi 1 GPi 2 GPi 1 GPi 2 GPi 1 GPi 2     Active Active Inactive Inactive Active Active Inactve Inactive   Amplitude (V) 2.5 2.0   6.0 [0-6.0] 6.0     Pulse width (usec) 60 60   120 90     Freq (Hz) 125 125   100 100     Contacts:  C+3- C+2abc-   C+11- C+10a-     Impedance 1344/1.8 1453/1.4 / / 1489/2.6 2413/2.3        Group D Left Brain             Right Brain           Initial   Final   Initial   Final        GPi 1 GPi 2 GPi 1 GPi 2 GPi 1 GPi 2 GPi 1 GPi 2     Inactive Inactive Inactive Inactive None None Inactive Inactive   Amplitude (V) 2.5 2.0   4.0 [0-7.0] 6.0     Pulse width (usec) 60 60   140 90     Freq (Hz) 125 125    80  80     Contacts:  C+3- C+2abc-   C+11- C+10a-         Programming notes:   Program B - R GP2 amplitude increased to 4 V (maximum) - left shoulder was tighter.     Program C  Neck pulling back and to the left immediately (amplitude is higher than prior initial settings)  Reducing amplitude to settings from prior visit - neck loosened a little but tightness around the left shoulder (bottom edge of the scapula)    Deleted Programs C and D    Program B   R GP2  Increasing amplitude to 6.0 mA - left shoulder a problem, 6.5 mA - definite shoulder pulling   5.5 mA left shoulder is better, neck feels looser.     Group A - transient tightening when switched over the program but improved shortly      Assessment/Plan:  Bradley Garcia is a 23 year old male with generalized dystonia s/p bilateral GPi DBS who returns for follow-up.  Programs A and B have been his best programs thus far. He tried new Programs C and D with increased pulse width but experienced capsular side effects with increased stimulation amplitude. Balancing improvement in neck dystonia with left upper extremity symptoms remains a  challenge. Of note, when trying Programs C and D, he went up on amplitude fairly rapidly (day after switching to the new program) due to worsening of neck dystonia symptoms. We opted to delete Programs C and D today as they were clearly worse in clinic. Amplitude to R GP2 was increased in Program B as above. In the future, it may be worth retrying increased pulse width and counseling to stay on settings longer (if able to tolerate) as full effect of DBS changes can take weeks to months to see.     Mr. Garcia is interested in retrying medication. On chart review, it appears that he was on Artane in the past - likely childhood and the dose is not clear from documentation. This did not provide significant benefit per prior [facility] note although this would have been prior to DBS placement. We agree that it is reasonable to try Artane again now to see if this provides further benefit on top of DBS and botulinum toxin. He will try his new program B prior to trying Artane. Reviewed potential side effects and provided a titration schedule to follow. He will otherwise continue botulinum injections.     - Deleted Program C and D - clearly worse  - Program B - amplitude increased to R Gpi2 as above  - Left on Program A, best to date  - Start Artane 2 mg tablets   AM Noon PM   Week 1 1/2  1/2   Week 2 1/2 1/2 1/2   Week 3 and on 1 1 1   - He will try Program B again before starting Artane     - RTC 12 weeks, 1 hr DBS programming and botulinum toxin      Mr. Garcia was seen and discussed with movement disorders attending, Dr. Hemanth Rao MD  Movement Disorders Fellow    Neurology Attending Attestation:   I personally saw this patient with our fellow and agree with the their findings and plan of care as documented in the note. I personally performed salient aspects of the history and neurological examination.  I personally reviewed the vital signs, medications, and labs. I personally viewed the imaging, and agree  with the interpretation documented by the resident    Tahira Saxena MD   of Neurology  Movement Disorders Division      Time spent for separate DBS programmin minutes

## 2024-07-06 ENCOUNTER — HEALTH MAINTENANCE LETTER (OUTPATIENT)
Age: 24
End: 2024-07-06

## 2024-08-12 NOTE — PROGRESS NOTES
Movement Disorders Botulinum Toxin Clinic Note    Chief Complaint: cervical dystonia    History of Present Illness:  Bradley Garcia is a 23 year old male who presents to clinic for botulinum toxin injections for treatment of cervical dystonia.    Response to Last Injection: 5/14/24    Onset: About one day, he notices benefit very quickly    Benefit from last injections: Did not work as well as previously and neck wanted to go up and to the left. Felt the tightness was 8/10 initially but then got worse about a week later with more pulling. Feels that his movement to the left and right we're less bothersome than up and down    Improvement in function/activity: It makes everything easier. All ADLs are easier when botulinum toxin is working. Easier to drive.     Wearing off: June 1st (2.5 weeks after injection)    Side effects: Feels more tightness this time with more pulling up and left as above.     Current Outpatient Medications   Medication Sig Dispense Refill    acetaminophen (TYLENOL) 325 MG tablet Take 325-650 mg by mouth every 6 hours as needed for mild pain      trihexyphenidyl (ARTANE) 2 MG tablet 1/2 tab twice a day for one week then increase to 1/2 tab three times a day for one week then increase to 1 pills three times a day and stay at that dose. (Patient not taking: Reported on 8/13/2024) 90 tablet 5       Allergies: He has No Known Allergies.    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     Depression     Neck pain     PONV (postoperative nausea and vomiting)     S/P deep brain stimulator placement     Torsion dystonia        Past Surgical History:   Procedure Laterality Date    ANESTHESIA OUT OF OR MRI      ANESTHESIA OUT OF OR MRI N/A 7/8/2021    Procedure: ANESTHESIA OUT OF OR Magnetic resonance imaging Brain @1200;  Surgeon: GENERIC ANESTHESIA PROVIDER;  Location: [facility] OR    ANESTHESIA OUT OF OR MRI  N/A 10/8/2021    Procedure: ANESTHESIA OUT OF OR MAGNETIC RESONANCE IMAGING of BRAIN WITH AND WITHOUT CONTRAST@1400;  Surgeon: GENERIC ANESTHESIA PROVIDER;  Location: [facility] OR    EYE SURGERY      IMPLANT DEEP BRAIN STIMULATION GENERATOR / BATTERY Right 4/18/2022    Procedure: Deep brain stimulator placement, phase II, placement of deep brain stimulator generator/battery over the right chest wall;  Surgeon: Ravinder Coleman MD;  Location: [facility] OR    NOSE SURGERY      nasal fracture repair    OPTICAL TRACKING SYSTEM INSERTION DEEP BRAIN STIMULATION Right 4/11/2022    Procedure: stealth assisted Right side deep brain stimulator placement, phase I,  target right globus pallidus internus, with microelectrode recording;  Surgeon: Ravinder Coleman MD;  Location: [facility] OR    OPTICAL TRACKING SYSTEM INSERTION DEEP BRAIN STIMULATION Left 5/2/2022    Procedure: Stealth Assisted Left side deep brain stimulator placement, phase I & II combined, target left globus pallidus internus, with microelectrode recording and connection to existing generator/battery;  Surgeon: Ravinder Coleman MD;  Location: [facility] OR    REMOVE DEPTH ELECTRODES N/A 8/23/2021    Procedure: Removal of entire deep brain stimulation system hardware including intracranial electrodes and battery/generator in chest wall;  Surgeon: Ravinder Coleman MD;  Location: [facility] OR    STEREOTACTIC INSERTION DEEP BRAIN STIMULATION         Social History     Socioeconomic History    Marital status: Single     Spouse name: Not on file    Number of children: 0    Years of education: Not on file    Highest education level: Not on file   Occupational History    Occupation: unemployed   Tobacco Use    Smoking status: Never    Smokeless tobacco: Never   Substance and Sexual Activity    Alcohol use: Never    Drug use: Never    Sexual activity: Not on file   Other Topics Concern    Not on file   Social History Narrative    Not on file     Social Determinants of Health     Financial  Resource Strain: Not on file   Food Insecurity: Not on file   Transportation Needs: Not on file   Physical Activity: Not on file   Stress: Not on file   Social Connections: Not on file   Interpersonal Safety: Not on file   Housing Stability: Not on file       Family History   Problem Relation Age of Onset    No Known Problems Mother     No Known Problems Father     No Known Problems Sister     Other - See Comments Brother         di bunny syndrome    Myocardial Infarction Maternal Grandmother     Hyperlipidemia Maternal Grandmother     Hyperlipidemia Maternal Grandfather     Unknown/Adopted Paternal Grandmother     Unknown/Adopted Paternal Grandfather     Deep Vein Thrombosis No family hx of     Anesthesia Reaction No family hx of        Physical Examination:  Vital Signs:   vitals were not taken for this visit.   Severe retrocollis  Right shoulder elevation  Reported tightness at left side, back of the neck when looking to the left. Less tightness when looking to the right but some still present.   To lower head, needs to look to the left and then down.     BOTULINUM NEUROTOXIN INJECTION PROCEDURES:    VERIFICATION OF PATIENT IDENTIFICATION AND PROCEDURE     Initials   Patient Name acc   Patient  acc   Procedure Verified by: acc     Above assessments performed by:  Tahira Saxena MD      INDICATION/S FOR PROCEDURE/S:  Bradley Garcia is a 23 year old patient with dystonia affecting the  head, neck and shoulder girdle musculature secondary to a diagnosis of generalized dystonia with associated  tremor, spasms, loss of volitional motor control and difficulty with activities of daily living.     His baseline symptoms have been recalcitrant to oral medications and conservative therapy.  He is here today for an injection of Botox.      GOAL OF PROCEDURE:  The goal of this procedure is to improve volitional motor control associated with dystonic movements.    TOTAL DOSE ADMINISTERED:  Dose Administered: 385 units  Botox    Diluent Used:  Preservative Free Normal Saline  Total Volume of Diluent Used: 4 ml  NDC #: Botox 100u ([address]-13)    CONSENT:  The risks, benefits, and treatment options were discussed with Bradley Garcia and he agreed to proceed.      Written consent was obtained by acc.     EQUIPMENT USED:  Needle-37mm stimulating/recording  EMG/NCS Machine    SKIN PREPARATION:  Skin preparation was performed using an alcohol wipe.    GUIDANCE DESCRIPTION:  Electro-myographic guidance was necessary throughout the procedure to accurately identify all areas of dystonic muscles while avoiding injection of non-dystonic muscles, neighboring nerves and nearby vascular structures.     AREA/MUSCLE INJECTED:    Visual display of locations injections are scanned into the chart under MEDIA tab.    Muscles Injected Units Injected Number of Injections   Left splenius capitis 75 (75) 2   Left cervical paraspinals 80 (80) 4   Left trapezius 65 (65) 3   Right splenius capitis 60 (60) 2   Right cervical paraspinals 80 (80) 4   Right SCM 5 (5) 1   Right Trapezius 20 (20) 1        Total Units Injected: 385    Unavoidable Waste: 15    Total Units Billed 400      The patient tolerated the injections without difficulty.    Assessment:    Bradely Garcia is a 23 year old male with generalized dystonia.  Today we did repeat botulinum toxin injections.    Plan  Follow-up in 3 months' time to consider repeat injections.    Ravinder Nicole MD  Neuromodulation/Movement Disorders Fellow    Patient seen and discussed with attending neurologist Dr. Tahira Saxena      Neurology Attending Attestation:   I personally saw this patient with our fellow and agree with the their findings and plan of care as documented in the note. I personally performed salient aspects of the history and neurological examination.  I performed and supervised the injections.    Tahira Saxena MD   of Neurology  Movement Disorders Division

## 2024-08-12 NOTE — PROGRESS NOTES
Department of Neurology  Movement Disorders Division   DBS Follow-up Note    Patient: Bradley Garcia  MRN: [id number]   : 2000   Date of Visit: 2024    Diagnosis: Dystonia  DBS Target(s): Bilateral GPi  Date(s) of DBS Lead Placement: R 2022, L 2022  Date(s) of IPG Placement: R chest 2022  Device: MedRun3D Activa RC    Chief Complaint:  Bradley Garcia is a 23 year old male who returns to clinic for follow up of generalized dystonia status post bilateral GPi DBS.  He was last seen for programming on 24 at which time Groups C and D were delete and the amplitude on R GPI2 was increased. In addition, he was instructed to start a Artane uptitration to 2 mg three times daily after trying Program B    Interval History:  Since the last visit he reports that that he has had more tightness and didn't try the Artane. He felt that he would not need the relaxation all the time and only needed it PRN so he wasn't sure it was a good idea. Has had more pulling up and to the left and feels the Botox did not work as well this time around.     Group A has gone well enough that he didn't feel that he needed an increase    Group B did not try as he forgot    Left hand can open somewhat but is fairly stable. Walking is stable but no worsened. On 24 he had a piercing sensation around his right chest IPG. This occurred for ~1 second once and then came back and lasted for ~10secs. He felt like his chest in that area was a bit more warm. He did not feel that his dystonia was worse during this period and it did not recur.    Medications:  Current Outpatient Medications   Medication Sig Dispense Refill    acetaminophen (TYLENOL) 325 MG tablet Take 325-650 mg by mouth every 6 hours as needed for mild pain      trihexyphenidyl (ARTANE) 2 MG tablet 1/2 tab twice a day for one week then increase to 1/2 tab three times a day for one week then increase to 1 pills three times a day and stay at that dose.  (Patient not taking: Reported on 8/13/2024) 90 tablet 5        Allergies: has No Known Allergies.    Physical Exam:  The patient's  blood pressure is 120/80 and his pulse is 112. His respiration is 20 and oxygen saturation is 97%.        Neurological Examination:   Severe retrocollis   Able to flex neck barely past midline  Right shoulder elevation  Good rotation bilaterally (but feels harder to left)  Left hand fisted but able to slowly open  Upright posture with standing  Slight left lean with gait    Procedure: DBS Interrogation & Programming  Lead(s):     Left Right   DBS Target Left GPi Right GPi   DBS Lead Type SenSight SenSight   Lead Placement Date 5/2/2022 4/11/2022      IPG(s):    1   IPG Activa-RC   IPG Implant Date 4/18/2022   Location Right chest   Battery (V) 100%, JAMES 04/15/36      Impedance Check: No problems found.  See scanned report for impedance details.     Group A Left Brain             Right Brain           Initial   Final   Initial   Final        GPi 1 GPi 2 GPi 1 GPi 2 GPi 1 GPi 2 GPi 1 GPi 2     Active Active Active Active Active Active Active Active   Amplitude (V) 2.5 2.0 2.5 2.0 4.0 6.0 [0-7.0] 4.0 6.0 [0-7.0]   Pulse width (usec) 60 60 60  60  90 90 90 90   Freq (Hz) 125 125 125 125 125 125 125 125   Contacts: C+3- C+2abc- C+3- C+2abc-  C+11- C+10a- C+11- C+10a-   Impedance 1,257/2.0 1313/1.5 1,257/2.0 1313/1.5 1522/2.6 2234/2.7 1522/2.6 2234/2.7      Group B Left Brain             Right Brain           Initial   Final   Initial   Final       GPi 1 GPi 2 GPi 1 GPi 2 GPi 1 GPi 2 GPi 1 GPi 2     Inactive Inactive Inactive Inactive Inactive Inactive Inactive Inactive   Amplitude (V) 2.5 2.0 [0-2.6] 2.5 2.0 4.0 5.5 [0-5.5] 4.0 5.5 [0-5.5]   Pulse width (usec) 60 60 60 60 90 120 90 120   Freq (Hz) 125 125 125 125 125 125 125 125   Contacts: C+3- C+2abc- C+3- C+2abc- C+11- C+10a- C+11- C+10a-        Programming notes:   No changes made today     Assessment/Plan:  Bradley Garcia is a 23 year  old male with generalized dystonia s/p bilateral GPi DBS who returns for follow-up. Program A has done well for the patient and has not tried program B. The patient didn't start Artane as he feels like he needs a PRN option. Overall he feels like the Botox has been significantly less efficacious and that's the bigger problem right now so we will hold off on other changes and instead make changes to the Botox. Please see the Botox note from this date for specifics on how this was changed. We will hold off on Artane for now but consider restarting this in the future    -  Continue on Program A  - Try Program B  - Will hold off on Artane for now but could consider starting it in the future    - RTC 12 weeks, 1 hr DBS programming and botulinum toxin    Mr. Garcia was seen and discussed with movement disorders attending, Dr. Hemanth Nicole MD  Neuromodulation/Movement Disorders Fellow

## 2024-08-13 ENCOUNTER — OFFICE VISIT (OUTPATIENT)
Dept: NEUROLOGY | Facility: CLINIC | Age: 24
End: 2024-08-13
Payer: MEDICARE

## 2024-08-13 ENCOUNTER — LAB (OUTPATIENT)
Dept: LAB | Facility: CLINIC | Age: 24
End: 2024-08-13

## 2024-08-13 VITALS
SYSTOLIC BLOOD PRESSURE: 120 MMHG | HEART RATE: 112 BPM | DIASTOLIC BLOOD PRESSURE: 80 MMHG | OXYGEN SATURATION: 97 % | RESPIRATION RATE: 20 BRPM

## 2024-08-13 DIAGNOSIS — Z77.21 EXPOSURE TO BLOOD OR BODY FLUID: Primary | ICD-10-CM

## 2024-08-13 DIAGNOSIS — Z96.89 S/P DEEP BRAIN STIMULATOR PLACEMENT: ICD-10-CM

## 2024-08-13 DIAGNOSIS — G24.1 GENERALIZED TORSION DYSTONIA: Primary | ICD-10-CM

## 2024-08-13 DIAGNOSIS — G24.3 CERVICAL DYSTONIA: Primary | ICD-10-CM

## 2024-08-13 DIAGNOSIS — Z77.21 EXPOSURE TO BLOOD OR BODY FLUID: ICD-10-CM

## 2024-08-13 LAB
HBV SURFACE AG SERPL QL IA: NONREACTIVE
HIV 1+2 AB+HIV1 P24 AG SERPL QL IA: NONREACTIVE
HIV 1+2 AB+HIV1P24 AG SERPLBLD IA.RAPID: NON REACTIVE
HIV 1+2 AB+HIV1P24 AG SERPLBLD IA.RAPID: NON REACTIVE
HIV INTERPRETATION: NORMAL

## 2024-08-13 PROCEDURE — 999N000104 HC STATISTIC NO CHARGE

## 2024-08-13 PROCEDURE — 95970 ALYS NPGT W/O PRGRMG: CPT | Performed by: STUDENT IN AN ORGANIZED HEALTH CARE EDUCATION/TRAINING PROGRAM

## 2024-08-13 PROCEDURE — 95874 GUIDE NERV DESTR NEEDLE EMG: CPT | Performed by: STUDENT IN AN ORGANIZED HEALTH CARE EDUCATION/TRAINING PROGRAM

## 2024-08-13 PROCEDURE — 64616 CHEMODENERV MUSC NECK DYSTON: CPT | Mod: 50 | Performed by: STUDENT IN AN ORGANIZED HEALTH CARE EDUCATION/TRAINING PROGRAM

## 2024-08-13 PROCEDURE — 99214 OFFICE O/P EST MOD 30 MIN: CPT | Mod: 25 | Performed by: STUDENT IN AN ORGANIZED HEALTH CARE EDUCATION/TRAINING PROGRAM

## 2024-08-13 ASSESSMENT — PAIN SCALES - GENERAL: PAINLEVEL: NO PAIN (0)

## 2024-08-13 NOTE — LETTER
8/13/2024       RE: Bradley Garcia  [address]  [facility] [address]     Dear Colleague,    Thank you for referring your patient, Bradley Garcia, to the [facility] [place] at [facility]. Please see a copy of my visit note below.    Movement Disorders Botulinum Toxin Clinic Note    Chief Complaint: cervical dystonia    History of Present Illness:  Bradley Garcia is a 23 year old male who presents to clinic for botulinum toxin injections for treatment of cervical dystonia.    Response to Last Injection: 5/14/24    Onset: About one day, he notices benefit very quickly    Benefit from last injections: Did not work as well as previously and neck wanted to go up and to the left. Felt the tightness was 8/10 initially but then got worse about a week later with more pulling. Feels that his movement to the left and right we're less bothersome than up and down    Improvement in function/activity: It makes everything easier. All ADLs are easier when botulinum toxin is working. Easier to drive.     Wearing off: June 1st (2.5 weeks after injection)    Side effects: Feels more tightness this time with more pulling up and left as above.     Current Outpatient Medications   Medication Sig Dispense Refill     acetaminophen (TYLENOL) 325 MG tablet Take 325-650 mg by mouth every 6 hours as needed for mild pain       trihexyphenidyl (ARTANE) 2 MG tablet 1/2 tab twice a day for one week then increase to 1/2 tab three times a day for one week then increase to 1 pills three times a day and stay at that dose. (Patient not taking: Reported on 8/13/2024) 90 tablet 5       Allergies: He has No Known Allergies.    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      Depression      Neck pain      PONV (postoperative nausea and vomiting)      S/P deep brain  stimulator placement      Torsion dystonia        Past Surgical History:   Procedure Laterality Date     ANESTHESIA OUT OF OR MRI       ANESTHESIA OUT OF OR MRI N/A 7/8/2021    Procedure: ANESTHESIA OUT OF OR Magnetic resonance imaging Brain @1200;  Surgeon: GENERIC ANESTHESIA PROVIDER;  Location: [facility] OR     ANESTHESIA OUT OF OR MRI N/A 10/8/2021    Procedure: ANESTHESIA OUT OF OR MAGNETIC RESONANCE IMAGING of BRAIN WITH AND WITHOUT CONTRAST@1400;  Surgeon: GENERIC ANESTHESIA PROVIDER;  Location: [facility] OR     EYE SURGERY       IMPLANT DEEP BRAIN STIMULATION GENERATOR / BATTERY Right 4/18/2022    Procedure: Deep brain stimulator placement, phase II, placement of deep brain stimulator generator/battery over the right chest wall;  Surgeon: Ravinder Coleman MD;  Location: [facility] OR     NOSE SURGERY      nasal fracture repair     OPTICAL TRACKING SYSTEM INSERTION DEEP BRAIN STIMULATION Right 4/11/2022    Procedure: stealth assisted Right side deep brain stimulator placement, phase I,  target right globus pallidus internus, with microelectrode recording;  Surgeon: Ravinder Coleman MD;  Location: [facility] OR     OPTICAL TRACKING SYSTEM INSERTION DEEP BRAIN STIMULATION Left 5/2/2022    Procedure: Stealth Assisted Left side deep brain stimulator placement, phase I & II combined, target left globus pallidus internus, with microelectrode recording and connection to existing generator/battery;  Surgeon: Ravinder Coleman MD;  Location: [facility] OR     REMOVE DEPTH ELECTRODES N/A 8/23/2021    Procedure: Removal of entire deep brain stimulation system hardware including intracranial electrodes and battery/generator in chest wall;  Surgeon: Ravinder Coleman MD;  Location: [facility] OR     STEREOTACTIC INSERTION DEEP BRAIN STIMULATION         Social History     Socioeconomic History     Marital status: Single     Spouse name: Not on file     Number of children: 0     Years of education: Not on file     Highest education level: Not on file    Occupational History     Occupation: unemployed   Tobacco Use     Smoking status: Never     Smokeless tobacco: Never   Substance and Sexual Activity     Alcohol use: Never     Drug use: Never     Sexual activity: Not on file   Other Topics Concern     Not on file   Social History Narrative     Not on file     Social Determinants of Health     Financial Resource Strain: Not on file   Food Insecurity: Not on file   Transportation Needs: Not on file   Physical Activity: Not on file   Stress: Not on file   Social Connections: Not on file   Interpersonal Safety: Not on file   Housing Stability: Not on file       Family History   Problem Relation Age of Onset     No Known Problems Mother      No Known Problems Father      No Known Problems Sister      Other - See Comments Brother         di bunny syndrome     Myocardial Infarction Maternal Grandmother      Hyperlipidemia Maternal Grandmother      Hyperlipidemia Maternal Grandfather      Unknown/Adopted Paternal Grandmother      Unknown/Adopted Paternal Grandfather      Deep Vein Thrombosis No family hx of      Anesthesia Reaction No family hx of        Physical Examination:  Vital Signs:   vitals were not taken for this visit.   Severe retrocollis  Right shoulder elevation  Reported tightness at left side, back of the neck when looking to the left. Less tightness when looking to the right but some still present.   To lower head, needs to look to the left and then down.     BOTULINUM NEUROTOXIN INJECTION PROCEDURES:    VERIFICATION OF PATIENT IDENTIFICATION AND PROCEDURE     Initials   Patient Name [facility]   Patient  [facility]   Procedure Verified by: [facility]     Above assessments performed by:  Tahira Saxena MD      INDICATION/S FOR PROCEDURE/S:  Bradley Garcia is a 23 year old patient with dystonia affecting the  head, neck and shoulder girdle musculature secondary to a diagnosis of generalized dystonia with associated  tremor, spasms, loss of volitional motor control and  difficulty with activities of daily living.     His baseline symptoms have been recalcitrant to oral medications and conservative therapy.  He is here today for an injection of Botox.      GOAL OF PROCEDURE:  The goal of this procedure is to improve volitional motor control associated with dystonic movements.    TOTAL DOSE ADMINISTERED:  Dose Administered: 385 units Botox    Diluent Used:  Preservative Free Normal Saline  Total Volume of Diluent Used: 4 ml  NDC #: Botox 100u ([id number])    CONSENT:  The risks, benefits, and treatment options were discussed with Bradley Garcia and he agreed to proceed.      Written consent was obtained by acc.     EQUIPMENT USED:  Needle-37mm stimulating/recording  EMG/NCS Machine    SKIN PREPARATION:  Skin preparation was performed using an alcohol wipe.    GUIDANCE DESCRIPTION:  Electro-myographic guidance was necessary throughout the procedure to accurately identify all areas of dystonic muscles while avoiding injection of non-dystonic muscles, neighboring nerves and nearby vascular structures.     AREA/MUSCLE INJECTED:    Visual display of locations injections are scanned into the chart under MEDIA tab.    Muscles Injected Units Injected Number of Injections   Left splenius capitis 75 (75) 2   Left cervical paraspinals 80 (80) 4   Left trapezius 65 (65) 3   Right splenius capitis 60 (60) 2   Right cervical paraspinals 80 (80) 4   Right SCM 5 (5) 1   Right Trapezius 20 (20) 1        Total Units Injected: 385    Unavoidable Waste: 15    Total Units Billed 400      The patient tolerated the injections without difficulty.    Assessment:    Bradley Garcia is a 23 year old male with generalized dystonia.  Today we did repeat botulinum toxin injections.    Plan  Follow-up in 3 months' time to consider repeat injections.    Ravinder Nicole MD  Neuromodulation/Movement Disorders Fellow    Patient seen and discussed with attending neurologist Dr. Tahira Saxena      Neurology Attending  Attestation:   I personally saw this patient with our fellow and agree with the their findings and plan of care as documented in the note. I personally performed salient aspects of the history and neurological examination.  I performed and supervised the injections.    Tahira Saxena MD   of Neurology  Movement Disorders Division      Again, thank you for allowing me to participate in the care of your patient.      Sincerely,    Tahira Saxena MD

## 2024-08-13 NOTE — NURSING NOTE
Chief Complaint   Patient presents with    DBS Programming     /80 (BP Location: Right arm, Patient Position: Sitting, Cuff Size: Adult Regular)   Pulse 112   Resp 20   SpO2 97%     GUILLERMO FRANCISCO

## 2024-08-15 LAB — HCV RNA SERPL NAA+PROBE-ACNC: NOT DETECTED IU/ML

## 2024-08-29 ENCOUNTER — TELEPHONE (OUTPATIENT)
Dept: NEUROLOGY | Facility: CLINIC | Age: 24
End: 2024-08-29
Payer: MEDICARE

## 2024-08-29 NOTE — TELEPHONE ENCOUNTER
Message left for patient to reschedule Botox treatment on November 11th with Dr. Menendez.      Provider is not able to give botox treatments at the [facility].  Patient has the following options for rescheduling:    Schedule with Dr. Menendez at the [facility]    Schedule with Dr. Peoples at the [facility].     Patient advised to call [phone] for the purpose of rescheduling.

## 2024-09-04 NOTE — TELEPHONE ENCOUNTER
2nd attempt to reschedule:     Message left for patient to reschedule Botox treatment on November 11th with Dr. Menendez.       Provider is not able to give botox treatments at the [facility].  Patient has the following options for rescheduling:     Schedule with Dr. Menendez at the [facility]     Schedule with Dr. Peoples at the [facility].      Patient advised to call [phone] for the purpose of rescheduling.

## 2024-11-14 ENCOUNTER — OFFICE VISIT (OUTPATIENT)
Dept: NEUROLOGY | Facility: CLINIC | Age: 24
End: 2024-11-14
Payer: MEDICARE

## 2024-11-14 DIAGNOSIS — G24.3 CERVICAL DYSTONIA: Primary | ICD-10-CM

## 2024-11-14 NOTE — LETTER
11/14/2024       RE: Bradley Garcia  [address] [facility] [address]     Dear Colleague,    Thank you for referring your patient, Bradley Garcia, to the [facility]UR[facility] [place] at [facility]. Please see a copy of my visit note below.    Movement Disorders Botulinum Toxin Clinic Note    Diagnosis: Dystonia  DBS Target(s): Bilateral GPi  Date(s) of DBS Lead Placement: R 4/11/2022, L 5/2/2022  Date(s) of IPG Placement: R chest 4/18/2022  Device: Medtronic Activa RC    Chief Complaint: cervical dystonia    History of Present Illness:  Bradley Garcia is a 24 year old male who presents to clinic for botulinum toxin injections for treatment of cervical dystonia.    Response to Last Injection: 8/13/24    Onset: About one day, he notices benefit very quickly    Benefit from last injections:  Did not work as well as previously Felt the tightness was 6-7/10 when working and 7-8 when not.   Improvement in function/activity: It makes everything easier. All ADLs are easier when botulinum toxin is working. Easier to drive.     Wearing off: After about a week     Side effects: None, just didn't last long    Current Outpatient Medications   Medication Sig Dispense Refill     acetaminophen (TYLENOL) 325 MG tablet Take 325-650 mg by mouth every 6 hours as needed for mild pain       trihexyphenidyl (ARTANE) 2 MG tablet 1/2 tab twice a day for one week then increase to 1/2 tab three times a day for one week then increase to 1 pills three times a day and stay at that dose. (Patient not taking: Reported on 8/13/2024) 90 tablet 5       Allergies: He has No Known Allergies.    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      Depression      Neck pain      PONV (postoperative nausea and vomiting)      S/P deep brain stimulator placement      Torsion dystonia         Past Surgical History:   Procedure Laterality Date     ANESTHESIA OUT OF OR MRI       ANESTHESIA OUT OF OR MRI N/A 7/8/2021    Procedure: ANESTHESIA OUT OF OR Magnetic resonance imaging Brain @1200;  Surgeon: GENERIC ANESTHESIA PROVIDER;  Location: [facility] OR     ANESTHESIA OUT OF OR MRI N/A 10/8/2021    Procedure: ANESTHESIA OUT OF OR MAGNETIC RESONANCE IMAGING of BRAIN WITH AND WITHOUT CONTRAST@1400;  Surgeon: GENERIC ANESTHESIA PROVIDER;  Location: [facility] OR     EYE SURGERY       IMPLANT DEEP BRAIN STIMULATION GENERATOR / BATTERY Right 4/18/2022    Procedure: Deep brain stimulator placement, phase II, placement of deep brain stimulator generator/battery over the right chest wall;  Surgeon: Ravinder Coleman MD;  Location: [facility] OR     NOSE SURGERY      nasal fracture repair     OPTICAL TRACKING SYSTEM INSERTION DEEP BRAIN STIMULATION Right 4/11/2022    Procedure: stealth assisted Right side deep brain stimulator placement, phase I,  target right globus pallidus internus, with microelectrode recording;  Surgeon: Ravinder Coleman MD;  Location: [facility] OR     OPTICAL TRACKING SYSTEM INSERTION DEEP BRAIN STIMULATION Left 5/2/2022    Procedure: Stealth Assisted Left side deep brain stimulator placement, phase I & II combined, target left globus pallidus internus, with microelectrode recording and connection to existing generator/battery;  Surgeon: Ravinder Coleman MD;  Location: [facility] OR     REMOVE DEPTH ELECTRODES N/A 8/23/2021    Procedure: Removal of entire deep brain stimulation system hardware including intracranial electrodes and battery/generator in chest wall;  Surgeon: Ravinder Coleman MD;  Location: [facility] OR     STEREOTACTIC INSERTION DEEP BRAIN STIMULATION         Social History     Socioeconomic History     Marital status: Single     Spouse name: Not on file     Number of children: 0     Years of education: Not on file     Highest education level: Not on file   Occupational History     Occupation: unemployed    Tobacco Use     Smoking status: Never     Smokeless tobacco: Never   Substance and Sexual Activity     Alcohol use: Never     Drug use: Never     Sexual activity: Not on file   Other Topics Concern     Not on file   Social History Narrative     Not on file     Social Drivers of Health     Financial Resource Strain: Not on file   Food Insecurity: Not on file   Transportation Needs: Not on file   Physical Activity: Not on file   Stress: Not on file   Social Connections: Not on file   Interpersonal Safety: Not on file   Housing Stability: Not on file       Family History   Problem Relation Age of Onset     No Known Problems Mother      No Known Problems Father      No Known Problems Sister      Other - See Comments Brother         di bunny syndrome     Myocardial Infarction Maternal Grandmother      Hyperlipidemia Maternal Grandmother      Hyperlipidemia Maternal Grandfather      Unknown/Adopted Paternal Grandmother      Unknown/Adopted Paternal Grandfather      Deep Vein Thrombosis No family hx of      Anesthesia Reaction No family hx of        Physical Examination:  Vital Signs:   vitals were not taken for this visit.   Severe retrocollis  Right shoulder elevation  Reported tightness at left side, back of the neck when looking to the left. Less tightness when looking to the right but some still present.   To lower head, needs to look to the left and then down.     BOTULINUM NEUROTOXIN INJECTION PROCEDURES:    VERIFICATION OF PATIENT IDENTIFICATION AND PROCEDURE     Initials   Patient Name RL   Patient  RL   Procedure Verified by: THOMAS     Above assessments performed by:  Ravinder Nicole MD      INDICATION/S FOR PROCEDURE/S:  Bradley Garcia is a 24 year old patient with dystonia affecting the  head, neck and shoulder girdle musculature secondary to a diagnosis of generalized dystonia with associated  tremor, spasms, loss of volitional motor control and difficulty with activities of daily living.     His baseline  symptoms have been recalcitrant to oral medications and conservative therapy.  He is here today for an injection of Botox.      GOAL OF PROCEDURE:  The goal of this procedure is to improve volitional motor control associated with dystonic movements.    TOTAL DOSE ADMINISTERED:  Dose Administered: 400 units Botox    Diluent Used:  Preservative Free Normal Saline  Total Volume of Diluent Used: 4 ml  NDC #: Botox 100u ([address]-40)    CONSENT:  The risks, benefits, and treatment options were discussed with Bradley Garcia and he agreed to proceed.      Written consent was obtained by acc.     EQUIPMENT USED:  Needle-37mm stimulating/recording  EMG/NCS Machine    SKIN PREPARATION:  Skin preparation was performed using an alcohol wipe.    GUIDANCE DESCRIPTION:  Electro-myographic guidance was necessary throughout the procedure to accurately identify all areas of dystonic muscles while avoiding injection of non-dystonic muscles, neighboring nerves and nearby vascular structures.     AREA/MUSCLE INJECTED:    Visual display of locations injections are scanned into the chart under MEDIA tab.    Muscles Injected Units Injected Number of Injections   Left splenius capitis 100 (75) 2   Left obliquus capitis inferior 25 1   Left semispinalis capitis 50 2   Left semispinalis cervicis 50 2   Left cervical paraspinals 0 (80) 0   Left trapezius 0 (65) 0        Right splenius capitis 65 (60) 2   Right obliquus capitis inferior 25 1   Right semispinalis capitis 25 1   Right semispinalis cervicis 50 2   Right cervical paraspinals 0 (80) 0   Right SCM 0 (5) 0   Right Trapezius 10 (20) 1        Total Units Injected: 400    Unavoidable Waste: 0    Total Units Billed 400      The patient tolerated the injections without difficulty.    Assessment:    Bradley Garcia is a 24 year old male with generalized dystonia.  Today we did repeat botulinum toxin injections.    Plan  Message via [employer] or call the clinic in 4-6 weeks for an  update  Avoid heat and cold pack and massaging the areas injected for 24 hours post injections  Follow-up in 3 and 6 months to consider repeat injections    Ravinder Nicole MD  Neuromodulation/Movement Disorders Fellow    Patient seen and discussed with attending neurologist Dr. Andrea     I was present for the entire Botulinum toxin injection performed on 11/15/2024 and reported by Dr. Nicole and was available to take over any critical portions of the exam as needed.  I agree entirely of the report and impression as recorded by Dr. Nicole. Deep Brain Stimulation was turned off during the procedure and turned back on afterward; no changes were made to Deep Brain Stimulation settings/program.     Alyson Andrea DO  Movement Disorders Specialist  [facility] Neu[facility]         Again, thank you for allowing me to participate in the care of your patient.      Sincerely,    Alyson Andrea DO

## 2024-11-14 NOTE — PROGRESS NOTES
Movement Disorders Botulinum Toxin Clinic Note    Diagnosis: Dystonia  DBS Target(s): Bilateral GPi  Date(s) of DBS Lead Placement: R 4/11/2022, L 5/2/2022  Date(s) of IPG Placement: R chest 4/18/2022  Device: Medtronic Activa RC    Chief Complaint: cervical dystonia    History of Present Illness:  Bradley Garcia is a 24 year old male who presents to clinic for botulinum toxin injections for treatment of cervical dystonia.    Response to Last Injection: 8/13/24    Onset: About one day, he notices benefit very quickly    Benefit from last injections:  Did not work as well as previously Felt the tightness was 6-7/10 when working and 7-8 when not.   Improvement in function/activity: It makes everything easier. All ADLs are easier when botulinum toxin is working. Easier to drive.     Wearing off: After about a week     Side effects: None, just didn't last long    Current Outpatient Medications   Medication Sig Dispense Refill    acetaminophen (TYLENOL) 325 MG tablet Take 325-650 mg by mouth every 6 hours as needed for mild pain      trihexyphenidyl (ARTANE) 2 MG tablet 1/2 tab twice a day for one week then increase to 1/2 tab three times a day for one week then increase to 1 pills three times a day and stay at that dose. (Patient not taking: Reported on 8/13/2024) 90 tablet 5       Allergies: He has No Known Allergies.    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     Depression     Neck pain     PONV (postoperative nausea and vomiting)     S/P deep brain stimulator placement     Torsion dystonia        Past Surgical History:   Procedure Laterality Date    ANESTHESIA OUT OF OR MRI      ANESTHESIA OUT OF OR MRI N/A 7/8/2021    Procedure: ANESTHESIA OUT OF OR Magnetic resonance imaging Brain @1200;  Surgeon: GENERIC ANESTHESIA PROVIDER;  Location: [facility] OR    ANESTHESIA OUT OF OR MRI N/A 10/8/2021    Procedure: ANESTHESIA  OUT OF OR MAGNETIC RESONANCE IMAGING of BRAIN WITH AND WITHOUT CONTRAST@1400;  Surgeon: GENERIC ANESTHESIA PROVIDER;  Location: [facility] OR    EYE SURGERY      IMPLANT DEEP BRAIN STIMULATION GENERATOR / BATTERY Right 4/18/2022    Procedure: Deep brain stimulator placement, phase II, placement of deep brain stimulator generator/battery over the right chest wall;  Surgeon: Ravinder Coleman MD;  Location: [facility] OR    NOSE SURGERY      nasal fracture repair    OPTICAL TRACKING SYSTEM INSERTION DEEP BRAIN STIMULATION Right 4/11/2022    Procedure: stealth assisted Right side deep brain stimulator placement, phase I,  target right globus pallidus internus, with microelectrode recording;  Surgeon: Ravinder Coleman MD;  Location: [facility] OR    OPTICAL TRACKING SYSTEM INSERTION DEEP BRAIN STIMULATION Left 5/2/2022    Procedure: Stealth Assisted Left side deep brain stimulator placement, phase I & II combined, target left globus pallidus internus, with microelectrode recording and connection to existing generator/battery;  Surgeon: Ravinder Coleman MD;  Location: [facility] OR    REMOVE DEPTH ELECTRODES N/A 8/23/2021    Procedure: Removal of entire deep brain stimulation system hardware including intracranial electrodes and battery/generator in chest wall;  Surgeon: Ravinder Coleman MD;  Location: [facility] OR    STEREOTACTIC INSERTION DEEP BRAIN STIMULATION         Social History     Socioeconomic History    Marital status: Single     Spouse name: Not on file    Number of children: 0    Years of education: Not on file    Highest education level: Not on file   Occupational History    Occupation: unemployed   Tobacco Use    Smoking status: Never    Smokeless tobacco: Never   Substance and Sexual Activity    Alcohol use: Never    Drug use: Never    Sexual activity: Not on file   Other Topics Concern    Not on file   Social History Narrative    Not on file     Social Drivers of Health     Financial Resource Strain: Not on file   Food Insecurity:  Not on file   Transportation Needs: Not on file   Physical Activity: Not on file   Stress: Not on file   Social Connections: Not on file   Interpersonal Safety: Not on file   Housing Stability: Not on file       Family History   Problem Relation Age of Onset    No Known Problems Mother     No Known Problems Father     No Known Problems Sister     Other - See Comments Brother         di bunny syndrome    Myocardial Infarction Maternal Grandmother     Hyperlipidemia Maternal Grandmother     Hyperlipidemia Maternal Grandfather     Unknown/Adopted Paternal Grandmother     Unknown/Adopted Paternal Grandfather     Deep Vein Thrombosis No family hx of     Anesthesia Reaction No family hx of        Physical Examination:  Vital Signs:   vitals were not taken for this visit.   Severe retrocollis  Right shoulder elevation  Reported tightness at left side, back of the neck when looking to the left. Less tightness when looking to the right but some still present.   To lower head, needs to look to the left and then down.     BOTULINUM NEUROTOXIN INJECTION PROCEDURES:    VERIFICATION OF PATIENT IDENTIFICATION AND PROCEDURE     Initials   Patient Name RL   Patient  RL   Procedure Verified by: RL     Above assessments performed by:  Ravinder Nicole MD      INDICATION/S FOR PROCEDURE/S:  Bradley Garcia is a 24 year old patient with dystonia affecting the  head, neck and shoulder girdle musculature secondary to a diagnosis of generalized dystonia with associated  tremor, spasms, loss of volitional motor control and difficulty with activities of daily living.     His baseline symptoms have been recalcitrant to oral medications and conservative therapy.  He is here today for an injection of Botox.      GOAL OF PROCEDURE:  The goal of this procedure is to improve volitional motor control associated with dystonic movements.    TOTAL DOSE ADMINISTERED:  Dose Administered: 400 units Botox    Diluent Used:  Preservative Free Normal  Saline  Total Volume of Diluent Used: 4 ml  NDC #: Botox 100u ([address]-88)    CONSENT:  The risks, benefits, and treatment options were discussed with Bradley Garcia and he agreed to proceed.      Written consent was obtained by acc.     EQUIPMENT USED:  Needle-37mm stimulating/recording  EMG/NCS Machine    SKIN PREPARATION:  Skin preparation was performed using an alcohol wipe.    GUIDANCE DESCRIPTION:  Electro-myographic guidance was necessary throughout the procedure to accurately identify all areas of dystonic muscles while avoiding injection of non-dystonic muscles, neighboring nerves and nearby vascular structures.     AREA/MUSCLE INJECTED:    Visual display of locations injections are scanned into the chart under MEDIA tab.    Muscles Injected Units Injected Number of Injections   Left splenius capitis 100 (75) 2   Left obliquus capitis inferior 25 1   Left semispinalis capitis 50 2   Left semispinalis cervicis 50 2   Left cervical paraspinals 0 (80) 0   Left trapezius 0 (65) 0        Right splenius capitis 65 (60) 2   Right obliquus capitis inferior 25 1   Right semispinalis capitis 25 1   Right semispinalis cervicis 50 2   Right cervical paraspinals 0 (80) 0   Right SCM 0 (5) 0   Right Trapezius 10 (20) 1        Total Units Injected: 400    Unavoidable Waste: 0    Total Units Billed 400      The patient tolerated the injections without difficulty.    Assessment:    Bradley Garcia is a 24 year old male with generalized dystonia.  Today we did repeat botulinum toxin injections.    Plan  Message via [employer] or call the clinic in 4-6 weeks for an update  Avoid heat and cold pack and massaging the areas injected for 24 hours post injections  Follow-up in 3 and 6 months to consider repeat injections    Ravinder Nicole MD  Neuromodulation/Movement Disorders Fellow    Patient seen and discussed with attending neurologist Dr. Zully RIBEIRO was present for the entire Botulinum toxin injection performed on 11/15/2024  and reported by Dr. Nicole and was available to take over any critical portions of the exam as needed.  I agree entirely of the report and impression as recorded by Dr. Nicole. Deep Brain Stimulation was turned off during the procedure and turned back on afterward; no changes were made to Deep Brain Stimulation settings/program.     Alyson Yeh,   Movement Disorders Specialist  [facility]

## 2024-11-15 ENCOUNTER — TELEPHONE (OUTPATIENT)
Dept: NEUROLOGY | Facility: CLINIC | Age: 24
End: 2024-11-15
Payer: MEDICARE

## 2024-11-15 NOTE — TELEPHONE ENCOUNTER
----- Message from Ravinder Nicole sent at 11/14/2024  2:25 PM CST -----  Regarding: Patient for [facility] Thursday morning DBS Programming  Hi Gael,    This patient should be seen in Dr. Song's Thursday morning DBS clinic for complex programming. Thanks for your help!    BestCarl

## 2024-11-15 NOTE — TELEPHONE ENCOUNTER
The patient was offered to be scheduled in [place] also. The patient stated he will go to wherever is soonest. The patient was scheduled with Dr. Song on 1/15/25 at 9 AM in [place]. The patient was reminded to bring their patient controller with them to their upcoming appointment, with their battery fully charged if it's a rechargeable battery.

## 2025-01-15 ENCOUNTER — OFFICE VISIT (OUTPATIENT)
Dept: NEUROLOGY | Facility: CLINIC | Age: 25
End: 2025-01-15
Payer: MEDICARE

## 2025-01-15 VITALS
OXYGEN SATURATION: 100 % | HEART RATE: 88 BPM | SYSTOLIC BLOOD PRESSURE: 119 MMHG | BODY MASS INDEX: 23.08 KG/M2 | DIASTOLIC BLOOD PRESSURE: 73 MMHG | WEIGHT: 143 LBS

## 2025-01-15 DIAGNOSIS — Z45.42 ENCOUNTER FOR FITTING AND ADJUSTMENT OF DEEP BRAIN STIMULATOR: ICD-10-CM

## 2025-01-15 DIAGNOSIS — G24.3 CERVICAL DYSTONIA: Primary | ICD-10-CM

## 2025-01-15 ASSESSMENT — PATIENT HEALTH QUESTIONNAIRE - PHQ9
SUM OF ALL RESPONSES TO PHQ QUESTIONS 1-9: 4
10. IF YOU CHECKED OFF ANY PROBLEMS, HOW DIFFICULT HAVE THESE PROBLEMS MADE IT FOR YOU TO DO YOUR WORK, TAKE CARE OF THINGS AT HOME, OR GET ALONG WITH OTHER PEOPLE: NOT DIFFICULT AT ALL
SUM OF ALL RESPONSES TO PHQ QUESTIONS 1-9: 4

## 2025-01-15 NOTE — PATIENT INSTRUCTIONS
Jaspreet Huerta    After the visit today we discussed the following:    We focus mostly on the right lead which corresponds to the left side of your body.  As we discussed today, I do suspect that the lead might be not 100% where it needs to be, but nonetheless, it seems like we found some settings then gave you a little more relief today.  We programmed them as new groups detailed below.    As for your DBS settings, we made the current changes:    Group/program A:    This is your rescue group.  That is the same when you came in on today, we did not change anything, and you can was fall back to that 1 if the other ones do not seem to work quite as well.    Group/program B:    This is a new setting.  Again the changes only for the left side of your body.    Right brain/left side of the body/left side of the screen on your remote: You are leaving clinic on 4.0 V, with a range of 0.0-4.4   - You can increase stimulation if you feel like your neck is still pulling   - You can decrease stimulation if if you notice left shoulder tightness    Group/program C:  This is also a new group, again focusing on the left side of your body    Right brain/left side of the body/left side of the screen on your remote: You are leaving clinic on 4.0 V, with a range of 0.0-4.4   - You can increase stimulation if your neck still pulling   - You can decrease stimulation if you notice left shoulder tightness    Group/program D:    This is also a new program again focusing on the left side of your body    Right brain/left side of the body/left side of the screen on your remote: You are leaving clinic on 3.0 V, with a range of 0.0-3.4   - You can increase stimulation if neck pulling   - You can decrease stimulation if left shoulder tightness    DBS considerations:    Every time you change groups/programs, we want to make sure that you give enough time to experience the full benefits from those new settings.  For Parkinson's and essential tremor,  we usually suggest between 1 to 2 weeks on the program to try to get the full benefits before you change to something else.  For dystonia cases, we suggest anywhere between 2 to 4 weeks, before you change settings.    However, the role we mentioned above does not apply if you experience any side effects.  Keep in mind that when you change settings at home you may experience a temporary/transient worsening in symptoms, or occurrence of side effects.  We called dose activation/deactivation side effects, and they are expected to happen in the majority of DBS cases, especially in situations where we have high settings being utilized.  We usually suggest that you wait over the next 15 to 30 minutes, and if that acute worsening alleviates, there is no need to change anything because that is just a product of the device ramping all the way down and then ramping all the way up into the new settings and your brain might be sensitive to those acute changes.  However, if you experience a gradual worsening or a gradual onset of a side effect that does not seem to go away, or rather worsens as time goes on over the next 30 minutes or so, those are probably not the right settings for you, so you can either do an amplitude adjustment as per instructions within the groups mentioned above, or just change to a new group/programming setting.    For any button related questions, feel free to contact us, however a great resource is also to call the representatives from the , you should have a card with their name and number on it.    However, if it is a symptom related questions, please do not contact the manufacture, and contact us instead.    Lastly, for any scheduled changes and parameters (for example changing from a group/program to another), my suggestion is always to do that on weekdays, and business hours, because he will be much easier to get a hold of someone from the  or somebody from the clinic to  guide you or your caregiver on what to do with the patient remote if for some reason you cannot remember all the steps, or has a question or concern that needs guidance over the phone.

## 2025-01-15 NOTE — PROGRESS NOTES
Department of Neurology  Movement Disorders Division   Return Patient Visit    Patient: Bradley Garcia   MRN: [id number]   : 2000   Date of Visit: January 15, 2025  PCP: Tatyana Low PA-C   Referring provider: Rao    CC:  Generalized dystonia  DBS Programming visit    Interval history:  Mr. Garcia is a 24 year old right-handed male with history significant for generalized dystonia, with a complex surgical history pertaining DBS, with hardware revision due to suboptimal benefit, who presents to movement disorder clinic for establish care after his previous neurologist left the practice. Last visit was several months ago, with a plan to expanding the amplitude range for him to try home.    Per patient report, his R hemibody feels great, but L side still struggles because the most effective programs for his head/neck seem to cause shoulder pulling when he reaches a beneficial threshold. He recalls that exploring the lower portion of his R Gpi lead have led to speech changes and him seeing flashes of light which were intolerable.    Last attempt to change anything was in 2024, when he increased amplitude to 6.0v, which led to a worsening in shoulder pain and stiffness, so he reduced to 5.5v.    Currently, DBS seems to provide 20% benefit and botox at its best gives an additional 50% improvement. Neck movements are the main disabling symptom. Appendicular symptoms do not seem to be a problem, aside of the L shoulder pulling (dystonia vs DBS side effect)    He does endorse that with previous adjustments there was always a ceiling in terms of how much benefit he could get in his neck which tends to pull backwards on a regular basis, because every time that it seems like the settings were beginning to dialing in his neck would be more relaxed he would experience tightness in the left shoulder which will cause pain, and he has noticed that if he reduces the amplitude his neck gets worse but the  Discharge instructions given, pt verbalized understanding. Discussed follow-up appointments and medications. No questions or concerns at this time. Pt ambulated independently out of ER to await ride organized by [employer] transportation. Pt discharged with 1 prescription.       Eddie Kelly RN  01/28/22 9048 shoulder tightness gets better.  Some of those settings also seem to affect the left side of his trunk unclear whether that is dystonia related or an overstimulation side effect, but he does say that when he tries to walk his back seems to tighten up and he tends to lean to the left when the settings are not quite right.    He denies any cognitive changes, he denies any speech or swallowing changes attributable to DBS, he does have some speech changes at baseline that are associated with dystonia.    He does remember the last time he had any genetic workup was before he had his original surgery which was several years ago.    Hardware data      Right Left   DBS Lead Medtronic Sensight Directional [id number] Medtronic Sensight Directional [id number]   Target GPi GPi   Hola hole Model [id number] Model [id number]   Lead implant date 4/11/22 5/2/22   IPG # 1 1   Surgeon Dr. Virginia Coleman        Right   IPG # 1   IPG Medtronic Activa RC model [id number]   IPG Location Chest   Extensions Model [id number]   Pocket adapters None   IPG implant date 4/18/22   Surgeon Dr. Coleman      Initial surgery at [facility], with good benefit for about 18 months, then gradual loss of benefit. Leads were repositioned in 2022 by Dr Coleman and he has about 20% benefit since repositioning.    Impedances: wnl    Battery level: 75%    JAMES: 4-    ROS:  All others negative except as listed above. Pertinent movement disorders-specific ROS listed above.    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     Depression     Neck pain     PONV (postoperative nausea and vomiting)     S/P deep brain stimulator placement     Torsion dystonia         Past Surgical History:   Procedure Laterality Date    ANESTHESIA OUT OF OR MRI      ANESTHESIA OUT OF OR MRI N/A 7/8/2021    Procedure: ANESTHESIA OUT OF OR Magnetic resonance imaging Brain @1200;   Surgeon: GENERIC ANESTHESIA PROVIDER;  Location: [facility] OR    ANESTHESIA OUT OF OR MRI N/A 10/8/2021    Procedure: ANESTHESIA OUT OF OR MAGNETIC RESONANCE IMAGING of BRAIN WITH AND WITHOUT CONTRAST@1400;  Surgeon: GENERIC ANESTHESIA PROVIDER;  Location: [facility] OR    EYE SURGERY      IMPLANT DEEP BRAIN STIMULATION GENERATOR / BATTERY Right 4/18/2022    Procedure: Deep brain stimulator placement, phase II, placement of deep brain stimulator generator/battery over the right chest wall;  Surgeon: Ravinder Coleman MD;  Location: [facility] OR    NOSE SURGERY      nasal fracture repair    OPTICAL TRACKING SYSTEM INSERTION DEEP BRAIN STIMULATION Right 4/11/2022    Procedure: stealth assisted Right side deep brain stimulator placement, phase I,  target right globus pallidus internus, with microelectrode recording;  Surgeon: Ravinder Coleman MD;  Location: [facility] OR    OPTICAL TRACKING SYSTEM INSERTION DEEP BRAIN STIMULATION Left 5/2/2022    Procedure: Stealth Assisted Left side deep brain stimulator placement, phase I & II combined, target left globus pallidus internus, with microelectrode recording and connection to existing generator/battery;  Surgeon: Ravinder Coleman MD;  Location: [facility] OR    REMOVE DEPTH ELECTRODES N/A 8/23/2021    Procedure: Removal of entire deep brain stimulation system hardware including intracranial electrodes and battery/generator in chest wall;  Surgeon: Ravinder Coleman MD;  Location: [facility] OR    STEREOTACTIC INSERTION DEEP BRAIN STIMULATION            Current Outpatient Medications:     acetaminophen (TYLENOL) 325 MG tablet, Take 325-650 mg by mouth every 6 hours as needed for mild pain, Disp: , Rfl:     trihexyphenidyl (ARTANE) 2 MG tablet, 1/2 tab twice a day for one week then increase to 1/2 tab three times a day for one week then increase to 1 pills three times a day and stay at that dose. (Patient not taking: Reported on 1/15/2025), Disp: 90 tablet, Rfl: 5    Current Facility-Administered Medications:      botulinum toxin type A (BOTOX) 100 units injection 300 Units, 300 Units, Intramuscular, See Admin Instructions, Tahira Saxena MD, 290 Units at 02/13/24 1610    botulinum toxin type A (BOTOX) 100 units injection 400 Units, 400 Units, Intramuscular, See Admin Instructions, Tahira Saxena MD, 400 Units at 11/14/24 1401     No Known Allergies     Family History   Problem Relation Age of Onset    No Known Problems Mother     No Known Problems Father     No Known Problems Sister     Other - See Comments Brother         di bunny syndrome    Myocardial Infarction Maternal Grandmother     Hyperlipidemia Maternal Grandmother     Hyperlipidemia Maternal Grandfather     Unknown/Adopted Paternal Grandmother     Unknown/Adopted Paternal Grandfather     Deep Vein Thrombosis No family hx of     Anesthesia Reaction No family hx of         Social History:  He  reports that he has never smoked. He has never used smokeless tobacco. He reports that he does not drink alcohol and does not use drugs.     PHYSICAL EXAM:  /73   Pulse 88   Wt 64.9 kg (143 lb)   SpO2 100%   BMI 23.08 kg/m       NEURO:  No scales were performed today.  However, we monitored for acute changes related to dystonia and overstimulation.  Notable findings were a retrocollis that was quite acute when the stimulation was reduced, and he had significant wrist flexion and a fist posturing together with biceps contraction when we reduced the stimulation, that was quite uncomfortable for him.  Increments and stimulation delineated under the new settings below quickly alleviated that discomfort.  We did some trials also of gait around the room to evaluate for possible truncal involvement, and none was appreciated.    DBS groups/programs    Initial    Group (A)                 Side Left Brain-1 Left Brain-1 Left Brain-2 Left Brain-2 Right Brain-1 Right Brain-2 Right Brain-1 Right Brain-2   Initial/Final Initial  Final  Initial  Final    Active/Inactive  Active Active Active Active Inactive Inactive Inactive Inactive   Amplitude (V) 2.5 2.0 same sunitha 4.0 5.5 (0.0-7.0)  same same   Pulse width (usec) 60 60    90 90      Freq (Hz) 125  125    125 125      Contacts: 3- C+ 2abc- C+    11- C+ 10a- C+       Best so far. Left unchanged    Group (B)                 Side Left Brain - 1 Left brain-2 Left Brain - 1 Left Brain-2 Right Brain-1 Right brain -2 Right Brain-1 Right Brain-2   Initial/Final Initial  Final  Initial  Final -   Active/Inactive Inactive Inactive Inactive Inactive Inactive Inactive Inactive -   Amplitude (V) 2.5 2.0 same sunitha 4.0 5.2 (0.0-5.5)  4.0 (0.0-4.4) -   Pulse width (usec) 60 60    90 120 100 -   Freq (Hz) 125  125    125 125 125 -   Contacts: 3- C+ 2abc- C+    11- C+ 10a- C+  11- 10+  -   Prior group, gave him more pulling at higher amplitudes in regards to the L shoulder. Modified today, interleaving changed to bipolar, he felt acute alleviation in the neck, without shoulder pulling until we reached amplitudes of 4.5v or greater    Group (C)                 Side Left Brain - 1 Left brain-2 Left Brain - 1 Left Brain-2 Right Brain-1 Right brain -2 Right Brain-1 Right Brain-2   Initial/Final Initial  Final  Initial  Final -   Active/Inactive Inactive Inactive Inactive Inactive Inactive Inactive Inactive -   Amplitude (V) 2.5 2.0 same sunitha 4.0 5.2 (0.0-5.5)  4.0 (0.0-4.4) -   Pulse width (usec) 60 60    90 120 90 -   Freq (Hz) 125  125    125 125 125 -   Contacts: 3- C+ 2abc- C+    11- C+ 10a- C+  11- 10+  -   New today, similar to B, but slightly lower PW on R brain 1, reduce to that if pulling on B    Group (D)                 Side Left Brain - 1 Left brain-2 Left Brain - 1 Left Brain-2 Right Brain-1 Right brain -2 Right Brain-1 Right Brain-2   Initial/Final Initial  Final  Initial  Final -   Active/Inactive Inactive Inactive Inactive Inactive Active Active Active -   Amplitude (V) 2.5 2.0 same sunitha 4.0 5.2 (0.0-5.5)  3.0 (0.0-3.4) -   Pulse width  ([facility]) 60 60    90 120 150 -   Freq (Hz) 125  125    125 125 125 -   Contacts: 3- C+ 2abc- C+    11- C+ 10a- C+  11- 10+  -   New today, similar to B, and C, but a little stronger in terms of PW. If pulling on the L shoulder, consider reducing amplitude slightly. Final today, he was able to look down without fighting the dystonia, and arm felt loose. Standing and walking felt good without trunk pulling.    Programming notes from today:  No changes were made for the left brain lead which was left on the interleaving configuration without any amplitude ranges, as it seems that this is corresponding to the optimize settings, and actually correlate with the fact that he has reported good benefit on the right hemibody.    Given the fact that he is on quite high charge densities, I suspect that he will not be able to tolerate a proper monopolar review to start that right GPI lead from scratch, however even switching back to group a, which has interleaving settings involving the cathode 10 he actually had transient fosphenes, and in bipolar configuration, we had difficulties crossing 4 mA due to the occurrence of shoulder tightness that was highly suspicious for capsular side effect, it makes me suspect that this lead might be too deep and posteromedial, which also correlates with the fact that he endorses a significant amount of dysarthria and flashes of light every time that the deeper contacts were activated on that lead, which is limiting our programming options.  Nonetheless, he was able to tolerate well in bipolar configuration focusing on 11 -, with 10+ as the anode, and we were able to get amplitudes of 3 to 4 V depending on the pulse width that was used, and that seemed to provide, at least acutely, a good balance between the head control and capsular side effects from shoulder tightness.  In fact, for group D he actually felt that he was much better comparing to the initial settings coming in today, because he  "was not having to fight his head to keep it looking forward as much, and his shoulder felt \"pretty relaxed\".  The other 2 groups were quite similar to the initial settings, so he wanted to keep those to try them for a longer period of time at home.    DATA REVIEWED:  Reviewed pertinent data available in epic    ASSESSMENT:  24-year-old male with advanced, medication refractory, generalized dystonia, status post bilateral GPI DBS initially implanted at [facility], with complete hardware revision in 2022 performed by us at [facility], here for reprogramming and to establish care with a different provider.    The overall picture suggests that both leads might be in slight suboptimal position, however it seems like he has reached optimization for the left GPI lead, while the right GPI lead still needs some work to be done, and we are unfortunately struggling with dose-limiting stimulation induced side effects.    Nonetheless, we tried a few advance programming techniques today which, at least acutely, seem to have provided a slight better benefit than the original settings, without triggering any side effects, however this is a tight lead and we will have to see if those benefits are sustained versus transient.    PLAN:  -Reviewed impression and plan with patient.    - New settings as above.  He left clinic on group D, with instructions to try to stay on those settings at least 2 to 4 weeks at a time.  He seems pretty proficient with using the patient  so we did not spend any time going over how to operate his patient  today.  He also seems to be pretty comfortable with the concept of under stimulation and overstimulation.  He has recognized a pattern of, if his shoulder is tight, it correlates with overstimulation and he adopts the approach of gradual, slow decrements in the amplitude until that alleviates which is something I am a big advocate of so he was " encouraged to continue to do that.    - We may consider revisiting genetic testing in the near future since genetic panels have changed significantly over the past 10 years.    - We did talk about the possibility of surgical revision of the right hemispheric electrode, however we will have a discussion with the group and continue to try optimizing the settings to salvage the existing electrode is much as possible.    - Will plan on regrouping the next 3 months for another programming session.  Sooner if needed.  We did discuss the fact that we may have to consider starting in monopolar review from scratch but we will have to check his tolerability in terms of turning the device off which I think might be difficult because he appears to be very sensitive to high charge densities.  Will continue to the best we can to try to tease out therapeutic windows for each of those contacts of that electrode.  I can see him sooner if needed.  He was encouraged to utilize [employer] to keep me posted and let me know if any questions or concerns and to I see him next time in 3 months.    Patient Instructions   Dear Bradley    After the visit today we discussed the following:    We focus mostly on the right lead which corresponds to the left side of your body.  As we discussed today, I do suspect that the lead might be not 100% where it needs to be, but nonetheless, it seems like we found some settings then gave you a little more relief today.  We programmed them as new groups detailed below.    As for your DBS settings, we made the current changes:    Group/program A:    This is your rescue group.  That is the same when you came in on today, we did not change anything, and you can was fall back to that 1 if the other ones do not seem to work quite as well.    Group/program B:    This is a new setting.  Again the changes only for the left side of your body.    Right brain/left side of the body/left side of the screen on your remote:  You are leaving clinic on 4.0 V, with a range of 0.0-4.4   - You can increase stimulation if you feel like your neck is still pulling   - You can decrease stimulation if if you notice left shoulder tightness    Group/program C:  This is also a new group, again focusing on the left side of your body    Right brain/left side of the body/left side of the screen on your remote: You are leaving clinic on 4.0 V, with a range of 0.0-4.4   - You can increase stimulation if your neck still pulling   - You can decrease stimulation if you notice left shoulder tightness    Group/program D:    This is also a new program again focusing on the left side of your body    Right brain/left side of the body/left side of the screen on your remote: You are leaving clinic on 3.0 V, with a range of 0.0-3.4   - You can increase stimulation if neck pulling   - You can decrease stimulation if left shoulder tightness    DBS considerations:    Every time you change groups/programs, we want to make sure that you give enough time to experience the full benefits from those new settings.  For Parkinson's and essential tremor, we usually suggest between 1 to 2 weeks on the program to try to get the full benefits before you change to something else.  For dystonia cases, we suggest anywhere between 2 to 4 weeks, before you change settings.    However, the role we mentioned above does not apply if you experience any side effects.  Keep in mind that when you change settings at home you may experience a temporary/transient worsening in symptoms, or occurrence of side effects.  We called dose activation/deactivation side effects, and they are expected to happen in the majority of DBS cases, especially in situations where we have high settings being utilized.  We usually suggest that you wait over the next 15 to 30 minutes, and if that acute worsening alleviates, there is no need to change anything because that is just a product of the device ramping all  the way down and then ramping all the way up into the new settings and your brain might be sensitive to those acute changes.  However, if you experience a gradual worsening or a gradual onset of a side effect that does not seem to go away, or rather worsens as time goes on over the next 30 minutes or so, those are probably not the right settings for you, so you can either do an amplitude adjustment as per instructions within the groups mentioned above, or just change to a new group/programming setting.    For any button related questions, feel free to contact us, however a great resource is also to call the representatives from the , you should have a card with their name and number on it.    However, if it is a symptom related questions, please do not contact the manufacture, and contact us instead.    Lastly, for any scheduled changes and parameters (for example changing from a group/program to another), my suggestion is always to do that on weekdays, and business hours, because he will be much easier to get a hold of someone from the  or somebody from the clinic to guide you or your caregiver on what to do with the patient remote if for some reason you cannot remember all the steps, or has a question or concern that needs guidance over the phone.        Rodri Song MD (Leo) (he/him/his)   of Neurology  [facility]  Department of Neurology      Thank you for referring Bradley Garcia to our [facility], we appreciate the opportunity of being your partner in healthcare. Please feel free to reach out to us at any point if any questions or concerns about this visit.    I, Rodri Madrigal MD, personally interviewed, examined and evaluated this patient. I personally reviewed the vital signs, medications and pertinent labs/imaging.     The longitudinal plan of care for Bradley was addressed during this visit. Due to the added  complexity in care, I will continue to support Bradley Garcia in the subsequent management of this condition(s) and with the ongoing continuity of care of this condition(s).    Attending attestation: Today I spent a total 45 minutes on this case, in face-to-face, examining, obtaining history, providing education and coordination of care. Additional time was spent in chart review, ancillary data, and documentation as delineated above.     Additional time spent for separate DBS programmin min DBS analyzed, device checked for integrity, with (with/without) complex (simple/complex) programming as detailed above.

## 2025-01-15 NOTE — NURSING NOTE
"Bradley Garcia is a 24 year old male who presents for:  Chief Complaint   Patient presents with    DBS Programming     Cervical dystonia. Complex programming and establish care per Dr. Yeh and Dr. Nicole. Former Dr. Saxena pt. Has Medtronic DBS. Pt's aware        Initial Vitals:  /73   Pulse 88   Wt 64.9 kg (143 lb)   SpO2 100%   BMI 23.08 kg/m   Estimated body mass index is 23.08 kg/m  as calculated from the following:    Height as of 5/2/22: 1.676 m (5' 6\").    Weight as of this encounter: 64.9 kg (143 lb).. Body surface area is 1.74 meters squared. BP completed using cuff size: regular  Data Unavailable    Nursing Comments:     Daphne Nunez MA   "

## 2025-01-15 NOTE — LETTER
1/15/2025      Bradley Garcia  [address] [facility] [address]      Dear Colleague,    Thank you for referring your patient, Bradley Garcia, to the [facility] NEUROLOG[facility]. Please see a copy of my visit note below.    Department of Neurology  Movement Disorders Division   Return Patient Visit    Patient: Bradley Garcia   MRN: [id number]   : 2000   Date of Visit: January 15, 2025  PCP: Tatyana Low PA-C   Referring provider: Rao    CC:  Generalized dystonia  DBS Programming visit    Interval history:  Mr. Garcia is a 24 year old right-handed male with history significant for generalized dystonia, with a complex surgical history pertaining DBS, with hardware revision due to suboptimal benefit, who presents to movement disorder clinic for establish care after his previous neurologist left the practice. Last visit was several months ago, with a plan to expanding the amplitude range for him to try home.    Per patient report, his R hemibody feels great, but L side still struggles because the most effective programs for his head/neck seem to cause shoulder pulling when he reaches a beneficial threshold. He recalls that exploring the lower portion of his R Gpi lead have led to speech changes and him seeing flashes of light which were intolerable.    Last attempt to change anything was in 2024, when he increased amplitude to 6.0v, which led to a worsening in shoulder pain and stiffness, so he reduced to 5.5v.    Currently, DBS seems to provide 20% benefit and botox at its best gives an additional 50% improvement. Neck movements are the main disabling symptom. Appendicular symptoms do not seem to be a problem, aside of the L shoulder pulling (dystonia vs DBS side effect)    He does endorse that with previous adjustments there was always a ceiling in terms of how much benefit he could get in his neck which tends to pull backwards on a regular basis, because every time  that it seems like the settings were beginning to dialing in his neck would be more relaxed he would experience tightness in the left shoulder which will cause pain, and he has noticed that if he reduces the amplitude his neck gets worse but the shoulder tightness gets better.  Some of those settings also seem to affect the left side of his trunk unclear whether that is dystonia related or an overstimulation side effect, but he does say that when he tries to walk his back seems to tighten up and he tends to lean to the left when the settings are not quite right.    He denies any cognitive changes, he denies any speech or swallowing changes attributable to DBS, he does have some speech changes at baseline that are associated with dystonia.    He does remember the last time he had any genetic workup was before he had his original surgery which was several years ago.    Hardware data      Right Left   DBS Lead Medtronic Sensight Directional [id number] Medtronic Sensight Directional [id number]   Target GPi GPi   Hola hole Model [id number] Model [id number]   Lead implant date 4/11/22 5/2/22   IPG # 1 1   Surgeon Dr. Virginia Coleman        Right   IPG # 1   IPG Medtronic Activa RC model [id number]   IPG Location Chest   Extensions Model [id number]   Pocket adapters None   IPG implant date 4/18/22   Surgeon Dr. Coleman      Initial surgery at [facility], with good benefit for about 18 months, then gradual loss of benefit. Leads were repositioned in 2022 by Dr Coleman and he has about 20% benefit since repositioning.    Impedances: wnl    Battery level: 75%    JAMES: 4-    ROS:  All others negative except as listed above. Pertinent movement disorders-specific ROS listed above.    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      Depression      Neck pain      PONV (postoperative nausea and vomiting)      S/P deep brain  stimulator placement      Torsion dystonia         Past Surgical History:   Procedure Laterality Date     ANESTHESIA OUT OF OR MRI       ANESTHESIA OUT OF OR MRI N/A 7/8/2021    Procedure: ANESTHESIA OUT OF OR Magnetic resonance imaging Brain @1200;  Surgeon: GENERIC ANESTHESIA PROVIDER;  Location: [facility] OR     ANESTHESIA OUT OF OR MRI N/A 10/8/2021    Procedure: ANESTHESIA OUT OF OR MAGNETIC RESONANCE IMAGING of BRAIN WITH AND WITHOUT CONTRAST@1400;  Surgeon: GENERIC ANESTHESIA PROVIDER;  Location: [facility] OR     EYE SURGERY       IMPLANT DEEP BRAIN STIMULATION GENERATOR / BATTERY Right 4/18/2022    Procedure: Deep brain stimulator placement, phase II, placement of deep brain stimulator generator/battery over the right chest wall;  Surgeon: Ravinder Coleman MD;  Location: [facility] OR     NOSE SURGERY      nasal fracture repair     OPTICAL TRACKING SYSTEM INSERTION DEEP BRAIN STIMULATION Right 4/11/2022    Procedure: stealth assisted Right side deep brain stimulator placement, phase I,  target right globus pallidus internus, with microelectrode recording;  Surgeon: Ravinder Coleman MD;  Location: [facility] OR     OPTICAL TRACKING SYSTEM INSERTION DEEP BRAIN STIMULATION Left 5/2/2022    Procedure: Stealth Assisted Left side deep brain stimulator placement, phase I & II combined, target left globus pallidus internus, with microelectrode recording and connection to existing generator/battery;  Surgeon: Ravinder Coleman MD;  Location: [facility] OR     REMOVE DEPTH ELECTRODES N/A 8/23/2021    Procedure: Removal of entire deep brain stimulation system hardware including intracranial electrodes and battery/generator in chest wall;  Surgeon: Ravinder Coleman MD;  Location: [facility] OR     STEREOTACTIC INSERTION DEEP BRAIN STIMULATION            Current Outpatient Medications:      acetaminophen (TYLENOL) 325 MG tablet, Take 325-650 mg by mouth every 6 hours as needed for mild pain, Disp: , Rfl:      trihexyphenidyl (ARTANE) 2 MG tablet, 1/2 tab  twice a day for one week then increase to 1/2 tab three times a day for one week then increase to 1 pills three times a day and stay at that dose. (Patient not taking: Reported on 1/15/2025), Disp: 90 tablet, Rfl: 5    Current Facility-Administered Medications:      botulinum toxin type A (BOTOX) 100 units injection 300 Units, 300 Units, Intramuscular, See Admin Instructions, Tahira Saxena MD, 290 Units at 02/13/24 1610     botulinum toxin type A (BOTOX) 100 units injection 400 Units, 400 Units, Intramuscular, See Admin Instructions, Tahira Saxena MD, 400 Units at 11/14/24 1401     No Known Allergies     Family History   Problem Relation Age of Onset     No Known Problems Mother      No Known Problems Father      No Known Problems Sister      Other - See Comments Brother         di bunny syndrome     Myocardial Infarction Maternal Grandmother      Hyperlipidemia Maternal Grandmother      Hyperlipidemia Maternal Grandfather      Unknown/Adopted Paternal Grandmother      Unknown/Adopted Paternal Grandfather      Deep Vein Thrombosis No family hx of      Anesthesia Reaction No family hx of         Social History:  He  reports that he has never smoked. He has never used smokeless tobacco. He reports that he does not drink alcohol and does not use drugs.     PHYSICAL EXAM:  /73   Pulse 88   Wt 64.9 kg (143 lb)   SpO2 100%   BMI 23.08 kg/m       NEURO:  No scales were performed today.  However, we monitored for acute changes related to dystonia and overstimulation.  Notable findings were a retrocollis that was quite acute when the stimulation was reduced, and he had significant wrist flexion and a fist posturing together with biceps contraction when we reduced the stimulation, that was quite uncomfortable for him.  Increments and stimulation delineated under the new settings below quickly alleviated that discomfort.  We did some trials also of gait around the room to evaluate for possible truncal  involvement, and none was appreciated.    DBS groups/programs    Initial    Group (A)                 Side Left Brain-1 Left Brain-1 Left Brain-2 Left Brain-2 Right Brain-1 Right Brain-2 Right Brain-1 Right Brain-2   Initial/Final Initial  Final  Initial  Final    Active/Inactive Active Active Active Active Inactive Inactive Inactive Inactive   Amplitude (V) 2.5 2.0 same sunitha 4.0 5.5 (0.0-7.0)  same same   Pulse width (usec) 60 60    90 90      Freq (Hz) 125  125    125 125      Contacts: 3- C+ 2abc- C+    11- C+ 10a- C+       Best so far. Left unchanged    Group (B)                 Side Left Brain - 1 Left brain-2 Left Brain - 1 Left Brain-2 Right Brain-1 Right brain -2 Right Brain-1 Right Brain-2   Initial/Final Initial  Final  Initial  Final -   Active/Inactive Inactive Inactive Inactive Inactive Inactive Inactive Inactive -   Amplitude (V) 2.5 2.0 same sunitha 4.0 5.2 (0.0-5.5)  4.0 (0.0-4.4) -   Pulse width (usec) 60 60    90 120 100 -   Freq (Hz) 125  125    125 125 125 -   Contacts: 3- C+ 2abc- C+    11- C+ 10a- C+  11- 10+  -   Prior group, gave him more pulling at higher amplitudes in regards to the L shoulder. Modified today, interleaving changed to bipolar, he felt acute alleviation in the neck, without shoulder pulling until we reached amplitudes of 4.5v or greater    Group (C)                 Side Left Brain - 1 Left brain-2 Left Brain - 1 Left Brain-2 Right Brain-1 Right brain -2 Right Brain-1 Right Brain-2   Initial/Final Initial  Final  Initial  Final -   Active/Inactive Inactive Inactive Inactive Inactive Inactive Inactive Inactive -   Amplitude (V) 2.5 2.0 same sunitha 4.0 5.2 (0.0-5.5)  4.0 (0.0-4.4) -   Pulse width (usec) 60 60    90 120 90 -   Freq (Hz) 125  125    125 125 125 -   Contacts: 3- C+ 2abc- C+    11- C+ 10a- C+  11- 10+  -   New today, similar to B, but slightly lower PW on R brain 1, reduce to that if pulling on B    Group (D)                 Side Left Brain - 1 Left brain-2 Left Brain - 1  Left Brain-2 Right Brain-1 Right brain -2 Right Brain-1 Right Brain-2   Initial/Final Initial  Final  Initial  Final -   Active/Inactive Inactive Inactive Inactive Inactive Active Active Active -   Amplitude (V) 2.5 2.0 same sunitha 4.0 5.2 (0.0-5.5)  3.0 (0.0-3.4) -   Pulse width (usec) 60 60    90 120 150 -   Freq (Hz) 125  125    125 125 125 -   Contacts: 3- C+ 2abc- C+    11- C+ 10a- C+  11- 10+  -   New today, similar to B, and C, but a little stronger in terms of PW. If pulling on the L shoulder, consider reducing amplitude slightly. Final today, he was able to look down without fighting the dystonia, and arm felt loose. Standing and walking felt good without trunk pulling.    Programming notes from today:  No changes were made for the left brain lead which was left on the interleaving configuration without any amplitude ranges, as it seems that this is corresponding to the optimize settings, and actually correlate with the fact that he has reported good benefit on the right hemibody.    Given the fact that he is on quite high charge densities, I suspect that he will not be able to tolerate a proper monopolar review to start that right GPI lead from scratch, however even switching back to group a, which has interleaving settings involving the cathode 10 he actually had transient fosphenes, and in bipolar configuration, we had difficulties crossing 4 mA due to the occurrence of shoulder tightness that was highly suspicious for capsular side effect, it makes me suspect that this lead might be too deep and posteromedial, which also correlates with the fact that he endorses a significant amount of dysarthria and flashes of light every time that the deeper contacts were activated on that lead, which is limiting our programming options.  Nonetheless, he was able to tolerate well in bipolar configuration focusing on 11 -, with 10+ as the anode, and we were able to get amplitudes of 3 to 4 V depending on the pulse width  "that was used, and that seemed to provide, at least acutely, a good balance between the head control and capsular side effects from shoulder tightness.  In fact, for group D he actually felt that he was much better comparing to the initial settings coming in today, because he was not having to fight his head to keep it looking forward as much, and his shoulder felt \"pretty relaxed\".  The other 2 groups were quite similar to the initial settings, so he wanted to keep those to try them for a longer period of time at home.    DATA REVIEWED:  Reviewed pertinent data available in epic    ASSESSMENT:  24-year-old male with advanced, medication refractory, generalized dystonia, status post bilateral GPI DBS initially implanted at [facility], with complete hardware revision in 2022 performed by us at [facility], here for reprogramming and to establish care with a different provider.    The overall picture suggests that both leads might be in slight suboptimal position, however it seems like he has reached optimization for the left GPI lead, while the right GPI lead still needs some work to be done, and we are unfortunately struggling with dose-limiting stimulation induced side effects.    Nonetheless, we tried a few advance programming techniques today which, at least acutely, seem to have provided a slight better benefit than the original settings, without triggering any side effects, however this is a tight lead and we will have to see if those benefits are sustained versus transient.    PLAN:  -Reviewed impression and plan with patient.    - New settings as above.  He left clinic on group D, with instructions to try to stay on those settings at least 2 to 4 weeks at a time.  He seems pretty proficient with using the patient  so we did not spend any time going over how to operate his patient  today.  He also seems to be pretty comfortable with the concept of under " stimulation and overstimulation.  He has recognized a pattern of, if his shoulder is tight, it correlates with overstimulation and he adopts the approach of gradual, slow decrements in the amplitude until that alleviates which is something I am a big advocate of so he was encouraged to continue to do that.    - We may consider revisiting genetic testing in the near future since genetic panels have changed significantly over the past 10 years.    - We did talk about the possibility of surgical revision of the right hemispheric electrode, however we will have a discussion with the group and continue to try optimizing the settings to salvage the existing electrode is much as possible.    - Will plan on regrouping the next 3 months for another programming session.  Sooner if needed.  We did discuss the fact that we may have to consider starting in monopolar review from scratch but we will have to check his tolerability in terms of turning the device off which I think might be difficult because he appears to be very sensitive to high charge densities.  Will continue to the best we can to try to tease out therapeutic windows for each of those contacts of that electrode.  I can see him sooner if needed.  He was encouraged to utilize [employer] to keep me posted and let me know if any questions or concerns and to I see him next time in 3 months.    Patient Instructions   Dear Bradley    After the visit today we discussed the following:    We focus mostly on the right lead which corresponds to the left side of your body.  As we discussed today, I do suspect that the lead might be not 100% where it needs to be, but nonetheless, it seems like we found some settings then gave you a little more relief today.  We programmed them as new groups detailed below.    As for your DBS settings, we made the current changes:    Group/program A:    This is your rescue group.  That is the same when you came in on today, we did not change  anything, and you can was fall back to that 1 if the other ones do not seem to work quite as well.    Group/program B:    This is a new setting.  Again the changes only for the left side of your body.    Right brain/left side of the body/left side of the screen on your remote: You are leaving clinic on 4.0 V, with a range of 0.0-4.4   - You can increase stimulation if you feel like your neck is still pulling   - You can decrease stimulation if if you notice left shoulder tightness    Group/program C:  This is also a new group, again focusing on the left side of your body    Right brain/left side of the body/left side of the screen on your remote: You are leaving clinic on 4.0 V, with a range of 0.0-4.4   - You can increase stimulation if your neck still pulling   - You can decrease stimulation if you notice left shoulder tightness    Group/program D:    This is also a new program again focusing on the left side of your body    Right brain/left side of the body/left side of the screen on your remote: You are leaving clinic on 3.0 V, with a range of 0.0-3.4   - You can increase stimulation if neck pulling   - You can decrease stimulation if left shoulder tightness    DBS considerations:    Every time you change groups/programs, we want to make sure that you give enough time to experience the full benefits from those new settings.  For Parkinson's and essential tremor, we usually suggest between 1 to 2 weeks on the program to try to get the full benefits before you change to something else.  For dystonia cases, we suggest anywhere between 2 to 4 weeks, before you change settings.    However, the role we mentioned above does not apply if you experience any side effects.  Keep in mind that when you change settings at home you may experience a temporary/transient worsening in symptoms, or occurrence of side effects.  We called dose activation/deactivation side effects, and they are expected to happen in the majority of  DBS cases, especially in situations where we have high settings being utilized.  We usually suggest that you wait over the next 15 to 30 minutes, and if that acute worsening alleviates, there is no need to change anything because that is just a product of the device ramping all the way down and then ramping all the way up into the new settings and your brain might be sensitive to those acute changes.  However, if you experience a gradual worsening or a gradual onset of a side effect that does not seem to go away, or rather worsens as time goes on over the next 30 minutes or so, those are probably not the right settings for you, so you can either do an amplitude adjustment as per instructions within the groups mentioned above, or just change to a new group/programming setting.    For any button related questions, feel free to contact us, however a great resource is also to call the representatives from the , you should have a card with their name and number on it.    However, if it is a symptom related questions, please do not contact the manufacture, and contact us instead.    Lastly, for any scheduled changes and parameters (for example changing from a group/program to another), my suggestion is always to do that on weekdays, and business hours, because he will be much easier to get a hold of someone from the  or somebody from the clinic to guide you or your caregiver on what to do with the patient remote if for some reason you cannot remember all the steps, or has a question or concern that needs guidance over the phone.        Rodri Song MD (Leo) (he/him/his)   of Neurology  [facility]  Department of Neurology      Thank you for referring Bradley Garcia to our [facility] Movement Disorders Program, we appreciate the opportunity of being your partner in healthcare. Please feel free to reach out to us at any point if any questions or concerns about this  visit.    I, Rodri Madrigal MD, personally interviewed, examined and evaluated this patient. I personally reviewed the vital signs, medications and pertinent labs/imaging.     The longitudinal plan of care for Bradley was addressed during this visit. Due to the added complexity in care, I will continue to support Bradley Garcia in the subsequent management of this condition(s) and with the ongoing continuity of care of this condition(s).    Attending attestation: Today I spent a total 45 minutes on this case, in face-to-face, examining, obtaining history, providing education and coordination of care. Additional time was spent in chart review, ancillary data, and documentation as delineated above.     Additional time spent for separate DBS programmin min DBS analyzed, device checked for integrity, with (with/without) complex (simple/complex) programming as detailed above.       Again, thank you for allowing me to participate in the care of your patient.        Sincerely,        Rodri Madrigal MD    Electronically signed

## 2025-04-28 ENCOUNTER — TELEPHONE (OUTPATIENT)
Dept: NEUROLOGY | Facility: CLINIC | Age: 25
End: 2025-04-28

## 2025-04-28 ENCOUNTER — OFFICE VISIT (OUTPATIENT)
Dept: NEUROLOGY | Facility: CLINIC | Age: 25
End: 2025-04-28
Payer: MEDICARE

## 2025-04-28 VITALS
BODY MASS INDEX: 22.79 KG/M2 | OXYGEN SATURATION: 98 % | DIASTOLIC BLOOD PRESSURE: 73 MMHG | SYSTOLIC BLOOD PRESSURE: 130 MMHG | WEIGHT: 141.2 LBS | HEART RATE: 112 BPM

## 2025-04-28 DIAGNOSIS — G24.3 CERVICAL DYSTONIA: Primary | ICD-10-CM

## 2025-04-28 DIAGNOSIS — Z45.42 ENCOUNTER FOR FITTING AND ADJUSTMENT OF DEEP BRAIN STIMULATOR: ICD-10-CM

## 2025-04-28 DIAGNOSIS — G24.1 GENERALIZED TORSION DYSTONIA: ICD-10-CM

## 2025-04-28 PROCEDURE — 3075F SYST BP GE 130 - 139MM HG: CPT | Performed by: PSYCHIATRY & NEUROLOGY

## 2025-04-28 PROCEDURE — 99213 OFFICE O/P EST LOW 20 MIN: CPT | Mod: 25 | Performed by: PSYCHIATRY & NEUROLOGY

## 2025-04-28 PROCEDURE — 3078F DIAST BP <80 MM HG: CPT | Performed by: PSYCHIATRY & NEUROLOGY

## 2025-04-28 PROCEDURE — 95983 ALYS BRN NPGT PRGRMG 15 MIN: CPT | Performed by: PSYCHIATRY & NEUROLOGY

## 2025-04-28 PROCEDURE — 95984 ALYS BRN NPGT PRGRMG ADDL 15: CPT | Performed by: PSYCHIATRY & NEUROLOGY

## 2025-04-28 NOTE — PROGRESS NOTES
Department of Neurology  Movement Disorders Division   Return Patient Visit    Patient: Bradley Garcia   MRN: [id number]   : 2000   Date of Visit: 2025  PCP: Tatyana Low PA-C   Referring provider: Rao    CC:  Generalized dystonia  DBS Programming visit    Interval history:  Mr. Garcia is a 24 year old right-handed male with history significant for generalized dystonia, with a complex surgical history pertaining DBS, with hardware revision due to suboptimal benefit, who presents to movement disorder clinic for establish care after his previous neurologist left the practice. Last visit was January 15, 2025, with a plan to try new Groups B, C & D.    The patient has found that Group A was the most helpful group and found that increasing the other groups caused worsening shoulder tightness which limited further increases. He found that the amplitudes required to control the neck symptoms his shoulder was intolerable. He feels that overall his neck symptoms are worsening over time. He continues to get Botox in his neck         Hardware data      Right Left   DBS Lead Medtronic Sensight Directional [id number] Medtronic Sensight Directional [id number]   Target GPi GPi   [place] hole Model [id number] Model [id number]   Lead implant date 22   IPG # 1 1   Surgeon Dr. Virginia Coleman        Right   IPG # 1   IPG Medtronic Activa RC model 41292   IPG Location Chest   Extensions Model [id number]   Pocket adapters None   IPG implant date 22   Surgeon Dr. Coleman      Initial surgery at [facility], with good benefit for about 18 months, then gradual loss of benefit. Leads were repositioned in  by Dr Coleman and he has about 20% benefit since repositioning.    Impedances: wnl     Battery level: 100%    JAMES: 4-     ROS:  All others negative except as listed above. Pertinent movement disorders-specific ROS listed above.    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     Depression     Neck pain     PONV (postoperative nausea and vomiting)     S/P deep brain stimulator placement     Torsion dystonia         Past Surgical History:   Procedure Laterality Date    ANESTHESIA OUT OF OR MRI      ANESTHESIA OUT OF OR MRI N/A 7/8/2021    Procedure: ANESTHESIA OUT OF OR Magnetic resonance imaging Brain @1200;  Surgeon: GENERIC ANESTHESIA PROVIDER;  Location: [facility]    ANESTHESIA OUT OF OR MRI N/A 10/8/2021    Procedure: ANESTHESIA OUT OF OR MAGNETIC RESONANCE IMAGING of BRAIN WITH AND WITHOUT CONTRAST@1400;  Surgeon: GENERIC ANESTHESIA PROVIDER;  Location: [facility] OR    EYE SURGERY      IMPLANT DEEP BRAIN STIMULATION GENERATOR / BATTERY Right 4/18/2022    Procedure: Deep brain stimulator placement, phase II, placement of deep brain stimulator generator/battery over the right chest wall;  Surgeon: Ravinder Coleman MD;  Location: [facility] OR    NOSE SURGERY      nasal fracture repair    OPTICAL TRACKING SYSTEM INSERTION DEEP BRAIN STIMULATION Right 4/11/2022    Procedure: stealth assisted Right side deep brain stimulator placement, phase I,  target right globus pallidus internus, with microelectrode recording;  Surgeon: Ravinder Coleman MD;  Location: [facility] OR    OPTICAL TRACKING SYSTEM INSERTION DEEP BRAIN STIMULATION Left 5/2/2022    Procedure: Stealth Assisted Left side deep brain stimulator placement, phase I & II combined, target left globus pallidus internus, with microelectrode recording and connection to existing generator/battery;  Surgeon: Ravinder Coleman MD;  Location: [facility] OR    REMOVE DEPTH ELECTRODES N/A 8/23/2021    Procedure: Removal of entire deep brain stimulation system hardware including intracranial electrodes and battery/generator in chest wall;  Surgeon: Ravinder Coleman MD;  Location: [facility] OR    STEREOTACTIC INSERTION DEEP BRAIN STIMULATION            Current Outpatient Medications:      acetaminophen (TYLENOL) 325 MG tablet, Take 325-650 mg by mouth every 6 hours as needed for mild pain, Disp: , Rfl:     trihexyphenidyl (ARTANE) 2 MG tablet, 1/2 tab twice a day for one week then increase to 1/2 tab three times a day for one week then increase to 1 pills three times a day and stay at that dose. (Patient not taking: Reported on 1/15/2025), Disp: 90 tablet, Rfl: 5    Current Facility-Administered Medications:     botulinum toxin type A (BOTOX) 100 units injection 300 Units, 300 Units, Intramuscular, See Admin Instructions, Tahira Saxena MD, 290 Units at 02/13/24 1610    botulinum toxin type A (BOTOX) 100 units injection 400 Units, 400 Units, Intramuscular, See Admin Instructions, Tahira Saxena MD, 400 Units at 11/14/24 1401     No Known Allergies     Family History   Problem Relation Age of Onset    No Known Problems Mother     No Known Problems Father     No Known Problems Sister     Other - See Comments Brother         di bunny syndrome    Myocardial Infarction Maternal Grandmother     Hyperlipidemia Maternal Grandmother     Hyperlipidemia Maternal Grandfather     Unknown/Adopted Paternal Grandmother     Unknown/Adopted Paternal Grandfather     Deep Vein Thrombosis No family hx of     Anesthesia Reaction No family hx of         Social History:  He  reports that he has never smoked. He has never used smokeless tobacco. He reports that he does not drink alcohol and does not use drugs.     PHYSICAL EXAM:  There were no vitals taken for this visit.     NEURO:  No scales were performed today.  However, we monitored for acute changes related to dystonia and overstimulation.  Notable findings were a retrocollis that was quite acute when the stimulation was reduced, and he had significant wrist flexion and a fist posturing together with biceps contraction when we reduced the stimulation, that was quite uncomfortable for him.  Increments and stimulation delineated under the new settings below quickly  alleviated that discomfort.  We did some trials also of gait around the room to evaluate for possible truncal involvement, and none was appreciated.    DBS groups/programs    Initial    Group (A)                 Side Left Brain-1 Left Brain-1 Left Brain-2 Left Brain-2 Right Brain-1 Right Brain-2 Right Brain-1 Right Brain-2   Initial/Final Initial  Final  Initial  Final    Active/Inactive Active Active Active Active Acitve Active Active Active   Amplitude (V) 2.5 2.0 same same 4.0 5.5 (0.0-7.0)  same same   Pulse width (usec) 60 60    90 90      Freq (Hz) 125  125    125 125      Contacts: 3- C+ 2abc- C+    11- C+ 10a- C+       Best so far. Left unchanged    Group (B)                 Side Left Brain - 1 Left brain-2 Left Brain - 1 Left Brain-2 Right Brain-1 Right brain -2 Right Brain-1 Right Brain-2   Initial/Final Initial  Final  Initial  Final -   Active/Inactive Inactive Inactive Inactive Inactive Inactive Inactive Inactive -   Amplitude (V) 2.5 2.0 same same 4.0 (0.0-4.4) 5.2 (0.0-5.5)  4.9 (0-5.5) -   Pulse width (usec) 60 60    100 120 120 -   Freq (Hz) 125  125    125 125 125 -   Contacts: 3- C+ 2abc- C+    11- 10+ 10a- C+  11-10+  -     Group (C)                 Side Left Brain - 1 Left brain-2 Left Brain - 1 Left Brain-2 Right Brain-1 Right brain -2 Right Brain-1 Right Brain-2   Initial/Final Initial  Final  Initial  Final -   Active/Inactive Inactive Inactive Inactive Inactive Inactive Inactive Inactive -   Amplitude (V) 2.5 2.0 4.0 (0-4.6) same 4.0 (0.0-4.4) 5.2 (0.0-5.5)  3.2 (0-3.6) -   Pulse width (usec) 60 60 60   90 120 120 -   Freq (Hz) 125  125 150   125 125 150 -   Contacts: 3- C+ 2abc- C+ 3- 2+   11- 10+ 10a- C+  11- 10+  -   New today, similar to B, but slightly lower PW on R brain 1, reduce to that if pulling on B    Group (D)                 Side Left Brain - 1 Left brain-2 Left Brain - 1 Left Brain-2 Right Brain-1 Right brain -2 Right Brain-1 Right Brain-2   Initial/Final Initial  Final   Initial  Final -   Active/Inactive Inactive Inactive Inactive Inactive Inactive Inactive Active -   Amplitude (V) 2.5 2.0   3.0 (0-3.4) 5.2 (0.0-5.5)  4.5 (0-5.1) -   Pulse width (usec) 60 60    150 120 150 -   Freq (Hz) 125  125    125 125 125 -   Contacts: 3- C+ 2abc- C+    11- 10+ 10a- C+  11- 10+  -     Programming notes:  Limitation on increased voltage with right shoulder tightness likely representing capsular side effects    DATA REVIEWED:  Reviewed pertinent data available in epic    ASSESSMENT:  24 year old with advanced, medication refractory, generalized dystonia, status post bilateral GPI DBS initially implanted at [facility], with complete hardware revision in 2022 performed by us at [facility], here for reprogramming. Low thresholds limit programming but we are able to find settings with some degree of improvement with adjustments as above.    PLAN:  - Changes as above  - Battery and impedence okay  - RTC in 4 months    Patient seen and discussed with Dr. Duncan Nicole MD  Neuromodulation/Movement Disorders Fellow

## 2025-04-28 NOTE — LETTER
2025      Bradley Garcia  [address] [address] [address]      Dear Colleague,    Thank you for referring your patient, Bradley Garcia, to the [facility]. Please see a copy of my visit note below.    Department of Neurology  Movement Disorders Division   Return Patient Visit    Patient: Bradley Garcia   MRN: [id number]   : 2000   Date of Visit: 2025  PCP: Tatyana Low PA-C   Referring provider: Rao    CC:  Generalized dystonia  DBS Programming visit    Interval history:  Mr. Garcia is a 24 year old right-handed male with history significant for generalized dystonia, with a complex surgical history pertaining DBS, with hardware revision due to suboptimal benefit, who presents to movement disorder clinic for establish care after his previous neurologist left the practice. Last visit was January 15, 2025, with a plan to try new Groups B, C & D.    The patient has found that Group A was the most helpful group and found that increasing the other groups caused worsening shoulder tightness which limited further increases. He found that the amplitudes required to control the neck symptoms his shoulder was intolerable. He feels that overall his neck symptoms are worsening over time. He continues to get Botox in his neck         Hardware data      Right Left   DBS Lead Medtronic Sensight Directional [id number] Medtronic Sensight Directional [id number]   Target GPi GPi   [place] hole Model [id number] Model [id number]   Lead implant date 22   IPG # 1 1   Surgeon Dr. Virginia Coleman        Right   IPG # 1   IPG Medtronic Activa RC model 04426   IPG Location Chest   Extensions Model [id number]   Pocket adapters None   IPG implant date 22   Surgeon Dr. Coleman      Initial surgery at [facility], with good benefit for about 18 months, then gradual loss of benefit. Leads were repositioned in  by Dr Coleman and he has about 20% benefit since  repositioning.    Impedances: wnl     Battery level: 100%    JAMES: 4-     ROS:  All others negative except as listed above. Pertinent movement disorders-specific ROS listed above.    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      Depression      Neck pain      PONV (postoperative nausea and vomiting)      S/P deep brain stimulator placement      Torsion dystonia         Past Surgical History:   Procedure Laterality Date     ANESTHESIA OUT OF OR MRI       ANESTHESIA OUT OF OR MRI N/A 7/8/2021    Procedure: ANESTHESIA OUT OF OR Magnetic resonance imaging Brain @1200;  Surgeon: GENERIC ANESTHESIA PROVIDER;  Location: [facility]     ANESTHESIA OUT OF OR MRI N/A 10/8/2021    Procedure: ANESTHESIA OUT OF OR MAGNETIC RESONANCE IMAGING of BRAIN WITH AND WITHOUT CONTRAST@1400;  Surgeon: GENERIC ANESTHESIA PROVIDER;  Location: [facility] OR     EYE SURGERY       IMPLANT DEEP BRAIN STIMULATION GENERATOR / BATTERY Right 4/18/2022    Procedure: Deep brain stimulator placement, phase II, placement of deep brain stimulator generator/battery over the right chest wall;  Surgeon: Ravinder Coleman MD;  Location: [facility] OR     NOSE SURGERY      nasal fracture repair     OPTICAL TRACKING SYSTEM INSERTION DEEP BRAIN STIMULATION Right 4/11/2022    Procedure: stealth assisted Right side deep brain stimulator placement, phase I,  target right globus pallidus internus, with microelectrode recording;  Surgeon: Ravinder Coleman MD;  Location: [facility] OR     OPTICAL TRACKING SYSTEM INSERTION DEEP BRAIN STIMULATION Left 5/2/2022    Procedure: Stealth Assisted Left side deep brain stimulator placement, phase I & II combined, target left globus pallidus internus, with microelectrode recording and connection to existing generator/battery;  Surgeon: Ravinder Coleman MD;  Location:  OR     REMOVE DEPTH ELECTRODES N/A 8/23/2021    Procedure: Removal  of entire deep brain stimulation system hardware including intracranial electrodes and battery/generator in chest wall;  Surgeon: Ravinder Coleman MD;  Location: [facility] OR     STEREOTACTIC INSERTION DEEP BRAIN STIMULATION            Current Outpatient Medications:      acetaminophen (TYLENOL) 325 MG tablet, Take 325-650 mg by mouth every 6 hours as needed for mild pain, Disp: , Rfl:      trihexyphenidyl (ARTANE) 2 MG tablet, 1/2 tab twice a day for one week then increase to 1/2 tab three times a day for one week then increase to 1 pills three times a day and stay at that dose. (Patient not taking: Reported on 1/15/2025), Disp: 90 tablet, Rfl: 5    Current Facility-Administered Medications:      botulinum toxin type A (BOTOX) 100 units injection 300 Units, 300 Units, Intramuscular, See Admin Instructions, Tahira Saxena MD, 290 Units at 02/13/24 1610     botulinum toxin type A (BOTOX) 100 units injection 400 Units, 400 Units, Intramuscular, See Admin Instructions, Tahira Saxena MD, 400 Units at 11/14/24 1401     No Known Allergies     Family History   Problem Relation Age of Onset     No Known Problems Mother      No Known Problems Father      No Known Problems Sister      Other - See Comments Brother         di bunny syndrome     Myocardial Infarction Maternal Grandmother      Hyperlipidemia Maternal Grandmother      Hyperlipidemia Maternal Grandfather      Unknown/Adopted Paternal Grandmother      Unknown/Adopted Paternal Grandfather      Deep Vein Thrombosis No family hx of      Anesthesia Reaction No family hx of         Social History:  He  reports that he has never smoked. He has never used smokeless tobacco. He reports that he does not drink alcohol and does not use drugs.     PHYSICAL EXAM:  There were no vitals taken for this visit.     NEURO:  No scales were performed today.  However, we monitored for acute changes related to dystonia and overstimulation.  Notable findings were a retrocollis that was  quite acute when the stimulation was reduced, and he had significant wrist flexion and a fist posturing together with biceps contraction when we reduced the stimulation, that was quite uncomfortable for him.  Increments and stimulation delineated under the new settings below quickly alleviated that discomfort.  We did some trials also of gait around the room to evaluate for possible truncal involvement, and none was appreciated.    DBS groups/programs    Initial    Group (A)                 Side Left Brain-1 Left Brain-1 Left Brain-2 Left Brain-2 Right Brain-1 Right Brain-2 Right Brain-1 Right Brain-2   Initial/Final Initial  Final  Initial  Final    Active/Inactive Active Active Active Active Acitve Active Active Active   Amplitude (V) 2.5 2.0 same same 4.0 5.5 (0.0-7.0)  same same   Pulse width (usec) 60 60    90 90      Freq (Hz) 125  125    125 125      Contacts: 3- C+ 2abc- C+    11- C+ 10a- C+       Best so far. Left unchanged    Group (B)                 Side Left Brain - 1 Left brain-2 Left Brain - 1 Left Brain-2 Right Brain-1 Right brain -2 Right Brain-1 Right Brain-2   Initial/Final Initial  Final  Initial  Final -   Active/Inactive Inactive Inactive Inactive Inactive Inactive Inactive Inactive -   Amplitude (V) 2.5 2.0 same same 4.0 (0.0-4.4) 5.2 (0.0-5.5)  4.9 (0-5.5) -   Pulse width (usec) 60 60    100 120 120 -   Freq (Hz) 125  125    125 125 125 -   Contacts: 3- C+ 2abc- C+    11- 10+ 10a- C+  11-10+  -     Group (C)                 Side Left Brain - 1 Left brain-2 Left Brain - 1 Left Brain-2 Right Brain-1 Right brain -2 Right Brain-1 Right Brain-2   Initial/Final Initial  Final  Initial  Final -   Active/Inactive Inactive Inactive Inactive Inactive Inactive Inactive Inactive -   Amplitude (V) 2.5 2.0 4.0 (0-4.6) same 4.0 (0.0-4.4) 5.2 (0.0-5.5)  3.2 (0-3.6) -   Pulse width (usec) 60 60 60   90 120 120 -   Freq (Hz) 125  125 150   125 125 150 -   Contacts: 3- C+ 2abc- C+ 3- 2+   11- 10+ 10a- C+  11-  10+  -   New today, similar to B, but slightly lower PW on R brain 1, reduce to that if pulling on B    Group (D)                 Side Left Brain - 1 Left brain-2 Left Brain - 1 Left Brain-2 Right Brain-1 Right brain -2 Right Brain-1 Right Brain-2   Initial/Final Initial  Final  Initial  Final -   Active/Inactive Inactive Inactive Inactive Inactive Inactive Inactive Active -   Amplitude (V) 2.5 2.0   3.0 (0-3.4) 5.2 (0.0-5.5)  4.5 (0-5.1) -   Pulse width (usec) 60 60    150 120 150 -   Freq (Hz) 125  125    125 125 125 -   Contacts: 3- C+ 2abc- C+    11- 10+ 10a- C+  11- 10+  -     Programming notes:  Limitation on increased voltage with right shoulder tightness likely representing capsular side effects    DATA REVIEWED:  Reviewed pertinent data available in epic    ASSESSMENT:  24 year old with advanced, medication refractory, generalized dystonia, status post bilateral GPI DBS initially implanted at [facility], with complete hardware revision in 2022 performed by us at [facility], here for reprogramming. Low thresholds limit programming but we are able to find settings with some degree of improvement with adjustments as above.    PLAN:  - Changes as above  - Battery and impedence okay  - RTC in 4 months    Patient seen and discussed with Dr. Duncan Nicole MD  Neuromodulation/Movement Disorders Fellow            Attestation signed by Rodri White MD at 5/12/2025  1:21 PM:  Attestation entered as a separate note.      I, Rodri Madrigal MD, personally interviewed, examined and evaluated this patient. I personally reviewed the vital signs, medications and pertinent labs/imaging. I discussed the patient with Dr Nicole and agree with the assessment, examination and plan of care documented, with the additions and/or exceptions delineated below:    Overall not much of a change, we have been exploring the upper portion of his electrode which seems to be  closer to the area that causes therapeutic benefit, but higher amplitude seems to be running into capsular side effects which have limited program ability.    Nonetheless, not much of a major progression is seen since last time she was seen by us.  We gave him some new options to explore today and we will see how he does longitudinally.  Next scan in the next 4 to 6 months.  Sooner if needed.  Encouraged to contact us back if any questions or concerns in the meantime.    Attending attestation: Today I spent a total of 25 minutes on this case, in face-to-face, examining, obtaining history, providing education and coordination of care. Additional time was spent in chart review, ancillary data, and documentation as delineated above.     Additional time spent for separate DBS programmin min DBS analyzed, device checked for integrity, with (with/without) simple (simple/complex) programming as detailed above.        Again, thank you for allowing me to participate in the care of your patient.        Sincerely,        Rodri Madrigal MD    Electronically signed Ipledge Number (Optional): [id number] Anticipated Starting Dosage (Optional): 40mg Daily Detail Level: Zone

## 2025-04-28 NOTE — TELEPHONE ENCOUNTER
Called patient to see if he is able to attend appointment earlier today.   Patient states that he already has a planned  and does not want to mess up the drivers schedule.     Advised patient if he is able to come a bit sooner it would be much appreciated but we do understand and look forward to seeing him today.    Patient understands.     Daphne Nunez MA

## 2025-05-12 NOTE — PROGRESS NOTES
I, Rodri Madrigal MD, personally interviewed, examined and evaluated this patient. I personally reviewed the vital signs, medications and pertinent labs/imaging. I discussed the patient with Dr Nicole and agree with the assessment, examination and plan of care documented, with the additions and/or exceptions delineated below:    Overall not much of a change, we have been exploring the upper portion of his electrode which seems to be closer to the area that causes therapeutic benefit, but higher amplitude seems to be running into capsular side effects which have limited program ability.    Nonetheless, not much of a major progression is seen since last time she was seen by us.  We gave him some new options to explore today and we will see how he does longitudinally.  Next scan in the next 4 to 6 months.  Sooner if needed.  Encouraged to contact us back if any questions or concerns in the meantime.    Attending attestation: Today I spent a total of 25 minutes on this case, in face-to-face, examining, obtaining history, providing education and coordination of care. Additional time was spent in chart review, ancillary data, and documentation as delineated above.     Additional time spent for separate DBS programmin min DBS analyzed, device checked for integrity, with (with/without) simple (simple/complex) programming as detailed above.

## 2025-07-13 ENCOUNTER — HEALTH MAINTENANCE LETTER (OUTPATIENT)
Age: 25
End: 2025-07-13

## 2025-07-20 NOTE — PROGRESS NOTES
Movement Disorders Botulinum Toxin Clinic Note    Diagnosis: Dystonia  DBS Target(s): Bilateral GPi  Date(s) of DBS Lead Placement: R 4/11/2022, L 5/2/2022  Date(s) of IPG Placement: R chest 4/18/2022  Device: Medtronic Activa RC    Chief Complaint: Cervical Dystonia    History of Present Illness:  Bradley Garcia is a 24 year old male who presents to clinic for botulinum toxin injections for treatment of cervical dystonia.    Response to Last Injection: 5/1/2025. Distribution was changed to better target retrocollis with a return to Trapezius and R SCM injections, increased dosage in Splenius Capitis, and skipped injections to the Semispinalis Capitis and Obliquus Capitis Inferior    Onset: About one day, he notices benefit very quickly    Benefit from last injections: Felt like didn't get as much benefit from his last set of injections. The superior part of his neck also felt more tight compared to prior injections rounds    Improvement in function/activity: It makes everything easier. All ADLs are easier when botulinum toxin is working. Easier to drive.     Wearing off: After about 1-2 weeks. Reports that he was previously having a much longer duration of benefit and so is wondering if he's developing antibodies    Side effects: None    Additional History:   Continues to follow with Dr Song for DBS programming. Last seen on 4/28/2025 where some changes to programming were made. This has helped with other parts of his dystonia, but not so much his neck    Current Outpatient Medications   Medication Sig Dispense Refill    acetaminophen (TYLENOL) 325 MG tablet Take 325-650 mg by mouth every 6 hours as needed for mild pain         Allergies: He has no known allergies.    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     Depression     Neck pain     PONV (postoperative nausea and vomiting)     S/P deep brain stimulator  placement     Torsion dystonia        Past Surgical History:   Procedure Laterality Date    ANESTHESIA OUT OF OR MRI      ANESTHESIA OUT OF OR MRI N/A 7/8/2021    Procedure: ANESTHESIA OUT OF OR Magnetic resonance imaging Brain @1200;  Surgeon: GENERIC ANESTHESIA PROVIDER;  Location: [facility]    ANESTHESIA OUT OF OR MRI N/A 10/8/2021    Procedure: ANESTHESIA OUT OF OR MAGNETIC RESONANCE IMAGING of BRAIN WITH AND WITHOUT CONTRAST@1400;  Surgeon: GENERIC ANESTHESIA PROVIDER;  Location: [facility] OR    EYE SURGERY      IMPLANT DEEP BRAIN STIMULATION GENERATOR / BATTERY Right 4/18/2022    Procedure: Deep brain stimulator placement, phase II, placement of deep brain stimulator generator/battery over the right chest wall;  Surgeon: Ravindre Coleman MD;  Location: [facility] OR    NOSE SURGERY      nasal fracture repair    OPTICAL TRACKING SYSTEM INSERTION DEEP BRAIN STIMULATION Right 4/11/2022    Procedure: stealth assisted Right side deep brain stimulator placement, phase I,  target right globus pallidus internus, with microelectrode recording;  Surgeon: Ravinder Coleman MD;  Location: [facility]    OPTICAL TRACKING SYSTEM INSERTION DEEP BRAIN STIMULATION Left 5/2/2022    Procedure: Stealth Assisted Left side deep brain stimulator placement, phase I & II combined, target left globus pallidus internus, with microelectrode recording and connection to existing generator/battery;  Surgeon: Ravinder Coleman MD;  Location: [facility]    REMOVE DEPTH ELECTRODES N/A 8/23/2021    Procedure: Removal of entire deep brain stimulation system hardware including intracranial electrodes and battery/generator in chest wall;  Surgeon: Ravinder Coleman MD;  Location: [facility] OR    STEREOTACTIC INSERTION DEEP BRAIN STIMULATION         Social History     Socioeconomic History    Marital status: Single     Spouse name: Not on file    Number of children: 0    Years of education: Not on file    Highest education level: Not on file   Occupational History     "Occupation: unemployed   Tobacco Use    Smoking status: Never    Smokeless tobacco: Never   Substance and Sexual Activity    Alcohol use: Never    Drug use: Never    Sexual activity: Not on file   Other Topics Concern    Not on file   Social History Narrative    Not on file     Social Drivers of Health     Financial Resource Strain: Not on file   Food Insecurity: Not on file   Transportation Needs: Not on file   Physical Activity: Not on file   Stress: Not on file   Social Connections: Not on file   Interpersonal Safety: Not on file   Housing Stability: Not on file       Family History   Problem Relation Age of Onset    No Known Problems Mother     No Known Problems Father     No Known Problems Sister     Other - See Comments Brother         di bunny syndrome    Myocardial Infarction Maternal Grandmother     Hyperlipidemia Maternal Grandmother     Hyperlipidemia Maternal Grandfather     Unknown/Adopted Paternal Grandmother     Unknown/Adopted Paternal Grandfather     Deep Vein Thrombosis No family hx of     Anesthesia Reaction No family hx of        Physical Examination:  Vital Signs:   vitals were not taken for this visit.   Severe retrocollis  Right shoulder elevation  Reported tightness at left side, back of the neck when looking to the left. Less tightness when looking to the right but some still present.   To lower head, needs to look to the left and then down.   Often exhibits jaw opening and jutting to the left but patient reports he does not feel a pulling sensation encouraging this movement and that his jaw \"just sits like that\". He is less bothered by his jaw and more so his severe retrocollis    BOTULINUM NEUROTOXIN INJECTION PROCEDURES:    VERIFICATION OF PATIENT IDENTIFICATION AND PROCEDURE     Initials   Patient Name KD   Patient  KD   Procedure Verified by: MURIEL     Above assessments performed by:  Alyson Yeh DO    INDICATION/S FOR PROCEDURE/S:  Bradley Garcia is a 24 year old patient with " dystonia affecting the  head, neck and shoulder girdle musculature secondary to a diagnosis of generalized dystonia with associated  tremor, spasms, loss of volitional motor control and difficulty with activities of daily living.     His baseline symptoms have been recalcitrant to oral medications and conservative therapy.  He is here today for an injection of Botox.      GOAL OF PROCEDURE:  The goal of this procedure is to improve volitional motor control associated with dystonic movements.    TOTAL DOSE ADMINISTERED:  Dose Administered: 490 units Botox    Diluent Used:  Preservative Free Normal Saline  Total Volume of Diluent Used: 4 ml  NDC #: Botox 100u ([address]-25)    CONSENT:  The risks, benefits, and treatment options were discussed with Bradley Garcia and he agreed to proceed.      Written consent was obtained by mb.     EQUIPMENT USED:  Needle-37mm stimulating/recording  EMG/NCS Machine    SKIN PREPARATION:  Skin preparation was performed using an alcohol wipe.    GUIDANCE DESCRIPTION:  Electro-myographic guidance was necessary throughout the procedure to accurately identify all areas of dystonic muscles while avoiding injection of non-dystonic muscles, neighboring nerves and nearby vascular structures.     AREA/MUSCLE INJECTED:    Visual display of locations injections are scanned into the chart under MEDIA tab.    Muscles Injected Units Injected Number of Injections   Left splenius capitis 50, 50 (100) 2   Left obliquus capitis inferior 0 (0) 0   Left semispinalis capitis 25, 25 (0) 2   Left semispinalis cervicis 25, 25 (50) 2   Left splenius cervicis 20, 20 2   Left cervical paraspinals 20 (0) 1   Left trapezius 0 (0) 0        Right splenius capitis 40, 40 (80) 2   Right obliquus capitis inferior 0 (0) 0   Right semispinalis capitis 25, 25 (0) 2   Right semispinalis cervicis 25, 25 (50) 2   Right splenius cervicis 25, 15 2   Right cervical paraspinals 0 (0) 0   Right SCM 0 (0) 0   Right Trapezius 0  (50) 0        L Frontalis (Frontalis Test) 10 1        Total Units Injected: 490    Unavoidable Waste: 10    Total Units Billed 500      The patient tolerated the injections without difficulty.    Assessment:    Bradley Garcia is a 24 year old male with generalized dystonia.  Today we did repeat botulinum toxin injections but made changes to dose and distribution given the patient's report or tightness in the superior part of the neck. We increased the overall dose from 400 -> 500u. To do so, we re-added injection sites at the bilateral semispinalis capitis and L cervical paraspinals. We also added injection sites to the bilateral splenius cervicis and did not inject the trapezius. The patient also has concerns that he may be developing a resistance to Botox and so is wondering if alternative formulations would beneficial. To assess this today, we will perform a frontalis test    Plan:  Message via [employer] or call the clinic in 1 month with a picture/video of your forehead. This will help us determine if you are forming a resistance to Botox or not  Avoid heat and cold pack and massaging the areas injected for 24 hours post injections  Follow-up in 3 and 6 months to consider repeat injections    This patient was seen with Movement Disorder Specialist, Dr Yeh, who agrees with the above plan    Kira Madden MD  Movement Disorder Fellow

## 2025-07-24 ENCOUNTER — OFFICE VISIT (OUTPATIENT)
Dept: NEUROLOGY | Facility: CLINIC | Age: 25
End: 2025-07-24
Payer: MEDICARE

## 2025-07-24 DIAGNOSIS — Z96.89 S/P DEEP BRAIN STIMULATOR PLACEMENT: ICD-10-CM

## 2025-07-24 DIAGNOSIS — G24.3 CERVICAL DYSTONIA: Primary | ICD-10-CM

## 2025-07-24 PROCEDURE — 64616 CHEMODENERV MUSC NECK DYSTON: CPT | Mod: 50 | Performed by: PSYCHIATRY & NEUROLOGY

## 2025-07-24 PROCEDURE — 95874 GUIDE NERV DESTR NEEDLE EMG: CPT | Mod: GC | Performed by: PSYCHIATRY & NEUROLOGY

## 2025-07-24 NOTE — LETTER
7/24/2025       RE: Bradley Garcia  [address] [facility] [id number]     Dear Colleague,    Thank you for referring your patient, Bradley Garcia, to the [facility] [place] [facility]. Please see a copy of my visit note below.    Movement Disorders Botulinum Toxin Clinic Note    Diagnosis: Dystonia  DBS Target(s): Bilateral GPi  Date(s) of DBS Lead Placement: R 4/11/2022, L 5/2/2022  Date(s) of IPG Placement: R chest 4/18/2022  Device: Med[employer] Activa RC    Chief Complaint: Cervical Dystonia    History of Present Illness:  Bradley Garcia is a 24 year old male who presents to clinic for botulinum toxin injections for treatment of cervical dystonia.    Response to Last Injection: 5/1/2025. Distribution was changed to better target retrocollis with a return to Trapezius and R SCM injections, increased dosage in Splenius Capitis, and skipped injections to the Semispinalis Capitis and Obliquus Capitis Inferior    Onset: About one day, he notices benefit very quickly    Benefit from last injections: Felt like didn't get as much benefit from his last set of injections. The superior part of his neck also felt more tight compared to prior injections rounds    Improvement in function/activity: It makes everything easier. All ADLs are easier when botulinum toxin is working. Easier to drive.     Wearing off: After about 1-2 weeks. Reports that he was previously having a much longer duration of benefit and so is wondering if he's developing antibodies    Side effects: None    Additional History:   Continues to follow with Dr Song for DBS programming. Last seen on 4/28/2025 where some changes to programming were made. This has helped with other parts of his dystonia, but not so much his neck    Current Outpatient Medications   Medication Sig Dispense Refill     acetaminophen (TYLENOL) 325 MG tablet Take 325-650 mg by mouth every 6 hours as  needed for mild pain         Allergies: He has no known allergies.    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      Depression      Neck pain      PONV (postoperative nausea and vomiting)      S/P deep brain stimulator placement      Torsion dystonia        Past Surgical History:   Procedure Laterality Date     ANESTHESIA OUT OF OR MRI       ANESTHESIA OUT OF OR MRI N/A 7/8/2021    Procedure: ANESTHESIA OUT OF OR Magnetic resonance imaging Brain @1200;  Surgeon: GENERIC ANESTHESIA PROVIDER;  [facility]: [facility]     ANESTHESIA OUT OF OR MRI N/A 10/8/2021    Procedure: ANESTHESIA OUT OF OR MAGNETIC RESONANCE IMAGING of BRAIN WITH AND WITHOUT CONTRAST@1400;  Surgeon: GENERIC ANESTHESIA PROVIDER;  Location: [facility] OR     EYE SURGERY       IMPLANT DEEP BRAIN STIMULATION GENERATOR / BATTERY Right 4/18/2022    Procedure: Deep brain stimulator placement, phase II, placement of deep brain stimulator generator/battery over the right chest wall;  Surgeon: Ravinder Coleman MD;  [facility]     NOSE SURGERY      nasal fracture repair     OPTICAL TRACKING SYSTEM INSERTION DEEP BRAIN STIMULATION Right 4/11/2022    Procedure: stealth assisted Right side deep brain stimulator placement, phase I,  target right globus pallidus internus, with microelectrode recording;  Surgeon: Ravinder Coleman MD;  Location: [facility] OR     OPTICAL TRACKING SYSTEM INSERTION DEEP BRAIN STIMULATION Left 5/2/2022    Procedure: Stealth Assisted Left side deep brain stimulator placement, phase I & II combined, target left globus pallidus internus, with microelectrode recording and connection to existing generator/battery;  Surgeon: Ravinder Coleman MD;  Location:  OR     REMOVE DEPTH ELECTRODES N/A 8/23/2021    Procedure: Removal of entire deep brain stimulation system hardware including intracranial electrodes and battery/generator in chest wall;   "Surgeon: Ravinder Coleman MD;  Location: [facility] OR     STEREOTACTIC INSERTION DEEP BRAIN STIMULATION         Social History     Socioeconomic History     Marital status: Single     Spouse name: Not on file     Number of children: 0     Years of education: Not on file     Highest education level: Not on file   Occupational History     Occupation: unemployed   Tobacco Use     Smoking status: Never     Smokeless tobacco: Never   Substance and Sexual Activity     Alcohol use: Never     Drug use: Never     Sexual activity: Not on file   Other Topics Concern     Not on file   Social History Narrative     Not on file     Social Drivers of Health     Financial Resource Strain: Not on file   Food Insecurity: Not on file   Transportation Needs: Not on file   Physical Activity: Not on file   Stress: Not on file   Social Connections: Not on file   Interpersonal Safety: Not on file   Housing Stability: Not on file       Family History   Problem Relation Age of Onset     No Known Problems Mother      No Known Problems Father      No Known Problems Sister      Other - See Comments Brother         di bunny syndrome     Myocardial Infarction Maternal Grandmother      Hyperlipidemia Maternal Grandmother      Hyperlipidemia Maternal Grandfather      Unknown/Adopted Paternal Grandmother      Unknown/Adopted Paternal Grandfather      Deep Vein Thrombosis No family hx of      Anesthesia Reaction No family hx of        Physical Examination:  Vital Signs:   vitals were not taken for this visit.   Severe retrocollis  Right shoulder elevation  Reported tightness at left side, back of the neck when looking to the left. Less tightness when looking to the right but some still present.   To lower head, needs to look to the left and then down.   Often exhibits jaw opening and jutting to the left but patient reports he does not feel a pulling sensation encouraging this movement and that his jaw \"just sits like that\". He is less bothered by his jaw " and more so his severe retrocollis    BOTULINUM NEUROTOXIN INJECTION PROCEDURES:    VERIFICATION OF PATIENT IDENTIFICATION AND PROCEDURE     Initials   Patient Name KD   Patient  KD   Procedure Verified by: MURIEL     Above assessments performed by:  Alyson Yeh DO    INDICATION/S FOR PROCEDURE/S:  Bradley Garcia is a 24 year old patient with dystonia affecting the  head, neck and shoulder girdle musculature secondary to a diagnosis of generalized dystonia with associated  tremor, spasms, loss of volitional motor control and difficulty with activities of daily living.     His baseline symptoms have been recalcitrant to oral medications and conservative therapy.  He is here today for an injection of Botox.      GOAL OF PROCEDURE:  The goal of this procedure is to improve volitional motor control associated with dystonic movements.    TOTAL DOSE ADMINISTERED:  Dose Administered: 490 units Botox    Diluent Used:  Preservative Free Normal Saline  Total Volume of Diluent Used: 4 ml  NDC #: Botox 100u ([id number])    CONSENT:  The risks, benefits, and treatment options were discussed with Bradley Garcia and he agreed to proceed.      Written consent was obtained by mb.     EQUIPMENT USED:  Needle-37mm stimulating/recording  EMG/NCS Machine    SKIN PREPARATION:  Skin preparation was performed using an alcohol wipe.    GUIDANCE DESCRIPTION:  Electro-myographic guidance was necessary throughout the procedure to accurately identify all areas of dystonic muscles while avoiding injection of non-dystonic muscles, neighboring nerves and nearby vascular structures.     AREA/MUSCLE INJECTED:    Visual display of locations injections are scanned into the chart under MEDIA tab.    Muscles Injected Units Injected Number of Injections   Left splenius capitis 50, 50 (100) 2   Left obliquus capitis inferior 0 (0) 0   Left semispinalis capitis 25, 25 (0) 2   Left semispinalis cervicis 25, 25 (50) 2   Left splenius cervicis 20,  20 2   Left cervical paraspinals 20 (0) 1   Left trapezius 0 (0) 0        Right splenius capitis 40, 40 (80) 2   Right obliquus capitis inferior 0 (0) 0   Right semispinalis capitis 25, 25 (0) 2   Right semispinalis cervicis 25, 25 (50) 2   Right splenius cervicis 25, 15 2   Right cervical paraspinals 0 (0) 0   Right SCM 0 (0) 0   Right Trapezius 0 (50) 0        L Frontalis (Frontalis Test) 10 1        Total Units Injected: 490    Unavoidable Waste: 10    Total Units Billed 500      The patient tolerated the injections without difficulty.    Assessment:    Bradley Garcia is a 24 year old male with generalized dystonia.  Today we did repeat botulinum toxin injections but made changes to dose and distribution given the patient's report or tightness in the superior part of the neck. We increased the overall dose from 400 -> 500u. To do so, we re-added injection sites at the bilateral semispinalis capitis and L cervical paraspinals. We also added injection sites to the bilateral splenius cervicis and did not inject the trapezius. The patient also has concerns that he may be developing a resistance to Botox and so is wondering if alternative formulations would beneficial. To assess this today, we will perform a frontalis test    Plan:  Message via [email or web address] or call the clinic in 1 month with a picture/video of your forehead. This will help us determine if you are forming a resistance to Botox or not  Avoid heat and cold pack and massaging the areas injected for 24 hours post injections  Follow-up in 3 and 6 months to consider repeat injections    This patient was seen with Movement Disorder Specialist, Dr Yeh, who agrees with the above plan    Kira Madden MD  Movement Disorder Fellow    Attestation signed by Alyson Yeh DO at 7/31/2025  1:36 PM:   I was present for the entire Botulinum toxin injection performed on 07/31/2025 and reported by Dr. Madden and was available to take over any critical portions of the  exam as needed.  I agree entirely of the report and impression as recorded by Dr. Madden.     Alyson Yeh, DO  Movement Disorders Specialist  [facility]       Again, thank you for allowing me to participate in the care of your patient.      Sincerely,    Alyson Yeh DO

## 2025-07-31 NOTE — PATIENT INSTRUCTIONS
Plan:  Message via [employer] or call the clinic in 1 month with a picture/video of your forehead. This will help us determine if you are forming a resistance to Botox or not  Avoid heat and cold pack and massaging the areas injected for 24 hours post injections  Follow-up in 3 and 6 months to consider repeat injections    Botox side effects:    Botox starts to take effect 3-7 days after the injections and peaks 14 days post injections. If side effects occur they typically resolve in weeks to months.     All injections:   Redness/warmth  Swelling  Drainage/infection  Pain/discomfort  Bleeding  Muscle spasms  Muscle pain  flu syndrome, increased cough  Dizziness    Cervical dystonia and oromandibular dystonia injections:    Excessive weakness with holding the head up, or chewing  Trouble swallowing, speaking or breathing  Headache  Rhinitis  Upper respiratory infection  Injections into the levator scapulae may be associated with an  increased risk of upper respiratory infection and dysphagia    Sometimes a new type of achy or sharp pain after botox injection reflects that the injected dose was a bit too high. Not enough to cause weakness, but enough that it can cause cramping in other muscles that are compensating for the botox effects. If this were to occur one would expect it to happen once the botox starts to kick in, not within the first few days. Be reassured that this should improve as the botox wears off and then we will adjust the dosage accordingly at the next appointment.     In rare cases, the spread of toxin effect can occur. The effect of botulinum toxin may affect areas away from the injection site and cause serious symptoms including: loss of strength and all-over muscle weakness, double vision, blurred vision and drooping eyelids, hoarseness or change or loss of voice, trouble saying words clearly, loss of bladder control, trouble breathing, and trouble swallowing    Information collected from the  products insert and BOTOX safety information web site:  https://[employer]ie[employer].StarChase.ClearStar/Spot formerly [employer]/snapp.mevis/media/zhhaprxq-iem-pyqnuqxeg/product-prescribing/20190620-BOTOX-[email or web address]-200-Units-v3-0USPI1145-v2-0MG1145.pdf    https://www.botoxchronicmigraine.com/botox-chronic-migraine

## 2025-08-18 ENCOUNTER — OFFICE VISIT (OUTPATIENT)
Dept: NEUROLOGY | Facility: CLINIC | Age: 25
End: 2025-08-18
Payer: MEDICARE

## 2025-08-18 VITALS
HEART RATE: 78 BPM | OXYGEN SATURATION: 97 % | BODY MASS INDEX: 23.95 KG/M2 | SYSTOLIC BLOOD PRESSURE: 117 MMHG | DIASTOLIC BLOOD PRESSURE: 79 MMHG | WEIGHT: 148.4 LBS

## 2025-08-18 DIAGNOSIS — G24.3 CERVICAL DYSTONIA: Primary | ICD-10-CM

## 2025-08-18 DIAGNOSIS — Z45.42 ENCOUNTER FOR FITTING AND ADJUSTMENT OF DEEP BRAIN STIMULATOR: ICD-10-CM

## (undated) DEVICE — ESU CORD BIPOLAR GREEN 10-4000

## (undated) DEVICE — PAD CHUX UNDERPAD 23X24" 7136

## (undated) DEVICE — NDL 25GA 2"  8881200441

## (undated) DEVICE — WIRELESS RECHARGER

## (undated) DEVICE — CATH TRAY FOLEY SURESTEP 16FR W/TMP PRB STLK LATEX A319416AM

## (undated) DEVICE — DRSG PRIMAPORE 03 1/8X6" 66000318

## (undated) DEVICE — LINEN TOWEL PACK X30 5481

## (undated) DEVICE — DRAPE POUCH IRR 1016

## (undated) DEVICE — SPONGE COTTONOID 1/2X1/2" 20-04S

## (undated) DEVICE — BUR STRK CARBIDE ROUND 5.0MM 5820-110-050C

## (undated) DEVICE — GLOVE PROTEXIS W/NEU-THERA 7.5  2D73TE75

## (undated) DEVICE — SU MONOCRYL 4-0 PS-2 27" UND Y426H

## (undated) DEVICE — GLOVE PROTEXIS MICRO 7.5  2D73PM75

## (undated) DEVICE — SU VICRYL 3-0 RB-1 18" J713D

## (undated) DEVICE — STPL SKIN 35W ROTATING HEAD PRW35

## (undated) DEVICE — SOL WATER IRRIG 1000ML BOTTLE 2F7114

## (undated) DEVICE — ELEC MICROPHONICS-FREE ALPHA OMEGA STR-009080-00

## (undated) DEVICE — ESU ELEC BLADE 2.75" COATED/INSULATED E1455

## (undated) DEVICE — SU ETHILON 3-0 PS-1 18" 1663H

## (undated) DEVICE — SUCTION MANIFOLD NEPTUNE 2 SYS 4 PORT 0702-020-000

## (undated) DEVICE — DRSG PRIMAPORE 02X3" 7133

## (undated) DEVICE — PACK NEURO MINOR UMMC SNE32MNMU4

## (undated) DEVICE — BLADE KNIFE SURG 11 371111

## (undated) DEVICE — DRAPE IOBAN ISOLATION VERTICAL 320X21CM 6617

## (undated) DEVICE — PACK GOWN 3/PK DISP XL SBA32GPFCB

## (undated) DEVICE — SYR BULB IRRIG DOVER 60 ML LATEX FREE 67000

## (undated) DEVICE — PREP CHLORAPREP 26ML TINTED HI-LITE ORANGE 930815

## (undated) DEVICE — BLADE CLIPPER SGL USE 9680

## (undated) DEVICE — DRSG TEGADERM 4X4 3/4" 1626W

## (undated) DEVICE — PREP SKIN SCRUB TRAY 4461A

## (undated) DEVICE — PREP POVIDONE-IODINE 7.5% SCRUB 4OZ BOTTLE MDS093945

## (undated) DEVICE — DRAPE MAYO STAND 23X54 8337

## (undated) DEVICE — RX BACITRACIN OINTMENT 0.9G 1/32OZ CUR001109

## (undated) DEVICE — DRAPE C-ARM W/STRAPS 42X72" 07-CA104

## (undated) DEVICE — SUTURE BOOTS 051003PBX

## (undated) DEVICE — LINEN TOWEL PACK X6 WHITE 5487

## (undated) DEVICE — PATIENT PROGRAMMER

## (undated) DEVICE — DECANTER VIAL 2006S

## (undated) DEVICE — SU VICRYL 0 CT-2 27" J334H

## (undated) DEVICE — ESU PENCIL W/ROCKER SWITCH BLADE HOLSTER E2350HDB

## (undated) DEVICE — SENSIGHT EXTENSION TUNNELER KIT

## (undated) DEVICE — JELLY LUBRICATING SURGILUBE 2OZ TUBE

## (undated) DEVICE — DRSG TELFA 3X8" 1238

## (undated) DEVICE — PREP POVIDONE IODINE SCRUB 7.5% 4OZ APL82212

## (undated) DEVICE — Device

## (undated) DEVICE — SPONGE SURGIFOAM 100 1974

## (undated) DEVICE — BUR STRK 2.3MM TAPERED ROUTER - FA2 5407-FA2-023

## (undated) DEVICE — PREP POVIDONE IODINE SOLUTION 10% 4OZ

## (undated) DEVICE — PREP POVIDONE IODINE SOLUTION 10% 4OZ BOTTLE 29906-004

## (undated) DEVICE — DRAPE SHEET REV FOLD 3/4 9349

## (undated) DEVICE — IMP PLATE SYN STRUT LOW PROFILE 6H TI 421.522: Type: IMPLANTABLE DEVICE | Site: CRANIAL | Status: NON-FUNCTIONAL

## (undated) DEVICE — DRAPE IOBAN INCISE 23X17" 6650EZ

## (undated) DEVICE — DRAPE U SPLIT 74X120" 29440

## (undated) DEVICE — RX SURGIFLO HEMOSTATIC MATRIX W/THROMBIN 8ML 2994

## (undated) DEVICE — SYR 10ML FINGER CONTROL W/O NDL 309695

## (undated) DEVICE — NDL BLUNT 18GA 1" W/O FILTER 305181

## (undated) DEVICE — DRSG GAUZE 4X4" TRAY 6939

## (undated) DEVICE — DRSG TELFA 3"X8" NON25720

## (undated) DEVICE — SOL ADH LIQUID BENZOIN SWAB 0.6ML C1544

## (undated) DEVICE — CABLE 5 CHANNEL INPUT  ALPHA OMEGA SYSTEM STR-000642-55

## (undated) DEVICE — STRAP UNIVERSAL POSITIONING 2-PIECE 4X47X76" 91-287

## (undated) DEVICE — DRAPE SHEET HALF 40X60" 9358

## (undated) DEVICE — SOL NACL 0.9% IRRIG 1000ML BOTTLE 2F7124

## (undated) DEVICE — SENSIGHT LEAD TEST CABLE KIT

## (undated) DEVICE — SU VICRYL 3-0 SH 8X18" UND J864D

## (undated) DEVICE — PREP CHLORAPREP CLEAR 3ML 260400

## (undated) DEVICE — DRAPE SPLIT SHEET 77X108 REINFORCED 29436

## (undated) DEVICE — SU VICRYL 0 CT-1 27" UND J260H

## (undated) DEVICE — PACK SET-UP STD 9102

## (undated) DEVICE — CANNULA

## (undated) DEVICE — BUR STRK CARBIDE ROUND 3.0MM EXT 5820-110-330C

## (undated) DEVICE — ESU GROUND PAD ADULT REM W/15' CORD E7507DB

## (undated) DEVICE — PREP CHLORAPREP CLEAR 3ML 930400

## (undated) DEVICE — DRSG STERI STRIP 1/2X4" R1547

## (undated) DEVICE — ESU GROUND PAD ADULT W/CORD E7507

## (undated) DEVICE — SU VICRYL 2-0 CT-2 CR 8X18" J726D

## (undated) DEVICE — STERILE CANNULA

## (undated) DEVICE — DRSG TEGADERM 2 3/8X2 3/4" 1624W

## (undated) DEVICE — LABEL MEDICATION SYSTEM 3303-P

## (undated) DEVICE — IMPLANTABLE DEVICE: Type: IMPLANTABLE DEVICE | Site: CRANIAL | Status: NON-FUNCTIONAL

## (undated) DEVICE — COVER CAMERA VIDEO LASER 9X96" 04-CC227

## (undated) DEVICE — LINEN TOWEL PACK X5 5464

## (undated) DEVICE — WIPES FOLEY CARE SURESTEP PROVON DFC100

## (undated) DEVICE — HEADRING POINTS STEREOTACTIC DHRSL

## (undated) RX ORDER — PROPOFOL 10 MG/ML
INJECTION, EMULSION INTRAVENOUS
Status: DISPENSED
Start: 2021-08-23

## (undated) RX ORDER — LABETALOL HYDROCHLORIDE 5 MG/ML
INJECTION, SOLUTION INTRAVENOUS
Status: DISPENSED
Start: 2022-04-11

## (undated) RX ORDER — DEXAMETHASONE SODIUM PHOSPHATE 4 MG/ML
INJECTION, SOLUTION INTRA-ARTICULAR; INTRALESIONAL; INTRAMUSCULAR; INTRAVENOUS; SOFT TISSUE
Status: DISPENSED
Start: 2021-08-23

## (undated) RX ORDER — LIDOCAINE HYDROCHLORIDE 20 MG/ML
INJECTION, SOLUTION EPIDURAL; INFILTRATION; INTRACAUDAL; PERINEURAL
Status: DISPENSED
Start: 2021-08-23

## (undated) RX ORDER — FENTANYL CITRATE 50 UG/ML
INJECTION, SOLUTION INTRAMUSCULAR; INTRAVENOUS
Status: DISPENSED
Start: 2022-05-02

## (undated) RX ORDER — HYDROMORPHONE HYDROCHLORIDE 1 MG/ML
INJECTION, SOLUTION INTRAMUSCULAR; INTRAVENOUS; SUBCUTANEOUS
Status: DISPENSED
Start: 2022-04-18

## (undated) RX ORDER — PROPOFOL 10 MG/ML
INJECTION, EMULSION INTRAVENOUS
Status: DISPENSED
Start: 2021-10-08

## (undated) RX ORDER — CEFAZOLIN SODIUM 1 G/3ML
INJECTION, POWDER, FOR SOLUTION INTRAMUSCULAR; INTRAVENOUS
Status: DISPENSED
Start: 2021-08-23

## (undated) RX ORDER — EPHEDRINE SULFATE 50 MG/ML
INJECTION, SOLUTION INTRAMUSCULAR; INTRAVENOUS; SUBCUTANEOUS
Status: DISPENSED
Start: 2021-08-23

## (undated) RX ORDER — CEFAZOLIN SODIUM/WATER 2 G/20 ML
SYRINGE (ML) INTRAVENOUS
Status: DISPENSED
Start: 2022-04-18

## (undated) RX ORDER — LIDOCAINE HYDROCHLORIDE 20 MG/ML
INJECTION, SOLUTION EPIDURAL; INFILTRATION; INTRACAUDAL; PERINEURAL
Status: DISPENSED
Start: 2021-10-08

## (undated) RX ORDER — PROPOFOL 10 MG/ML
INJECTION, EMULSION INTRAVENOUS
Status: DISPENSED
Start: 2022-04-18

## (undated) RX ORDER — ROCURONIUM BROMIDE 50 MG/5 ML
SYRINGE (ML) INTRAVENOUS
Status: DISPENSED
Start: 2022-04-18

## (undated) RX ORDER — HYDRALAZINE HYDROCHLORIDE 20 MG/ML
INJECTION INTRAMUSCULAR; INTRAVENOUS
Status: DISPENSED
Start: 2022-04-11

## (undated) RX ORDER — ONDANSETRON 2 MG/ML
INJECTION INTRAMUSCULAR; INTRAVENOUS
Status: DISPENSED
Start: 2021-08-23

## (undated) RX ORDER — ACETAMINOPHEN 325 MG/1
TABLET ORAL
Status: DISPENSED
Start: 2022-04-18

## (undated) RX ORDER — LIDOCAINE HYDROCHLORIDE 20 MG/ML
INJECTION, SOLUTION EPIDURAL; INFILTRATION; INTRACAUDAL; PERINEURAL
Status: DISPENSED
Start: 2022-04-11

## (undated) RX ORDER — FENTANYL CITRATE 50 UG/ML
INJECTION, SOLUTION INTRAMUSCULAR; INTRAVENOUS
Status: DISPENSED
Start: 2021-10-08

## (undated) RX ORDER — FENTANYL CITRATE 50 UG/ML
INJECTION, SOLUTION INTRAMUSCULAR; INTRAVENOUS
Status: DISPENSED
Start: 2021-08-23

## (undated) RX ORDER — HYDROMORPHONE HYDROCHLORIDE 1 MG/ML
INJECTION, SOLUTION INTRAMUSCULAR; INTRAVENOUS; SUBCUTANEOUS
Status: DISPENSED
Start: 2021-08-23

## (undated) RX ORDER — PROPOFOL 10 MG/ML
INJECTION, EMULSION INTRAVENOUS
Status: DISPENSED
Start: 2022-05-02

## (undated) RX ORDER — ESMOLOL HYDROCHLORIDE 10 MG/ML
INJECTION INTRAVENOUS
Status: DISPENSED
Start: 2022-04-11

## (undated) RX ORDER — PROPOFOL 10 MG/ML
INJECTION, EMULSION INTRAVENOUS
Status: DISPENSED
Start: 2021-07-13

## (undated) RX ORDER — FENTANYL CITRATE 50 UG/ML
INJECTION, SOLUTION INTRAMUSCULAR; INTRAVENOUS
Status: DISPENSED
Start: 2022-04-11

## (undated) RX ORDER — ONDANSETRON 2 MG/ML
INJECTION INTRAMUSCULAR; INTRAVENOUS
Status: DISPENSED
Start: 2021-10-08

## (undated) RX ORDER — ONDANSETRON 2 MG/ML
INJECTION INTRAMUSCULAR; INTRAVENOUS
Status: DISPENSED
Start: 2022-04-18

## (undated) RX ORDER — ACETAMINOPHEN 325 MG/1
TABLET ORAL
Status: DISPENSED
Start: 2022-05-02

## (undated) RX ORDER — CEFAZOLIN SODIUM 1 G/3ML
INJECTION, POWDER, FOR SOLUTION INTRAMUSCULAR; INTRAVENOUS
Status: DISPENSED
Start: 2022-04-11

## (undated) RX ORDER — ACETAMINOPHEN 325 MG/1
TABLET ORAL
Status: DISPENSED
Start: 2022-04-11

## (undated) RX ORDER — DEXAMETHASONE SODIUM PHOSPHATE 4 MG/ML
INJECTION, SOLUTION INTRA-ARTICULAR; INTRALESIONAL; INTRAMUSCULAR; INTRAVENOUS; SOFT TISSUE
Status: DISPENSED
Start: 2022-04-18

## (undated) RX ORDER — CEFAZOLIN SODIUM 2 G/100ML
INJECTION, SOLUTION INTRAVENOUS
Status: DISPENSED
Start: 2021-08-23

## (undated) RX ORDER — FENTANYL CITRATE-0.9 % NACL/PF 10 MCG/ML
PLASTIC BAG, INJECTION (ML) INTRAVENOUS
Status: DISPENSED
Start: 2021-08-23

## (undated) RX ORDER — FENTANYL CITRATE 50 UG/ML
INJECTION, SOLUTION INTRAMUSCULAR; INTRAVENOUS
Status: DISPENSED
Start: 2021-07-13

## (undated) RX ORDER — DEXAMETHASONE SODIUM PHOSPHATE 4 MG/ML
INJECTION, SOLUTION INTRA-ARTICULAR; INTRALESIONAL; INTRAMUSCULAR; INTRAVENOUS; SOFT TISSUE
Status: DISPENSED
Start: 2022-04-11

## (undated) RX ORDER — FENTANYL CITRATE 50 UG/ML
INJECTION, SOLUTION INTRAMUSCULAR; INTRAVENOUS
Status: DISPENSED
Start: 2022-04-18

## (undated) RX ORDER — ONDANSETRON 2 MG/ML
INJECTION INTRAMUSCULAR; INTRAVENOUS
Status: DISPENSED
Start: 2021-07-08

## (undated) RX ORDER — PROPOFOL 10 MG/ML
INJECTION, EMULSION INTRAVENOUS
Status: DISPENSED
Start: 2022-04-11

## (undated) RX ORDER — FENTANYL CITRATE 50 UG/ML
INJECTION, SOLUTION INTRAMUSCULAR; INTRAVENOUS
Status: DISPENSED
Start: 2021-07-08

## (undated) RX ORDER — CEFAZOLIN SODIUM/WATER 2 G/20 ML
SYRINGE (ML) INTRAVENOUS
Status: DISPENSED
Start: 2022-05-02

## (undated) RX ORDER — ONDANSETRON 2 MG/ML
INJECTION INTRAMUSCULAR; INTRAVENOUS
Status: DISPENSED
Start: 2022-04-11

## (undated) RX ORDER — LIDOCAINE HYDROCHLORIDE AND EPINEPHRINE 10; 10 MG/ML; UG/ML
INJECTION, SOLUTION INFILTRATION; PERINEURAL
Status: DISPENSED
Start: 2021-08-23